# Patient Record
Sex: MALE | Race: WHITE | NOT HISPANIC OR LATINO | Employment: OTHER | URBAN - METROPOLITAN AREA
[De-identification: names, ages, dates, MRNs, and addresses within clinical notes are randomized per-mention and may not be internally consistent; named-entity substitution may affect disease eponyms.]

---

## 2017-01-05 DIAGNOSIS — K21.00 GASTROESOPHAGEAL REFLUX DISEASE WITH ESOPHAGITIS: Primary | ICD-10-CM

## 2017-01-05 RX ORDER — OMEPRAZOLE 40 MG/1
40 CAPSULE, DELAYED RELEASE ORAL DAILY
Qty: 90 CAPSULE | Refills: 0 | Status: SHIPPED | OUTPATIENT
Start: 2017-01-05 | End: 2017-03-23

## 2017-01-05 NOTE — TELEPHONE ENCOUNTER
omeprazole (PRILOSEC) 40 MG capsule      Last Written Prescription Date: 4/13/2016  Last Fill Quantity: 90 capsule,  # refills: 1   Last Office Visit with [facility], [facility] or [facility] prescribing provider: 4/13/2016

## 2017-01-05 NOTE — TELEPHONE ENCOUNTER
Prescription approved per [facility] Refill Protocol.  Dolores Kim, RN, BSN  [facility]

## 2017-01-09 ENCOUNTER — TELEPHONE (OUTPATIENT)
Dept: FAMILY MEDICINE | Facility: CLINIC | Age: 52
End: 2017-01-09

## 2017-01-09 DIAGNOSIS — Z53.9 DIAGNOSIS NOT YET DEFINED: Primary | ICD-10-CM

## 2017-01-09 PROCEDURE — G0179 MD RECERTIFICATION HHA PT: HCPCS | Performed by: FAMILY MEDICINE

## 2017-01-09 NOTE — TELEPHONE ENCOUNTER
Forms received by: Fax    Purpose of Form:  Home Health    How the form needs to be returned for patient:  Fax    Form currently placed:  inbox    Steph Tolbert CMA

## 2017-01-23 ENCOUNTER — TELEPHONE (OUTPATIENT)
Dept: FAMILY MEDICINE | Facility: CLINIC | Age: 52
End: 2017-01-23

## 2017-01-23 NOTE — TELEPHONE ENCOUNTER
Forms received by: Fax    Purpose of Form:  Accurate Home Care    How the form needs to be returned for patient:  Fax    Form currently placed:  inbox    Steph Tolbert CMA

## 2017-03-06 ENCOUNTER — TELEPHONE (OUTPATIENT)
Dept: FAMILY MEDICINE | Facility: CLINIC | Age: 52
End: 2017-03-06

## 2017-03-06 NOTE — TELEPHONE ENCOUNTER
Forms received by: Fax    Purpose of Form:  Orders    How the form needs to be returned for patient:  Fax    Form currently placed:  inbox    Steph Tolbert CMA

## 2017-03-13 ENCOUNTER — TELEPHONE (OUTPATIENT)
Dept: FAMILY MEDICINE | Facility: CLINIC | Age: 52
End: 2017-03-13

## 2017-03-13 DIAGNOSIS — Z53.9 DIAGNOSIS NOT YET DEFINED: Primary | ICD-10-CM

## 2017-03-13 PROCEDURE — 99207 C MD CERTIFICATION HHA PATIENT: CPT | Performed by: FAMILY MEDICINE

## 2017-03-13 NOTE — TELEPHONE ENCOUNTER
Date Forms was received: March 13, 2017    Forms received by: Fax    Last office visit: 04/20/2016    Purpose of Form:  Accurate Home Care    When the form is due:  ASAP    How the form needs to be returned for patient:  Fax to [phone]    Form currently placed  Dr Terrance Lovett LPN

## 2017-03-13 NOTE — TELEPHONE ENCOUNTER
Date Forms was received: March 13, 2017    Forms received by: Fax    Last office visit: 04/2016    Purpose of Form:  Home Health Cert,Plan of Care    When the form is due:  ASAP    How the form needs to be returned for patient:  Fax    Form currently placed  To Dr Terrance Lovett LPN

## 2017-03-23 DIAGNOSIS — K21.00 GASTROESOPHAGEAL REFLUX DISEASE WITH ESOPHAGITIS: ICD-10-CM

## 2017-03-23 NOTE — TELEPHONE ENCOUNTER
Omeprazole DR 40MG Capsule      Last Written Prescription Date: 01/05/2017  Last Fill Quantity: 90,  # refills: 0   Last Office Visit with G, P or [facility] prescribing provider: navneet

## 2017-03-24 RX ORDER — OMEPRAZOLE 40 MG/1
40 CAPSULE, DELAYED RELEASE ORAL DAILY
Qty: 90 CAPSULE | Refills: 0 | Status: SHIPPED | OUTPATIENT
Start: 2017-03-24 | End: 2017-07-25

## 2017-03-24 NOTE — TELEPHONE ENCOUNTER
Prescription approved per [facility] Refill Protocol.  LOV 4/13/16  Madelyn Aranda R.N.

## 2017-03-29 ENCOUNTER — OFFICE VISIT (OUTPATIENT)
Dept: FAMILY MEDICINE | Facility: CLINIC | Age: 52
End: 2017-03-29
Payer: MEDICARE

## 2017-03-29 VITALS
DIASTOLIC BLOOD PRESSURE: 64 MMHG | HEART RATE: 69 BPM | TEMPERATURE: 98 F | SYSTOLIC BLOOD PRESSURE: 118 MMHG | BODY MASS INDEX: 27.92 KG/M2 | OXYGEN SATURATION: 98 % | WEIGHT: 195 LBS | HEIGHT: 70 IN

## 2017-03-29 DIAGNOSIS — M21.371 RIGHT FOOT DROP: ICD-10-CM

## 2017-03-29 DIAGNOSIS — M25.561 ACUTE PAIN OF RIGHT KNEE: Primary | ICD-10-CM

## 2017-03-29 DIAGNOSIS — S06.9XAS NEUROBEHAVIORAL SEQUELAE OF TRAUMATIC BRAIN INJURY (H): ICD-10-CM

## 2017-03-29 DIAGNOSIS — G83.10 MONOPLEGIA OF LOWER LIMB AFFECTING DOMINANT SIDE (H): ICD-10-CM

## 2017-03-29 DIAGNOSIS — R26.9 ABNORMAL GAIT: ICD-10-CM

## 2017-03-29 DIAGNOSIS — R46.89 NEUROBEHAVIORAL SEQUELAE OF TRAUMATIC BRAIN INJURY (H): ICD-10-CM

## 2017-03-29 PROCEDURE — 99214 OFFICE O/P EST MOD 30 MIN: CPT | Performed by: PHYSICIAN ASSISTANT

## 2017-03-29 NOTE — NURSING NOTE
"Chief Complaint   Patient presents with     Musculoskeletal Problem       Initial /64  Pulse 69  Temp 98  F (36.7  C) (Oral)  Ht 5' 10\" (1.778 m)  Wt 195 lb (88.5 kg)  SpO2 98%  BMI 27.98 kg/m2 Estimated body mass index is 27.98 kg/(m^2) as calculated from the following:    Height as of this encounter: 5' 10\" (1.778 m).    Weight as of this encounter: 195 lb (88.5 kg)..  BP completed using cuff size: keaton Goss MA  "

## 2017-03-29 NOTE — MR AVS SNAPSHOT
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: Neurology Clinic - [place] [phone]   [email or web address]  HX of TBI  [facility]: [facility] of [facility] - [place] [phone]   [email or web address]  [place] [phone]   [email or web address]  [facility]: [facility] - [place] [phone]   [email or web address]    Reason for Referral: Consult    Please be aware that coverage of these services is subject to the terms and limitations of your health insurance plan.  Call member services at your health plan with any benefit or coverage questions.      Please bring the following with you to your appointment:    (1) Any X-Rays, CTs or MRIs which have been performed.  Contact the facility where they were done to arrange for  prior to your scheduled appointment.    (2) List of current medications  (3) This referral request   (4) Any documents/labs given to you for this referral            PHYSICAL THERAPY REFERRAL       *This therapy referral will be filtered to a centralized scheduling office at [facility] and the patient will receive a call to schedule an appointment at a [place] location most convenient for them. *     [facility] provides Physical Therapy  evaluation and treatment and many specialty services across the [place] system.  If requesting a specialty program, please choose from the list below.    If you have not heard from the scheduling office within 2 business days, please call [phone] for all locations, with the exception of [place], please call [phone].  Treatment: Evaluation & Treatment  Special Instructions/Modalities: Would like PT and OT evaluation for abnormal gait  Special Programs: Can we help fit him with a new brace for his gait instability as well?  If needs new orders or referral let me know.  Also completed a neurology consult and if feels he should complete this first let me know.    Electronically Signed By: Katia Huntley PA-C  [phone]    Please be aware that coverage of these services is subject to the terms and limitations of your health insurance plan.  Call member services at your health plan with any benefit or coverage questions.      **Note to Provider:  If you are referring outside of [facility] for the therapy appointment, please list the name of the location in the  special instructions  above, print the referral and give to the patient to schedule the appointment.                  Your next 10 appointments already scheduled     May 03, 2017  3:15 PM CDT   Evaluation with Lori Mcelroy, PT   [facility] ([facility])    [address] [place] [id number] [phone]              Who to contact     If you have questions or need follow up information about today's clinic visit or your schedule please contact [facility] [place] directly at [phone].  Normal or non-critical lab and imaging results will be communicated to you by MyChart, letter or phone within 4 business days after the clinic has received the results. If you do not hear from us within 7 days, please contact the clinic through MyChart or phone. If you have a critical or abnormal lab result,  "we will notify you by phone as soon as possible.  Submit refill requests through [employer] or call your pharmacy and they will forward the refill request to us. Please allow 3 business days for your refill to be completed.          Additional Information About Your Visit        [employer] Information     [employer] lets you send messages to your doctor, view your test results, renew your prescriptions, schedule appointments and more. To sign up, go to [email or web address] . Click on \"Log in\" on the left side of the screen, which will take you to the Welcome page. Then click on \"Sign up Now\" on the right side of the page.     You will be asked to enter the access code listed below, as well as some personal information. Please follow the directions to create your username and password.     Your access code is: [email or web address]  Expires: 2017  8:15 AM     Your access code will  in 90 days. If you need help or a new code, please call your [place] clinic or [phone].        Care EveryWhere ID     This is your Care EveryWhere ID. This could be used by other organizations to access your [place] medical records  [id number]        Your Vitals Were     Pulse Temperature Height Pulse Oximetry BMI (Body Mass Index)       69 98  F (36.7  C) (Oral) 5' 10\" (1.778 m) 98% 27.98 kg/m2        Blood Pressure from Last 3 Encounters:   17 118/64   16 141/69   16 112/62    Weight from Last 3 Encounters:   17 195 lb (88.5 kg)   16 190 lb (86.2 kg)   16 192 lb (87.1 kg)              We Performed the Following     NEUROLOGY ADULT REFERRAL     PHYSICAL THERAPY REFERRAL          Today's Medication Changes          These changes are accurate as of: 3/29/17 11:59 PM.  If you have any questions, ask your nurse or doctor.               Stop taking these medicines if you haven't already. Please contact your care team if you have questions.     order for DME   Stopped by:  Katia Huntley, " [place]                    Primary Care Provider Office Phone # Fax #    Alberto Carlos Jr., [phone]       [facility] [address] [facility] [address]        Thank you!     Thank you for choosing [facility]  for your care. Our goal is always to provide you with excellent care. Hearing back from our patients is one way we can continue to improve our services. Please take a few minutes to complete the written survey that you may receive in the mail after your visit with us. Thank you!             Your Updated Medication List - Protect others around you: Learn how to safely use, store and throw away your medicines at www.disposemymeds.org.          This list is accurate as of: 3/29/17 11:59 PM.  Always use your most recent med list.                   Brand Name Dispense Instructions for use    aspirin 325 MG EC tablet     90 tablet    Take 1 tablet (325 mg) by mouth every morning       dicyclomine 10 MG capsule    BENTYL    240 capsule    Take 1 capsule (10 mg) by mouth 4 times daily (before meals and nightly)       divalproex 500 MG 24 hr tablet    DEPAKOTE ER    180 tablet    Take 2 tablets (1,000 mg) by mouth daily       indomethacin 50 MG capsule    INDOCIN    30 capsule    Take 1 capsule (50 mg) by mouth 3 times daily (with meals)       Multi-vitamin Tabs tablet   Generic drug:  multivitamin, therapeutic with minerals      1 tab q d       omeprazole 40 MG capsule    priLOSEC    90 capsule    Take 1 capsule (40 mg) by mouth daily Take 30-60 minutes before a meal.       oxyCODONE-acetaminophen 5-325 MG per tablet    PERCOCET    20 tablet    Take 1-2 tablets by mouth every 6 hours as needed for moderate to severe pain       PARoxetine 40 MG tablet    PAXIL    135 tablet    Take 1.5 tablets (60 mg) by mouth every morning       polyethylene glycol powder    MIRALAX    510 g    Take 17 g (1 capful) by mouth daily       PROBIOTIC DAILY PO          senna-docusate 8.6-50 MG  per tablet    SENOKOT S    60 tablet    Take 1 tablet by mouth 2 times daily as needed for constipation       simvastatin 80 MG tablet    ZOCOR    90 tablet    Take 1 tablet (80 mg) by mouth At Bedtime       VITAMIN D (CHOLECALCIFEROL) PO      Take by mouth daily

## 2017-03-29 NOTE — PROGRESS NOTES
SUBJECTIVE:                                                    Yunior Angel is a 51 year old male who presents to clinic today for the following health issues:    Concern - right leg - knee area     Onset: 2 weeks - 5 days ago was the worst    Description:   When walking there is pain - rated 7/10 while walking and 0/10 at rest    Intensity: mild    Progression of Symptoms:  same    Accompanying Signs & Symptoms:  Limited activity at gym - can no longer walk on treadmill  0/10 pain at rest.  Only has pain with bearing weight rates as 6-7.  Stairs make worse.   Never has swelling or bruising.   No popping, catching or locking.   No remembered injury.       Previous history of similar problem:   none    Precipitating factors:   Worsened by: walking    Alleviating factors:  Improved by: laying down and sitting       Therapies Tried and outcome: knee brace - has worn this intermittently for past 3 years as has abnormal gait from his TBI in the 1990's. Has not seen neurology for years. No pain medication.    Pt accompanied by his mother who does provide some of the history today, but pt otherwise appears to be a pretty good historian.    Problem list and histories reviewed & adjusted, as indicated.  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  Avulsion of the right great toenail.  2.  Excision of bone cyst distal phalanx, right great toe.        Social History   Substance Use Topics     Smoking status: Never Smoker     Smokeless tobacco: Never Used     Alcohol use No     Family History   Problem Relation Age of Onset     Neurologic Disorder Father      migraines     Family History Negative Mother      Cancer - colorectal No family hx of      Colon Cancer No family hx of          Current Outpatient Prescriptions   Medication Sig Dispense Refill     omeprazole (PRILOSEC) 40 MG capsule Take 1 capsule (40 mg) by mouth daily Take 30-60 minutes before a meal. 90 capsule 0     divalproex (DEPAKOTE ER) 500 MG 24 hr tablet Take 2 tablets (1,000 mg) by mouth daily 180 tablet 1     PARoxetine (PAXIL) 40 MG tablet Take 1.5 tablets (60 mg) by mouth every morning 135 tablet 1     aspirin 325 MG EC tablet Take 1 tablet (325 mg) by mouth every morning 90 tablet 2     indomethacin (INDOCIN) 50 MG capsule Take 1 capsule (50 mg) by mouth 3 times daily (with meals) 30 capsule 0     oxyCODONE-acetaminophen (PERCOCET) 5-325 MG per tablet Take 1-2 tablets by mouth every 6 hours as needed for moderate to severe pain 20 tablet 0     polyethylene glycol (MIRALAX) powder Take 17 g (1 capful) by mouth daily 510 g 1     simvastatin (ZOCOR) 80 MG tablet Take 1 tablet (80 mg) by mouth At Bedtime 90 tablet 3     dicyclomine (BENTYL) 10 MG capsule Take 1 capsule (10 mg) by mouth 4 times daily (before meals and nightly) 240 capsule 3     senna-docusate (SENOKOT S) 8.6-50 MG per tablet Take 1 tablet by mouth 2  "times daily as needed for constipation 60 tablet 5     VITAMIN D, CHOLECALCIFEROL, PO Take by mouth daily       Probiotic Product (PROBIOTIC DAILY PO)        MULTI-VITAMIN OR TABS 1 tab q d        Allergies   Allergen Reactions     Insect Extract      red ants       Reviewed and updated as needed this visit by clinical staff  Tobacco  Allergies  Meds  Med Hx  Surg Hx  Fam Hx  Soc Hx      Reviewed and updated as needed this visit by Provider  Tobacco  Allergies  Meds  Med Hx  Surg Hx  Fam Hx  Soc Hx        ROS:  CONSTITUTIONAL:NEGATIVE for fever, chills, change in weight  INTEGUMENTARY/SKIN: NEGATIVE for worrisome rashes, moles or lesions  MUSCULOSKELETAL: POSITIVE  for R knee pain.    OBJECTIVE:                                                    /64  Pulse 69  Temp 98  F (36.7  C) (Oral)  Ht 5' 10\" (1.778 m)  Wt 195 lb (88.5 kg)  SpO2 98%  BMI 27.98 kg/m2  Body mass index is 27.98 kg/(m^2).  GENERAL: healthy, alert and no distress  MS: ambulates with antalgic gait picking-up R hip in order to place foot on ground as appears to have R foot drop. Utilizes custom knee orthosis, but knee appears to still hyperextend a little bit.   Neuro: Normal 2+ LE DTRs, but has decreased strength to dorsiflexion against resistance of R foot in comparison to L.   Knee: Wearing a R knee brace. Has no pain with active non-weight bearing ROM. No TTP of patella, medial/lateral joint lines or bony landmarks. Negative Rafi testing, medial/lateral stress testing and patellar grind.  SKIN: no rash or areas of abrasion, injury from brace.    Diagnostic Test Results:  X-ray considered today, but machine is broken.     ASSESSMENT/PLAN:                                                          ICD-10-CM    1. Acute pain of right knee M25.561 PHYSICAL THERAPY REFERRAL   2. Abnormal gait R26.9 PHYSICAL THERAPY REFERRAL     NEUROLOGY ADULT REFERRAL   3. Monoparesis, Right Lower Extremity, due to TBI G83.10 PHYSICAL " THERAPY REFERRAL     NEUROLOGY ADULT REFERRAL   4. Right foot drop M21.371 PHYSICAL THERAPY REFERRAL     NEUROLOGY ADULT REFERRAL   5. 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 Exam suggests that his R knee orthosis may now be contributing to peroneal injury resulting in R foot drop.   Would likely benefit from re-consultation with neurology to confirm diagnosis and ensure no other follow-up required.  Additionally feel that further evaluation with PT and OT would be of benefit to see if we can help strengthen his  R side and help fit him for a different orthosis.  Considered completing x-ray today, but this was unavailable.  Could consider if starting to have pain at rest or new concerns.  Had wanted to verify referral info for mom/pt so did call them after our visit with this and asked for return call with any questions.  Did call back again and they have appt already scheduled with PT and will call to schedule neurology consult at \[facility]\"" [facility] in [place].    A total of 30 minutes was spent with the patient today, with greater than 50% of the visit involving counseling and coordination of care regarding possible origins of his knee pain and next best treatment plan as noted above.  Katia Huntley PA-C  [facility]

## 2017-04-10 ENCOUNTER — TELEPHONE (OUTPATIENT)
Dept: FAMILY MEDICINE | Facility: CLINIC | Age: 52
End: 2017-04-10

## 2017-04-10 NOTE — TELEPHONE ENCOUNTER
Forms received by: Fax    Purpose of Form:  Accommodations to home fixtures    How the form needs to be returned for patient:  Fax    Form currently placed:  inbox    Steph Tolbert CMA

## 2017-05-03 ENCOUNTER — HOSPITAL ENCOUNTER (OUTPATIENT)
Dept: PHYSICAL THERAPY | Facility: CLINIC | Age: 52
Setting detail: THERAPIES SERIES
End: 2017-05-03
Attending: PHYSICIAN ASSISTANT
Payer: MEDICARE

## 2017-05-03 PROCEDURE — 97110 THERAPEUTIC EXERCISES: CPT | Mod: GP | Performed by: PHYSICAL THERAPIST

## 2017-05-03 PROCEDURE — G8979 MOBILITY GOAL STATUS: HCPCS | Mod: GP,CJ | Performed by: PHYSICAL THERAPIST

## 2017-05-03 PROCEDURE — 40000185 ZZHC STATISTIC PT OUTPT VISIT: Performed by: PHYSICAL THERAPIST

## 2017-05-03 PROCEDURE — G8978 MOBILITY CURRENT STATUS: HCPCS | Mod: GP,CK | Performed by: PHYSICAL THERAPIST

## 2017-05-03 PROCEDURE — 97162 PT EVAL MOD COMPLEX 30 MIN: CPT | Mod: GP | Performed by: PHYSICAL THERAPIST

## 2017-05-03 NOTE — PROGRESS NOTES
[facility]        OUTPATIENT PHYSICAL THERAPY FUNCTIONAL EVALUATION  PLAN OF TREATMENT FOR OUTPATIENT REHABILITATION  (COMPLETE FOR INITIAL CLAIMS ONLY)  Patient's Last Name, First Name, M.I.  YOB: 1965  JeanneYunior  LINDSAY     Provider's Name   [facility]   Medical Record No.  [id number]     Start of Care Date:  05/03/17   Onset Date:   (knee pain about 6-8 weeks ago.  TBI 1991)   Type:     _X__PT   ____OT  ____SLP Medical Diagnosis:  right knee pain; h/o TBI and sequelae     PT Diagnosis:  impaired postural alignment and gait mechanics resulting in right knee pain further limiting mobility.  Visits from SOC:  1                              __________________________________________________________________________________  Plan of Treatment/Functional Goals:  gait training, joint mobilization, neuromuscular re-education, orthotic fitting/training, strengthening, transfer training, stretching, manual therapy           GOALS  1  Yunior will be able to negotiate stairs in a reciprocal pattern and one rail with no c/o pain or instability for greater safety at home and in community.   07/02/17    2  Yunior will rise to stand from an 18 inch surface with feet no more than 10 inches apart with minimal use of hands and minimal c/o pain for greater ease and safety with transitions in home and community areas.   07/02/17    3  Yunior will ambulate 25 feet in 7.5 sec or less to indicate improved gait efficiency and reduced level of impairment.  07/02/17    4  Yunior will be independent in a home ex program to address his system and functional impairments to self management of his condition.   07/02/17                                                Therapy Frequency:  1 time/week   Predicted Duration of Therapy Intervention:  60 days    Lori Mcelroy, PT                                     I CERTIFY THE NEED FOR THESE SERVICES FURNISHED UNDER        THIS PLAN OF TREATMENT AND WHILE UNDER MY CARE     (Physician co-signature of this document indicates review and certification of the therapy plan).                Certification Date From:  05/03/17   Certification Date To:  07/02/17    Referring Provider:  Katia Kearns PA-C    Initial Assessment  See Epic Evaluation- Start of Care Date: 05/03/17

## 2017-05-03 NOTE — PROGRESS NOTES
05/03/17 1500   Quick Adds   Type of Visit Initial OP PT Evaluation   General Information   Start of Care Date 05/03/17   Referring Physician Katia Kearns PA-C   Orders Evaluate and Treat as Indicated   Order Date 03/29/17   Medical Diagnosis Right knee pain; h/o closed head injury, sequelae of TBI   Onset of illness/injury or Date of Surgery (knee pain about 6-8 weeks ago.  TBI 1991)   Precautions/Limitations fall precautions   Surgical/Medical history reviewed Yes   Pertinent history of current problem (include personal factors and/or comorbidities that impact the POC) Pt presents to PT with c/o increased rigth knee pain over the past 6-8 weeks.  Pt is known to me from previous episode of care.  He has h/o TBI in 1991 resulting in cognitive, memory impairmetns and right sided weakness as well as secondary impairments of tissue shortening and disuse atrophy of his right LE musculature.  Pt's mother Patsy is present to help with history of present condition.  She reports that about 6-8 weeks ago, pt started having c/o right knee pain with rising from sitting position , particularly with getting out of a car, as well as going up stairs.  She feels he started having greater difficulty on the stairs and then became more tentative and cautious on the stairs, mostly going up but also with descending at times.  Both Yunior and Patsy feel that his knee has improved over the past 4 weeks since he saw his PA but it is still bothersome.  Pt has a swedish knee cage brace that he was trained with in PT in his last episode of care 2 and half years ago and he still wears it daily to help limit knee hyperextension and malalignment that persists due to his brain injury.      Pertinent Visual History  wear glasses   Prior level of function comment independent, receives some help with housework. Walks up the stairs, but takes elevator down as he has a h/o fear of going down stairs as he sustained his TBI on the stairs  years ago.  Goes to [employer] 2 x per week. doing tmill less and bike more now. Starts golfing next week 1 x per week.    Diagnostic Tests (no imaging.  Xray machine was not working at time of MD appt)   Current Community Support Family/friend caregiver;Transportation service;Other/Comments  (mom Patsy helps as needed. Gets meals on wheels)   Patient role/Employment history Employed  (part time, mostly sitting)   Living environment Apartment/condo   Home/Community Accessibility Comments Has elevator.  sometimes uses stairs up.     Patient/Family Goals Statement Reduce knee pain   Fall Risk Screen   Fall screen completed by PT   Per patient - Fall 2 or more times in past year? No   Per patient - Fall with injury in past year? No   Timed Up and Go score (seconds) deferred.     Is patient a fall risk? Yes   Fall screen comments due to cognitive and physical impairments    System Outcome Measures   -PAC  Basic Mobility Score Level  (Lower scores equate to lower levels of function) 51.93   AM-PAC  Daily Activity Score Level  (Lower scores equate to lower levels of function) 91.66   AM-PAC  Applied Cognitive Score Level  (Lower scores equate to lower levels of function) 42.56   Pain   Patient currently in pain Yes   Pain location right distal thigh, proximal to patella   Pain rating unable to rate    Pain description (unable to describe)   Pain description comment grimaces when rises to stand.  May hurt more with rising after he has been sitting for awhile.  Feels it loosens up and gets better after first few steps.    Cognitive Status Examination   Orientation orientation to person, place and time   Level of Consciousness alert   Follows Commands and Answers Questions able to follow multistep instructions  (easily distracted, doesn't always answer questions directly)   Personal Safety and Judgment intact   Memory impaired   Posture   Posture Forward head position;Protracted shoulders   Posture Comments right knee  hyperextension with genu valgus of right LE, relative ankle PF and inversion in standing, wt shift to left,  asymmetry of pelvis and spine.ant pelvic tilt and increased lumbar lordosis .    Palpation   Palpation non tender to palpation at quadricep mm group, quad tendon, patellar tendon and MCL/LCL.  some mild tenderness to palpation at medial and lateral joint line.    Range of Motion (ROM)   ROM Comment hyperextension right knee, significant tissue shortening of right plantarflexors and ankle inverters.     Strength   Strength Comments weakness right hip extensors, right hip felxion 3+/5.  right knee ext 4+/5,  right knee flex 4-/5 with slight discomfort in distal thigh - all around front and back. right ankle DF 1+/5 with inversion.  0/5 everters.  hip abd/add in sitting appears similar to left.    Transfer Skills   Transfer Comments able to rise to stand from 21 inch surface with no UE support with effort.  decreased loading right LE - relies on left side.  altered movement pattern with decreased anter wt shift.     Gait   Gait Comments wearing swedish knee cage right LE - still demonstrates decreased hip ext in midstance to term stance with significant knee hyperextension from initial contact through stance phase.  right hip hike with circumduction in swing phase.  Initial contact on right with foot nearly flat - lackes heel contact, lacks ankle DF and ecc control into loading response on right.  compensations of trunk and pelvis due to limb impairments.     Gait Special Tests 25 Foot Timed Walk   Seconds 9.5   Steps 14 Steps   Comments no AD   Balance   Balance Comments able to stand fee together x 30 sec, feet together with EC 30 sec with adquate control.    Balance Special Tests Sit to Stand Reps in 30 Seconds   Reps in 30 seconds 11   Height 20   Comments decreased wt bearing throught right LE.  uses hands.     Sensory Examination   Sensory Perception Comments denies n/t   Coordination   Coordination  Comments impaired   Modality Interventions   Planned Modality Interventions Comments per PT   Planned Therapy Interventions   Planned Therapy Interventions gait training;joint mobilization;neuromuscular re-education;orthotic fitting/training;strengthening;transfer training;stretching;manual therapy   Clinical Impression   Criteria for Skilled Therapeutic Interventions Met yes, treatment indicated   PT Diagnosis impaired postural alignment and gait mechanics resulting in right knee pain further limiting mobility.    Influenced by the following impairments right LE mm imbalances, tissue shortening of right LE/foot and trunk, postural/alignment deviations, strongly engrained compensatory patterns, impaired memory and cognition, distractibility,   Functional limitations due to impairments decreased tolerance and movement quality of transfers, gait , risk for falls.    Clinical Presentation Evolving/Changing  (right knee pain is changing)   Clinical Presentation Rationale significant memory, cognition impairments, significant alignment/movement impairments, lives alone although mother supportive.  many impairments of chronic nature but with new acute pain.    Clinical Decision Making (Complexity) Moderate complexity   Therapy Frequency 1 time/week   Predicted Duration of Therapy Intervention (days/wks) 60 days   Risk & Benefits of therapy have been explained Yes   Patient, Family & other staff in agreement with plan of care Yes   Clinical Impression Comments Recommend follow up with orthotist to assess for any possible adjustments of right swedish knee cage.    Education Assessment   Preferred Learning Style Demonstration;Pictures/video   Barriers to Learning Cognitive   Goal 1   Goal Identifier 1   Goal Description Yunior will be able to negotiate stairs in a reciprocal pattern and one rail with no c/o pain or instability for greater safety at home and in community.    Target Date 07/02/17   Goal 2   Goal Identifier 2    Goal Description Yunior will rise to stand from an 18 inch surface with feet no more than 10 inches apart with minimal use of hands and minimal c/o pain for greater ease and safety with transitions in home and community areas.    Target Date 07/02/17   Goal 3   Goal Identifier 3   Goal Description Yunior will ambulate 25 feet in 7.5 sec or less to indicate improved gait efficiency and reduced level of impairment.   Target Date 07/02/17   Goal 4   Goal Identifier 4   Goal Description Yunior will be independent in a home ex program to address his system and functional impairments to self management of his condition.    Target Date 07/02/17   Total Evaluation Time   Total Evaluation Time (Minutes) 35   Therapy Certification   Certification date from 05/03/17   Certification date to 07/02/17   Medical Diagnosis right knee pain; h/o TBI and sequelae

## 2017-05-09 DIAGNOSIS — K59.09 CHRONIC CONSTIPATION: ICD-10-CM

## 2017-05-09 NOTE — TELEPHONE ENCOUNTER
senna-docusate (SENOKOT S) 8.6-50 MG per tablet      Last Written Prescription Date: 4/13/2016  Last Fill Quantity: 60 tablet,  # refills: 5   Last Office Visit with [facility], [facility] or [facility] prescribing provider: 3/29/2017

## 2017-05-10 ENCOUNTER — TELEPHONE (OUTPATIENT)
Dept: FAMILY MEDICINE | Facility: CLINIC | Age: 52
End: 2017-05-10

## 2017-05-10 ENCOUNTER — HOSPITAL ENCOUNTER (OUTPATIENT)
Dept: PHYSICAL THERAPY | Facility: CLINIC | Age: 52
Setting detail: THERAPIES SERIES
End: 2017-05-10
Attending: PHYSICIAN ASSISTANT
Payer: MEDICARE

## 2017-05-10 PROCEDURE — 40000719 ZZHC STATISTIC PT DEPARTMENT NEURO VISIT: Performed by: PHYSICAL THERAPIST

## 2017-05-10 PROCEDURE — 97112 NEUROMUSCULAR REEDUCATION: CPT | Mod: GP | Performed by: PHYSICAL THERAPIST

## 2017-05-10 NOTE — TELEPHONE ENCOUNTER
Skilled nurse requesting orders every other week for med management    Approved orders.    Brittany Mcintyre RN    [facility]

## 2017-05-11 RX ORDER — AMOXICILLIN 250 MG
1 CAPSULE ORAL 2 TIMES DAILY PRN
Qty: 180 TABLET | Refills: 1 | Status: SHIPPED | OUTPATIENT
Start: 2017-05-11 | End: 2018-01-04

## 2017-05-11 NOTE — TELEPHONE ENCOUNTER
Prescription approved per [facility] Refill Protocol.  Dolores Kim, RN, BSN  [facility]

## 2017-05-12 ENCOUNTER — HOSPITAL ENCOUNTER (OUTPATIENT)
Dept: LAB | Facility: CLINIC | Age: 52
Discharge: HOME | End: 2017-05-12
Attending: PHYSICIAN ASSISTANT | Admitting: PHYSICIAN ASSISTANT
Payer: MEDICARE

## 2017-05-12 DIAGNOSIS — G24.8 DYSTONIA OF EXTREMITY: ICD-10-CM

## 2017-05-12 DIAGNOSIS — Z87.820 HISTORY OF TRAUMATIC BRAIN INJURY: Primary | ICD-10-CM

## 2017-05-12 DIAGNOSIS — M79.18 MYOFASCIAL MUSCLE PAIN: ICD-10-CM

## 2017-05-12 LAB
CK SERPL-CCNC: 116 U/L (ref 30–300)
GGT SERPL-CCNC: 20 U/L (ref 0–75)

## 2017-05-12 PROCEDURE — 36415 COLL VENOUS BLD VENIPUNCTURE: CPT | Performed by: PHYSICIAN ASSISTANT

## 2017-05-12 PROCEDURE — 82550 ASSAY OF CK (CPK): CPT | Performed by: PHYSICIAN ASSISTANT

## 2017-05-12 PROCEDURE — 82977 ASSAY OF GGT: CPT | Mod: GA | Performed by: PHYSICIAN ASSISTANT

## 2017-05-15 ENCOUNTER — TELEPHONE (OUTPATIENT)
Dept: FAMILY MEDICINE | Facility: CLINIC | Age: 52
End: 2017-05-15

## 2017-05-15 DIAGNOSIS — Z53.9 DIAGNOSIS NOT YET DEFINED: Primary | ICD-10-CM

## 2017-05-15 PROCEDURE — G0179 MD RECERTIFICATION HHA PT: HCPCS | Performed by: FAMILY MEDICINE

## 2017-05-17 ENCOUNTER — HOSPITAL ENCOUNTER (OUTPATIENT)
Dept: PHYSICAL THERAPY | Facility: CLINIC | Age: 52
Setting detail: THERAPIES SERIES
End: 2017-05-17
Attending: PHYSICIAN ASSISTANT
Payer: MEDICARE

## 2017-05-17 PROCEDURE — 97112 NEUROMUSCULAR REEDUCATION: CPT | Mod: GP | Performed by: PHYSICAL THERAPIST

## 2017-05-17 PROCEDURE — 40000719 ZZHC STATISTIC PT DEPARTMENT NEURO VISIT: Performed by: PHYSICAL THERAPIST

## 2017-05-17 PROCEDURE — 97110 THERAPEUTIC EXERCISES: CPT | Mod: GP | Performed by: PHYSICAL THERAPIST

## 2017-05-18 ENCOUNTER — MEDICAL CORRESPONDENCE (OUTPATIENT)
Dept: HEALTH INFORMATION MANAGEMENT | Facility: CLINIC | Age: 52
End: 2017-05-18

## 2017-05-24 ENCOUNTER — HOSPITAL ENCOUNTER (OUTPATIENT)
Dept: PHYSICAL THERAPY | Facility: CLINIC | Age: 52
Setting detail: THERAPIES SERIES
End: 2017-05-24
Attending: PHYSICIAN ASSISTANT
Payer: MEDICARE

## 2017-05-24 PROCEDURE — 97110 THERAPEUTIC EXERCISES: CPT | Mod: GP | Performed by: PHYSICAL THERAPIST

## 2017-05-24 PROCEDURE — 40000719 ZZHC STATISTIC PT DEPARTMENT NEURO VISIT: Performed by: PHYSICAL THERAPIST

## 2017-05-24 PROCEDURE — 97112 NEUROMUSCULAR REEDUCATION: CPT | Mod: GP | Performed by: PHYSICAL THERAPIST

## 2017-06-07 ENCOUNTER — HOSPITAL ENCOUNTER (OUTPATIENT)
Dept: PHYSICAL THERAPY | Facility: CLINIC | Age: 52
Setting detail: THERAPIES SERIES
End: 2017-06-07
Attending: PHYSICIAN ASSISTANT
Payer: MEDICARE

## 2017-06-07 PROCEDURE — 40000719 ZZHC STATISTIC PT DEPARTMENT NEURO VISIT: Performed by: PHYSICAL THERAPIST

## 2017-06-07 PROCEDURE — 97112 NEUROMUSCULAR REEDUCATION: CPT | Mod: GP | Performed by: PHYSICAL THERAPIST

## 2017-06-07 PROCEDURE — 97110 THERAPEUTIC EXERCISES: CPT | Mod: GP | Performed by: PHYSICAL THERAPIST

## 2017-06-13 ENCOUNTER — HOSPITAL ENCOUNTER (OUTPATIENT)
Dept: PHYSICAL THERAPY | Facility: CLINIC | Age: 52
Setting detail: THERAPIES SERIES
End: 2017-06-13
Attending: PHYSICIAN ASSISTANT
Payer: MEDICARE

## 2017-06-13 PROCEDURE — 97110 THERAPEUTIC EXERCISES: CPT | Mod: GP | Performed by: PHYSICAL THERAPIST

## 2017-06-13 PROCEDURE — 40000719 ZZHC STATISTIC PT DEPARTMENT NEURO VISIT: Performed by: PHYSICAL THERAPIST

## 2017-06-13 PROCEDURE — 97116 GAIT TRAINING THERAPY: CPT | Mod: GP | Performed by: PHYSICAL THERAPIST

## 2017-06-15 DIAGNOSIS — R46.89 NEUROBEHAVIORAL SEQUELAE OF TRAUMATIC BRAIN INJURY (H): Primary | ICD-10-CM

## 2017-06-15 DIAGNOSIS — S06.9XAS NEUROBEHAVIORAL SEQUELAE OF TRAUMATIC BRAIN INJURY (H): Primary | ICD-10-CM

## 2017-06-15 NOTE — TELEPHONE ENCOUNTER
Paroxetine HCL 40MG     Last Written Prescription Date: 10/27/16  Last Fill Quantity: 135, # refills: 1  Last Office Visit with [facility] primary care provider:  3/29/17        Last PHQ-9 score on record=   PHQ-9 SCORE 12/10/2014   Total Score 3

## 2017-06-16 RX ORDER — PAROXETINE 40 MG/1
60 TABLET, FILM COATED ORAL EVERY MORNING
Qty: 30 TABLET | Refills: 0 | Status: SHIPPED | OUTPATIENT
Start: 2017-06-16 | End: 2017-06-26

## 2017-06-16 NOTE — TELEPHONE ENCOUNTER
Chart reviewed.  Rx sent to pt's preferred pharmacy.  Needs to be seen.  Please have patient make appointment.     [id number] - [place], [place] - [address] [place] [address] [address]    Alberto Carlos MD

## 2017-06-16 NOTE — TELEPHONE ENCOUNTER
Dx for medication is migraines. No depression or anxiety listed on patient's problem list.   Routing refill request to provider for review/approval because:  Patient needs to be seen because it has been more than 1 year since last office visit for this issue.       Madelyn Aranda R.N.

## 2017-06-16 NOTE — TELEPHONE ENCOUNTER
I spoke with pharmacist he is questioning why only a 20 day supply. I informed him patient is overdue for office visit and this is a courtesy refill. Patient will need to be seen by provider for further refills. I asked if they could tell patient this and they said they would. Madelyn Aranda R.N.      Will route to  to call patient for appointment. Madelyn Aranda R.N.

## 2017-06-16 NOTE — TELEPHONE ENCOUNTER
Pharmacy wants to verify quantity .  Please call them back @ [phone]  Татьяна Carpenter, Clinic Receptionist

## 2017-06-19 ENCOUNTER — TELEPHONE (OUTPATIENT)
Dept: FAMILY MEDICINE | Facility: CLINIC | Age: 52
End: 2017-06-19

## 2017-06-19 NOTE — TELEPHONE ENCOUNTER
Forms were dropped off with an incorrect middle and last name so patient could not initially be found.  Called [phone] and spoke with sister who gave me correct patient info.  Pt needs to be seen for this and does have an appt scheduled on 6/26/2017 with GISSELLE.  Form given to GISSELLE for completion at upcoming appt.  Ping Garay

## 2017-06-21 ENCOUNTER — HOSPITAL ENCOUNTER (OUTPATIENT)
Dept: PHYSICAL THERAPY | Facility: CLINIC | Age: 52
Setting detail: THERAPIES SERIES
End: 2017-06-21
Attending: PHYSICIAN ASSISTANT
Payer: MEDICARE

## 2017-06-21 PROCEDURE — 97110 THERAPEUTIC EXERCISES: CPT | Mod: GP | Performed by: PHYSICAL THERAPIST

## 2017-06-21 PROCEDURE — 40000719 ZZHC STATISTIC PT DEPARTMENT NEURO VISIT: Performed by: PHYSICAL THERAPIST

## 2017-06-21 PROCEDURE — 97112 NEUROMUSCULAR REEDUCATION: CPT | Mod: GP | Performed by: PHYSICAL THERAPIST

## 2017-06-26 ENCOUNTER — RADIANT APPOINTMENT (OUTPATIENT)
Dept: GENERAL RADIOLOGY | Facility: CLINIC | Age: 52
End: 2017-06-26
Attending: PHYSICIAN ASSISTANT
Payer: MEDICARE

## 2017-06-26 ENCOUNTER — OFFICE VISIT (OUTPATIENT)
Dept: FAMILY MEDICINE | Facility: CLINIC | Age: 52
End: 2017-06-26
Payer: MEDICARE

## 2017-06-26 VITALS
DIASTOLIC BLOOD PRESSURE: 74 MMHG | OXYGEN SATURATION: 98 % | BODY MASS INDEX: 27.66 KG/M2 | HEIGHT: 70 IN | HEART RATE: 83 BPM | TEMPERATURE: 97.4 F | SYSTOLIC BLOOD PRESSURE: 126 MMHG | WEIGHT: 193.2 LBS

## 2017-06-26 DIAGNOSIS — R46.89 NEUROBEHAVIORAL SEQUELAE OF TRAUMATIC BRAIN INJURY (H): ICD-10-CM

## 2017-06-26 DIAGNOSIS — S06.9XAS NEUROBEHAVIORAL SEQUELAE OF TRAUMATIC BRAIN INJURY (H): ICD-10-CM

## 2017-06-26 DIAGNOSIS — E78.5 HYPERLIPIDEMIA LDL GOAL <160: ICD-10-CM

## 2017-06-26 DIAGNOSIS — K21.00 GASTROESOPHAGEAL REFLUX DISEASE WITH ESOPHAGITIS: ICD-10-CM

## 2017-06-26 DIAGNOSIS — K58.0 IRRITABLE BOWEL SYNDROME WITH DIARRHEA: ICD-10-CM

## 2017-06-26 DIAGNOSIS — M25.561 RIGHT KNEE PAIN, UNSPECIFIED CHRONICITY: Primary | ICD-10-CM

## 2017-06-26 DIAGNOSIS — M25.561 RIGHT KNEE PAIN, UNSPECIFIED CHRONICITY: ICD-10-CM

## 2017-06-26 PROCEDURE — 99215 OFFICE O/P EST HI 40 MIN: CPT | Performed by: PHYSICIAN ASSISTANT

## 2017-06-26 PROCEDURE — 73562 X-RAY EXAM OF KNEE 3: CPT | Mod: RT

## 2017-06-26 RX ORDER — SIMVASTATIN 80 MG
80 TABLET ORAL AT BEDTIME
Qty: 90 TABLET | Refills: 0 | Status: SHIPPED | OUTPATIENT
Start: 2017-06-26 | End: 2018-02-08

## 2017-06-26 RX ORDER — PAROXETINE 40 MG/1
60 TABLET, FILM COATED ORAL EVERY MORNING
Qty: 90 TABLET | Refills: 1 | Status: SHIPPED | OUTPATIENT
Start: 2017-06-26 | End: 2017-11-10

## 2017-06-26 RX ORDER — BACLOFEN 10 MG/1
TABLET ORAL
Refills: 3 | COMMUNITY
Start: 2017-05-31 | End: 2018-02-21

## 2017-06-26 RX ORDER — DIVALPROEX SODIUM 250 MG/1
TABLET, EXTENDED RELEASE ORAL
Refills: 0 | COMMUNITY
Start: 2017-05-04 | End: 2018-02-21

## 2017-06-26 NOTE — PROGRESS NOTES
SUBJECTIVE:                                                    Yunior Angel is a 51 year old male who presents to clinic today for the following health issues:    Joint Pain    Onset: x 3 months     Description:   Location: Right knee   Character: Throbbing     Intensity: 10/10 (varies ) sitting and moving cause discomfort     Progression of Symptoms: worse    Accompanying Signs & Symptoms:  Other symptoms: numbness, tingling and swelling    History:   Previous similar pain: YES      Precipitating factors:   Trauma or overuse: YES    Alleviating factors:  Improved by: ice - mild improvement     Therapies Tried and outcome: Nothing     Difficulty obtaining history due to patient's TBI. Mother attempts to provide some history details.    Reports R anterolateral knee pain  States golf has been difficult for him due to the pain, so he recently stopped golfing. Initially states that it was the prolonged walking during golf that caused pain, but later states it was the twisting motion of his swing.   Gets stiffness when he sits for longer periods of time. Does think his knee loosens and becomes less painful once he has been walking for a bit.  Denies popping/clicking, but does state that the knee feels unstable.  Wears a knee orthosis. States they had it checked recently and were told that it fits him well and is appropriate to keep using.  Mother reports recent falls. Does also state that he has been having balance issues in general, for which he is attending physical therapy. Not doing any knee-specific physical therapy.  Physical therapist noticed the knee swelling and recommended a visit    Has history of gout in foot    Also needs form completed for [employer]. Has been using them for years to get to work.  Due to his traumatic brain injury, would be unable to navigate a bus schedule  He is unable to drive due to his TBI  Mother also notes some judgment problems. Has been known to wander off and think he can  walk home from the [facility], which is actually 4 miles. Cannot properly  the distance. Also got into a car with his mom's friends (whom he did not know) when they saw him walking.     Reports that he is doing well on Paxil. Denies depression or anxiety symptoms.  Mother is unsure what other medications he is taking. Someone comes to his home once per week to set up his medications for him. No known medication errors.  Mother believes that neurologist recently discontinued Depakote.  She believes he is still taking omeprazole.  Thinks he may have run out of his simvastatin.  Has indomethacin on medication list, but suspect this is from gout flare and that he is no longer on this  Mother has questions about what all of his medications are for.    Problem list and histories reviewed & adjusted, as indicated.  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  Avulsion of the right  great toenail.  2.  Excision of bone cyst distal phalanx, right great toe.        Social History   Substance Use Topics     Smoking status: Never Smoker     Smokeless tobacco: Never Used     Alcohol use No     Family History   Problem Relation Age of Onset     Neurologic Disorder Father      migraines     Family History Negative Mother      Cancer - colorectal No family hx of      Colon Cancer No family hx of          Current Outpatient Prescriptions   Medication Sig Dispense Refill     PARoxetine (PAXIL) 40 MG tablet Take 1.5 tablets (60 mg) by mouth every morning 90 tablet 1     simvastatin (ZOCOR) 80 MG tablet Take 1 tablet (80 mg) by mouth At Bedtime 90 tablet 0     aspirin 325 MG EC tablet Take 1 tablet (325 mg) by mouth every morning 90 tablet 2     baclofen (LIORESAL) 10 MG tablet   3     divalproex (DEPAKOTE ER) 250 MG 24 hr tablet   0     [DISCONTINUED] PARoxetine (PAXIL) 40 MG tablet Take 1.5 tablets (60 mg) by mouth every morning 30 tablet 0     senna-docusate (SENOKOT S) 8.6-50 MG per tablet Take 1 tablet by mouth 2 times daily as needed for constipation 180 tablet 1     omeprazole (PRILOSEC) 40 MG capsule Take 1 capsule (40 mg) by mouth daily Take 30-60 minutes before a meal. 90 capsule 0     divalproex (DEPAKOTE ER) 500 MG 24 hr tablet Take 2 tablets (1,000 mg) by mouth daily 180 tablet 1     indomethacin (INDOCIN) 50 MG capsule Take 1 capsule (50 mg) by mouth 3 times daily (with meals) 30 capsule 0     oxyCODONE-acetaminophen (PERCOCET) 5-325 MG per tablet Take 1-2 tablets by mouth every 6 hours as needed for moderate to severe pain 20 tablet 0     polyethylene glycol (MIRALAX) powder Take 17 g (1 capful) by mouth daily 510 g 1     dicyclomine (BENTYL) 10 MG capsule Take 1 capsule (10 mg) by mouth 4 times daily (before meals and nightly) 240 capsule 3     [DISCONTINUED] simvastatin (ZOCOR) 80 MG tablet Take 1 tablet (80 mg) by mouth At Bedtime 90 tablet 3     VITAMIN D, CHOLECALCIFEROL, PO Take by  "mouth daily       Probiotic Product (PROBIOTIC DAILY PO)        MULTI-VITAMIN OR TABS 1 tab q d        Allergies   Allergen Reactions     Insect Extract      red ants       Reviewed and updated as needed this visit by clinical staff       Reviewed and updated as needed this visit by Provider         ROS:  C: NEGATIVE for fever, chills, change in weight  INTEGUMENTARY/SKIN: NEGATIVE for worrisome rashes, moles or lesions  MUSCULOSKELETAL: POSITIVE  for knee pain  PSYCHIATRIC: NEGATIVE for changes in mood or affect    OBJECTIVE:     /74 (BP Location: Right arm, Patient Position: Chair, Cuff Size: Adult Regular)  Pulse 83  Temp 97.4  F (36.3  C) (Oral)  Ht 5' 10\" (1.778 m)  Wt 193 lb 3.2 oz (87.6 kg)  SpO2 98%  BMI 27.72 kg/m2  Body mass index is 27.72 kg/(m^2).  GENERAL: healthy, alert and no distress  MS: Mild anterior knee effusion. No pain with varus/valgus stress. No pain with knee extension. Antalgic gait. Knee appears stiff when he stands to ambulate.   SKIN: no suspicious lesions or rashes  PSYCH: tangential, affect normal/bright and appearance well groomed    Diagnostic Test Results:  Xray - Negative per my interpretation    ASSESSMENT/PLAN:     1. Right knee pain, unspecified chronicity  Discussed potential etiologies, including gout. I am concerned that his orthotic may not be fitting him properly, but they assure me that they recently had it checked. Right leg does have evidence of muscle atrophy; has monoparesis in R lower extremity secondary to TBI. Discussed with patient and mother that this is why his R knee looks different compared to his left. No palpable lumps. May benefit from joint aspiration to test fluid, as he has a history of gout in his foot. Recommend ortho consult for further evaluation. Also discussed benefits of physical therapy for his knee, as he has only been doing therapy for balance up to this point.  - XR Knee Right 3 Views; Future  - ORTHOPEDICS ADULT REFERRAL    2. 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 Medication refilled.  [employer] form completed and sent with patient today  - PARoxetine (PAXIL) 40 MG tablet; Take 1.5 tablets (60 mg) by mouth every morning  Dispense: 90 tablet; Refill: 1    3. Hyperlipidemia LDL goal <160  Likely ran out of cholesterol medication based on last refill date. Restart simvastatin and take consistently for next 6-8 weeks. Recommend fasting physical with PCP at that time to have labs checked.  - simvastatin (ZOCOR) 80 MG tablet; Take 1 tablet (80 mg) by mouth At Bedtime  Dispense: 90 tablet; Refill: 0    4. Gastroesophageal reflux disease with esophagitis  Discussed that omeprazole is for GERD. Experiences nausea in the morning when he wakes up. Continue on Prilosec.    5. Irritable bowel syndrome with diarrhea  Discussed that Bentyl is prescribed for IBS. Mother isn't sure if he is still on this medication. She will check at home.    Reviewed additional medications and their purposes with patient and mother. Mother plans to confirm which medications he is still taking.    See Patient Instructions    Over 45 minutes spent with patient, with greater than 50% of time spent on counseling and coordination of care.    Vanesa Oden PA-C  [facility]

## 2017-06-26 NOTE — NURSING NOTE
"Chief Complaint   Patient presents with     Musculoskeletal Problem     Recheck Medication     Refill Request       Initial /74 (BP Location: Right arm, Patient Position: Chair, Cuff Size: Adult Regular)  Pulse 83  Temp 97.4  F (36.3  C) (Oral)  Ht 5' 10\" (1.778 m)  Wt 193 lb 3.2 oz (87.6 kg)  SpO2 98%  BMI 27.72 kg/m2 Estimated body mass index is 27.72 kg/(m^2) as calculated from the following:    Height as of this encounter: 5' 10\" (1.778 m).    Weight as of this encounter: 193 lb 3.2 oz (87.6 kg).  Medication Reconciliation: complete     Jo-Ann Gallego MA     "

## 2017-06-26 NOTE — MR AVS SNAPSHOT
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 This medication is for depression/anxiety  -Simvastatin (cholesterol medication) also sent to pharmacy. Should take this daily, and come in for a fasting physical in 6-8 weeks.    -Check on other medications so that we can update his medication list          Follow-ups after your visit        Additional Services     ORTHOPEDICS ADULT REFERRAL       Your provider has referred you to: [facility]: [facility] [place] -  [facility]  [phone]   [email or web address]  [facility] [place] [phone]   [email or web address]    Please be aware that coverage of these services is subject to the terms and limitations of your health insurance plan.  Call member services at your health plan with any benefit or coverage questions.      Please bring the following to your appointment:    >>   Any x-rays, CTs or MRIs which have been performed.  Contact the facility where they were done to arrange for  prior to your scheduled appointment.    >>   List of current medications   >>   This referral request   >>   Any documents/labs given to you for this referral                  Your next 10 appointments already scheduled     Jun 28, 2017  2:45 PM CDT   Neuro Treatment with Lori Mcelroy, PT   [facility] ([facility])    [address]  [address]  [facility] [address]   [phone]              Who to contact     If you have questions or need follow up information about today's clinic visit or your schedule please contact [facility] directly at [phone].  Normal or non-critical lab and imaging results will be communicated to you by MyChart, letter or phone within 4 business days after the clinic has received the results. If you do not hear from us within 7 days, please contact the clinic through MyChart or phone. If you have a critical or abnormal lab result, we will notify you by phone as soon as possible.  Submit refill requests through [employer] or call your pharmacy and they will forward the refill request to us. Please allow 3 business days for your refill to be completed.          Additional Information About Your Visit        MyCharePrep Information     [facility] lets you send messages to your doctor, view your test results, renew your prescriptions, schedule appointments and more. To sign up, go to [email or web address] . Click on \"Log in\" on the left side of the screen, which will take you to the Welcome page. Then click on \"Sign up Now\" on the right side of the page.     You will be asked to enter the access code listed below, as well as some personal information. Please follow the directions to create your username and password.     Your access code is: [email or web address]  Expires: 2017  8:15 AM     Your access code will  in 90 days. If you need help or a new code, please call your [place] clinic or [phone].        Care EveryWhere ID     This is your Care EveryWhere ID. This could be used by other organizations to access your [place] medical records  [id number]        Your Vitals Were     Pulse Temperature Height Pulse Oximetry BMI (Body Mass Index)       83 97.4  F (36.3  C) (Oral) 5' 10\" (1.778 m) 98% 27.72 kg/m2        Blood Pressure from Last 3 Encounters:   17 126/74   17 118/64   16 141/69 "    Weight from Last 3 Encounters:   06/26/17 193 lb 3.2 oz (87.6 kg)   03/29/17 195 lb (88.5 kg)   06/02/16 190 lb (86.2 kg)              We Performed the Following     ORTHOPEDICS ADULT REFERRAL          Where to get your medicines      These medications were sent to [id number] - [facility], [place] - [address]  [address] [facility] [address], [facility] [address]     Phone:  [phone]     PARoxetine 40 MG tablet    simvastatin 80 MG tablet          Primary Care Provider Office Phone # Fax #    Alberto Carlos Jr., [phone]       [facility] [address] [facility] [address]        Equal Access to Services     RAMYA PORTILLO : Hadii jules bolanos hadasho Sotanvi, waaxda luqadaha, qaybta kaalmada adeegyada, sylvie wen . So [facility] [phone].    ATENCIÓN: Si habla español, tiene a gamble disposición servicios gratuitos de asistencia lingüística. [facility] [phone].    We comply with applicable federal civil rights laws and [place] laws. We do not discriminate on the basis of race, color, national origin, age, disability sex, sexual orientation or gender identity.            Thank you!     Thank you for choosing [facility]  for your care. Our goal is always to provide you with excellent care. Hearing back from our patients is one way we can continue to improve our services. Please take a few minutes to complete the written survey that you may receive in the mail after your visit with us. Thank you!             Your Updated Medication List - Protect others around you: Learn how to safely use, store and throw away your medicines at www.disposemymeds.org.          This list is accurate as of: 6/26/17  5:35 PM.  Always use your most recent med list.                   Brand Name Dispense Instructions for use Diagnosis    aspirin 325 MG EC tablet     90 tablet    Take 1 tablet (325 mg) by mouth every morning    Mixed  hyperlipidemia       baclofen 10 MG tablet    LIORESAL          dicyclomine 10 MG capsule    BENTYL    240 capsule    Take 1 capsule (10 mg) by mouth 4 times daily (before meals and nightly)    Irritable bowel syndrome with diarrhea       * divalproex 500 MG 24 hr tablet    DEPAKOTE ER    180 tablet    Take 2 tablets (1,000 mg) by mouth daily    Migraine without aura and without status migrainosus, not intractable       * divalproex 250 MG 24 hr tablet    DEPAKOTE ER          indomethacin 50 MG capsule    INDOCIN    30 capsule    Take 1 capsule (50 mg) by mouth 3 times daily (with meals)        Multi-vitamin Tabs tablet   Generic drug:  multivitamin, therapeutic with minerals      1 tab q d        omeprazole 40 MG capsule    priLOSEC    90 capsule    Take 1 capsule (40 mg) by mouth daily Take 30-60 minutes before a meal.    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)       polyethylene glycol powder    MIRALAX    510 g    Take 17 g (1 capful) by mouth daily    Irritable bowel syndrome with diarrhea       PROBIOTIC DAILY PO           senna-docusate 8.6-50 MG per tablet    SENOKOT S    180 tablet    Take 1 tablet by mouth 2 times daily as needed for constipation    Chronic constipation       simvastatin 80 MG tablet    ZOCOR    90 tablet    Take 1 tablet (80 mg) by mouth At Bedtime    Hyperlipidemia LDL goal <160       VITAMIN D (CHOLECALCIFEROL) PO      Take by mouth daily        * Notice:  This list has 2 medication(s) that are the same as other medications prescribed for you. Read the directions carefully, and ask your doctor or other care provider to review them with you.

## 2017-06-26 NOTE — PATIENT INSTRUCTIONS
-Paxil refills sent to pharmacy. This medication is for depression/anxiety  -Simvastatin (cholesterol medication) also sent to pharmacy. Should take this daily, and come in for a fasting physical in 6-8 weeks.    -Check on other medications so that we can update his medication list

## 2017-06-27 ENCOUNTER — TELEPHONE (OUTPATIENT)
Dept: FAMILY MEDICINE | Facility: CLINIC | Age: 52
End: 2017-06-27

## 2017-06-27 NOTE — TELEPHONE ENCOUNTER
Name of caller: ghassan  Relationship to Patient: mother    Reason for Call:  Patient seen yesterday for knee and advised to see ortho.  Wondering if he needs to continue PT as well.  Please advise.    Best phone number to reach pt at is: [phone]  Ok to leave a message with medical info? yes    Alexia Maloney

## 2017-06-28 ENCOUNTER — HOSPITAL ENCOUNTER (OUTPATIENT)
Dept: PHYSICAL THERAPY | Facility: CLINIC | Age: 52
Setting detail: THERAPIES SERIES
End: 2017-06-28
Attending: PHYSICIAN ASSISTANT
Payer: MEDICARE

## 2017-06-28 PROCEDURE — 97110 THERAPEUTIC EXERCISES: CPT | Mod: GP | Performed by: PHYSICAL THERAPIST

## 2017-06-28 PROCEDURE — 97112 NEUROMUSCULAR REEDUCATION: CPT | Mod: GP | Performed by: PHYSICAL THERAPIST

## 2017-06-28 PROCEDURE — 40000719 ZZHC STATISTIC PT DEPARTMENT NEURO VISIT: Performed by: PHYSICAL THERAPIST

## 2017-06-30 NOTE — TELEPHONE ENCOUNTER
Per Vanesa, yes continue PT for balance.  Ortho may Rx more PT also specific for knee      I left a detailed message for patient to continue PT  Mindy Lyles RN- Triage [employer]

## 2017-07-11 ENCOUNTER — TELEPHONE (OUTPATIENT)
Dept: FAMILY MEDICINE | Facility: CLINIC | Age: 52
End: 2017-07-11

## 2017-07-11 NOTE — TELEPHONE ENCOUNTER
Brenna Catalan--[phone]--[facility]    Skilled nurse visits every other week for next 60 days, patient needs medication set up and education. Verbal order given order approved per RN protocol. Madelyn Aranda R.N.

## 2017-07-19 ENCOUNTER — TELEPHONE (OUTPATIENT)
Dept: FAMILY MEDICINE | Facility: CLINIC | Age: 52
End: 2017-07-19

## 2017-07-21 ENCOUNTER — HOSPITAL ENCOUNTER (OUTPATIENT)
Dept: PHYSICAL THERAPY | Facility: CLINIC | Age: 52
Setting detail: THERAPIES SERIES
End: 2017-07-21
Attending: PHYSICIAN ASSISTANT
Payer: MEDICARE

## 2017-07-21 PROCEDURE — G8979 MOBILITY GOAL STATUS: HCPCS | Mod: GP,CJ | Performed by: PHYSICAL THERAPIST

## 2017-07-21 PROCEDURE — 40000719 ZZHC STATISTIC PT DEPARTMENT NEURO VISIT: Performed by: PHYSICAL THERAPIST

## 2017-07-21 PROCEDURE — 97110 THERAPEUTIC EXERCISES: CPT | Mod: GP | Performed by: PHYSICAL THERAPIST

## 2017-07-21 PROCEDURE — G8978 MOBILITY CURRENT STATUS: HCPCS | Mod: GP,CK | Performed by: PHYSICAL THERAPIST

## 2017-07-21 PROCEDURE — 97112 NEUROMUSCULAR REEDUCATION: CPT | Mod: GP | Performed by: PHYSICAL THERAPIST

## 2017-07-25 DIAGNOSIS — K21.00 GASTROESOPHAGEAL REFLUX DISEASE WITH ESOPHAGITIS: ICD-10-CM

## 2017-07-25 RX ORDER — OMEPRAZOLE 40 MG/1
40 CAPSULE, DELAYED RELEASE ORAL DAILY
Qty: 90 CAPSULE | Refills: 0 | Status: SHIPPED | OUTPATIENT
Start: 2017-07-25 | End: 2017-09-28

## 2017-07-25 NOTE — TELEPHONE ENCOUNTER
omeprazole (PRILOSEC) 40 MG capsule      Last Written Prescription Date: 3/24/2017  Last Fill Quantity: 90 capsule,  # refills: 0   Last Office Visit with [facility], [facility] or [facility] prescribing provider: 6/26/2017

## 2017-07-25 NOTE — TELEPHONE ENCOUNTER
Prescription approved per [facility], [facility] or [facility] refill protocol.  Ana Mahan RN - Triage  [facility]

## 2017-07-28 ENCOUNTER — HOSPITAL ENCOUNTER (OUTPATIENT)
Dept: PHYSICAL THERAPY | Facility: CLINIC | Age: 52
Setting detail: THERAPIES SERIES
End: 2017-07-28
Attending: PHYSICIAN ASSISTANT
Payer: MEDICARE

## 2017-07-28 PROCEDURE — 97110 THERAPEUTIC EXERCISES: CPT | Mod: GP | Performed by: PHYSICAL THERAPIST

## 2017-07-28 PROCEDURE — 40000719 ZZHC STATISTIC PT DEPARTMENT NEURO VISIT: Performed by: PHYSICAL THERAPIST

## 2017-07-28 PROCEDURE — 97112 NEUROMUSCULAR REEDUCATION: CPT | Mod: GP | Performed by: PHYSICAL THERAPIST

## 2017-08-04 ENCOUNTER — HOSPITAL ENCOUNTER (OUTPATIENT)
Dept: PHYSICAL THERAPY | Facility: CLINIC | Age: 52
Setting detail: THERAPIES SERIES
End: 2017-08-04
Attending: PHYSICIAN ASSISTANT
Payer: MEDICARE

## 2017-08-04 PROCEDURE — 40000719 ZZHC STATISTIC PT DEPARTMENT NEURO VISIT: Performed by: PHYSICAL THERAPIST

## 2017-08-04 PROCEDURE — 97112 NEUROMUSCULAR REEDUCATION: CPT | Mod: GP | Performed by: PHYSICAL THERAPIST

## 2017-08-04 PROCEDURE — 97110 THERAPEUTIC EXERCISES: CPT | Mod: GP | Performed by: PHYSICAL THERAPIST

## 2017-08-07 ENCOUNTER — TELEPHONE (OUTPATIENT)
Dept: FAMILY MEDICINE | Facility: CLINIC | Age: 52
End: 2017-08-07

## 2017-08-07 ENCOUNTER — MEDICAL CORRESPONDENCE (OUTPATIENT)
Dept: HEALTH INFORMATION MANAGEMENT | Facility: CLINIC | Age: 52
End: 2017-08-07

## 2017-08-07 NOTE — TELEPHONE ENCOUNTER
Forms received by: Fax    Purpose of Form:  Care Plan    How the form needs to be returned for patient:  Fax    Form currently placed:  inbox    Steph Tolbert CMA

## 2017-08-11 ENCOUNTER — HOSPITAL ENCOUNTER (OUTPATIENT)
Dept: PHYSICAL THERAPY | Facility: CLINIC | Age: 52
Setting detail: THERAPIES SERIES
End: 2017-08-11
Attending: PHYSICIAN ASSISTANT
Payer: MEDICARE

## 2017-08-11 PROCEDURE — 40000719 ZZHC STATISTIC PT DEPARTMENT NEURO VISIT: Performed by: PHYSICAL THERAPIST

## 2017-08-11 PROCEDURE — 97110 THERAPEUTIC EXERCISES: CPT | Mod: GP | Performed by: PHYSICAL THERAPIST

## 2017-08-17 DIAGNOSIS — K58.0 IRRITABLE BOWEL SYNDROME WITH DIARRHEA: ICD-10-CM

## 2017-08-17 NOTE — TELEPHONE ENCOUNTER
Dicyclomine 10MG cap Deb      Last Written Prescription Date: 4/13/16  Last Fill Quantity: 240,  # refills: 3   Last Office Visit with G, [facility] or [facility] prescribing provider: 6/26/17

## 2017-08-23 NOTE — ADDENDUM NOTE
Encounter addended by: Lori Mcelroy, PT on: 8/23/2017 11:25 AM<BR>     Actions taken: Flowsheet data copied forward, Flowsheet accepted

## 2017-08-23 NOTE — ADDENDUM NOTE
Encounter addended by: Lori Mcelroy, PT on: 8/23/2017 11:28 AM<BR>     Actions taken: Flowsheet accepted

## 2017-08-23 NOTE — ADDENDUM NOTE
Encounter addended by: Lori Mcelroy, PT on: 8/23/2017 11:24 AM<BR>     Actions taken: Sign clinical note, Flowsheet accepted

## 2017-08-23 NOTE — ADDENDUM NOTE
Encounter addended by: Lori Mcelroy, PT on: 8/23/2017 11:27 AM<BR>     Actions taken: Flowsheet data copied forward, Flowsheet accepted, Charge Capture section accepted

## 2017-08-23 NOTE — ADDENDUM NOTE
Encounter addended by: Lori Mcelroy, PT on: 8/23/2017 11:26 AM<BR>     Actions taken: Flowsheet accepted

## 2017-08-23 NOTE — PROGRESS NOTES
[facility]      OUTPATIENT PHYSICAL THERAPY  PLAN OF TREATMENT FOR OUTPATIENT REHABILITATION    Patient's Last Name, First Name, M.I.                YOB: 1965  Yunior Angel                        Provider's Name  [facility] Medical Record No.  [id number]                               Onset Date: knee pain about 6-8 weeks ago.  TBI 1991)   Start of Care Date: 5/3/2017   Type:     _X_PT   ___OT   ___SLP Medical Diagnosis: right knee pain; h/o TBI and sequelae                       PT Diagnosis: impaired postural alignment and gait mechanics resulting in right knee pain further limiting mobility.       _________________________________________________________________________________  Plan of Treatment:    Frequency/Duration: 0 x per week x 3 weeks, then 1 x per week x 67 days.       Goals:  Goal Identifier 1   Goal Description Yunior will be able to negotiate stairs in a reciprocal pattern and one rail with no c/o pain or instability for greater safety at home and in community.    Target Date 07/02/17   Date Met      Progress:  Still having effusion and pain in right knee.  In therapy sessions does not c/o knee pain on steps but has much fewer than he has elsewhere in community and parents home. Also uses strong compensatory strategies of circumduction and knee hyperextension.      Goal Identifier 2   Goal Description Yunior will rise to stand from an 18 inch surface with feet no more than 10 inches apart with minimal use of hands and minimal c/o pain for greater ease and safety with transitions in home and community areas.    Target Date 07/02/17   Date Met      Progress:  In prgoress.  Yunior is getting better with this and does not c/o pain during therapy sessions with this activity but does need cues and demo for proper set up, alignment and technique.       Goal Identifier 3    Goal Description Yunior will ambulate 25 feet in 7.5 sec or less to indicate improved gait efficiency and reduced level of impairment.   Target Date 07/02/17   Date Met      Progress:  Not recently tested.  Emphasis has been on quality of his gait pattern and achieving proper gait alignments.       Goal Identifier 4   Goal Description Yunior will be independent in a home ex program to address his system and functional impairments to self management of his condition.    Target Date 07/02/17   Date Met      Progress:  In progress.  Needs a lot of repetition and written instructions to follow  Through at home due to significant memory impairments. Due to this it does take him longer to be able to achieve independence in home program activities.      Progress Toward Goals:   Progress this reporting period: Yunior has been tolerating therapy sessions well.  He is able to demonstrate improvements in his alignment and gait after therapy interventions that target his right hip strength, right ankle alignment , pelvice alignment and motor sequencing.  Progress limited due to ongoing effusion right knee.  Still in progress of getting appropriate consultations / assessments of knee condition.  Also limited by cognition, memory and attention deficits from his TBI.      Continue with goals above and extend date to 9/25/17.  Due to cognitive, memory and attention impairments from his TBI, progress will be slow and gradual.       Certification date from 6/29/2017 to 9/25/2017.    Lori Mcelroy, PT          I CERTIFY THE NEED FOR THESE SERVICES FURNISHED UNDER        THIS PLAN OF TREATMENT AND WHILE UNDER MY CARE     (Physician co-signature of this document indicates review and certification of the therapy plan).                  Referring Provider: Katia Kearns PA-C

## 2017-08-29 RX ORDER — DICYCLOMINE HYDROCHLORIDE 10 MG/1
10 CAPSULE ORAL
Qty: 240 CAPSULE | Refills: 3 | Status: SHIPPED | OUTPATIENT
Start: 2017-08-29 | End: 2018-06-07

## 2017-08-29 NOTE — TELEPHONE ENCOUNTER
Chart reviewed.  Rx sent to pt's preferred pharmacy.    [id number] - [place], [place] - [address] [place] [address] [address]    Alberto Carlos MD

## 2017-08-29 NOTE — TELEPHONE ENCOUNTER
Routing refill request to provider for review/approval because:  Possible break in medication  Dolores Kim RN, BSN  [facility]

## 2017-08-31 ENCOUNTER — TELEPHONE (OUTPATIENT)
Dept: FAMILY MEDICINE | Facility: CLINIC | Age: 52
End: 2017-08-31

## 2017-08-31 NOTE — TELEPHONE ENCOUNTER
Spoke with Tiffany and gave verbal OK for continuation of skilled nurse visits for next 60 days.    Tiffany verbalized understanding and agrees with plan.    Will call back if further questions or concerns.    Dolores Kim, RN, BSN

## 2017-08-31 NOTE — TELEPHONE ENCOUNTER
Reason for Call: Request for an order or referral:    Order or referral being requested: Tiffany is calling requesting orders for continuing with nursing visits for the next 60 days.    Date needed: as soon as possible    Has the patient been seen by the PCP for this problem? YES    Phone number Patient can be reached at:  Other phone number: [phone]    Best Time:  Anytime    Can we leave a detailed message on this number?  YES    Call taken on 8/31/2017 at 2:30 PM by Dianna Greer

## 2017-09-06 ENCOUNTER — HOSPITAL ENCOUNTER (OUTPATIENT)
Dept: PHYSICAL THERAPY | Facility: CLINIC | Age: 52
Setting detail: THERAPIES SERIES
End: 2017-09-06
Attending: PHYSICIAN ASSISTANT
Payer: MEDICARE

## 2017-09-06 PROCEDURE — 97112 NEUROMUSCULAR REEDUCATION: CPT | Mod: GP | Performed by: PHYSICAL THERAPIST

## 2017-09-06 PROCEDURE — 97110 THERAPEUTIC EXERCISES: CPT | Mod: GP | Performed by: PHYSICAL THERAPIST

## 2017-09-06 PROCEDURE — 40000719 ZZHC STATISTIC PT DEPARTMENT NEURO VISIT: Performed by: PHYSICAL THERAPIST

## 2017-09-11 ENCOUNTER — TELEPHONE (OUTPATIENT)
Dept: FAMILY MEDICINE | Facility: CLINIC | Age: 52
End: 2017-09-11

## 2017-09-11 DIAGNOSIS — Z53.9 DIAGNOSIS NOT YET DEFINED: Primary | ICD-10-CM

## 2017-09-11 PROCEDURE — G0179 MD RECERTIFICATION HHA PT: HCPCS | Performed by: FAMILY MEDICINE

## 2017-09-22 ENCOUNTER — HOSPITAL ENCOUNTER (OUTPATIENT)
Dept: PHYSICAL THERAPY | Facility: CLINIC | Age: 52
Setting detail: THERAPIES SERIES
End: 2017-09-22
Attending: PHYSICIAN ASSISTANT
Payer: MEDICARE

## 2017-09-22 PROCEDURE — 40000719 ZZHC STATISTIC PT DEPARTMENT NEURO VISIT: Performed by: PHYSICAL THERAPIST

## 2017-09-22 PROCEDURE — 97116 GAIT TRAINING THERAPY: CPT | Mod: GP | Performed by: PHYSICAL THERAPIST

## 2017-09-22 PROCEDURE — 97112 NEUROMUSCULAR REEDUCATION: CPT | Mod: GP | Performed by: PHYSICAL THERAPIST

## 2017-09-28 DIAGNOSIS — K21.00 GASTROESOPHAGEAL REFLUX DISEASE WITH ESOPHAGITIS: ICD-10-CM

## 2017-09-28 RX ORDER — OMEPRAZOLE 40 MG/1
40 CAPSULE, DELAYED RELEASE ORAL DAILY
Qty: 90 CAPSULE | Refills: 1 | Status: SHIPPED | OUTPATIENT
Start: 2017-09-28 | End: 2018-06-13

## 2017-09-28 NOTE — TELEPHONE ENCOUNTER
omeprazole      Last Written Prescription Date: 7-25-17  Last Fill Quantity: 90,  # refills: 0   Last Office Visit with G, P or [facility] prescribing provider: 6-26-17

## 2017-09-28 NOTE — TELEPHONE ENCOUNTER
Prescription approved per [facility] Refill Protocol.  Dolores Kim, RN, BSN  [facility]

## 2017-10-11 ENCOUNTER — MEDICAL CORRESPONDENCE (OUTPATIENT)
Dept: HEALTH INFORMATION MANAGEMENT | Facility: CLINIC | Age: 52
End: 2017-10-11

## 2017-11-01 ENCOUNTER — TELEPHONE (OUTPATIENT)
Dept: FAMILY MEDICINE | Facility: CLINIC | Age: 52
End: 2017-11-01

## 2017-11-01 NOTE — TELEPHONE ENCOUNTER
Reason for Call: Request for an order or referral: Home Care    Order or referral being requested: Verbal orders for 60 days of home care. Also pt mother would like to know if he should take fish oil    Date needed: as soon as possible    Has the patient been seen by the PCP for this problem? YES    Additional comments: Tiffany vela [facility] [phone]    Phone number Patient can be reached at:  Other phone number:  [phone]    Best Time:      Can we leave a detailed message on this number?  Not Applicable    Call taken on 11/1/2017 at 10:37 AM by Nikia Ervin

## 2017-11-01 NOTE — TELEPHONE ENCOUNTER
I spoke with Tiffany and gave her info below. I advised about the fish oil and what Dr. Carlos advised as well as orders approved for home care. Tiffany will give information about fish oil to patient mom who asked the question. Madelyn Aranda R.N.

## 2017-11-01 NOTE — TELEPHONE ENCOUNTER
Dr. Carlos please see below regarding fish oil and advise. Then RN can place call to Home Care. Thank you, Madelyn Aranda R.N.

## 2017-11-01 NOTE — TELEPHONE ENCOUNTER
OK for 60 days of home care.  I do not see a pressing medical reason for which to take fish oil.  I don't think he needs to take this.    Alberto Carlos MD

## 2017-11-10 DIAGNOSIS — S06.9XAS NEUROBEHAVIORAL SEQUELAE OF TRAUMATIC BRAIN INJURY (H): ICD-10-CM

## 2017-11-10 DIAGNOSIS — R46.89 NEUROBEHAVIORAL SEQUELAE OF TRAUMATIC BRAIN INJURY (H): ICD-10-CM

## 2017-11-10 NOTE — TELEPHONE ENCOUNTER
Routing refill request to provider for review/approval because:  Dx does not fit RN protocol for refill.     Madelyn Aranda R.N.

## 2017-11-10 NOTE — TELEPHONE ENCOUNTER
Requested Prescriptions   Pending Prescriptions Disp Refills     PARoxetine (PAXIL) 40 MG tablet  Last Written Prescription Date:  6/26/2017  Last Fill Quantity: 90 tablet,  # refills: 1   Last Office Visit with [facility], P or [facility] prescribing provider:  6/26/2017   Future Office Visit:      90 tablet 1     Sig: Take 1.5 tablets (60 mg) by mouth every morning    SSRIs Protocol Passed    11/10/2017 11:52 AM  PHQ-9 SCORE 11/15/2013 12/10/2014   Total Score 3 3          Passed - Medication is NOT Bupropion    If the medication is Bupropion (Wellbutrin), and the patient is taking for smoking cessation; OK to refill.         Passed - Patient is age 18 or older

## 2017-11-13 ENCOUNTER — TELEPHONE (OUTPATIENT)
Dept: FAMILY MEDICINE | Facility: CLINIC | Age: 52
End: 2017-11-13

## 2017-11-13 DIAGNOSIS — Z53.9 DIAGNOSIS NOT YET DEFINED: Primary | ICD-10-CM

## 2017-11-13 PROCEDURE — G0179 MD RECERTIFICATION HHA PT: HCPCS | Performed by: FAMILY MEDICINE

## 2017-11-13 RX ORDER — PAROXETINE 40 MG/1
60 TABLET, FILM COATED ORAL EVERY MORNING
Qty: 90 TABLET | Refills: 1 | Status: SHIPPED | OUTPATIENT
Start: 2017-11-13 | End: 2018-01-12

## 2017-11-17 ENCOUNTER — HOSPITAL ENCOUNTER (OUTPATIENT)
Dept: PHYSICAL THERAPY | Facility: CLINIC | Age: 52
Setting detail: THERAPIES SERIES
End: 2017-11-17
Attending: PHYSICIAN ASSISTANT
Payer: MEDICARE

## 2017-11-17 PROCEDURE — 97110 THERAPEUTIC EXERCISES: CPT | Mod: GP | Performed by: PHYSICAL THERAPIST

## 2017-11-17 PROCEDURE — G8980 MOBILITY D/C STATUS: HCPCS | Mod: GP,CJ | Performed by: PHYSICAL THERAPIST

## 2017-11-17 PROCEDURE — 97116 GAIT TRAINING THERAPY: CPT | Mod: GP | Performed by: PHYSICAL THERAPIST

## 2017-11-17 PROCEDURE — 40000719 ZZHC STATISTIC PT DEPARTMENT NEURO VISIT: Performed by: PHYSICAL THERAPIST

## 2017-11-17 PROCEDURE — G8979 MOBILITY GOAL STATUS: HCPCS | Mod: GP,CJ | Performed by: PHYSICAL THERAPIST

## 2017-11-17 NOTE — PROGRESS NOTES
[facility]      OUTPATIENT PHYSICAL THERAPY  PLAN OF TREATMENT FOR OUTPATIENT REHABILITATION    Patient's Last Name, First Name, M.I.                YOB: 1965  Yunior Angel                        Provider's Name  [facility] Medical Record No.  [id number]                               Onset Date: 3/1/2017. ( TBI 1991)   Start of Care Date: 5/3/2017   Type:     _X_PT   ___OT   ___SLP Medical Diagnosis: right knee pain; h/o TBI and sequelae                       PT Diagnosis: impaired postural alignment and gait mechanics resulting in right knee pain further limiting mobility.       _________________________________________________________________________________  Plan of Treatment:    Frequency/Duration: 1 additional visit.  (Pt had significant delay in scheduling for final visit due to scheduling conflicts and transportation issues).     Goals:  Goal Identifier 1   Goal Description Yunior will be able to negotiate stairs in a reciprocal pattern and one rail with no c/o pain or instability for greater safety at home and in community.    Target Date 09/25/17   Date Met  11/17/17 (amb reciprocally on stairs with railing, no pain. )   Progress:     Goal Identifier 2   Goal Description Yunior will rise to stand from an 18 inch surface with feet no more than 10 inches apart with minimal use of hands and minimal c/o pain for greater ease and safety with transitions in home and community areas.    Target Date 09/25/17   Date Met  11/17/17 (able without UE support with cues for set up position)   Progress:     Goal Identifier 3   Goal Description Yunior will ambulate 25 feet in 7.5 sec or less to indicate improved gait efficiency and reduced level of impairment.   Target Date 09/25/17   Date Met   (not met)   Progress:  Ambulated 25 feet in 7.6 sec today when cued to walk fast.  At  faster pace, gait impairments are accentuated.  At comfortable pace today took 11.5 sec.       Goal Identifier 4   Goal Description Yunior will be independent in a home ex program to address his system and functional impairments to self management of his condition.    Target Date 09/25/17   Date Met   (needs guidance of written instructions)   Progress:  Yunior's mom also reinforces education with him.       Progress Toward Goals:   Progress this reporting period: Yunior has met 2 of 4 goals above.  He also is having no further pain with daily activity.  He still continues to have slight joint line effusion of the right knee but it has stayed stable since last visit on 9/22/17.    Progress limited due to significant cognitive/memory impairment, impairment in attention/distractibility related to his TBI.     Plan:  One additional visit 11/17/2017, then d/c from PT.      Certification date from 9/25/2017 to 11/17/2017    Lori Mcelroy, PT          I CERTIFY THE NEED FOR THESE SERVICES FURNISHED UNDER        THIS PLAN OF TREATMENT AND WHILE UNDER MY CARE     (Physician co-signature of this document indicates review and certification of the therapy plan).                  Referring Provider: Katia Kearns PA-C

## 2017-12-12 ENCOUNTER — TELEPHONE (OUTPATIENT)
Dept: FAMILY MEDICINE | Facility: CLINIC | Age: 52
End: 2017-12-12

## 2017-12-12 NOTE — TELEPHONE ENCOUNTER
Forms received by: Fax    Purpose of Form:  [place]     How the form needs to be returned for patient:  Fax    Form currently placed: SR inbox    Steph Tolbert CMA

## 2018-01-03 ENCOUNTER — TELEPHONE (OUTPATIENT)
Dept: FAMILY MEDICINE | Facility: CLINIC | Age: 53
End: 2018-01-03

## 2018-01-03 NOTE — TELEPHONE ENCOUNTER
Tiffany nurse [facility] calling for orders [phone]. Overall assessment and medication set up once per week, continue for next 60 days.     Orders approved per RN protocol. Madelyn Aranda R.N.

## 2018-01-04 DIAGNOSIS — K59.09 CHRONIC CONSTIPATION: ICD-10-CM

## 2018-01-04 NOTE — TELEPHONE ENCOUNTER
senna-docusate (SENOKOT S) 8.6-50 MG per tablet      Last Written Prescription Date:  5/11/2017  Last Fill Quantity: 180 tablet,   # refills: 1  Last Office Visit: 6/26/2017  Future Office visit:       Routing refill request to provider for review/approval because:  Drug not on the [facility], [facility] or [facility] refill protocol or controlled substance

## 2018-01-08 RX ORDER — AMOXICILLIN 250 MG
1 CAPSULE ORAL 2 TIMES DAILY PRN
Qty: 180 TABLET | Refills: 1 | Status: SHIPPED | OUTPATIENT
Start: 2018-01-08 | End: 2018-02-14

## 2018-01-09 ENCOUNTER — TELEPHONE (OUTPATIENT)
Dept: FAMILY MEDICINE | Facility: CLINIC | Age: 53
End: 2018-01-09

## 2018-01-09 PROCEDURE — G0179 MD RECERTIFICATION HHA PT: HCPCS | Performed by: FAMILY MEDICINE

## 2018-01-09 NOTE — TELEPHONE ENCOUNTER
Date Forms was received: January 9, 2018    Forms received by: Fax    Last office visit: 6/26/2017    Purpose of Form:  Nursing Home Orders POC    When the form is due:  ASAP    How the form needs to be returned for patient:  Fax    Form currently placed  SR inbox

## 2018-01-12 DIAGNOSIS — R46.89 NEUROBEHAVIORAL SEQUELAE OF TRAUMATIC BRAIN INJURY (H): ICD-10-CM

## 2018-01-12 DIAGNOSIS — S06.9XAS NEUROBEHAVIORAL SEQUELAE OF TRAUMATIC BRAIN INJURY (H): ICD-10-CM

## 2018-01-12 NOTE — TELEPHONE ENCOUNTER
"Requested Prescriptions   Pending Prescriptions Disp Refills     PARoxetine (PAXIL) 40 MG tablet  New Pharmacy  Last Written Prescription Date:  11/13/2017  Last Fill Quantity: 90 tablet,  # refills: 1   Last Office Visit with [facility], [facility] or [facility] prescribing provider:  6/26/2017   Future Office Visit:      90 tablet 1     Sig: Take 1.5 tablets (60 mg) by mouth every morning    SSRIs Protocol Passed    1/12/2018 11:30 AM    PHQ-9 SCORE 11/15/2013 12/10/2014   Total Score 3 3     OLIVER-7 SCORE 11/15/2013 12/10/2014   Total Score 0 3          Passed - Recent or future visit with authorizing provider    Patient had office visit in the last year or has a visit in the next 30 days with authorizing provider.  See \"Patient Info\" tab in inbasket, or \"Choose Columns\" in Meds & Orders section of the refill encounter.          Passed - Patient is age 18 or older          "

## 2018-01-15 RX ORDER — PAROXETINE 40 MG/1
60 TABLET, FILM COATED ORAL EVERY MORNING
Qty: 90 TABLET | Refills: 0 | Status: SHIPPED | OUTPATIENT
Start: 2018-01-15 | End: 2018-02-21

## 2018-01-15 NOTE — TELEPHONE ENCOUNTER
Prescription approved per [facility] Refill Protocol.  Dolores Kim, RN, BSN  [facility]

## 2018-02-02 DIAGNOSIS — K59.09 CHRONIC CONSTIPATION: ICD-10-CM

## 2018-02-02 RX ORDER — AMOXICILLIN 250 MG
1 CAPSULE ORAL 2 TIMES DAILY PRN
Qty: 180 TABLET | Refills: 1
Start: 2018-02-02

## 2018-02-02 NOTE — TELEPHONE ENCOUNTER
Filled on 1/8/2018 with 180 tablets and one refill, Refill denied, will route pharmacy a note to dispense from recent RX. Madelyn Aranda R.N.

## 2018-02-02 NOTE — TELEPHONE ENCOUNTER
senna-docusate (SENOKOT S) 8.6-50 MG per tablet      Last Written Prescription Date:  1/8/2018  Last Fill Quantity: 180 tablet,   # refills: 1  Last Office Visit: 6/26/2017  Future Office visit:       Routing refill request to provider for review/approval because:  Drug not on the [facility], [facility] or [facility] refill protocol or controlled substance

## 2018-02-08 DIAGNOSIS — E78.5 HYPERLIPIDEMIA LDL GOAL <160: ICD-10-CM

## 2018-02-08 RX ORDER — SIMVASTATIN 80 MG
80 TABLET ORAL AT BEDTIME
Qty: 90 TABLET | Refills: 0 | Status: SHIPPED | OUTPATIENT
Start: 2018-02-08 | End: 2018-02-21

## 2018-02-08 RX ORDER — SIMVASTATIN 80 MG
80 TABLET ORAL AT BEDTIME
Qty: 90 TABLET | Refills: 0 | OUTPATIENT
Start: 2018-02-08

## 2018-02-08 NOTE — TELEPHONE ENCOUNTER
Denied. Needs to be seen.  Please have patient make appointment for preventative/medicare annual wellness exam  visit.  Due for lab work and refills.  Please call patient.     Alberto Carlos MD

## 2018-02-08 NOTE — TELEPHONE ENCOUNTER
Called [phone] and spoke with mother.  Appt made for 2/21/2018 as MD is out next week.  Can we refill this knowing he will be seen when MD is back in office?  Ping Garay

## 2018-02-08 NOTE — TELEPHONE ENCOUNTER
Chart reviewed.  Rx sent to pt's preferred pharmacy.       [id number] - [place], [place] - [address]  [facility] - [place], [place] - [address] [place] [address]    Alberto Carlos MD

## 2018-02-08 NOTE — TELEPHONE ENCOUNTER
"Last Written Prescription Date:  6/26/17  Last Fill Quantity: 90,  # refills: 0   Last Office Visit with [facility] provider:  6/26/17   Future Office Visit:       Requested Prescriptions   Pending Prescriptions Disp Refills     simvastatin (ZOCOR) 80 MG tablet 90 tablet 0     Sig: Take 1 tablet (80 mg) by mouth At Bedtime    Statins Protocol Failed    2/8/2018  1:45 PM       Failed - LDL on file in past 12 months    Recent Labs   Lab Test  04/13/16   1402   LDL  144*            Passed - No abnormal creatine kinase in past 12 months    No lab results found.         Passed - Recent or future visit with authorizing provider    Patient had office visit in the last year or has a visit in the next 30 days with authorizing provider.  See \"Patient Info\" tab in inbasket, or \"Choose Columns\" in Meds & Orders section of the refill encounter.            Passed - Patient is age 18 or older        Routing refill request to provider for review/approval because:  Labs out of range:  LDL  A break in medication    Jaci De La Garza RN  [employer]        "

## 2018-02-14 DIAGNOSIS — K59.09 CHRONIC CONSTIPATION: ICD-10-CM

## 2018-02-14 NOTE — TELEPHONE ENCOUNTER
senna-docusate (SENOKOT S) 8.6-50 MG per tablet      Last Written Prescription Date:  1/8/2018  Last Fill Quantity: 180 tablet,   # refills: 1  Last Office Visit: 6/26/2017 Akshat  Future Office visit:    Next 5 appointments (look out 90 days)     Feb 21, 2018  2:40 PM CST   [facility] Alber[facility] Jr., [facility]    [address] [facility] [id number] [phone]                   Routing refill request to provider for review/approval because:  Drug not on the [facility], [facility] or  Health refill protocol or controlled substance

## 2018-02-14 NOTE — TELEPHONE ENCOUNTER
Reason for Call:  Medication or medication refill:    Do you use a [facility]?  Name of the pharmacy and phone number for the current request:      [facility] - [place], [place] - [address]      Name of the medication requested: senna-docusate (SENOKOT S) 8.6-50 MG per tablet    Other request: Tiffany ,Home Care Nurse stated that there is still some confusion as the patient still cannot get his RX.      Can we leave a detailed message on this number? YES    Phone number patient can be reached at:[phone]    Best Time: Any: this is Tiffany's Cell Phone    Call taken on 2/14/2018 at 10:26 AM by Arabella Naranjo

## 2018-02-15 RX ORDER — AMOXICILLIN 250 MG
1 CAPSULE ORAL 2 TIMES DAILY PRN
Qty: 180 TABLET | Refills: 1 | Status: SHIPPED | OUTPATIENT
Start: 2018-02-15 | End: 2018-03-29 | Stop reason: DRUGHIGH

## 2018-02-15 NOTE — TELEPHONE ENCOUNTER
REviewed rx. Original Rx was sent to [facility]. Will reorder and send to [facility] pharmacy.  Dolores Kim RN, BSN  [facility]

## 2018-02-20 ENCOUNTER — TELEPHONE (OUTPATIENT)
Dept: FAMILY MEDICINE | Facility: CLINIC | Age: 53
End: 2018-02-20

## 2018-02-21 ENCOUNTER — TELEPHONE (OUTPATIENT)
Dept: PHARMACY | Facility: OTHER | Age: 53
End: 2018-02-21

## 2018-02-21 ENCOUNTER — OFFICE VISIT (OUTPATIENT)
Dept: FAMILY MEDICINE | Facility: CLINIC | Age: 53
End: 2018-02-21
Payer: MEDICARE

## 2018-02-21 VITALS
BODY MASS INDEX: 27.77 KG/M2 | DIASTOLIC BLOOD PRESSURE: 62 MMHG | HEIGHT: 70 IN | HEART RATE: 74 BPM | WEIGHT: 194 LBS | OXYGEN SATURATION: 97 % | TEMPERATURE: 97.7 F | SYSTOLIC BLOOD PRESSURE: 118 MMHG

## 2018-02-21 DIAGNOSIS — G43.009 MIGRAINE WITHOUT AURA AND WITHOUT STATUS MIGRAINOSUS, NOT INTRACTABLE: ICD-10-CM

## 2018-02-21 DIAGNOSIS — Z11.59 NEED FOR HEPATITIS C SCREENING TEST: ICD-10-CM

## 2018-02-21 DIAGNOSIS — R46.89 NEUROBEHAVIORAL SEQUELAE OF TRAUMATIC BRAIN INJURY (H): ICD-10-CM

## 2018-02-21 DIAGNOSIS — M62.838 MUSCLE SPASTICITY: ICD-10-CM

## 2018-02-21 DIAGNOSIS — E78.5 HYPERLIPIDEMIA LDL GOAL <160: Primary | ICD-10-CM

## 2018-02-21 DIAGNOSIS — S06.9XAS NEUROBEHAVIORAL SEQUELAE OF TRAUMATIC BRAIN INJURY (H): ICD-10-CM

## 2018-02-21 LAB
BASOPHILS # BLD AUTO: 0.1 10E9/L (ref 0–0.2)
BASOPHILS NFR BLD AUTO: 2 %
DIFFERENTIAL METHOD BLD: ABNORMAL
EOSINOPHIL # BLD AUTO: 0.6 10E9/L (ref 0–0.7)
EOSINOPHIL NFR BLD AUTO: 8.5 %
ERYTHROCYTE [DISTWIDTH] IN BLOOD BY AUTOMATED COUNT: 16.7 % (ref 10–15)
HCT VFR BLD AUTO: 45.2 % (ref 40–53)
HGB BLD-MCNC: 14.8 G/DL (ref 13.3–17.7)
LYMPHOCYTES # BLD AUTO: 2.8 10E9/L (ref 0.8–5.3)
LYMPHOCYTES NFR BLD AUTO: 42.5 %
MCH RBC QN AUTO: 23.1 PG (ref 26.5–33)
MCHC RBC AUTO-ENTMCNC: 32.7 G/DL (ref 31.5–36.5)
MCV RBC AUTO: 71 FL (ref 78–100)
MONOCYTES # BLD AUTO: 0.4 10E9/L (ref 0–1.3)
MONOCYTES NFR BLD AUTO: 6.3 %
NEUTROPHILS # BLD AUTO: 2.7 10E9/L (ref 1.6–8.3)
NEUTROPHILS NFR BLD AUTO: 40.7 %
PLATELET # BLD AUTO: 352 10E9/L (ref 150–450)
RBC # BLD AUTO: 6.41 10E12/L (ref 4.4–5.9)
WBC # BLD AUTO: 6.6 10E9/L (ref 4–11)

## 2018-02-21 PROCEDURE — 36415 COLL VENOUS BLD VENIPUNCTURE: CPT | Performed by: FAMILY MEDICINE

## 2018-02-21 PROCEDURE — 85025 COMPLETE CBC W/AUTO DIFF WBC: CPT | Performed by: FAMILY MEDICINE

## 2018-02-21 PROCEDURE — 80053 COMPREHEN METABOLIC PANEL: CPT | Performed by: FAMILY MEDICINE

## 2018-02-21 PROCEDURE — 80061 LIPID PANEL: CPT | Performed by: FAMILY MEDICINE

## 2018-02-21 PROCEDURE — G0472 HEP C SCREEN HIGH RISK/OTHER: HCPCS | Performed by: FAMILY MEDICINE

## 2018-02-21 PROCEDURE — 80164 ASSAY DIPROPYLACETIC ACD TOT: CPT | Performed by: FAMILY MEDICINE

## 2018-02-21 PROCEDURE — 99215 OFFICE O/P EST HI 40 MIN: CPT | Performed by: FAMILY MEDICINE

## 2018-02-21 RX ORDER — BACLOFEN 10 MG/1
10 TABLET ORAL 2 TIMES DAILY
Qty: 90 TABLET | Refills: 3 | COMMUNITY
Start: 2018-02-21 | End: 2018-03-13

## 2018-02-21 RX ORDER — PAROXETINE 40 MG/1
60 TABLET, FILM COATED ORAL EVERY MORNING
Qty: 90 TABLET | Refills: 1 | Status: SHIPPED | OUTPATIENT
Start: 2018-02-21 | End: 2018-06-07

## 2018-02-21 RX ORDER — SIMVASTATIN 80 MG
80 TABLET ORAL AT BEDTIME
Qty: 90 TABLET | Refills: 3 | Status: SHIPPED | OUTPATIENT
Start: 2018-02-21 | End: 2019-07-31

## 2018-02-21 NOTE — TELEPHONE ENCOUNTER
MTM referral from: [facility] clinic visit (referral by provider)    MTM referral outreach attempt #1 on February 21, 2018 at 3:59 PM      Outcome: Spoke with patients mother, still at clinic requested we call back tomorrow.    Shalonda Page, MTM Coordinator

## 2018-02-21 NOTE — LETTER
February 26, 2018      Yunior Angel  [address]    [facility] [address]        Dear ,    We are writing to inform you of your test results.    -Liver and gallbladder tests are normal. (ALT,AST, Alk phos, bilirubin), kidney function is normal (Cr, GFR), Sodium is normal, Potassium is normal, Calcium is normal, Glucose is normal (diabetes screening test).   -Cholesterol levels are at your goal levels.  ADVISE: Continuing your medication, a regular exercise program with at least 30 minutes of aerobic exercise 3-4 days/week ( 45 minutes 4-6 days/week if weight loss needed), and a low saturated fat,/low carbohydrate diet are helpful to maintain this.  Rechecking your fasting cholesterol panel in 12 months is recommended (Lipid w/ LDL reflex).   -Normal red blood cell (hgb) levels, normal white blood cell count and normal platelet levels.   -Hepatitis C antibody screen test shows no signs of a previous hepatitis C infection.   -Depakote level is below three as we would expect since he's no longer taking this.     If you have further questions about the interpretation of your labs, labtestsonline.org is a good website to check out for further information.     Resulted Orders   CBC with platelets differential   Result Value Ref Range    WBC 6.6 4.0 - 11.0 10e9/L    RBC Count 6.41 (H) 4.4 - 5.9 10e12/L    Hemoglobin 14.8 13.3 - 17.7 g/dL    Hematocrit 45.2 40.0 - 53.0 %    MCV 71 (L) 78 - 100 fl    MCH 23.1 (L) 26.5 - 33.0 pg    MCHC 32.7 31.5 - 36.5 g/dL    RDW 16.7 (H) 10.0 - 15.0 %    Platelet Count 352 150 - 450 10e9/L    Diff Method Automated Method     % Neutrophils 40.7 %    % Lymphocytes 42.5 %    % Monocytes 6.3 %    % Eosinophils 8.5 %    % Basophils 2.0 %    Absolute Neutrophil 2.7 1.6 - 8.3 10e9/L    Absolute Lymphocytes 2.8 0.8 - 5.3 10e9/L    Absolute Monocytes 0.4 0.0 - 1.3 10e9/L    Absolute Eosinophils 0.6 0.0 - 0.7 10e9/L    Absolute Basophils 0.1 0.0 - 0.2 10e9/L    Hepatitis C Screen Reflex to HCV RNA Quant and Genotype   Result Value Ref Range    Hepatitis C Antibody Nonreactive NR^Nonreactive      Comment:      Assay performance characteristics have not been established for newborns,   infants, and children     Lipid panel reflex to direct LDL Non-fasting   Result Value Ref Range    Cholesterol 204 (H) <200 mg/dL      Comment:      Desirable:       <200 mg/dl    Triglycerides 113 <150 mg/dL    HDL Cholesterol 47 >39 mg/dL    LDL Cholesterol Calculated 134 (H) <100 mg/dL      Comment:      Above desirable:  100-129 mg/dl  Borderline High:  130-159 mg/dL  High:             160-189 mg/dL  Very high:       >189 mg/dl      Non HDL Cholesterol 157 (H) <130 mg/dL      Comment:      Above Desirable:  130-159 mg/dl  Borderline high:  160-189 mg/dl  High:             190-219 mg/dl  Very high:       >219 mg/dl     Valproic acid   Result Value Ref Range    Valproic Acid Level <3 (L) 50 - 100 mg/L   Comprehensive metabolic panel (BMP + Alb, Alk Phos, ALT, AST, Total. Bili, TP)   Result Value Ref Range    Sodium 137 133 - 144 mmol/L    Potassium 4.1 3.4 - 5.3 mmol/L    Chloride 103 94 - 109 mmol/L    Carbon Dioxide 31 20 - 32 mmol/L    Anion Gap 3 3 - 14 mmol/L    Glucose 83 70 - 99 mg/dL    Urea Nitrogen 14 7 - 30 mg/dL    Creatinine 0.81 0.66 - 1.25 mg/dL    GFR Estimate >90 >60 mL/min/1.7m2      Comment:      Non  GFR Calc    GFR Estimate If Black >90 >60 mL/min/1.7m2      Comment:       GFR Calc    Calcium 9.3 8.5 - 10.1 mg/dL    Bilirubin Total 0.7 0.2 - 1.3 mg/dL    Albumin 4.0 3.4 - 5.0 g/dL    Protein Total 7.4 6.8 - 8.8 g/dL    Alkaline Phosphatase 65 40 - 150 U/L    ALT 31 0 - 70 U/L    AST 20 0 - 45 U/L       If you have any questions or concerns, please call the clinic at the number listed above.       Sincerely,        Alberto Carlos Jr, MD

## 2018-02-21 NOTE — MR AVS SNAPSHOT
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)        Migraine without aura and without status migrainosus, not intractable        Muscle spasticity        Need for hepatitis C screening test           Follow-ups after your visit        Additional Services     MED THERAPY MANAGE REFERRAL       Your provider has referred you to: **[facility] Scheduling (numerous locations) [phone]   [email or web address]  [facility]: [facility] [phone]   [email or web address]    Reason for Referral: review all meds, indications, costs, etc.  Patient with brain injury and mother's memory is lapsing as well.  She is Yunior's primary caregiver.    The [facility] department will contact you to schedule an appointment.  You may also schedule the appointment by calling [phone].  For [facility] [place] patients, please call [phone] to confirm/schedule your appointment on the next business day.    This service is designed to help you get the most from your medications.  A specially trained Pharmacist will work closely with you and your providers to solve any questions, concerns, issues or problems related to your medications.    Please bring all of your prescription and non-prescription medications (such as vitamins, over-the-counter medications, and herbals) or a detailed medication list to your appointment.    If you have a glucose meter or other home monitoring information, please also bring this to your  "appointment (i.e. blood glucose log, blood pressure log, pain log, etc.).                  Follow-up notes from your care team     Return in about 6 months (around 2018) for Recheck mental disorder, migraine.      Who to contact     If you have questions or need follow up information about today's clinic visit or your schedule please contact [facility] [place] directly at [phone].  Normal or non-critical lab and imaging results will be communicated to you by Histogenhart, letter or phone within 4 business days after the clinic has received the results. If you do not hear from us within 7 days, please contact the clinic through Histogenhart or phone. If you have a critical or abnormal lab result, we will notify you by phone as soon as possible.  Submit refill requests through [employer] or call your pharmacy and they will forward the refill request to us. Please allow 3 business days for your refill to be completed.          Additional Information About Your Visit        HistogenharInMobi Information     [employer] lets you send messages to your doctor, view your test results, renew your prescriptions, schedule appointments and more. To sign up, go to [email or web address] . Click on \"Log in\" on the left side of the screen, which will take you to the Welcome page. Then click on \"Sign up Now\" on the right side of the page.     You will be asked to enter the access code listed below, as well as some personal information. Please follow the directions to create your username and password.     Your access code is: [email or web address]  Expires: 2018  3:44 PM     Your access code will  in 90 days. If you need help or a new code, please call your [facility] clinic or [phone].        Care EveryWhere ID     This is your Care EveryWhere ID. This could be used by other organizations to access your [facility] medical records  [id number]        Your Vitals Were     Pulse Temperature Height Pulse Oximetry BMI (Body Mass Index)       " "74 97.7  F (36.5  C) (Oral) 5' 10\" (1.778 m) 97% 27.84 kg/m2        Blood Pressure from Last 3 Encounters:   02/21/18 118/62   06/26/17 126/74   03/29/17 118/64    Weight from Last 3 Encounters:   02/21/18 194 lb (88 kg)   06/26/17 193 lb 3.2 oz (87.6 kg)   03/29/17 195 lb (88.5 kg)              We Performed the Following     CBC with platelets differential     Comprehensive metabolic panel (BMP + Alb, Alk Phos, ALT, AST, Total. Bili, TP)     Hepatitis C Screen Reflex to HCV RNA Quant and Genotype     Lipid panel reflex to direct LDL Non-fasting     MED THERAPY MANAGE REFERRAL     Valproic acid          Today's Medication Changes          These changes are accurate as of 2/21/18  3:44 PM.  If you have any questions, ask your nurse or doctor.               These medicines have changed or have updated prescriptions.        Dose/Directions    baclofen 10 MG tablet   Commonly known as:  LIORESAL   This may have changed:    - how much to take  - how to take this  - when to take this   Used for:  Muscle spasticity   Changed by:  Alberto Carlos Jr., MD        Dose:  10 mg   Take 1 tablet (10 mg) by mouth 2 times daily   Quantity:  90 tablet   Refills:  3            Where to get your medicines      These medications were sent to [facility] - [place], [place] - [address] [place] [address]  [address] [facility] [facility] [address]    Hours:  Mon-Fri 8:30am-5:00pm Toll Free [phone] Phone:  [phone]     PARoxetine 40 MG tablet    simvastatin 80 MG tablet                Primary Care Provider Office Phone # Fax #    Alberto Carlos Jr., [phone] [address]  [place] [address]        Equal Access to Services     RAMYA PORTILLO AH: Ruthie Gordon, wamirian tan, qaybta kaalmada jaquelin, sylvie ch. So [facility] [phone].    ATENCIÓN: Si habla español, tiene a gamble disposición servicios gratuitos de asistencia lingüística. " [place] diallo [phone].    We comply with applicable federal civil rights laws and [place] laws. We do not discriminate on the basis of race, color, national origin, age, disability, sex, sexual orientation, or gender identity.            Thank you!     Thank you for choosing [facility]  for your care. Our goal is always to provide you with excellent care. Hearing back from our patients is one way we can continue to improve our services. Please take a few minutes to complete the written survey that you may receive in the mail after your visit with us. Thank you!             Your Updated Medication List - Protect others around you: Learn how to safely use, store and throw away your medicines at www.disposemymeds.org.          This list is accurate as of 2/21/18  3:44 PM.  Always use your most recent med list.                   Brand Name Dispense Instructions for use Diagnosis    aspirin 325 MG EC tablet     90 tablet    Take 1 tablet (325 mg) by mouth every morning    Mixed hyperlipidemia       baclofen 10 MG tablet    LIORESAL    90 tablet    Take 1 tablet (10 mg) by mouth 2 times daily    Muscle spasticity       dicyclomine 10 MG capsule    BENTYL    240 capsule    Take 1 capsule (10 mg) by mouth 4 times daily (before meals and nightly)    Irritable bowel syndrome with diarrhea       indomethacin 50 MG capsule    INDOCIN    30 capsule    Take 1 capsule (50 mg) by mouth 3 times daily (with meals)        Multi-vitamin Tabs tablet   Generic drug:  multivitamin, therapeutic with minerals      1 tab q d        omeprazole 40 MG capsule    priLOSEC    90 capsule    Take 1 capsule (40 mg) by mouth daily Take 30-60 minutes before a meal.    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

## 2018-02-21 NOTE — NURSING NOTE
"Chief Complaint   Patient presents with     Recheck Medication     Lipids       Initial /62  Pulse 74  Temp 97.7  F (36.5  C) (Oral)  Ht 5' 10\" (1.778 m)  Wt 194 lb (88 kg)  SpO2 97%  BMI 27.84 kg/m2 Estimated body mass index is 27.84 kg/(m^2) as calculated from the following:    Height as of this encounter: 5' 10\" (1.778 m).    Weight as of this encounter: 194 lb (88 kg).  Medication Reconciliation: complete  "

## 2018-02-21 NOTE — PROGRESS NOTES
SUBJECTIVE:   Yunior Angel is a 52 year old male, accompanied by his mother, who presents to clinic today for the following health issues:    Hyperlipidemia Follow-Up      Rate your low fat/cholesterol diet?: good    Taking statin?  Yes, no muscle aches from statin    Other lipid medications/supplements?:  none    Amount of exercise or physical activity: 2-3 days/week     Problems taking medications regularly: No    Medication side effects: none    Diet: low fat/cholesterol      Lipids- Pt is not fasting today, but agreed to recheck his lipids today. Pt is currently on simvastatin. He takes daily baby aspirin as well.    Anxiety- Pt is currently on Paxil for his anxiety, is working well.    Migraines- Pt is on Depakote for his migraines. Pt has never had a seizure. His mother thinks neurologist took him off Depakote but is not exactly sure. Mother notes pt rarely gets tingling in his hands, but when this happens, it is a sign that migraine is going to develop shortly after. Pt last saw neurologist Dr. Appiah in 2005, gave him trial of Depakote 250 mg TID. Dr. Appiah put him on Depakote for migraines and not for seizures. Dr. Appiah noted that pt wanted to taper off Depakote. Mother notes pt does not need Depakote since he rarely has migraines. Pt has home health nurse that comes every Wednesday; mother used to help pt with his medications but since she is not doing this anymore, mother is unsure of what medications he is currently on.    Knee pain- Mother notes pt had terrible knee pain and edema which prevented him from doing daily activities. Pt also had lump in his knee, was told it might be cancerous. Mother notes pt had MRI for his knee that did not show lump was cancerous. Lump eventually resolved. Pt did PT, and his pain also resolved. Mother thinks [facility] prescribed baclofen for his knee pain. However, pt does not have any current knee pain.    Nausea- Pt has episodes of  "nausea in the mornings, has not had it in awhile. His employer is getting agitated with this. Mother has tried many things to try to help with this but nothing has helped. His nausea prevents him from being able to even talk on the phone in the mornings.    Gait and balance concerns- His balance issues have have worsened. His mother notes pt is real unsteady and has to grab his mother's arm frequently due to feeling unstable when he is walking.    His mother notes pt does not need Percocet; she notes pt does not have any pain.        Problem list and histories reviewed & adjusted, as indicated.  Additional history: as documented    Reviewed and updated as needed this visit by clinical staff  Tobacco  Allergies  Meds  Problems       Reviewed and updated as needed this visit by Provider  Allergies  Meds  Problems       ROS:  Constitutional, HEENT, cardiovascular, pulmonary, gi and gu systems are negative, except as otherwise noted.    This document serves as a record of the services and decisions personally performed and made by Alberto Carlos MD. It was created on his behalf by Ankur Oden, a trained medical scribe. The creation of this document is based on the provider's statements to the medical scribe.  Ankur Oden 3:09 PM February 21, 2018    OBJECTIVE:     /62  Pulse 74  Temp 97.7  F (36.5  C) (Oral)  Ht 1.778 m (5' 10\")  Wt 88 kg (194 lb)  SpO2 97%  BMI 27.84 kg/m2  Body mass index is 27.84 kg/(m^2).  GENERAL: healthy, alert and no distress  PSYCH: mentation appears normal, affect normal/bright    Diagnostic Test Results:  Results for orders placed or performed in visit on 02/21/18 (from the past 24 hour(s))   CBC with platelets differential   Result Value Ref Range    WBC 6.6 4.0 - 11.0 10e9/L    RBC Count 6.41 (H) 4.4 - 5.9 10e12/L    Hemoglobin 14.8 13.3 - 17.7 g/dL    Hematocrit 45.2 40.0 - 53.0 %    MCV 71 (L) 78 - 100 fl    MCH 23.1 (L) 26.5 - 33.0 pg    MCHC 32.7 31.5 - 36.5 " g/dL    RDW 16.7 (H) 10.0 - 15.0 %    Platelet Count 352 150 - 450 10e9/L    Diff Method Automated Method     % Neutrophils 40.7 %    % Lymphocytes 42.5 %    % Monocytes 6.3 %    % Eosinophils 8.5 %    % Basophils 2.0 %    Absolute Neutrophil 2.7 1.6 - 8.3 10e9/L    Absolute Lymphocytes 2.8 0.8 - 5.3 10e9/L    Absolute Monocytes 0.4 0.0 - 1.3 10e9/L    Absolute Eosinophils 0.6 0.0 - 0.7 10e9/L    Absolute Basophils 0.1 0.0 - 0.2 10e9/L     ASSESSMENT/PLAN:     (E78.5) Hyperlipidemia LDL goal <160  (primary encounter diagnosis)  Comment: Pt was not fasting today, but agreed to recheck his lipids. In the interim, will refill his simvastatin and have him continue to take this along with his daily 325 mg aspirin tablet. His mother agreed to switch to [facility] mail order for his simvastatin to better coordinate his care & medications since patient has a TBI and mother is uncertain of his medications.  Plan: CBC with platelets differential, Lipid panel         reflex to direct LDL Non-fasting, simvastatin         (ZOCOR) 80 MG tablet        Follow up based on labs.    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 However, I advised pt that given the amount of time we spent discussing other concerns, will have pt follow up at another appointment to discuss his gait and balance issues. Furthermore, given patient's complex care, advised with his mother and with pt that if we refer him to a specialist such as a neurologist, I recommend we stay within the [facility] so we can have coordinated care. Mother was in agreement with this. She was also in agreement to switching to [facility] mail order for his Paroxetine. Will have mother follows up with [facility] to send us patient's records from his visits with them.  Plan: CBC with platelets differential, PARoxetine         (PAXIL) 40 MG tablet,  Valproic acid,         Comprehensive metabolic panel (BMP + Alb, Alk         Phos, ALT, AST, Total. Bili, TP)        Follow up based on labs. Will have pt follow up at another appointment to discuss his gait and balance problems.    (G43.009) Migraine without aura and without status migrainosus, not intractable  Comment: Discussed with pt and his mother that daily Depakote use reduces severity and frequency of migraines. Neurologist Dr. Appiah initially put pt on trial of Depakote in 2005 for migraines, but shortly after pt wanted to taper off his Depakote. Mother noted that pt rarely has migraines and feels pt does not need Depakote. Therefore, I was in agreement with discontinuing his Depakote. In the interim, will check CBC for further evaluation.  Plan: CBC with platelets differential        Follow up based on labs.    (M62.838) Muscle spasticity  Comment: His mother initially thought [facility] prescribed baclofen for patient's knee pain. However, I think [facility] may have prescribed this given pt had a visit with them for his knee pain. Furthermore, there is no evidence in our system of pt being put on baclofen by neurologist or for what reason he was put on this. However, I discussed with mother and pt that we usually keep patients on baclofen to prevent muscle spasticity. Furthermore, given I did not put pt on baclofen, I advised that we refill it and keep him on baclofen; mother and pt were in agreement with this. In the interim, since I do not have any current records from [facility] or from [facility], mother is going to call them to release their current records to us here at [facility] so we can figure out why pt is on baclofen. Will recheck blood work for further evaluation.  Plan: CBC with platelets differential, Comprehensive         metabolic panel (BMP + Alb, Alk Phos, ALT, AST,        Total. Bili, TP), baclofen (LIORESAL) 10 MG          tablet        Follow up based on labs.    (Z11.59) Need for hepatitis C screening test  Comment: Pt due for Hep C screening, discussed with pt that people born between 2980-1082 are at higher risk of Hep C, pt agreed to screen for Hep C today.  Plan: Hepatitis C Screen Reflex to HCV RNA Quant and         Genotype        Follow up based on labs.    MTM referral- Given his mother was clearly confused today on patient's medications, I recommended pt see MTM to go over his medications. This will enable his mother to have an accurate picture of what medications pt is taking, how he is supposed to be taking, and what he is taking them for.    The information in this document, created by the medical scribe for me, accurately reflects the services I personally performed and the decisions made by me. I have reviewed and approved this document for accuracy prior to leaving the patient care area.  February 21, 2018 3:09 PM    Over 40 minutes spent with patient, greater than 50% in face to face counseling and coordination of care.     Alberto Carlos Jr, MD  [facility]

## 2018-02-22 LAB
ALBUMIN SERPL-MCNC: 4 G/DL (ref 3.4–5)
ALP SERPL-CCNC: 65 U/L (ref 40–150)
ALT SERPL W P-5'-P-CCNC: 31 U/L (ref 0–70)
ANION GAP SERPL CALCULATED.3IONS-SCNC: 3 MMOL/L (ref 3–14)
AST SERPL W P-5'-P-CCNC: 20 U/L (ref 0–45)
BILIRUB SERPL-MCNC: 0.7 MG/DL (ref 0.2–1.3)
BUN SERPL-MCNC: 14 MG/DL (ref 7–30)
CALCIUM SERPL-MCNC: 9.3 MG/DL (ref 8.5–10.1)
CHLORIDE SERPL-SCNC: 103 MMOL/L (ref 94–109)
CHOLEST SERPL-MCNC: 204 MG/DL
CO2 SERPL-SCNC: 31 MMOL/L (ref 20–32)
CREAT SERPL-MCNC: 0.81 MG/DL (ref 0.66–1.25)
GFR SERPL CREATININE-BSD FRML MDRD: >90 ML/MIN/1.7M2
GLUCOSE SERPL-MCNC: 83 MG/DL (ref 70–99)
HCV AB SERPL QL IA: NONREACTIVE
HDLC SERPL-MCNC: 47 MG/DL
LDLC SERPL CALC-MCNC: 134 MG/DL
NONHDLC SERPL-MCNC: 157 MG/DL
POTASSIUM SERPL-SCNC: 4.1 MMOL/L (ref 3.4–5.3)
PROT SERPL-MCNC: 7.4 G/DL (ref 6.8–8.8)
SODIUM SERPL-SCNC: 137 MMOL/L (ref 133–144)
TRIGL SERPL-MCNC: 113 MG/DL
VALPROATE SERPL-MCNC: <3 MG/L (ref 50–100)

## 2018-02-23 NOTE — PROGRESS NOTES
Please send the following letter:    Mr. Angel,    -Liver and gallbladder tests are normal. (ALT,AST, Alk phos, bilirubin), kidney function is normal (Cr, GFR), Sodium is normal, Potassium is normal, Calcium is normal, Glucose is normal (diabetes screening test).   -Cholesterol levels are at your goal levels.  ADVISE: Continuing your medication, a regular exercise program with at least 30 minutes of aerobic exercise 3-4 days/week ( 45 minutes 4-6 days/week if weight loss needed), and a low saturated fat,/low carbohydrate diet are helpful to maintain this.  Rechecking your fasting cholesterol panel in 12 months is recommended (Lipid w/ LDL reflex).  -Normal red blood cell (hgb) levels, normal white blood cell count and normal platelet levels.  -Hepatitis C antibody screen test shows no signs of a previous hepatitis C infection.  -Depakote level is below three as we would expect since he's no longer taking this.    If you have further questions about the interpretation of your labs, labtestsonline.org is a good website to check out for further information.      Please contact the clinic if you have additional questions.  Thank you.    Sincerely,    Alberto Carlos MD

## 2018-02-23 NOTE — TELEPHONE ENCOUNTER
MTM referral from: [facility] visit (referral by provider)    MTM referral outreach attempt #2 on February 23, 2018 at 1:43 PM      Outcome: Patient not reachable after several attempts, will route to MTM Pharmacist/Provider as an FYI. Thank you for the referral.    Shalnoda Page, MTM Coordinator

## 2018-02-26 ENCOUNTER — MEDICAL CORRESPONDENCE (OUTPATIENT)
Dept: HEALTH INFORMATION MANAGEMENT | Facility: CLINIC | Age: 53
End: 2018-02-26

## 2018-02-27 ENCOUNTER — MEDICAL CORRESPONDENCE (OUTPATIENT)
Dept: HEALTH INFORMATION MANAGEMENT | Facility: CLINIC | Age: 53
End: 2018-02-27

## 2018-02-28 ENCOUNTER — TELEPHONE (OUTPATIENT)
Dept: FAMILY MEDICINE | Facility: CLINIC | Age: 53
End: 2018-02-28

## 2018-02-28 NOTE — TELEPHONE ENCOUNTER
Verbal order given for continuation of home care for 60 days.    Tiffany DOUGLAS asking for order for fiber powder for constipation, 1 TBLSP in water as needed.    Triage to call back if OK to add this to the patient's medication list.  Leona Marmolejo RN

## 2018-03-01 NOTE — TELEPHONE ENCOUNTER
Detailed message left for patient with information noted from provider below. Left call back number for any additional questions.  Jaci De La Garza RN  [employer]

## 2018-03-01 NOTE — TELEPHONE ENCOUNTER
Okay to add fiber supplement to patient's medication regimen as requested below.    Alberto Carlos MD

## 2018-03-05 ENCOUNTER — MEDICAL CORRESPONDENCE (OUTPATIENT)
Dept: HEALTH INFORMATION MANAGEMENT | Facility: CLINIC | Age: 53
End: 2018-03-05

## 2018-03-05 ENCOUNTER — TELEPHONE (OUTPATIENT)
Dept: FAMILY MEDICINE | Facility: CLINIC | Age: 53
End: 2018-03-05

## 2018-03-05 NOTE — TELEPHONE ENCOUNTER
Forms received by: Fax    Purpose of Form:  FACE TO FACE ENCOUNTER     How the form needs to be returned for patient:  Fax    Form currently placed: SR inbox    Steph Tolbert CMA

## 2018-03-07 ENCOUNTER — MEDICAL CORRESPONDENCE (OUTPATIENT)
Dept: HEALTH INFORMATION MANAGEMENT | Facility: CLINIC | Age: 53
End: 2018-03-07

## 2018-03-07 ENCOUNTER — TELEPHONE (OUTPATIENT)
Dept: FAMILY MEDICINE | Facility: CLINIC | Age: 53
End: 2018-03-07

## 2018-03-08 ENCOUNTER — TELEPHONE (OUTPATIENT)
Dept: FAMILY MEDICINE | Facility: CLINIC | Age: 53
End: 2018-03-08

## 2018-03-08 NOTE — TELEPHONE ENCOUNTER
Date Forms was received: March 8, 2018    Forms received by: Fax    Purpose of Form:  Nursing Home Orders Interim    When the form is due:  ASAP    How the form needs to be returned for patient:  Fax    Form currently placed  SR inbox

## 2018-03-12 ENCOUNTER — TELEPHONE (OUTPATIENT)
Dept: FAMILY MEDICINE | Facility: CLINIC | Age: 53
End: 2018-03-12

## 2018-03-12 ENCOUNTER — TELEPHONE (OUTPATIENT)
Dept: PHARMACY | Facility: CLINIC | Age: 53
End: 2018-03-12

## 2018-03-12 NOTE — TELEPHONE ENCOUNTER
9:05 am: Has appt with MTM today, 3/12@9:00 am. Patient/mother did not answer. Left Vm and CB#s to return call. 11:30 am: Patient called, tried calling back. No answer. Left VM and CB#s to return call again.     Katia Sow, PharmD  Pharmaceutical Care Resident   Pager: [phone]    Update 3/15:Called patient to try and reschedule, patient did not answer. Left VM and CB#s.

## 2018-03-13 ENCOUNTER — APPOINTMENT (OUTPATIENT)
Dept: CT IMAGING | Facility: CLINIC | Age: 53
DRG: 470 | End: 2018-03-13
Attending: EMERGENCY MEDICINE
Payer: MEDICARE

## 2018-03-13 ENCOUNTER — HOSPITAL ENCOUNTER (INPATIENT)
Facility: CLINIC | Age: 53
LOS: 4 days | Discharge: NURSING, REHAB OR LONG-TERM CARE | DRG: 470 | End: 2018-03-17
Attending: EMERGENCY MEDICINE | Admitting: INTERNAL MEDICINE
Payer: MEDICARE

## 2018-03-13 ENCOUNTER — APPOINTMENT (OUTPATIENT)
Dept: GENERAL RADIOLOGY | Facility: CLINIC | Age: 53
DRG: 470 | End: 2018-03-13
Attending: EMERGENCY MEDICINE
Payer: MEDICARE

## 2018-03-13 DIAGNOSIS — S72.001A CLOSED FRACTURE OF NECK OF RIGHT FEMUR, INITIAL ENCOUNTER (H): ICD-10-CM

## 2018-03-13 DIAGNOSIS — R20.0 RIGHT LEG NUMBNESS: ICD-10-CM

## 2018-03-13 LAB
ANION GAP SERPL CALCULATED.3IONS-SCNC: 7 MMOL/L (ref 3–14)
APTT PPP: 31 SEC (ref 22–37)
BASOPHILS # BLD AUTO: 0.2 10E9/L (ref 0–0.2)
BASOPHILS NFR BLD AUTO: 2.3 %
BUN SERPL-MCNC: 23 MG/DL (ref 7–30)
CALCIUM SERPL-MCNC: 9.3 MG/DL (ref 8.5–10.1)
CHLORIDE SERPL-SCNC: 105 MMOL/L (ref 94–109)
CO2 SERPL-SCNC: 29 MMOL/L (ref 20–32)
CREAT SERPL-MCNC: 0.76 MG/DL (ref 0.66–1.25)
DIFFERENTIAL METHOD BLD: ABNORMAL
EOSINOPHIL # BLD AUTO: 0.6 10E9/L (ref 0–0.7)
EOSINOPHIL NFR BLD AUTO: 9 %
ERYTHROCYTE [DISTWIDTH] IN BLOOD BY AUTOMATED COUNT: 15.2 % (ref 10–15)
GFR SERPL CREATININE-BSD FRML MDRD: >90 ML/MIN/1.7M2
GLUCOSE SERPL-MCNC: 104 MG/DL (ref 70–99)
HCT VFR BLD AUTO: 42.5 % (ref 40–53)
HGB BLD-MCNC: 13.1 G/DL (ref 13.3–17.7)
IMM GRANULOCYTES # BLD: 0.1 10E9/L (ref 0–0.4)
IMM GRANULOCYTES NFR BLD: 1.1 %
INR PPP: 0.98 (ref 0.86–1.14)
LYMPHOCYTES # BLD AUTO: 1.7 10E9/L (ref 0.8–5.3)
LYMPHOCYTES NFR BLD AUTO: 25.7 %
MCH RBC QN AUTO: 22.6 PG (ref 26.5–33)
MCHC RBC AUTO-ENTMCNC: 30.8 G/DL (ref 31.5–36.5)
MCV RBC AUTO: 73 FL (ref 78–100)
MONOCYTES # BLD AUTO: 0.4 10E9/L (ref 0–1.3)
MONOCYTES NFR BLD AUTO: 6.3 %
NEUTROPHILS # BLD AUTO: 3.6 10E9/L (ref 1.6–8.3)
NEUTROPHILS NFR BLD AUTO: 55.6 %
NRBC # BLD AUTO: 0 10*3/UL
NRBC BLD AUTO-RTO: 0 /100
PLATELET # BLD AUTO: 376 10E9/L (ref 150–450)
POTASSIUM SERPL-SCNC: 4.6 MMOL/L (ref 3.4–5.3)
RBC # BLD AUTO: 5.79 10E12/L (ref 4.4–5.9)
SODIUM SERPL-SCNC: 141 MMOL/L (ref 133–144)
WBC # BLD AUTO: 6.5 10E9/L (ref 4–11)

## 2018-03-13 PROCEDURE — 96374 THER/PROPH/DIAG INJ IV PUSH: CPT

## 2018-03-13 PROCEDURE — 85730 THROMBOPLASTIN TIME PARTIAL: CPT | Performed by: EMERGENCY MEDICINE

## 2018-03-13 PROCEDURE — 73502 X-RAY EXAM HIP UNI 2-3 VIEWS: CPT

## 2018-03-13 PROCEDURE — 99223 1ST HOSP IP/OBS HIGH 75: CPT | Mod: AI | Performed by: INTERNAL MEDICINE

## 2018-03-13 PROCEDURE — 96376 TX/PRO/DX INJ SAME DRUG ADON: CPT

## 2018-03-13 PROCEDURE — 85025 COMPLETE CBC W/AUTO DIFF WBC: CPT | Performed by: EMERGENCY MEDICINE

## 2018-03-13 PROCEDURE — 93005 ELECTROCARDIOGRAM TRACING: CPT

## 2018-03-13 PROCEDURE — 25000132 ZZH RX MED GY IP 250 OP 250 PS 637: Mod: GY | Performed by: PHYSICIAN ASSISTANT

## 2018-03-13 PROCEDURE — 72192 CT PELVIS W/O DYE: CPT

## 2018-03-13 PROCEDURE — 80048 BASIC METABOLIC PNL TOTAL CA: CPT | Performed by: EMERGENCY MEDICINE

## 2018-03-13 PROCEDURE — 99285 EMERGENCY DEPT VISIT HI MDM: CPT | Mod: 25

## 2018-03-13 PROCEDURE — 25000128 H RX IP 250 OP 636: Performed by: PHYSICIAN ASSISTANT

## 2018-03-13 PROCEDURE — A9270 NON-COVERED ITEM OR SERVICE: HCPCS | Mod: GY | Performed by: PHYSICIAN ASSISTANT

## 2018-03-13 PROCEDURE — 12000000 ZZH R&B MED SURG/OB

## 2018-03-13 PROCEDURE — 25000128 H RX IP 250 OP 636: Performed by: EMERGENCY MEDICINE

## 2018-03-13 PROCEDURE — 85610 PROTHROMBIN TIME: CPT | Performed by: EMERGENCY MEDICINE

## 2018-03-13 RX ORDER — PROCHLORPERAZINE 25 MG
25 SUPPOSITORY, RECTAL RECTAL EVERY 12 HOURS PRN
Status: DISCONTINUED | OUTPATIENT
Start: 2018-03-13 | End: 2018-03-14

## 2018-03-13 RX ORDER — MORPHINE SULFATE 4 MG/ML
8 INJECTION, SOLUTION INTRAMUSCULAR; INTRAVENOUS ONCE
Status: COMPLETED | OUTPATIENT
Start: 2018-03-13 | End: 2018-03-13

## 2018-03-13 RX ORDER — SODIUM CHLORIDE 9 MG/ML
INJECTION, SOLUTION INTRAVENOUS CONTINUOUS
Status: DISCONTINUED | OUTPATIENT
Start: 2018-03-13 | End: 2018-03-14

## 2018-03-13 RX ORDER — HYDROMORPHONE HYDROCHLORIDE 1 MG/ML
0.2 INJECTION, SOLUTION INTRAMUSCULAR; INTRAVENOUS; SUBCUTANEOUS
Status: DISCONTINUED | OUTPATIENT
Start: 2018-03-13 | End: 2018-03-14

## 2018-03-13 RX ORDER — DICYCLOMINE HYDROCHLORIDE 10 MG/1
10 CAPSULE ORAL
Status: DISCONTINUED | OUTPATIENT
Start: 2018-03-13 | End: 2018-03-14

## 2018-03-13 RX ORDER — MULTIVITAMIN,THERAPEUTIC
1 TABLET ORAL DAILY
COMMUNITY

## 2018-03-13 RX ORDER — ACETAMINOPHEN 325 MG/1
975 TABLET ORAL 3 TIMES DAILY
Status: DISCONTINUED | OUTPATIENT
Start: 2018-03-13 | End: 2018-03-14

## 2018-03-13 RX ORDER — PAROXETINE 20 MG/1
60 TABLET, FILM COATED ORAL EVERY MORNING
Status: DISCONTINUED | OUTPATIENT
Start: 2018-03-14 | End: 2018-03-14

## 2018-03-13 RX ORDER — ONDANSETRON 2 MG/ML
4 INJECTION INTRAMUSCULAR; INTRAVENOUS EVERY 6 HOURS PRN
Status: DISCONTINUED | OUTPATIENT
Start: 2018-03-13 | End: 2018-03-14

## 2018-03-13 RX ORDER — AMOXICILLIN 250 MG
1 CAPSULE ORAL 2 TIMES DAILY PRN
Status: DISCONTINUED | OUTPATIENT
Start: 2018-03-13 | End: 2018-03-14

## 2018-03-13 RX ORDER — MORPHINE SULFATE 4 MG/ML
4 INJECTION, SOLUTION INTRAMUSCULAR; INTRAVENOUS ONCE
Status: COMPLETED | OUTPATIENT
Start: 2018-03-13 | End: 2018-03-13

## 2018-03-13 RX ORDER — PROCHLORPERAZINE MALEATE 10 MG
10 TABLET ORAL EVERY 6 HOURS PRN
Status: DISCONTINUED | OUTPATIENT
Start: 2018-03-13 | End: 2018-03-14

## 2018-03-13 RX ORDER — NALOXONE HYDROCHLORIDE 0.4 MG/ML
.1-.4 INJECTION, SOLUTION INTRAMUSCULAR; INTRAVENOUS; SUBCUTANEOUS
Status: DISCONTINUED | OUTPATIENT
Start: 2018-03-13 | End: 2018-03-14

## 2018-03-13 RX ORDER — CEFAZOLIN SODIUM 2 G/100ML
2 INJECTION, SOLUTION INTRAVENOUS
Status: CANCELLED | OUTPATIENT
Start: 2018-03-13

## 2018-03-13 RX ORDER — OXYCODONE HYDROCHLORIDE 5 MG/1
5-10 TABLET ORAL
Status: DISCONTINUED | OUTPATIENT
Start: 2018-03-13 | End: 2018-03-14

## 2018-03-13 RX ORDER — ONDANSETRON 4 MG/1
4 TABLET, ORALLY DISINTEGRATING ORAL EVERY 6 HOURS PRN
Status: DISCONTINUED | OUTPATIENT
Start: 2018-03-13 | End: 2018-03-14

## 2018-03-13 RX ORDER — LIDOCAINE 40 MG/G
CREAM TOPICAL
Status: DISCONTINUED | OUTPATIENT
Start: 2018-03-13 | End: 2018-03-13

## 2018-03-13 RX ORDER — SIMVASTATIN 40 MG
80 TABLET ORAL AT BEDTIME
Status: DISCONTINUED | OUTPATIENT
Start: 2018-03-13 | End: 2018-03-14

## 2018-03-13 RX ADMIN — DICYCLOMINE HYDROCHLORIDE 10 MG: 10 CAPSULE ORAL at 21:24

## 2018-03-13 RX ADMIN — OXYCODONE HYDROCHLORIDE 5 MG: 5 TABLET ORAL at 21:24

## 2018-03-13 RX ADMIN — MORPHINE SULFATE 8 MG: 4 INJECTION INTRAVENOUS at 11:43

## 2018-03-13 RX ADMIN — DICYCLOMINE HYDROCHLORIDE 10 MG: 10 CAPSULE ORAL at 17:13

## 2018-03-13 RX ADMIN — ACETAMINOPHEN 975 MG: 325 TABLET, FILM COATED ORAL at 15:12

## 2018-03-13 RX ADMIN — MORPHINE SULFATE 4 MG: 4 INJECTION INTRAVENOUS at 09:40

## 2018-03-13 RX ADMIN — SIMVASTATIN 80 MG: 40 TABLET, FILM COATED ORAL at 21:24

## 2018-03-13 RX ADMIN — ACETAMINOPHEN 975 MG: 325 TABLET, FILM COATED ORAL at 21:24

## 2018-03-13 RX ADMIN — OXYCODONE HYDROCHLORIDE 5 MG: 5 TABLET ORAL at 15:12

## 2018-03-13 RX ADMIN — SODIUM CHLORIDE: 9 INJECTION, SOLUTION INTRAVENOUS at 16:44

## 2018-03-13 ASSESSMENT — ENCOUNTER SYMPTOMS
COLOR CHANGE: 0
HEADACHES: 0
ABDOMINAL PAIN: 0
NECK PAIN: 0
NUMBNESS: 1
BACK PAIN: 0

## 2018-03-13 ASSESSMENT — ACTIVITIES OF DAILY LIVING (ADL)
DRESS: 0-->INDEPENDENT
SWALLOWING: 0-->SWALLOWS FOODS/LIQUIDS WITHOUT DIFFICULTY
TOILETING: 0-->INDEPENDENT
FALL_HISTORY_WITHIN_LAST_SIX_MONTHS: YES
RETIRED_COMMUNICATION: 0-->UNDERSTANDS/COMMUNICATES WITHOUT DIFFICULTY
COGNITION: 1 - ATTENTION OR MEMORY DEFICITS
TRANSFERRING: 0-->INDEPENDENT
AMBULATION: 0-->INDEPENDENT
NUMBER_OF_TIMES_PATIENT_HAS_FALLEN_WITHIN_LAST_SIX_MONTHS: 1
BATHING: 0-->INDEPENDENT
RETIRED_EATING: 0-->INDEPENDENT

## 2018-03-13 NOTE — PROGRESS NOTES
- Pt seen and examined in ED. I discussed case with Ortho Trauma Surgeon, Dr. Parmjit Zabala- patient has right femoral neck fracture that will require surgery. Admit to Hospitalist Service. Plan for surgery tomorrow morning. Ok for patient to eat today. Bedrest and NWB on RLE until after surgery. NPO after midnight. Hold Aspirin and anticoagulant medications prior to surgery. We will also obtain CT of pelvis today. Patient has limited ability to wiggle toes and plantar/dorsiflex toes in right foot- states this is due to a previous brain injury in 1991. Is able to feel light touch to RLE, 1+ DP pulses, cap refill < 3 sec. Full Ortho consult to completed by Dr. Zabala.

## 2018-03-13 NOTE — IP AVS SNAPSHOT
` ` Patient Information     Patient Name Sex     Jeanne Yunior LINDSAY ([id number]) Male 1965       Room Bed    [address] [place] [address]      Patient Demographics     Address Phone E-mail Address    [address]   [facility] [address] [phone] (Home) *Preferred*  NONE (Work)  [phone] (Mobile) [email or web address]      Patient Ethnicity & Race     Ethnic Group Patient Race    American White      Emergency Contact(s)     Name Relation Home Work Mobile    Patsy Machado Mother [phone] [employer] [phone]    Jhony Angel Father [phone] NONE [phone]    ZINA ANGEL Brother [phone] NONE [phone]      Documents on File        Status Date Received Description       Documents for the Patient    Insurance Card  () 06     Face Sheet  () 10/03/08     Privacy Notice - [place] Received () 03     Waiver - Payment  () 04     Insurance Card  () 08     Face Sheet Received () 09     External Medication Information Consent Accepted () 09     Waiver - Payment  () 09     Patient ID       Consent for Services - Hospital/Clinic Received () 11     Insurance Card  () 11     External Medication Information Consent Accepted () 11     HIM ZACK Authorization - File Only   11 [facility]    Insurance Card Received () 10/30/11     Patient ID Received () 10/30/11     Privacy Notice - [place] Received 10/30/11     Consent for Services - Hospital/Clinic Received () 10/30/11     HIM ZACK Authorization - File Only   12 Z[facility]    External Medication Information Consent  ()      HIM ZACK Authorization   Yunior Gillesler    Insurance Card Received () 12     External Medication Information Consent Accepted 12     Consent for EHR Access  13 Copied from existing Consent for  services - C/HOD collected on 10/30/2011    [facility] Specified Other       Consent for Services - Hospital/Clinic Received () 13     Consent to Communicate Received () 10/16/13 authorization to discuss health information    HIM ZACK Authorization - File Only   [place]  [facility]   14    Consent for Services - Hospital/Clinic Received () 14     Consent to Communicate Received () 14     Physical Therapy Certification Received 14 balance/gait 14 PT    Insurance Card Received () 14 MEDICA    Patient ID Received () 14     Insurance Card Received () 14 MEDICARE    Business/Insurance/Care Coordination/Health Form - Patient   [facility] [place] Letter 01/29/15    HIM ZACK Authorization - File Only   [place] [facility] [place] 1/23/15    Consent for Services - Hospital/Clinic Received () 08/03/15     HIM ZACK Authorization - File Only Received 11/30/15 Proxy Access    HIM ZACK Authorization  16 [place] [facility] [place]/[facility] [place]-invalid    Consent for Services/Privacy Notice - Hospital/Clinic Received 17     Consent for Services/Privacy Notice - Hospital/Clinic Received () 16     Insurance Card Received () 16 [employer]    Insurance Card Received () 17 MEDICARE    Patient ID Received 17 exp     Business/Insurance/Care Coordination/Health Form - Patient  17 [employer] [facility] REASONABLE ACCOMMODATION FORMS 2017    HIM ZACK Authorization - File Only  17 AUTHORIZATION FOR ELECTRONIC COMMUNICATION    Consent to Communicate  17 AUTHORIZATION TO DISCUSS PROTECTED HEALTH INFORMATION    Care Everywhere Prospective Auth Received 17     Insurance Card Received 18 [employer]    Insurance Card  (Deleted) 09        Documents for the Encounter    CMS IM for Patient Signature Received 03/15/18     Discharge Attachment    INDOMETHACIN ORAL CAPSULE (ENGLISH)    Discharge Attachment  (Deleted)  INDOMETHACIN ORAL CAPSULE (ENGLISH)    Discharge Attachment  (Deleted)  INDOMETHACIN CAPSULES (ENGLISH)      Admission Information     Attending Provider Admitting Provider Admission Type Admission Date/Time    Parmjit Zabala MD Nemanich, Joseph Patrick, MD Emergency 03/13/18  0914    Discharge Date Hospital Service Auth/Cert Status Service Area     Orthoped[facility]    Unit Room/Bed Admission Status        ORTHO SPINE [id number]/[id number] Admission (Confirmed)       Admission     Complaint    Fracture of femoral neck, right (H), Femoral neck fracture, S/P total hip arthroplasty      Hospital Account     Name Acct ID Class Status Primary Coverage    Yunior Angel [id number] Inpatient Open MEDICARE - MEDICARE            Guarantor Account (for Hospital Account #[id number])     Name Relation to Pt Service Area Active? Acct Type    Yunior Angel  FCS Yes Personal/Family    Address Phone          [address] [place] [address] [phone](H)              Coverage Information (for Hospital Account #[id number])     1. MEDICARE/MEDICARE     F/O Payor/Plan Precert #    MEDICARE/MEDICARE     Subscriber Subscriber #    Yunior Angel [id number]    Address Phone    ATTN CLAIMS  [address]  [facility] [place] [address] [phone]          2. MEDICA/MEDICA ACCESS ABILITY [place]     F/O Payor/Plan Precert #    MEDICA/MEDICA ACCESS ABILITY [place]     Subscriber Subscriber #    Yunior Angel [id number]    Address Phone    [address] [address]  [place] [address] [phone]

## 2018-03-13 NOTE — IP AVS SNAPSHOT
` `     [facility] ORTHO SPINE: [phone]            Medication Administration Report for Yunior Angel as of 03/17/18 1311   Legend:    Given Hold Not Given Due Canceled Entry Other Actions    Time Time (Time) Time  Time-Action       Inactive    Active    Linked        Medications 03/11/18 03/12/18 03/13/18 03/14/18 03/15/18 03/16/18 03/17/18    acetaminophen (TYLENOL) tablet 650 mg  Dose: 650 mg  Freq: EVERY 4 HOURS PRN Route: PO  PRN Reason: other  PRN Comment: multimodal surgical pain management along with NSAIDS and opioid medication as indicated based on pain control and physical function.  Start: 03/17/18 1100   Admin Instructions: May give first dose 4 hours after last scheduled dose of acetaminophen  Maximum acetaminophen dose from all sources = 75 mg/kg/day not to exceed 4 grams/day.               benzocaine-menthol (CHLORASEPTIC) 6-10 MG lozenge 1-2 lozenge  Dose: 1-2 lozenge  Freq: EVERY 1 HOUR PRN Route: BU  PRN Reason: sore throat  PRN Comment: sore throat without fever  Start: 03/14/18 1203              dicyclomine (BENTYL) capsule 10 mg  Dose: 10 mg  Freq: 4 TIMES DAILY BEFORE MEALS & NIGHTLY Route: PO  Start: 03/15/18 1630   Admin Instructions: Recommended to take before meals.         1654 (10 mg)-Given       2230 (10 mg)-Given        0646 (10 mg)-Given       1207 (10 mg)-Given       1653 (10 mg)-Given       2126 (10 mg)-Given        0650 (10 mg)-Given       1303 (10 mg)-Given       [ ] 1630       [ ] 2200           enoxaparin (LOVENOX) injection 40 mg  Dose: 40 mg  Freq: EVERY 24 HOURS Route: SC  Start: 03/15/18 1000   Admin Instructions: Check to make sure start date/time is 12-24 hours post op unless documented complication, AND no sooner than 22 hours post op if spinal anesthesia used.   Continue until discharge to home. HOLD if platelet count falls below 50% of baseline or less than 100,000/ L and notify provider.         1043 (40 mg)-Given        1206 (40 mg)-Given        0939 (40  "mg)-Given           HYDROmorphone (PF) (DILAUDID) injection 0.3-0.5 mg  Dose: 0.3-0.5 mg  Freq: EVERY 2 HOURS PRN Route: IV  PRN Reasons: severe pain,other  PRN Comment: pain control or improvement in physical function. Hold dose for analgesic side effects.  Start: 03/14/18 1203   Admin Instructions: Start at the lowest dose.  May adjust dose by 0.1 mg every 2 hours as needed.  Hold dose for analgesic side effects.  Notify provider to assess for uncontrolled pain or analgesic side effects.   Hold while on IV PCA or with regular IV opioid dosing.  For ordered doses up to 4 mg give IV Push undiluted. Administer each 2mg over 2-5 minutes.               hydrOXYzine (ATARAX) tablet 25 mg  Dose: 25 mg  Freq: EVERY 6 HOURS PRN Route: PO  PRN Reason: itching  PRN Comment: adjuvant pain  Start: 03/14/18 1203   Admin Instructions: Caution to be used when administering multiple Central Nervous System (CNS) depressing meds within a short time frame.               indomethacin (INDOCIN) capsule 50 mg  Dose: 50 mg  Freq: 3 TIMES DAILY WITH MEALS Route: PO  Start: 03/14/18 1300   Admin Instructions: DO NOT CRUSH.        (1309)-Not Given       1821 (50 mg)-Given        (0915)-Not Given [C]       (1300)-Not Given [C]       1330 (50 mg)-Given       1742 (50 mg)-Given        0836 (50 mg)-Given       1207 (50 mg)-Given       1730 (50 mg)-Given        0938 (50 mg)-Given       1303 (50 mg)-Given       [ ] 1800           lidocaine (LMX4) kit  Freq: EVERY 1 HOUR PRN Route: Top  PRN Reason: pain  PRN Comment: with VAD insertion or accessing implanted port.  Start: 03/14/18 1203   Admin Instructions: Do NOT give if patient has a history of allergy to any local anesthetic or any \"americo\" product.   Apply 30 minutes prior to VAD insertion or port access.  MAX Dose:  2.5 g (  of 5 g tube)               lidocaine 1 % 1 mL  Dose: 1 mL  Freq: EVERY 1 HOUR PRN Route: OTHER  PRN Comment: mild pain with VAD insertion or accessing implanted " "port  Start: 03/14/18 1203   Admin Instructions: Do NOT give if patient has a history of allergy to any local anesthetic or any \"americo\" product. MAX dose 1 mL subcutaneous OR intradermal in divided doses.               naloxone (NARCAN) injection 0.1-0.4 mg  Dose: 0.1-0.4 mg  Freq: EVERY 2 MIN PRN Route: IV  PRN Reason: opioid reversal  Start: 03/14/18 1203   Admin Instructions: For respiratory rate LESS than or EQUAL to 8.  Partial reversal dose:  0.1 mg titrated q 2 minutes for Analgesia Side Effects Monitoring Sedation Level of 3 (frequently drowsy, arousable, drifts to sleep during conversation).Full reversal dose:  0.4 mg bolus for Analgesia Side Effects Monitoring Sedation Level of 4 (somnolent, minimal or no response to stimulation).  For ordered doses up to 2mg give IVP. Give each 0.4mg over 15 seconds in emergency situations. For non-emergent situations further dilute in 9mL of NS to facilitate titration of response.               omeprazole (priLOSEC) CR capsule 40 mg  Dose: 40 mg  Freq: EVERY MORNING Route: PO  Start: 03/14/18 1300       1653 (40 mg)-Given        0915 (40 mg)-Given        0835 (40 mg)-Given        0938 (40 mg)-Given           ondansetron (ZOFRAN-ODT) ODT tab 4 mg  Dose: 4 mg  Freq: EVERY 6 HOURS PRN Route: PO  PRN Reasons: nausea,vomiting  Start: 03/14/18 1203   Admin Instructions: This is Step 1 of nausea and vomiting management.  If nausea not resolved in 15 minutes, go to Step 2 prochlorperazine (COMPAZINE). Do not push through foil backing. Peel back foil and gently remove. Place on tongue immediately. Administration with liquid unnecessary  With dry hands, peel back foil backing and gently remove tablet; do not push oral disintegrating tablet through foil backing; administer immediately on tongue and oral disintegrating tablet dissolves in seconds; then swallow with saliva; liquid not required.              Or  ondansetron (ZOFRAN) injection 4 mg  Dose: 4 mg  Freq: EVERY 6 HOURS PRN " Route: IV  PRN Reasons: nausea,vomiting  Start: 03/14/18 1203   Admin Instructions: This is Step 1 of nausea and vomiting management.  If nausea not resolved in 15 minutes, go to Step 2 prochlorperazine (COMPAZINE).  Irritant. For ordered doses up to 4 mg, give IV Push undiluted over 2-5 minutes.               oxyCODONE IR (ROXICODONE) tablet 5-10 mg  Dose: 5-10 mg  Freq: EVERY 3 HOURS PRN Route: PO  PRN Reason: other  PRN Comment: pain control or improvement in physical function. Hold dose for analgesic side effects.  Start: 03/14/18 1203   Admin Instructions: Start with the lowest dose.    May adjust dose by 5 mg every 3 hours as needed.  Notify provider to assess for uncontrolled pain or analgesic side effects. Hold while on PCA or with regular IV opioid dosing. Maximum total is 80 mg in 24 hours.        1653 (5 mg)-Given       2029 (5 mg)-Given        0514 (5 mg)-Given       0915 (5 mg)-Given       1442 (5 mg)-Given       1742 (5 mg)-Given       2230 (5 mg)-Given        0123 (5 mg)-Given       0448 (5 mg)-Given       1301 (5 mg)-Given       1732 (5 mg)-Given        0650 (5 mg)-Given       0938 (10 mg)-Given           PARoxetine (PAXIL) tablet 60 mg  Dose: 60 mg  Freq: EVERY MORNING Route: PO  Start: 03/16/18 0800         0835 (60 mg)-Given        0937 (60 mg)-Given           prochlorperazine (COMPAZINE) injection 10 mg  Dose: 10 mg  Freq: EVERY 6 HOURS PRN Route: IV  PRN Reasons: nausea,vomiting  Start: 03/14/18 1203   Admin Instructions: This is Step 2 of nausea and vomiting management.   If nausea not resolved in 15 minutes, give metoclopramide (REGLAN), if ordered (step 3 of nausea and vomiting management)  For ordered doses up to 10 mg, give IV Push undiluted. Each 5mg over 1 minute.              Or  prochlorperazine (COMPAZINE) tablet 10 mg  Dose: 10 mg  Freq: EVERY 6 HOURS PRN Route: PO  PRN Reasons: nausea,vomiting  Start: 03/14/18 1203   Admin Instructions: This is Step 2 of nausea and vomiting  management.   If nausea not resolved in 15 minutes, give metoclopramide (REGLAN), if ordered (step 3 of nausea and vomiting management)               senna-docusate (SENOKOT-S;PERICOLACE) 8.6-50 MG per tablet 1 tablet  Dose: 1 tablet  Freq: 2 TIMES DAILY Route: PO  Start: 03/14/18 2000   Admin Instructions: If no bowel movement in 24 hours, increase to 2 tablets PO.  Hold for loose stools.        1944 (1 tablet)-Given        0915 (1 tablet)-Given                      2126 (1 tablet)-Given        0938 (1 tablet)-Given       [ ] 2000          Or  senna-docusate (SENOKOT-S;PERICOLACE) 8.6-50 MG per tablet 2 tablet  Dose: 2 tablet  Freq: 2 TIMES DAILY Route: PO  Start: 03/14/18 2000   Admin Instructions: Hold for loose stools.                       1952 (2 tablet)-Given        0836 (2 tablet)-Given                      [ ] 2000           simvastatin (ZOCOR) tablet 80 mg  Dose: 80 mg  Freq: AT BEDTIME Route: PO  Start: 03/15/18 2200        2230 (80 mg)-Given        2127 (80 mg)-Given        [ ] 2200           sodium chloride (PF) 0.9% PF flush 3 mL  Dose: 3 mL  Freq: EVERY 8 HOURS Route: IK  Start: 03/14/18 1215   Admin Instructions: And Q1H PRN, to lock peripheral IV dormant line.        (1234)-Not Given       (1945)-Not Given        0608 (3 mL)-Given       1543 (3 mL)-Given        0123 (3 mL)-Given       0837 (3 mL)-Given       0838 (3 mL)-Given       1731 (3 mL)-Given        0021 (3 mL)-Given       0943 (3 mL)-Given       [ ] 1600           sodium chloride (PF) 0.9% PF flush 3 mL  Dose: 3 mL  Freq: EVERY 1 HOUR PRN Route: IK  PRN Reason: line flush  PRN Comment: for peripheral IV flush post IV meds  Start: 03/14/18 1203              sodium chloride 0.9% infusion  Rate: 50 mL/hr   Freq: CONTINUOUS Route: IV  Last Dose: Stopped (03/15/18 1818)  Start: 03/14/18 1215   Admin Instructions: Change to saline lock when PO well tolerated.        1204 ( )-New Bag       1945 ( )-New Bag        1818-Stopped             zolpidem  (AMBIEN) tablet 5 mg  Dose: 5 mg  Freq: AT BEDTIME PRN Route: PO  PRN Reason: sleep  Start: 03/15/18 2000   Admin Instructions: POD 1.  Do not give unless at least 6 hours of uninterrupted sleep is expected.              Completed Medications  Medications 03/11/18 03/12/18 03/13/18 03/14/18 03/15/18 03/16/18 03/17/18         Dose: 1,000 mL  Freq: ONCE Route: IV  Last Dose: 1,000 mL (03/14/18 1308)  Start: 03/14/18 1215   End: 03/14/18 1508   Admin Instructions: Give over 2 hours as soon as pt arrives on floor        1308 (1,000 mL)-New Bag                Dose: 975 mg  Freq: EVERY 8 HOURS Route: PO  Start: 03/14/18 1500   End: 03/17/18 0938   Admin Instructions: Do not use if patient has an active opioid/acetaminophen combined analgesic product ordered for pain.  Maximum acetaminophen dose from all sources = 75 mg/kg/day not to exceed 4 grams/day.        1652 (975 mg)-Given        0111 (975 mg)-Given       0915 (975 mg)-Given       1654 (975 mg)-Given        0123 (975 mg)-Given       0835 (975 mg)-Given       1653 (975 mg)-Given        0020 (975 mg)-Given       0938 (975 mg)-Given             Dose: 2 g  Freq: EVERY 8 HOURS Route: IV  Indications of Use: PERIOPERATIVE PHARMACOPROPHYLAXIS  Start: 03/14/18 1600   End: 03/15/18 0132   Admin Instructions: First post-op dose due 8 hours after intra-op dose, see eMAR.          1609 (2 g)-Given        0102 (2 g)-Given            Discontinued Medications  Medications 03/11/18 03/12/18 03/13/18 03/14/18 03/15/18 03/16/18 03/17/18         Dose: 4 mg  Freq: EVERY 6 HOURS Route: IV  Start: 03/14/18 1215   End: 03/15/18 1214   Admin Instructions: Irritant. For ordered doses up to 4 mg, give IV Push undiluted over 2-5 minutes.        (1235)-Not Given       (1825)-Not Given [C]        0102 (4 mg)-Given       0608 (4 mg)-Given       1214-Med Discontinued

## 2018-03-13 NOTE — IP AVS SNAPSHOT
"    [facility] ORTHO SPINE: [phone]                                              INTERAGENCY TRANSFER FORM - PHYSICIAN ORDERS   3/13/2018                    Hospital Admission Date: 3/13/2018  CASIE CORLEY   : 1965  Sex: Male        Attending Provider: Parmjit Zabala MD     Allergies:  Insect Extract    Infection:  None   Service:  ORTHOPEDICS    Ht:  1.803 m (5' 11\")   Wt:  90.7 kg (200 lb)   Admission Wt:  90.7 kg (200 lb)    BMI:  27.89 kg/m 2   BSA:  2.13 m 2            Patient PCP Information     Provider PCP Type    Alberto Carlos Jr, MD General      ED Clinical Impression     Diagnosis Description Comment Added By Time Added    Closed fracture of neck of right femur, initial encounter (H) [S72.001A] Closed fracture of neck of right femur, initial encounter (H) [S72.001A]  Viktoria Solares MD 3/13/2018 11:19 AM    Right leg numbness [R20.0] Right leg numbness [R20.0]  Viktoria Solares MD 3/13/2018 11:19 AM      Hospital Problems as of 3/17/2018              Priority Class Noted POA    Fracture of femoral neck, right (H) Medium  3/13/2018 Yes    S/P total hip arthroplasty Medium  3/14/2018 Yes      Non-Hospital Problems as of 3/17/2018              Priority Class Noted    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 Medium  2005    Beta Thalassemia Minor Medium  2007    Monoparesis, Right Lower Extremity, due to TBI Medium  12/3/2009    Erythromelalgia (H) Medium  2009    Hyperlipidemia LDL goal <160 Medium  2/10/2010    Overweight (BMI 25.0-29.9) Medium  2011    Gout Medium  2014    Migraine without aura and without status migrainosus, not intractable Medium  10/28/2015    Benign neoplasm of sigmoid colon Medium  2016    Gastroesophageal reflux disease with esophagitis Medium  2016    Closed head injury, sequela Medium  2016    Irritable bowel syndrome with diarrhea Medium  2016    " 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) Medium  4/13/2016    Muscle spasticity Medium  2/21/2018      Code Status History     Date Active Date Inactive Code Status Order ID Comments User Context    3/13/2018  1:52 PM 3/14/2018 12:02 PM Full Code [id number]  Angie Martinez PA-C Inpatient         Medication Review      START taking        Dose / Directions Comments    indomethacin 25 MG capsule   Commonly known as:  INDOCIN        Dose:  25 mg   Take 1 capsule (25 mg) by mouth 3 times daily (with meals)   Quantity:  120 capsule   Refills:  0        oxyCODONE-acetaminophen 5-325 MG per tablet   Commonly known as:  PERCOCET        Dose:  1-2 tablet   Take 1-2 tablets by mouth every 4 hours as needed for moderate to severe pain   Quantity:  75 tablet   Refills:  0          CONTINUE these medications which may have CHANGED, or have new prescriptions. If we are uncertain of the size of tablets/capsules you have at home, strength may be listed as something that might have changed.        Dose / Directions Comments    aspirin  MG EC tablet   This may have changed:    - how much to take  - how to take this  - when to take this  - additional instructions        Take one Aspirin tab twice daily for 5 weeks.   Quantity:  70 tablet   Refills:  0          CONTINUE these medications which have NOT CHANGED        Dose / Directions Comments    dicyclomine 10 MG capsule   Commonly known as:  BENTYL   Used for:  Irritable bowel syndrome with diarrhea        Dose:  10 mg   Take 1 capsule (10 mg) by mouth 4 times daily (before meals and nightly)   Quantity:  240 capsule   Refills:  3        multivitamin, therapeutic Tabs tablet        Dose:  1 tablet   Take 1 tablet by mouth daily   Refills:  0        omeprazole 40 MG capsule   Commonly known as:  priLOSEC   Used for:  Gastroesophageal reflux disease with esophagitis        Dose:  40 mg   Take 1 capsule (40 mg) by mouth daily Take 30-60 minutes before a meal.    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)        Dose:  60 mg   Take 1.5 tablets (60 mg) by mouth every morning   Quantity:  90 tablet   Refills:  1        PROBIOTIC DAILY PO        Refills:  0        senna-docusate 8.6-50 MG per tablet   Commonly known as:  SENOKOT S   Used for:  Chronic constipation        Dose:  1 tablet   Take 1 tablet by mouth 2 times daily as needed for constipation   Quantity:  180 tablet   Refills:  1        simvastatin 80 MG tablet   Commonly known as:  ZOCOR   Used for:  Hyperlipidemia LDL goal <160        Dose:  80 mg   Take 1 tablet (80 mg) by mouth At Bedtime   Quantity:  90 tablet   Refills:  3        VITAMIN D (CHOLECALCIFEROL) PO        Take by mouth daily   Refills:  0                Summary of Visit     Reason for your hospital stay       Right femoral neck fracture, with subsequent total hip arthroplasty.             After Care     Activity - Up ad gavi           Advance Diet as Tolerated       Follow this diet upon discharge: regular       General info for SNF       Length of Stay Estimate: Short Term Care: Estimated # of Days <30  Condition at Discharge: Improving  Level of care:skilled   Rehabilitation Potential: Excellent  Admission H&P remains valid and up-to-date: Yes  Recent Chemotherapy: N/A  Use Nursing Home Standing Orders: Yes       Mantoux instructions       Give two-step Mantoux (PPD) Per Facility Policy Yes       Weight bearing status       WBAT       Wound care       Site:   R hip  Instructions:  Leave aquacel dressing in place until post-op follow-up.  If it becomes loose or soiled then remove and replace with daily dry dressings.             Referrals     Occupational Therapy Adult Consult       Evaluate and treat as clinically indicated.    Reason:  S/p R DANIELLE. No hip precautions.       Physical Therapy Adult Consult       Evaluate and treat as  clinically indicated.    Reason:  S/p R DANIELLE. No hip precautions.             Follow-Up Appointment Instructions     Future Labs/Procedures    Follow Up and recommended labs and tests     Comments:    Follow up with Dolores Gimenez PA-C within 12-16 days from surgery.  If you do not already have a follow up appointment scheduled, please call our [place] office: [phone].  No follow up labs or test are needed.      Follow-Up Appointment Instructions     Follow Up and recommended labs and tests       Follow up with Dolores Gimenez PA-C within 12-16 days from surgery.  If you do not already have a follow up appointment scheduled, please call our [place] office: [phone].  No follow up labs or test are needed.             Statement of Approval     Ordered          03/17/18 1111  I have reviewed and agree with all the recommendations and orders detailed in this document.  EFFECTIVE NOW     Approved and electronically signed by:  Betty Lopez MD

## 2018-03-13 NOTE — ED NOTES
States was preparing for day and was suddenly unable to ambulate due to severe pain in r hip.  States pain free un til movement of hip.  Position of comfort with hip and knee flexed.

## 2018-03-13 NOTE — CONSULTS
Formal consult to follow. I have reviewed the xrays and history, and discussed the case with Misael Quintana NP.  In Brief 52 year old male who suffered a right displaced femoral neck fracture after a mechanical fall. Patient would benefit from total hip arthroplasty.  Will plan for surgery in the morning.

## 2018-03-13 NOTE — ED NOTES
[facility]  ED Nurse Handoff Report    Yunior Angel is a 52 year old male   ED Chief complaint: Hip Pain (sudden onset this am with no wt bearing)  . ED Diagnosis:   Final diagnoses:   Closed fracture of neck of right femur, initial encounter (H)   Right leg numbness     Allergies:   Allergies   Allergen Reactions     Insect Extract      red ants       Code Status: Full Code  Activity level - Baseline/Home:  Independent. Activity Level - Current:   Total Care. Lift room needed: Yes. Bariatric: No   Needed: No   Isolation: No. Infection: Not Applicable.     Vital Signs:   Vitals:    03/13/18 0945 03/13/18 1015 03/13/18 1030 03/13/18 1045   BP: 116/58 110/62 125/73    Pulse: 58 64 64    Resp:       Temp:       TempSrc:       SpO2: 98% 98% 95% 92%   Weight:           Cardiac Rhythm:  ,      Pain level: 0-10 Pain Scale: 8  Patient confused: Oriented x4 but short term memory loss 2 to TBI. Patient Falls Risk: Yes.   Elimination Status: Has voided   Patient Report - Initial Complaint: Hip pain. Focused Assessment: unable to bear weight to R hip. Sudden onset when trying to stand today.  Not witnessed but pt is unaware of any fall.  Mom believes pt would recall a fall   Tests Performed:  . Abnormal Results:   Labs Ordered and Resulted from Time of ED Arrival Up to the Time of Departure from the ED   CBC WITH PLATELETS DIFFERENTIAL - Abnormal; Notable for the following:        Result Value    Hemoglobin 13.1 (*)     MCV 73 (*)     MCH 22.6 (*)     MCHC 30.8 (*)     RDW 15.2 (*)     All other components within normal limits   BASIC METABOLIC PANEL - Abnormal; Notable for the following:     Glucose 104 (*)     All other components within normal limits   INR   PARTIAL THROMBOPLASTIN TIME   PULSE OXIMETRY NURSING   PERIPHERAL IV CATHETER     XR Pelvis and Hip Right 2 Views   Final Result   IMPRESSION: Frontal view is consistent with a femoral neck fracture.   Extensive heterotopic bone formation  overlying the right inferior   pubic ramus, unusual appearing and likely related to old trauma. It   measures approximately 9 cm in length.       CHINO DUDLEY MD      CT Pelvis w/o Contrast    (Results Pending)    .   Treatments provided: pain meds   Family Comments: Mom in room   OBS brochure/video discussed/provided to patient:  No  ED Medications:   Medications   lidocaine 1 % 1 mL (not administered)   lidocaine (LMX4) kit (not administered)   sodium chloride (PF) 0.9% PF flush 3 mL (not administered)   sodium chloride (PF) 0.9% PF flush 3 mL (not administered)   morphine (PF) injection 4 mg (4 mg Intravenous Given 3/13/18 0940)   morphine (PF) injection 8 mg (8 mg Intravenous Given 3/13/18 0173)     Drips infusing:  No  For the majority of the shift, the patient's behavior Green. Interventions performed were  .     Severe Sepsis OR Septic Shock Diagnosis Present: No      ED Nurse Name/Phone Number: Tanya Barragan,   12:49 PM  RECEIVING UNIT ED HANDOFF REVIEW    Above ED Nurse Handoff Report was reviewed: Yes  Reviewed by: Zeina Oconnor on March 13, 2018 at 1:15 PM
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
## 2018-03-13 NOTE — PHARMACY-ADMISSION MEDICATION HISTORY
Admission medication history interview status for this patient is complete. See [facility] admission navigator for allergy information, prior to admission medications and immunization status.     Medication history interview source(s):Patient's mother  Medication history resources (including written lists, pill bottles, clinic record): med list    Changes made to PTA medication list:  Added: none  Deleted: Miralax  Changed: none  For patients on insulin therapy: n/a     Actions taken by pharmacist (provider contacted, etc):None     Additional medication history information:None    Medication reconciliation/reorder completed by provider prior to medication history? No    Prior to Admission medications    Medication Sig Last Dose Taking? Auth Provider   multivitamin, therapeutic (THERA-VIT) TABS tablet Take 1 tablet by mouth daily  Yes Unknown, Entered By History   simvastatin (ZOCOR) 80 MG tablet Take 1 tablet (80 mg) by mouth At Bedtime 3/12/2018 Yes Alberto Carlos Jr., MD   PARoxetine (PAXIL) 40 MG tablet Take 1.5 tablets (60 mg) by mouth every morning 3/13/2018 Yes Alberto Carlos Jr., MD   omeprazole (PRILOSEC) 40 MG capsule Take 1 capsule (40 mg) by mouth daily Take 30-60 minutes before a meal. 3/13/2018 Yes Alberto Carlos Jr., MD   dicyclomine (BENTYL) 10 MG capsule Take 1 capsule (10 mg) by mouth 4 times daily (before meals and nightly) 3/13/2018 at am Yes Alberto Carlos Jr., MD   aspirin 325 MG EC tablet Take 1 tablet (325 mg) by mouth every morning 3/13/2018 Yes Alberto Carlos Jr., MD   VITAMIN D, CHOLECALCIFEROL, PO Take by mouth daily 3/13/2018 Yes Reported, Patient   senna-docusate (SENOKOT S) 8.6-50 MG per tablet Take 1 tablet by mouth 2 times daily as needed for constipation More than a month at Unknown time  Alberto Carlos Jr., MD   Probiotic Product (PROBIOTIC DAILY PO)    Reported, Patient

## 2018-03-13 NOTE — ED PROVIDER NOTES
History     Chief Complaint:  Hip Pain (sudden onset this am with no wt bearing)      HPI   Yunior Angel is a 52 year old male with a history of a closed head injury with long term cognitive deficit who presents via EMS with hip pain. The patient states that he was going about his regular morning routine when he had sudden onset of right hip with ambulation. He cannot recall a fall or abnormal movement that triggered this. He did not hit his head or lose consciousness (he does not recall falling). Of note, the patient wears a right leg brace for a chronic deficit since his TBI, which he was wearing at time of onset. Patient notes chronic numbness in his right foot since his TBI.  Patient denies redness or swelling in his right leg. Patient denies headache, neck pain, back pain, and abdominal pain. Mother denies any change in his behavior. Patient denies any other complaints. He is a poor historian with regards to details.     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    Tobacco use: Never  Alcohol use: No  PCP: Alberto Carlos Jr    Marital Status:  Single     Review of Systems   Cardiovascular: Negative for leg swelling.   Gastrointestinal: Negative for abdominal pain.   Musculoskeletal: Negative for  "back pain and neck pain.        Right hip pain   Skin: Negative for color change.   Neurological: Positive for numbness. Negative for headaches.   All other systems reviewed and are negative.    Physical Exam     Patient Vitals for the past 24 hrs:   BP Temp Temp src Pulse Resp SpO2 Height Weight   03/13/18 1345 134/67 97.6  F (36.4  C) - 69 16 97 % 1.803 m (5' 11\") -   03/13/18 1130 - - - - - 94 % - -   03/13/18 1045 - - - - - 92 % - -   03/13/18 1030 125/73 - - 64 - 95 % - -   03/13/18 1015 110/62 - - 64 - 98 % - -   03/13/18 0945 116/58 - - 58 - 98 % - -   03/13/18 0930 118/57 - - - - 99 % - -   03/13/18 0923 - 98.4  F (36.9  C) Oral 73 16 99 % - 90.7 kg (200 lb)      Physical Exam  General: Adult male, sitting upright.   Eyes: PERRL, Conjunctive within normal limits  ENT: Moist mucous membranes, oropharynx clear.   CV: Normal S1S2, no murmur, rub or gallop. Regular rate and rhythm. Distal pulses intact in right lower extremity.   Resp: Clear to auscultation bilaterally, no wheezes, rales or rhonchi. Normal respiratory effort.  GI: Abdomen is soft, nontender and nondistended. No palpable masses. No rebound or guarding.  MSK: No edema. Tenderness over right lateral hip with slight prominence of the great trochanter region. No palpable crepitance. Unable to range due to pain. Nontender over the rest of right lower extremity.   Skin: Warm and dry. No rashes or lesions or ecchymoses on visible skin. Capillary refill of toes in right lower extremity is <2s.   Neuro: Alert and oriented. Responds appropriately to all questions and commands. No focal findings appreciated. Normal muscle tone. Sensation to light touch diminished but intact to sharp sensation.  Psych: Normal mood and affect. Pleasant.    Emergency Department Course   ECG:   ECG (13:27:51):  Rate 68 bpm. OH interval 140. QRS duration 102. QT/QTc 426/452. P-R-T axes 43 28 25. Normal sinus rhythm with sinus arrhythmia. Normal ECG. Interpreted at 1351 by " Viktoria Solares MD.     Imaging:  Radiographic findings were communicated with the patient and family who voiced understanding of the findings.    XR Pelvis and Right Hip:  IMPRESSION: Frontal view is consistent with a femoral neck fracture. Extensive heterotopic bone formation overlying the right inferior pubic ramus, unusual appearing and likely related to old trauma. It  measures approximately 9 cm in length.      Results per radiology.     Laboratory:  CBC: HGB 13.1 (L) o/w WNL (WBC 6.5, )    BMP: Glucose 104 (H) o/w WNL (Creatinine 0.76)   INR: 0.98  PTT: 31    Interventions:  0940: Morphine 4 mg IV  1143: Morphine 8 mg IV     The patient's symptoms were partially improved with parenteral narcotics.    Emergency Department Course:  Past medical records, nursing notes, and vitals reviewed.  0916: I performed an exam of the patient and obtained history, as documented above.  IV inserted and blood drawn.   Above interventions provided.   The patient was sent for a pelvis and right hip xray while in the emergency department, findings above.   I personally reviewed the laboratory results with the Patient and mother and answered all related questions prior to admission.  Findings and plan explained to the Patient and mother who consents to admission.   1130: I reassessed the patient and on repeat back exam he still has no tenderness. On reassessment of his right leg he now has sensation to light touch, though this is blunted per his report.   1122: I spoke to Misael Quintana NP, from orthopedics, regarding this patient.   1137: Discussed the patient with DAVION Page, who will admit the patient to an inpatient bed for further monitoring, evaluation, and treatment.        Misael Quintana in the ED to see the patient. Reports surgery likely for tomorrow. Requests that I order a CT scan of the pelvis for follow-up by Dr. Zabala.    Impression & Plan    Medical Decision Making:  Yunior Angel is a  52 year old male with a history of TBI and resultant memory issues who presents to the ED for concern of right leg pain and difficulty moving and standing on it. He had palpable pulses in the right lower extremity on arrival but decreased sensation to light touch with normal sensation pin prick. This was diffuse in nature and seemed to improve over time in the ED. The patient had reported some numbness in the past prior to injury, although he is a poor historian and his mother reports no reports of numbness in the past. The clinical significance of this is not known at this time. He has evidence of femoral neck fracture on xray. He can not recall any trauma and there are no other sign of trauma on examination.  I consulted orthopedics and spoke to Misael the APC of Dr. Zabala. He offered to see the patient in the ED prior to admission. Patient will be admitted for further surgical care.    Disposition: Patient admitted is admitted to a med/surg bed to the hospitalist service and was accepted by DAVION Page, in stable condition with orthopedics consulting.      Diagnosis:    ICD-10-CM   1. Closed fracture of neck of right femur, initial encounter (H) S72.001A   2. Right leg numbness R20.0         3/13/2018   [facility] EMERGENCY DEPARTMENT  IMargarita am serving as a scribe at 9:16 AM on 3/13/2018 to document services personally performed by Viktoria Solares MD based on my observations and the provider's statements to me.       Viktoria Solares MD  03/13/18 [id number]

## 2018-03-14 ENCOUNTER — APPOINTMENT (OUTPATIENT)
Dept: GENERAL RADIOLOGY | Facility: CLINIC | Age: 53
DRG: 470 | End: 2018-03-14
Attending: ORTHOPAEDIC SURGERY
Payer: MEDICARE

## 2018-03-14 ENCOUNTER — ANESTHESIA EVENT (OUTPATIENT)
Dept: SURGERY | Facility: CLINIC | Age: 53
DRG: 470 | End: 2018-03-14
Payer: MEDICARE

## 2018-03-14 ENCOUNTER — ANESTHESIA (OUTPATIENT)
Dept: SURGERY | Facility: CLINIC | Age: 53
DRG: 470 | End: 2018-03-14
Payer: MEDICARE

## 2018-03-14 PROBLEM — Z96.649 S/P TOTAL HIP ARTHROPLASTY: Status: ACTIVE | Noted: 2018-03-14

## 2018-03-14 LAB
ANION GAP SERPL CALCULATED.3IONS-SCNC: 4 MMOL/L (ref 3–14)
BUN SERPL-MCNC: 16 MG/DL (ref 7–30)
CALCIUM SERPL-MCNC: 8.2 MG/DL (ref 8.5–10.1)
CHLORIDE SERPL-SCNC: 108 MMOL/L (ref 94–109)
CO2 SERPL-SCNC: 31 MMOL/L (ref 20–32)
CREAT SERPL-MCNC: 0.84 MG/DL (ref 0.66–1.25)
ERYTHROCYTE [DISTWIDTH] IN BLOOD BY AUTOMATED COUNT: 15.3 % (ref 10–15)
GFR SERPL CREATININE-BSD FRML MDRD: >90 ML/MIN/1.7M2
GLUCOSE SERPL-MCNC: 106 MG/DL (ref 70–99)
HCT VFR BLD AUTO: 39.5 % (ref 40–53)
HGB BLD-MCNC: 12.2 G/DL (ref 13.3–17.7)
INTERPRETATION ECG - MUSE: NORMAL
MCH RBC QN AUTO: 22.8 PG (ref 26.5–33)
MCHC RBC AUTO-ENTMCNC: 30.9 G/DL (ref 31.5–36.5)
MCV RBC AUTO: 74 FL (ref 78–100)
PLATELET # BLD AUTO: 294 10E9/L (ref 150–450)
POTASSIUM SERPL-SCNC: 4.2 MMOL/L (ref 3.4–5.3)
RBC # BLD AUTO: 5.34 10E12/L (ref 4.4–5.9)
SODIUM SERPL-SCNC: 143 MMOL/L (ref 133–144)
WBC # BLD AUTO: 9.4 10E9/L (ref 4–11)

## 2018-03-14 PROCEDURE — A9270 NON-COVERED ITEM OR SERVICE: HCPCS | Mod: GY | Performed by: PHYSICIAN ASSISTANT

## 2018-03-14 PROCEDURE — 71000012 ZZH RECOVERY PHASE 1 LEVEL 1 FIRST HR: Performed by: ORTHOPAEDIC SURGERY

## 2018-03-14 PROCEDURE — 80048 BASIC METABOLIC PNL TOTAL CA: CPT | Performed by: PHYSICIAN ASSISTANT

## 2018-03-14 PROCEDURE — 25000128 H RX IP 250 OP 636: Performed by: ANESTHESIOLOGY

## 2018-03-14 PROCEDURE — 37000008 ZZH ANESTHESIA TECHNICAL FEE, 1ST 30 MIN: Performed by: ORTHOPAEDIC SURGERY

## 2018-03-14 PROCEDURE — 37000009 ZZH ANESTHESIA TECHNICAL FEE, EACH ADDTL 15 MIN: Performed by: ORTHOPAEDIC SURGERY

## 2018-03-14 PROCEDURE — A9270 NON-COVERED ITEM OR SERVICE: HCPCS | Mod: GY | Performed by: ORTHOPAEDIC SURGERY

## 2018-03-14 PROCEDURE — 36000093 ZZH SURGERY LEVEL 4 1ST 30 MIN: Performed by: ORTHOPAEDIC SURGERY

## 2018-03-14 PROCEDURE — 25000128 H RX IP 250 OP 636: Performed by: PHYSICIAN ASSISTANT

## 2018-03-14 PROCEDURE — 27810169 ZZH OR IMPLANT GENERAL: Performed by: ORTHOPAEDIC SURGERY

## 2018-03-14 PROCEDURE — 27210794 ZZH OR GENERAL SUPPLY STERILE: Performed by: ORTHOPAEDIC SURGERY

## 2018-03-14 PROCEDURE — 25000132 ZZH RX MED GY IP 250 OP 250 PS 637: Mod: GY | Performed by: ORTHOPAEDIC SURGERY

## 2018-03-14 PROCEDURE — 36000063 ZZH SURGERY LEVEL 4 EA 15 ADDTL MIN: Performed by: ORTHOPAEDIC SURGERY

## 2018-03-14 PROCEDURE — 99232 SBSQ HOSP IP/OBS MODERATE 35: CPT | Performed by: INTERNAL MEDICINE

## 2018-03-14 PROCEDURE — 85027 COMPLETE CBC AUTOMATED: CPT | Performed by: PHYSICIAN ASSISTANT

## 2018-03-14 PROCEDURE — 40000985 XR PELVIS AD HIP PORTABLE RIGHT 1 VIEW

## 2018-03-14 PROCEDURE — 27211024 ZZHC OR SUPPLY OTHER OPNP: Performed by: ORTHOPAEDIC SURGERY

## 2018-03-14 PROCEDURE — 25000566 ZZH SEVOFLURANE, EA 15 MIN: Performed by: ORTHOPAEDIC SURGERY

## 2018-03-14 PROCEDURE — 40000306 ZZH STATISTIC PRE PROC ASSESS II: Performed by: ORTHOPAEDIC SURGERY

## 2018-03-14 PROCEDURE — 25000128 H RX IP 250 OP 636: Performed by: NURSE ANESTHETIST, CERTIFIED REGISTERED

## 2018-03-14 PROCEDURE — C1776 JOINT DEVICE (IMPLANTABLE): HCPCS | Performed by: ORTHOPAEDIC SURGERY

## 2018-03-14 PROCEDURE — 12000007 ZZH R&B INTERMEDIATE

## 2018-03-14 PROCEDURE — 0SR9029 REPLACEMENT OF RIGHT HIP JOINT WITH METAL ON POLYETHYLENE SYNTHETIC SUBSTITUTE, CEMENTED, OPEN APPROACH: ICD-10-PCS | Performed by: ORTHOPAEDIC SURGERY

## 2018-03-14 PROCEDURE — 25000128 H RX IP 250 OP 636: Performed by: ORTHOPAEDIC SURGERY

## 2018-03-14 PROCEDURE — 25000132 ZZH RX MED GY IP 250 OP 250 PS 637: Mod: GY | Performed by: PHYSICIAN ASSISTANT

## 2018-03-14 PROCEDURE — 25000125 ZZHC RX 250: Performed by: PHYSICIAN ASSISTANT

## 2018-03-14 PROCEDURE — 25000125 ZZHC RX 250: Performed by: NURSE ANESTHETIST, CERTIFIED REGISTERED

## 2018-03-14 PROCEDURE — 36415 COLL VENOUS BLD VENIPUNCTURE: CPT | Performed by: PHYSICIAN ASSISTANT

## 2018-03-14 DEVICE — BONE CEMENT SIMPLEX FULL DOSE 6191-1-001: Type: IMPLANTABLE DEVICE | Site: HIP | Status: FUNCTIONAL

## 2018-03-14 DEVICE — BONE CEMENT SIMPLEX W/TOBRAMYCIN 6197-9-001: Type: IMPLANTABLE DEVICE | Site: HIP | Status: FUNCTIONAL

## 2018-03-14 DEVICE — IMPLANTABLE DEVICE: Type: IMPLANTABLE DEVICE | Site: HIP | Status: FUNCTIONAL

## 2018-03-14 DEVICE — BONE CEMENT RESTRICTOR BUCK FEMORAL 18.5MM 129418: Type: IMPLANTABLE DEVICE | Site: HIP | Status: FUNCTIONAL

## 2018-03-14 DEVICE — IMP CENTRALIZER DISTAL BI POLAR SNN INVIS 12MM 71313212: Type: IMPLANTABLE DEVICE | Site: HIP | Status: FUNCTIONAL

## 2018-03-14 RX ORDER — HYDROXYZINE HYDROCHLORIDE 25 MG/1
25 TABLET, FILM COATED ORAL 3 TIMES DAILY PRN
Status: DISCONTINUED | OUTPATIENT
Start: 2018-03-14 | End: 2018-03-14

## 2018-03-14 RX ORDER — CEFAZOLIN SODIUM 2 G/100ML
2 INJECTION, SOLUTION INTRAVENOUS
Status: COMPLETED | OUTPATIENT
Start: 2018-03-14 | End: 2018-03-14

## 2018-03-14 RX ORDER — HYDROMORPHONE HYDROCHLORIDE 1 MG/ML
.3-.5 INJECTION, SOLUTION INTRAMUSCULAR; INTRAVENOUS; SUBCUTANEOUS EVERY 5 MIN PRN
Status: DISCONTINUED | OUTPATIENT
Start: 2018-03-14 | End: 2018-03-14 | Stop reason: HOSPADM

## 2018-03-14 RX ORDER — ONDANSETRON 2 MG/ML
4 INJECTION INTRAMUSCULAR; INTRAVENOUS EVERY 30 MIN PRN
Status: DISCONTINUED | OUTPATIENT
Start: 2018-03-14 | End: 2018-03-14 | Stop reason: HOSPADM

## 2018-03-14 RX ORDER — LABETALOL HYDROCHLORIDE 5 MG/ML
10 INJECTION, SOLUTION INTRAVENOUS
Status: DISCONTINUED | OUTPATIENT
Start: 2018-03-14 | End: 2018-03-14 | Stop reason: HOSPADM

## 2018-03-14 RX ORDER — SODIUM CHLORIDE, SODIUM LACTATE, POTASSIUM CHLORIDE, CALCIUM CHLORIDE 600; 310; 30; 20 MG/100ML; MG/100ML; MG/100ML; MG/100ML
INJECTION, SOLUTION INTRAVENOUS CONTINUOUS
Status: DISCONTINUED | OUTPATIENT
Start: 2018-03-14 | End: 2018-03-14 | Stop reason: HOSPADM

## 2018-03-14 RX ORDER — NALOXONE HYDROCHLORIDE 0.4 MG/ML
.1-.4 INJECTION, SOLUTION INTRAMUSCULAR; INTRAVENOUS; SUBCUTANEOUS
Status: DISCONTINUED | OUTPATIENT
Start: 2018-03-14 | End: 2018-03-17 | Stop reason: HOSPADM

## 2018-03-14 RX ORDER — NALOXONE HYDROCHLORIDE 0.4 MG/ML
.1-.4 INJECTION, SOLUTION INTRAMUSCULAR; INTRAVENOUS; SUBCUTANEOUS
Status: DISCONTINUED | OUTPATIENT
Start: 2018-03-14 | End: 2018-03-14

## 2018-03-14 RX ORDER — ONDANSETRON 2 MG/ML
4 INJECTION INTRAMUSCULAR; INTRAVENOUS EVERY 6 HOURS PRN
Status: DISCONTINUED | OUTPATIENT
Start: 2018-03-14 | End: 2018-03-17 | Stop reason: HOSPADM

## 2018-03-14 RX ORDER — LIDOCAINE HYDROCHLORIDE 10 MG/ML
INJECTION, SOLUTION INFILTRATION; PERINEURAL PRN
Status: DISCONTINUED | OUTPATIENT
Start: 2018-03-14 | End: 2018-03-14

## 2018-03-14 RX ORDER — GLYCOPYRROLATE 0.2 MG/ML
INJECTION, SOLUTION INTRAMUSCULAR; INTRAVENOUS PRN
Status: DISCONTINUED | OUTPATIENT
Start: 2018-03-14 | End: 2018-03-14

## 2018-03-14 RX ORDER — PROPOFOL 10 MG/ML
INJECTION, EMULSION INTRAVENOUS PRN
Status: DISCONTINUED | OUTPATIENT
Start: 2018-03-14 | End: 2018-03-14

## 2018-03-14 RX ORDER — OXYCODONE HYDROCHLORIDE 5 MG/1
5-10 TABLET ORAL
Status: DISCONTINUED | OUTPATIENT
Start: 2018-03-14 | End: 2018-03-17 | Stop reason: HOSPADM

## 2018-03-14 RX ORDER — AMOXICILLIN 250 MG
2 CAPSULE ORAL 2 TIMES DAILY
Status: DISCONTINUED | OUTPATIENT
Start: 2018-03-14 | End: 2018-03-17 | Stop reason: HOSPADM

## 2018-03-14 RX ORDER — CEFAZOLIN SODIUM 2 G/100ML
2 INJECTION, SOLUTION INTRAVENOUS EVERY 8 HOURS
Status: COMPLETED | OUTPATIENT
Start: 2018-03-14 | End: 2018-03-15

## 2018-03-14 RX ORDER — ONDANSETRON 2 MG/ML
4 INJECTION INTRAMUSCULAR; INTRAVENOUS EVERY 6 HOURS
Status: DISPENSED | OUTPATIENT
Start: 2018-03-14 | End: 2018-03-15

## 2018-03-14 RX ORDER — CEFAZOLIN SODIUM 1 G/3ML
1 INJECTION, POWDER, FOR SOLUTION INTRAMUSCULAR; INTRAVENOUS SEE ADMIN INSTRUCTIONS
Status: DISCONTINUED | OUTPATIENT
Start: 2018-03-14 | End: 2018-03-14 | Stop reason: HOSPADM

## 2018-03-14 RX ORDER — ACETAMINOPHEN 500 MG
1000 TABLET ORAL ONCE
Status: COMPLETED | OUTPATIENT
Start: 2018-03-14 | End: 2018-03-14

## 2018-03-14 RX ORDER — DEXAMETHASONE SODIUM PHOSPHATE 4 MG/ML
INJECTION, SOLUTION INTRA-ARTICULAR; INTRALESIONAL; INTRAMUSCULAR; INTRAVENOUS; SOFT TISSUE PRN
Status: DISCONTINUED | OUTPATIENT
Start: 2018-03-14 | End: 2018-03-14

## 2018-03-14 RX ORDER — ACETAMINOPHEN 325 MG/1
975 TABLET ORAL EVERY 8 HOURS
Status: COMPLETED | OUTPATIENT
Start: 2018-03-14 | End: 2018-03-17

## 2018-03-14 RX ORDER — FENTANYL CITRATE 50 UG/ML
25-50 INJECTION, SOLUTION INTRAMUSCULAR; INTRAVENOUS
Status: DISCONTINUED | OUTPATIENT
Start: 2018-03-14 | End: 2018-03-14 | Stop reason: HOSPADM

## 2018-03-14 RX ORDER — NEOSTIGMINE METHYLSULFATE 1 MG/ML
VIAL (ML) INJECTION PRN
Status: DISCONTINUED | OUTPATIENT
Start: 2018-03-14 | End: 2018-03-14

## 2018-03-14 RX ORDER — LIDOCAINE 40 MG/G
CREAM TOPICAL
Status: DISCONTINUED | OUTPATIENT
Start: 2018-03-14 | End: 2018-03-17 | Stop reason: HOSPADM

## 2018-03-14 RX ORDER — AMOXICILLIN 250 MG
1 CAPSULE ORAL 2 TIMES DAILY
Status: DISCONTINUED | OUTPATIENT
Start: 2018-03-14 | End: 2018-03-17 | Stop reason: HOSPADM

## 2018-03-14 RX ORDER — SODIUM CHLORIDE 9 MG/ML
INJECTION, SOLUTION INTRAVENOUS CONTINUOUS
Status: DISCONTINUED | OUTPATIENT
Start: 2018-03-14 | End: 2018-03-17 | Stop reason: HOSPADM

## 2018-03-14 RX ORDER — ACETAMINOPHEN 325 MG/1
650 TABLET ORAL EVERY 4 HOURS PRN
Status: DISCONTINUED | OUTPATIENT
Start: 2018-03-17 | End: 2018-03-17 | Stop reason: HOSPADM

## 2018-03-14 RX ORDER — HYDRALAZINE HYDROCHLORIDE 20 MG/ML
2.5-5 INJECTION INTRAMUSCULAR; INTRAVENOUS EVERY 10 MIN PRN
Status: DISCONTINUED | OUTPATIENT
Start: 2018-03-14 | End: 2018-03-14 | Stop reason: HOSPADM

## 2018-03-14 RX ORDER — PANTOPRAZOLE SODIUM 40 MG/1
40 TABLET, DELAYED RELEASE ORAL ONCE
Status: COMPLETED | OUTPATIENT
Start: 2018-03-14 | End: 2018-03-14

## 2018-03-14 RX ORDER — ONDANSETRON 4 MG/1
4 TABLET, ORALLY DISINTEGRATING ORAL EVERY 30 MIN PRN
Status: DISCONTINUED | OUTPATIENT
Start: 2018-03-14 | End: 2018-03-14 | Stop reason: HOSPADM

## 2018-03-14 RX ORDER — PROCHLORPERAZINE MALEATE 10 MG
10 TABLET ORAL EVERY 6 HOURS PRN
Status: DISCONTINUED | OUTPATIENT
Start: 2018-03-14 | End: 2018-03-17 | Stop reason: HOSPADM

## 2018-03-14 RX ORDER — ZOLPIDEM TARTRATE 5 MG/1
5 TABLET ORAL
Status: DISCONTINUED | OUTPATIENT
Start: 2018-03-15 | End: 2018-03-17 | Stop reason: HOSPADM

## 2018-03-14 RX ORDER — ONDANSETRON 4 MG/1
4 TABLET, ORALLY DISINTEGRATING ORAL EVERY 6 HOURS PRN
Status: DISCONTINUED | OUTPATIENT
Start: 2018-03-14 | End: 2018-03-17 | Stop reason: HOSPADM

## 2018-03-14 RX ORDER — INDOMETHACIN 50 MG/1
50 CAPSULE ORAL
Status: DISCONTINUED | OUTPATIENT
Start: 2018-03-14 | End: 2018-03-17 | Stop reason: HOSPADM

## 2018-03-14 RX ORDER — HYDROXYZINE HYDROCHLORIDE 25 MG/1
25 TABLET, FILM COATED ORAL EVERY 6 HOURS PRN
Status: DISCONTINUED | OUTPATIENT
Start: 2018-03-14 | End: 2018-03-17 | Stop reason: HOSPADM

## 2018-03-14 RX ORDER — FENTANYL CITRATE 50 UG/ML
INJECTION, SOLUTION INTRAMUSCULAR; INTRAVENOUS PRN
Status: DISCONTINUED | OUTPATIENT
Start: 2018-03-14 | End: 2018-03-14

## 2018-03-14 RX ORDER — HYDROMORPHONE HYDROCHLORIDE 1 MG/ML
.3-.5 INJECTION, SOLUTION INTRAMUSCULAR; INTRAVENOUS; SUBCUTANEOUS
Status: DISCONTINUED | OUTPATIENT
Start: 2018-03-14 | End: 2018-03-17 | Stop reason: HOSPADM

## 2018-03-14 RX ADMIN — OXYCODONE HYDROCHLORIDE 5 MG: 5 TABLET ORAL at 16:53

## 2018-03-14 RX ADMIN — Medication 3 MG: at 09:45

## 2018-03-14 RX ADMIN — Medication 1 G: at 07:45

## 2018-03-14 RX ADMIN — ACETAMINOPHEN 975 MG: 325 TABLET, FILM COATED ORAL at 16:52

## 2018-03-14 RX ADMIN — INDOMETHACIN 50 MG: 50 CAPSULE ORAL at 18:21

## 2018-03-14 RX ADMIN — SODIUM CHLORIDE, POTASSIUM CHLORIDE, SODIUM LACTATE AND CALCIUM CHLORIDE: 600; 310; 30; 20 INJECTION, SOLUTION INTRAVENOUS at 09:00

## 2018-03-14 RX ADMIN — OMEPRAZOLE 40 MG: 20 CAPSULE, DELAYED RELEASE ORAL at 16:53

## 2018-03-14 RX ADMIN — FENTANYL CITRATE 100 MCG: 50 INJECTION, SOLUTION INTRAMUSCULAR; INTRAVENOUS at 08:00

## 2018-03-14 RX ADMIN — SODIUM CHLORIDE: 9 INJECTION, SOLUTION INTRAVENOUS at 19:45

## 2018-03-14 RX ADMIN — OXYCODONE HYDROCHLORIDE 5 MG: 5 TABLET ORAL at 20:29

## 2018-03-14 RX ADMIN — ONDANSETRON 4 MG: 2 INJECTION INTRAMUSCULAR; INTRAVENOUS at 09:45

## 2018-03-14 RX ADMIN — FENTANYL CITRATE 150 MCG: 50 INJECTION, SOLUTION INTRAMUSCULAR; INTRAVENOUS at 07:24

## 2018-03-14 RX ADMIN — PROPOFOL 180 MG: 10 INJECTION, EMULSION INTRAVENOUS at 07:24

## 2018-03-14 RX ADMIN — SODIUM CHLORIDE, POTASSIUM CHLORIDE, SODIUM LACTATE AND CALCIUM CHLORIDE: 600; 310; 30; 20 INJECTION, SOLUTION INTRAVENOUS at 08:20

## 2018-03-14 RX ADMIN — SODIUM CHLORIDE, POTASSIUM CHLORIDE, SODIUM LACTATE AND CALCIUM CHLORIDE: 600; 310; 30; 20 INJECTION, SOLUTION INTRAVENOUS at 06:45

## 2018-03-14 RX ADMIN — DEXAMETHASONE SODIUM PHOSPHATE 4 MG: 4 INJECTION, SOLUTION INTRA-ARTICULAR; INTRALESIONAL; INTRAMUSCULAR; INTRAVENOUS; SOFT TISSUE at 07:24

## 2018-03-14 RX ADMIN — Medication 1 MG: at 09:00

## 2018-03-14 RX ADMIN — PANTOPRAZOLE SODIUM 40 MG: 40 TABLET, DELAYED RELEASE ORAL at 06:39

## 2018-03-14 RX ADMIN — SODIUM CHLORIDE: 9 INJECTION, SOLUTION INTRAVENOUS at 12:04

## 2018-03-14 RX ADMIN — CEFAZOLIN SODIUM 2 G: 2 INJECTION, SOLUTION INTRAVENOUS at 16:09

## 2018-03-14 RX ADMIN — GLYCOPYRROLATE 0.4 MG: 0.2 INJECTION, SOLUTION INTRAMUSCULAR; INTRAVENOUS at 09:45

## 2018-03-14 RX ADMIN — Medication 0.2 MG: at 05:04

## 2018-03-14 RX ADMIN — ROCURONIUM BROMIDE 20 MG: 10 INJECTION INTRAVENOUS at 09:00

## 2018-03-14 RX ADMIN — ROCURONIUM BROMIDE 50 MG: 10 INJECTION INTRAVENOUS at 07:24

## 2018-03-14 RX ADMIN — SODIUM CHLORIDE: 9 INJECTION, SOLUTION INTRAVENOUS at 01:05

## 2018-03-14 RX ADMIN — GLYCOPYRROLATE 0.2 MG: 0.2 INJECTION, SOLUTION INTRAMUSCULAR; INTRAVENOUS at 07:24

## 2018-03-14 RX ADMIN — SENNOSIDES AND DOCUSATE SODIUM 1 TABLET: 8.6; 5 TABLET ORAL at 19:44

## 2018-03-14 RX ADMIN — SODIUM CHLORIDE 1000 ML: 9 INJECTION, SOLUTION INTRAVENOUS at 13:08

## 2018-03-14 RX ADMIN — CEFAZOLIN SODIUM 2 G: 2 INJECTION, SOLUTION INTRAVENOUS at 07:40

## 2018-03-14 RX ADMIN — LIDOCAINE HYDROCHLORIDE 40 MG: 10 INJECTION, SOLUTION INFILTRATION; PERINEURAL at 07:24

## 2018-03-14 RX ADMIN — SODIUM CHLORIDE, POTASSIUM CHLORIDE, SODIUM LACTATE AND CALCIUM CHLORIDE: 600; 310; 30; 20 INJECTION, SOLUTION INTRAVENOUS at 10:15

## 2018-03-14 RX ADMIN — ACETAMINOPHEN 1000 MG: 500 TABLET, FILM COATED ORAL at 06:39

## 2018-03-14 RX ADMIN — Medication 0.5 MG: at 11:25

## 2018-03-14 NOTE — BRIEF OP NOTE
[facility] Brief Operative Note    Pre-operative diagnosis: Femoral neck fracture   Post-operative diagnosis same   Procedure: Procedure(s):  Right total hip arthroplasty - Wound Class: I-Clean   Surgeon(s): Surgeon(s) and Role:     * Parmjit Zabala MD - Primary     * Dolores Gimenez PA-C   Estimated blood loss: 600 mL    Specimens: * No specimens in log *   Findings: See dictation

## 2018-03-14 NOTE — ANESTHESIA PREPROCEDURE EVALUATION
Anesthesia Evaluation     . Pt has had prior anesthetic.     No history of anesthetic complications          ROS/MED HX    ENT/Pulmonary:       Neurologic:     (+)other neuro TBI-deficits short term memory    Cardiovascular:     (+) Dyslipidemia, ----. : . . . :. .       METS/Exercise Tolerance:     Hematologic:         Musculoskeletal:   (+) , fracture lower extremity, -       GI/Hepatic:     (+) GERD       Renal/Genitourinary:         Endo:         Psychiatric:     (+) psychiatric history anxiety      Infectious Disease:         Malignancy:         Other:                     Physical Exam  Normal systems: cardiovascular and pulmonary    Airway   Mallampati: II  TM distance: >3 FB  Neck ROM: full    Dental     Cardiovascular       Pulmonary                     Anesthesia Plan      History & Physical Review      ASA Status:  2 .    NPO Status:  > 8 hours    Plan for General and ETT with Intravenous and Propofol induction. Maintenance will be Balanced.    PONV prophylaxis:  Ondansetron (or other 5HT-3) and Dexamethasone or Solumedrol       Postoperative Care  Postoperative pain management:  IV analgesics.      Consents  Anesthetic plan, risks, benefits and alternatives discussed with:  Patient and Parent (Mother and/or Father).  Use of blood products discussed: Yes.   Use of blood products discussed with Patient and Parent (Mother and/or Father).  Consented to blood products.  .                          .

## 2018-03-14 NOTE — PLAN OF CARE
Problem: Patient Care Overview  Goal: Plan of Care/Patient Progress Review  Outcome: No Change  VSS, Afebrile. Lung sounds clear.  +BS +flatus.  Tolerating diet, will be NPO at midnight. Voiding in urinal at bedside. Pain controlled with oral oxycodone 5mg. Bedrest. Pt hx of TBI, short term memory problems, Repeat question. Pt has no history of falls, pt was getting his day going and sudden onset of sharp pain in his hip, Surgery tomorrow at 730am with Vj- which will meet patient and family in Pre Op. CMS+, foot cool to touch, wiggles toes.  No significant issues this shift.

## 2018-03-14 NOTE — ANESTHESIA CARE TRANSFER NOTE
Patient: Yunior Angel    Procedure(s):  Right total hip arthroplasty - Wound Class: I-Clean    Diagnosis: Femoral neck fracture  Diagnosis Additional Information: No value filed.    Anesthesia Type:   General, ETT     Note:  Airway :Face Mask  Patient transferred to:PACU  Comments: Pt SV good tidal volume, op sxn prepare to transfer to pacu with ETT.  Report to PACU RN transfer care. Handoff Report: Identifed the Patient, Identified the Reponsible Provider, Reviewed the pertinent medical history, Discussed the surgical course, Reviewed Intra-OP anesthesia mangement and issues during anesthesia, Set expectations for post-procedure period and Allowed opportunity for questions and acknowledgement of understanding      Vitals: (Last set prior to Anesthesia Care Transfer)    CRNA VITALS  3/14/2018 0951 - 3/14/2018 1027      3/14/2018             Pulse: 68    SpO2: 100 %    Resp Rate (observed): 11                Electronically Signed By: JOSE A Rasmussen CRNA  March 14, 2018  10:27 AM

## 2018-03-14 NOTE — PLAN OF CARE
"Problem: Patient Care Overview  Goal: Plan of Care/Patient Progress Review  VS /59 (BP Location: Left arm)  Pulse 70  Temp 97.5  F (36.4  C) (Oral)  Resp 16  Ht 1.803 m (5' 11\")  Wt 90.7 kg (200 lb)  SpO2 93%  BMI 27.89 kg/m2  Lung sounds Clear  O2 Room air  NPO overnight  IVF NS at 100ml/hr  Activity Bedrest  Pain PRN oxycodone x1 and dilaudid x1 for pain.  Patient/family centered care Cares explained to patient.  Plan Surgery 0730 for R ORIF.    Pt off floor at 0600 - down to surgery.      "

## 2018-03-14 NOTE — OR NURSING
Unable to complete xray due to pt's legs shaking.  Xray tech called when the shaking had stopped.

## 2018-03-14 NOTE — PLAN OF CARE
Problem: Patient Care Overview  Goal: Plan of Care/Patient Progress Review  Outcome: Improving  VSS, Afebrile. Arrived to the floor shortly after 1200 from surgery. Pt sleepy, Getting bolus per orders. Pt will need to have radiation to hip per order from MD. Pt will go this afternoon. Lung sounds clear. Clear Liquids, Garcia in place. Pain controlled with IV Dilaudid if needed. Pt hx of TBI, short term memory problems, Repeat question.  CMS+, foot cool to touch, wiggles toes. +1 pulse. Abd pillow in place. EBL was 750cc. Will cont to monitor.

## 2018-03-14 NOTE — PROGRESS NOTES
"               [facility]  Hospitalist Progress Note  Name: Yunior Angel    MRN: [id number]  YOB: 1965    Age: 52 year old  Date of admission: 3/13/2018  Primary care provider: Alberto Carlos Jr.      Reason for Stay (Diagnosis): Presumed fall with right femoral neck fracture.         Assessment and Plan:      Summary of Stay:  Yunior Angel is a 52 year old male with a PMH significant for TBI with resultant cognitive deficit (limited short term memory), hx of migraines, HLP and anxiety and otherwise fairly healthy 53 yo  who presents with acute right hip pain. Xrays reveal right femoral neck fracture. Pt himself does not recall falling but his mother suspects he must have fallen. No hx of pathologic fractures in the past.     Problem List  1. Right femoral neck fracture: Suspect secondary to fall event although unwitnessed and limited historian secondary to history of traumatic brain injury.  Status post right total hip arthroplasty  2. History of traumatic brain injury with notable cognitive deficit: Currently pleasant and appreciative care with no evidence of belligerence.  3. Anxiety  4. Hyperlipidemia  5. History of GERD: Chronically on a PPI    Plan:    Routine postoperative PT/OT evaluation and treatment    Resume patient's chronic medication    Reorient as needed    DVT Prophylaxis: Per orthopedics  Code Status: Full Code  Discharge Dispo: To be determined based on progress with PT/OT  Estimated Disch Date / # of Days until Disch: Possibly tomorrow if cleared by orthopedics        Interval History (Subjective):      Pain reasonably controlled.   when patient is up with therapy.         Physical Exam:      Vital signs:  Temp: 97.2  F (36.2  C) Temp src: Oral BP: 125/68 Pulse: 70 Heart Rate: 80 Resp: 12 SpO2: 93 % O2 Device: Nasal cannula Oxygen Delivery: 3 LPM Height: 180.3 cm (5' 11\") Weight: 90.7 kg (200 lb)  Estimated body mass index is 27.89 kg/(m^2) as " "calculated from the following:    Height as of this encounter: 1.803 m (5' 11\").    Weight as of this encounter: 90.7 kg (200 lb).    # Pain Assessment:   Current Pain Score 3/14/2018 3/14/2018 3/14/2018   Patient currently in pain? yes sleeping: patient not able to self report sleeping: patient not able to self report   Pain score (0-10) 6 - -   Pain location Hip - -   Pain descriptors Aching - -   CPOT pain score - - -   - Yunior is experiencing pain due to right hip fracture status post right total hip arthroplasty. Pain management was discussed and the plan was created in a collaborative fashion.  Yunior's response to the current recommendations: compliant  - Opioid regimen: Oxycodone  - Response to opioid medications: Reduction of symptoms   - Bowel regimen: senna          I/O last 3 completed shifts:  In: 3758 [I.V.:3758]  Out: 2725 [Urine:1975; Blood:750]  Vitals:    03/13/18 0923   Weight: 90.7 kg (200 lb)       Constitutional: Awake, alert, cooperative, no apparent distress   Respiratory: Nl work of breathing. Clear to auscultation bilaterally, no crackles or wheezing   Cardiovascular: Regular rate and rhythm, normal S1 and S2, and no murmur noted       Skin: No rashes, no cyanosis, dry to touch   Neuro: CN 2-12 intact, no localizing weakness   Extremities: No edema, normal range of motion   HEENT Normocephalic, atraumatic, normal nasal turbinates; oropharynx clear   Neck Supple; nl inspection; trachea midline; no thryomegaly   Psychiatric: A+O x3. Normal affect          Medications:      All current medications were reviewed with changes reflected in problem list.         Data:      All new lab and imaging data was reviewed.   Labs:    Recent Labs  Lab 03/14/18  0504 03/13/18  0942   WBC 9.4 6.5   HGB 12.2* 13.1*   HCT 39.5* 42.5   MCV 74* 73*    376       Recent Labs  Lab 03/14/18  0504 03/13/18  0942    141   POTASSIUM 4.2 4.6   CHLORIDE 108 105   CO2 31 29   ANIONGAP 4 7   * 104*   BUN " 16 23   CR 0.84 0.76   GFRESTIMATED >90 >90   GFRESTBLACK >90 >90   JUSTA 8.2* 9.3      Imaging:   Recent Results (from the past 24 hour(s))   XR Pelvis w Hip Port Right 1 View    Narrative    PORTABLE ONE VIEW PELVIS AND ONE VIEW RIGHT HIP 3/14/2018 11:49 AM    HISTORY: Postoperative evaluation of the right hip.    COMPARISON: 3/13/2018    FINDINGS: There are interval surgical changes of a right total hip  arthroplasty. The hardware is intact with no fracture or other  complication seen. Lateral skin staples are seen.  No other  abnormality is demonstrated.      Impression    IMPRESSION: Unremarkable postoperative appearance of the right hip.    MD Betty DEL RIO [phone]

## 2018-03-14 NOTE — OP NOTE
Procedure Date: 03/14/2018      DATE OF PROCEDURE:   03/14/2018      PREOPERATIVE DIAGNOSIS:  Right displaced femoral neck fracture.      POSTOPERATIVE DIAGNOSIS:  Right displaced femoral neck fracture.      PROCEDURES:     1.  Right total hip arthroplasty using Smith and Nephew Synergy femoral stem and Reflection acetabulum.   2.  Excision of heterotopic ossification.      SURGEON:  Parmjit Zabala MD      FIRST ASSISTANT:  Dolores Gimenez PA-C      HISTORY OF PRESENT ILLNESS:  Mr. Angel is a very pleasant 52-year-old gentleman who has history of closed head injury and had an injury yesterday where he began developing right groin pain.  He was taken to [facility] where he was found to have a right displaced femoral neck fracture.  He also had a large area of heterotopic ossification extending off his right pubic ramus toward his medial femur at the level of the subtrochanteric region.  Given his age, activity level, and nature of this fracture I think he would benefit from total hip arthroplasty.  I am concerned that the amount of HO may be interfering with his mobility or causing impingement and decided that when we were operating we would excise some of that to increase range of motion of his hip.  He understands and is happy with this plan of care, as does his family.  He does have issues with gait, ambulation and spasticity on the right side secondary to his closed head injury.  We discussed he would be at increased risk for heterotopic ossification secondary to his closed head injury.  He is happy with this.  He understands all this and with his plan of care.      NARRATIVE EVENTS:  After thorough evaluation and proper identification of patient and patient's extremity to be operated on, Mr. Angel was taken to the Operating Room where he underwent a general anesthetic.  He was placed in the right lateral decubitus position on the operating table.  His right hip was prepped and draped in the  usual sterile manner.  After appropriate surgical pause to confirm the patient's extremity to be operated on and 30 minutes after the patient received 2 grams of Ancef, his right hip was approached through a lateral incision centered over the greater trochanter.  Skin and soft tissue were sharply dissected down to the tensor fascia.  The fascia was split in line with the fibers in line with the femur, taken down to the trochanteric bursa.  Bursal tissue was removed.  The anterior one-third of the gluteus medius was removed off the greater trochanter in a modified Hardinge fashion.  This took us down onto the joint capsule.  Joint capsule was T'd and the femoral neck and fracture was easily identified.  We made our femoral neck osteotomy 12 mm proximal to the lesser trochanter in accordance with our preoperative templating.  We then removed the femoral head from the acetabulum and exposed the acetabulum.  At this point, decision was made to proceed to try to excise some of the heterotopic ossification in his right pelvis to groin.  We were able to identify this.  Along its most distal end we were able to place some retractors around this and using an osteotome were able to excise a large amount of this and increase the range of motion.  We then thoroughly irrigated this wound bed and then proceeded back to the acetabulum.  When we knew we had left some of the heterotopic ossification in place and felt that given we had started to develop a little bit of bleeding near the area around the musculature it was best likely to leave the remainder of the HO before getting into further issues with bleeding or near any other neurovascular structures.  At this point, we then placed retractors around the acetabulum to expose and removed the ligamentum teres, transverse acetabular ligament and labrum.  We sequentially reamed the acetabulum up to fit a size 56  Smith and Nephew Reflection acetabular component.  We impacted this  into position approximately 20 degrees of anteversion and 40 degrees of abduction.  Once we had good primary stability of the cup we augmented this with 2 screws; 1 in the inner table of the pelvis, 1 the posterior column.  We then placed a polyethylene liner to fit a size 36 mm femoral head.  This was a flat polyethylene liner.  At this point we then thoroughly irrigated the hip and placed our attention to the femur.  Here we removed bone from the piriformis fossa using the entry reamer, lateralized and sequentially broached up to fit a size 16 Smith and Nephew Synergy cemented femoral stem with an extended neck offset and +0 femoral head.  We had good stability and full range of motion.  Decision was made at this point in time to place our final implants into position using 2 batches of Simplex cement: 1 with antibiotics and 1 without.  Using third generation cement techniques we cemented in a size 16 Smith and Nephew Synergy femoral stem with an 11 distal centralizer.  Once the cement had cured we removed excess cement from the hip.  This was an extended neck offset stem.  At this point we placed our trial femoral heads onto the trunnion and found a +0, 36 mm femoral head gave us appropriate leg length, stability and soft tissue tension.  This was an Oxinium femoral head with +0 neck length, 36 mm diameter.  It was impacted onto the trunnion.  We elected to use Oxinium secondary to the patient's age and expected level of activity.  At this point we then thoroughly irrigated the hip with both saline and IrriSept solution, closed the joint capsule with interrupted 0 Vicryl sutures, closed the abductors with #5 Ethibond sutures through bone tunnels.  We closed the tensor fascia with interrupted and running 0 Vicryl sutures and closed the skin and soft tissue with absorbable sutures and staples in the skin.  The patient was placed in a well-padded postoperative dressing and abduction pillow, taken to Recovery Room in  stable condition.  He tolerated the procedure without difficulty.         REI ETIENNE MD             D: 2018   T: 2018   MT: SESAR      Name:     CASIE CORLEY   MRN:      [id number]        Account:        [id number]   :      1965           Procedure Date: 2018      Document: [id number]

## 2018-03-14 NOTE — CONSULTS
Consult Date:  03/14/2018      DATE OF SERVICE:  03/14/2018.      DIAGNOSIS:  Right displaced femoral neck fracture.      REQUESTING PHYSICIAN:  Angie Martinez PA-C.      HISTORY OF PRESENT ILLNESS:  Mr. Angel is a very pleasant 52-year-old gentleman who has a history of closed head injury with long-term cognitive defect and spasticity in the right side.  He presented to the EMS with right hip pain.  Yesterday he developed a sudden onset of right hip pain with ambulation.  He cannot recall fall or abnormal movement, but he has memory issues and may have fallen and does not know if he could have possibly fallen.  Of note, the patient does wear a leg brace for chronic deficits with his TBI which he was wearing at the time of the pain onset.  He has some chronic sensory limitations in his right foot since his brain injury as well.  He has significant right groin pain.  He was found to have a displaced right femoral neck fracture and was admitted for definitive care.      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  He has had a previous colonoscopy, EGD, foot surgery and avulsion of the right great toenail.      FAMILY HISTORY:  Noncontributory.      SOCIAL HISTORY:  He is single.  His mother is his caretaker and his power of .  Ambulates with gait aids and a brace.      REVIEW OF SYSTEMS:  A 10-point review of systems is positive only for right hip pain and then the above-mentioned cognitive issues since his TBI.      PHYSICAL EXAMINATION:   GENERAL:  Lying in his hospital bed.  He is alert, oriented, conversive.  He is in mild distress anytime his hip is flexed or  moved.   VITAL SIGNS:  He is afebrile.  Vital signs are stable.  No obvious head trauma.   EXTREMITIES:  Right and left upper extremities are within normal limits.  Skin is clean, dry and intact.  Palpable radial pulses.  Radial, ulnar and median nerve sensation and function is intact bilaterally.  Right and left lower extremities:  Skin is clean, dry and intact.  Palpable dorsalis pedis pulses.  EHL, FHL, tibialis anterior and gastroc soleus fire bilaterally.  He has spasticity to his right foot, and this is accentuated by significant pain.  He has pain with range of motion of his right hip.      DIAGNOSTIC DATA:  X-rays show a right femoral neck fracture which is displaced.  He does have a large area of heterotopic ossification extending from his right pelvis to his right mid subtrochanteric region.  This was evaluated on CT.  In May actually impinged to some degree on his medial femur.      LABORATORY DATA:  Sodium is 143, potassium 4.2, hemoglobin is 12.2, white blood cell count 9.4.  Creatinine 0.84.  INR is 0.98.      IMPRESSION, REPORT AND PLAN:  I had a long discussion with Mr. Angel and his family regarding his right hip.  With a displaced femoral neck fracture at his age, I think the best treatment option would be a total hip arthroplasty.  We did discuss the risks, benefits and alternatives, particularly the risk of instability, DVT, PE, also the potential for recurrence of heterotopic ossification, as well as potential anemia, blood loss and possible need for transfusion.  We did discuss potentially removing some of, if not all of, his heterotopic ossification.  He and his family are happy with this plan of care.  We will make arrangements for surgery later today.         REI ETIENNE MD             D: 2018   T: 2018   MT: STACI      Name:     CASIE ANGEL   MRN:      [id number]        Account:       [id number]   :      1965           Consult Date:  2018       Document: [id number]

## 2018-03-14 NOTE — ANESTHESIA POSTPROCEDURE EVALUATION
Patient: Yunior Angel    Procedure(s):  Right total hip arthroplasty - Wound Class: I-Clean    Diagnosis:Femoral neck fracture  Diagnosis Additional Information: Pre-operative diagnosis: Femoral neck fracture  Post-operative diagnosis same  Procedure: Procedure(s):  Right total hip arthroplasty - Wound Class: I-Clean  Surgeon(s): Surgeon(s) and Role:     * Parmjit Zabala MD - Primary     * Gimenez Ashley, PA-C  Estimated blood loss: 600 mL                  Specimens: * No specimens in log *  Findings: See dictation                   Anesthesia Type:  General, ETT    Note:  Anesthesia Post Evaluation    Patient location during evaluation: PACU  Patient participation: Able to fully participate in evaluation  Level of consciousness: awake  Pain management: adequate  Airway patency: patent  Cardiovascular status: acceptable  Respiratory status: acceptable  Hydration status: acceptable  PONV: none     Anesthetic complications: None          Last vitals:  Vitals:    03/13/18 2130 03/14/18 0048 03/14/18 0626   BP: 121/62 121/59 134/73   Pulse: 71 70    Resp: 18 16 12   Temp: 96.5  F (35.8  C) 97.5  F (36.4  C) 99.2  F (37.3  C)   SpO2: 92% 93%          Electronically Signed By: Rafal Pearson MD  March 14, 2018  10:33 AM

## 2018-03-15 ENCOUNTER — APPOINTMENT (OUTPATIENT)
Dept: PHYSICAL THERAPY | Facility: CLINIC | Age: 53
DRG: 470 | End: 2018-03-15
Attending: ORTHOPAEDIC SURGERY
Payer: MEDICARE

## 2018-03-15 LAB
GLUCOSE SERPL-MCNC: 129 MG/DL (ref 70–99)
HGB BLD-MCNC: 9.8 G/DL (ref 13.3–17.7)

## 2018-03-15 PROCEDURE — 12000000 ZZH R&B MED SURG/OB

## 2018-03-15 PROCEDURE — 25000128 H RX IP 250 OP 636: Performed by: ORTHOPAEDIC SURGERY

## 2018-03-15 PROCEDURE — 25000132 ZZH RX MED GY IP 250 OP 250 PS 637: Mod: GY | Performed by: ORTHOPAEDIC SURGERY

## 2018-03-15 PROCEDURE — 97162 PT EVAL MOD COMPLEX 30 MIN: CPT | Mod: GP | Performed by: PHYSICAL THERAPIST

## 2018-03-15 PROCEDURE — A9270 NON-COVERED ITEM OR SERVICE: HCPCS | Mod: GY | Performed by: INTERNAL MEDICINE

## 2018-03-15 PROCEDURE — 97530 THERAPEUTIC ACTIVITIES: CPT | Mod: GP | Performed by: PHYSICAL THERAPIST

## 2018-03-15 PROCEDURE — 25000132 ZZH RX MED GY IP 250 OP 250 PS 637: Mod: GY | Performed by: INTERNAL MEDICINE

## 2018-03-15 PROCEDURE — 97116 GAIT TRAINING THERAPY: CPT | Mod: GP | Performed by: PHYSICAL THERAPIST

## 2018-03-15 PROCEDURE — 36415 COLL VENOUS BLD VENIPUNCTURE: CPT | Performed by: ORTHOPAEDIC SURGERY

## 2018-03-15 PROCEDURE — A9270 NON-COVERED ITEM OR SERVICE: HCPCS | Mod: GY | Performed by: ORTHOPAEDIC SURGERY

## 2018-03-15 PROCEDURE — 99207 ZZC CDG-MDM COMPONENT: MEETS LOW - DOWN CODED: CPT | Performed by: INTERNAL MEDICINE

## 2018-03-15 PROCEDURE — 82947 ASSAY GLUCOSE BLOOD QUANT: CPT | Performed by: ORTHOPAEDIC SURGERY

## 2018-03-15 PROCEDURE — 97110 THERAPEUTIC EXERCISES: CPT | Mod: GP | Performed by: PHYSICAL THERAPIST

## 2018-03-15 PROCEDURE — 99232 SBSQ HOSP IP/OBS MODERATE 35: CPT | Performed by: INTERNAL MEDICINE

## 2018-03-15 PROCEDURE — 40000193 ZZH STATISTIC PT WARD VISIT: Performed by: PHYSICAL THERAPIST

## 2018-03-15 PROCEDURE — 85018 HEMOGLOBIN: CPT | Performed by: ORTHOPAEDIC SURGERY

## 2018-03-15 RX ORDER — INDOMETHACIN 25 MG/1
25 CAPSULE ORAL
Qty: 120 CAPSULE | Refills: 0 | DISCHARGE
Start: 2018-03-15 | End: 2018-04-06

## 2018-03-15 RX ORDER — PAROXETINE 20 MG/1
60 TABLET, FILM COATED ORAL EVERY MORNING
Status: DISCONTINUED | OUTPATIENT
Start: 2018-03-16 | End: 2018-03-17 | Stop reason: HOSPADM

## 2018-03-15 RX ORDER — DICYCLOMINE HYDROCHLORIDE 10 MG/1
10 CAPSULE ORAL
Status: DISCONTINUED | OUTPATIENT
Start: 2018-03-15 | End: 2018-03-17 | Stop reason: HOSPADM

## 2018-03-15 RX ORDER — OXYCODONE AND ACETAMINOPHEN 5; 325 MG/1; MG/1
1-2 TABLET ORAL EVERY 4 HOURS PRN
Qty: 75 TABLET | Refills: 0 | Status: SHIPPED | DISCHARGE
Start: 2018-03-15 | End: 2018-03-20 | Stop reason: DRUGHIGH

## 2018-03-15 RX ORDER — SIMVASTATIN 40 MG
80 TABLET ORAL AT BEDTIME
Status: DISCONTINUED | OUTPATIENT
Start: 2018-03-15 | End: 2018-03-17 | Stop reason: HOSPADM

## 2018-03-15 RX ADMIN — SENNOSIDES AND DOCUSATE SODIUM 2 TABLET: 8.6; 5 TABLET ORAL at 19:52

## 2018-03-15 RX ADMIN — OXYCODONE HYDROCHLORIDE 5 MG: 5 TABLET ORAL at 05:14

## 2018-03-15 RX ADMIN — OXYCODONE HYDROCHLORIDE 5 MG: 5 TABLET ORAL at 14:42

## 2018-03-15 RX ADMIN — SENNOSIDES AND DOCUSATE SODIUM 1 TABLET: 8.6; 5 TABLET ORAL at 09:15

## 2018-03-15 RX ADMIN — INDOMETHACIN 50 MG: 50 CAPSULE ORAL at 17:42

## 2018-03-15 RX ADMIN — ACETAMINOPHEN 975 MG: 325 TABLET, FILM COATED ORAL at 09:15

## 2018-03-15 RX ADMIN — OXYCODONE HYDROCHLORIDE 5 MG: 5 TABLET ORAL at 22:30

## 2018-03-15 RX ADMIN — OMEPRAZOLE 40 MG: 20 CAPSULE, DELAYED RELEASE ORAL at 09:15

## 2018-03-15 RX ADMIN — CEFAZOLIN SODIUM 2 G: 2 INJECTION, SOLUTION INTRAVENOUS at 01:02

## 2018-03-15 RX ADMIN — ONDANSETRON 4 MG: 2 INJECTION INTRAMUSCULAR; INTRAVENOUS at 06:08

## 2018-03-15 RX ADMIN — INDOMETHACIN 50 MG: 50 CAPSULE ORAL at 13:30

## 2018-03-15 RX ADMIN — OXYCODONE HYDROCHLORIDE 5 MG: 5 TABLET ORAL at 17:42

## 2018-03-15 RX ADMIN — ACETAMINOPHEN 975 MG: 325 TABLET, FILM COATED ORAL at 01:11

## 2018-03-15 RX ADMIN — SIMVASTATIN 80 MG: 40 TABLET, FILM COATED ORAL at 22:30

## 2018-03-15 RX ADMIN — ONDANSETRON 4 MG: 2 INJECTION INTRAMUSCULAR; INTRAVENOUS at 01:02

## 2018-03-15 RX ADMIN — ACETAMINOPHEN 975 MG: 325 TABLET, FILM COATED ORAL at 16:54

## 2018-03-15 RX ADMIN — OXYCODONE HYDROCHLORIDE 5 MG: 5 TABLET ORAL at 09:15

## 2018-03-15 RX ADMIN — DICYCLOMINE HYDROCHLORIDE 10 MG: 10 CAPSULE ORAL at 22:30

## 2018-03-15 RX ADMIN — ENOXAPARIN SODIUM 40 MG: 40 INJECTION SUBCUTANEOUS at 10:43

## 2018-03-15 RX ADMIN — DICYCLOMINE HYDROCHLORIDE 10 MG: 10 CAPSULE ORAL at 16:54

## 2018-03-15 NOTE — PROGRESS NOTES
03/15/18 0800   Quick Adds   Type of Visit Initial PT Evaluation   Living Environment   Lives With alone   Living Arrangements apartment  (elevator, may dc to mother's home, unsure if she has stairs.)   Home Accessibility no concerns  (at his home)   Transportation Available family or friend will provide   Living Environment Comment pt lives in an apartment with elevator access, he may dc to mothers place upon discharge. Pt has a TBI, and will need assit if he doesn't go to TCU setting.    Self-Care   Usual Activity Tolerance good   Current Activity Tolerance poor   Equipment Currently Used at Home (Kneee brace, does not recall using any other AD)   Activity/Exercise/Self-Care Comment pt reports indep ADL's meds set up and he takes them. Someone takes him shopping. MOW's, he does light cooking himself   Functional Level Prior   Ambulation 0-->independent   Transferring 0-->independent   Toileting 0-->independent   Bathing 0-->independent   Dressing 0-->independent   Eating 0-->independent   Communication 0-->understands/communicates without difficulty  (STM loss)   Cognition 1 - attention or memory deficits   Fall history within last six months yes   Number of times patient has fallen within last six months 1   Prior Functional Level Comment wears a knee stabilizing brace R knee, has spasticity. needs his beter shoes here, mother to bring them in today.    General Information   Onset of Illness/Injury or Date of Surgery - Date 03/13/18  (surgery 3/14)   Referring Physician [facility]   Patient/Family Goals Statement decrease pain   Pertinent History of Current Problem (include personal factors and/or comorbidities that impact the POC) pt had unwitnessed fall, which he does does not recall, but reported R LRE pain ( pt has a TBI with STM loss), to LILIAN pena R femoral neck fx. POD#1 s/p R DANIELLE. Palliative radiation to begin today RLE. HX; Fell down steps many years ago, suffering a TBI, STM loss, pleasant.     Precautions/Limitations oxygen therapy device and L/min  (2 L, no hip precautions)   Weight-Bearing Status - RLE weight-bearing as tolerated   General Observations on capno, looks pale hgb 9.8   Cognitive Status Examination   Level of Consciousness alert   Follows Commands and Answers Questions 75% of the time;able to follow single-step instructions   Personal Safety and Judgment impaired  (STM loss, questions if he can get OOB alone on a few occasio)   Memory impaired   Cognitive Comment TBI with STM loss, cog deficits   Pain Assessment   Patient Currently in Pain Yes, see Vital Sign flowsheet   Range of Motion (ROM)   ROM Comment limited R HF to 80 2/2 pain. R LE spasticity with decreased DF   Strength   Strength Comments pt can ankle pump, good quad/ham set on R, did not assess otherwise on R due to pain and spasticity   Bed Mobility   Bed Mobility Comments Min>mod Ax1   Transfer Skills   Transfer Comments min A x2   Gait   Gait Comments unable at eval, does not have proper shoes here, R foot spastic, pt not bearing wt on RLE due to pain   Balance   Balance Comments needs UE support of fww in standing with CGA for safety   Sensory Examination   Sensory Perception Comments reports baseline R foot numbness   Muscle Tone   Muscle Tone other (describe)   Muscle Tone Comments R LE spasticity   General Therapy Interventions   Planned Therapy Interventions bed mobility training;gait training;strengthening;ROM;transfer training;progressive activity/exercise   Intervention Comments needs to have R knee brace on, and shoes   Clinical Impression   Criteria for Skilled Therapeutic Intervention yes, treatment indicated   PT Diagnosis significant decrease in functional mobility due to fall with fracture RLE   Influenced by the following impairments pain, weakness, RLE spasticiity, STM loss due to TBI hx   Functional limitations due to impairments Transefrs, gait limitations, increased fall risk, need for A with all mobility  "  Clinical Presentation Evolving/Changing   Clinical Presentation Rationale per clinical observation, spasticity may be heightened due to pain and trauma, pain fluctuating   Clinical Decision Making (Complexity) Moderate complexity   Therapy Frequency` 2 times/day   Predicted Duration of Therapy Intervention (days/wks) 3 days   Anticipated Equipment Needs at Discharge front wheeled walker  (if doesn't have)   Anticipated Discharge Disposition Transitional Care Facility;Home with Assist  (pt states home to mothers place)   Risk & Benefits of therapy have been explained Yes   Patient, Family & other staff in agreement with plan of care Yes   Clinical Impression Comments Mother not present to verify dc plan options   [facility] TM \"6 Clicks\"   2016, Trustees of [facility], under license to [employer].  All rights reserved.   6 Clicks Short Forms Basic Mobility Inpatient Short Form   [facility]  \"6 Clicks\" V.2 Basic Mobility Inpatient Short Form   1. Turning from your back to your side while in a flat bed without using bedrails? 3 - A Little   2. Moving from lying on your back to sitting on the side of a flat bed without using bedrails? 2 - A Lot   3. Moving to and from a bed to a chair (including a wheelchair)? 3 - A Little   4. Standing up from a chair using your arms (e.g., wheelchair, or bedside chair)? 1 - Total   5. To walk in hospital room? 1 - Total   6. Climbing 3-5 steps with a railing? 1 - Total   Basic Mobility Raw Score (Score out of 24.Lower scores equate to lower levels of function) 11   Total Evaluation Time   Total Evaluation Time (Minutes) 30     "

## 2018-03-15 NOTE — CONSULTS
.Care Transition Initial Assessment - SW  Reason For Consult: discharge planning. Noted PT assessment and progress with recommendation for pt's transfer to rehab facility pending his ability to stay with his mother. Per SW discussion with RN, pt's mother has been discussing need for pt's transfer to rehab facility. Noted consideration of radiation therapy, depending on length of therapy this could delay pt's transfer to rehab facility.   Met with: Patient    Active Problems:    Fracture of femoral neck, right (H)    S/P total hip arthroplasty       DATA  Lives With: alone  Living Arrangements: apartment  Description of Support System: Supportive, Involved  Who is your support system?: Parent(s)  Support Assessment: Adequate family and caregiver support.   Identified issues/concerns regarding health management: Pt lives alone, has hx of TBI with short term memory loss. Noted per therapy documentation that pt's mother provides  transportation and shopping assistance, pt receives MOW, meds set up otherwise independent.            Quality Of Family Relationships: supportive, involved  Transportation Available:  (to be determined)    ASSESSMENT  Cognitive Status:  awake and alert    INTERVENTION    Call placed today to pt's mother to discuss discharge planning and rehab facility considerations based on information received today from RN, message left. Met with pt who notes awareness of the consideration of his transfer to rehab facility, he defers to his mother to help with this determination. Pt was conversant with SW, discussed the injury he sustained which led to the TBI noting that this occurred after a fall down the stairs after which he was not found down until the day later. He notes that he had gone [place] [facility] in [place] and then went to [facility] but has no recall of his being at [facility] due to the injury. Pt was able to maintain conversation with SW, he acknowledged  often the short term memory issues that he has.. It was found by SW that clear, conscise information is the best way to communicate with pt with opportunity for him to ask clarifying questions.     No referral were made at this time pending discussion with mother, will also verify services and supports that pt has at home.      PLAN    To be determined, anticipate tranfer to rehab facility, will initiate process after discussion with pt's mother.

## 2018-03-15 NOTE — PLAN OF CARE
"Problem: Patient Care Overview  Goal: Plan of Care/Patient Progress Review  Outcome: No Change  [facility] Orthopedic Nursing Progress Note       Assessment   Assessment:  Post-operative day #1  Total hip arthoplasty (Right)     /59 (BP Location: Left arm)  Pulse 89  Temp 97.9  F (36.6  C) (Oral)  Resp 18  Ht 1.803 m (5' 11\")  Wt 90.7 kg (200 lb)  SpO2 97%  BMI 27.89 kg/m2    CMS: is intact except for baseline numbness in right foot. Calves non-tender bilaterally.  Lungs: are clear, encouraged to use incentive spirometer w/a.  BS: hypoactive, no flatus yet, roebrto sips of clear liquids.   Urine: adequate per parra  Surgical Site: Dressing is clean dry intact.   Pain: denied pain at rest. Premedicated with po pain meds prior to  repositioning. Ice to incisional area.   Activity: bedrest  Slept well.      Nishant Caro RN      "

## 2018-03-15 NOTE — PLAN OF CARE
Problem: Patient Care Overview  Goal: Individualization & Mutuality  Outcome: Improving  Pt A&O; hx of TBI, short term memory loss, forgetful. PO pain meds managing pain. CMS: numbness to R foot, states this is baseline. Dressing CDI. Dangled and stood at bedside w/ A2, gait belt, and walker. Garcia patent and draining. Tolerating liquids; hasn't had much of an appetite this evening, pretty sleepy. Pt to see radiation therapy in the morning. Will continue to monitor.

## 2018-03-15 NOTE — PLAN OF CARE
Problem: Patient Care Overview  Goal: Plan of Care/Patient Progress Review  Discharge Planner PT   Patient plan for discharge: TCU per mother  Current status: Min A bed mobility, and transfers. Shoes and brace on for gait with fww 12' in room, pt bearing some wt on RLE this pm, pain level high per pt report. Tolerates ex. Declined to sit up in chair, looks pale.  Barriers to return to prior living situation: Alone, fall risk, needs A, cannot care for himself. Mother reports she is moving in a week and cannot care for him at this time.  Recommendations for discharge: TCU  Rationale for recommendations: pt will benefit from skilled PT as IP and at TCU setting for progression of functional mobility, strength and gait until Mod I with ADL, and functional mobility.        Entered by: Jolene Graves 03/15/2018 3:59 PM

## 2018-03-15 NOTE — PLAN OF CARE
"Problem: Patient Care Overview  Goal: Plan of Care/Patient Progress Review  PT: PT evaluation completed, tx initiated. Pt admitted 3/13 after an apparent unwitnessed fall which pt does not recall. He had R hip pain, to ED with R femoral neck fx and R DANIELLE done 3/14/15. Pt has hx of TBI with STM loss with RLE spasticity, and R foot numbness at baseline. Pt reports living alone in apartment, wears a R knee brace, but does not use any AD. His mother helps as needed with driving, and shopping. He reports indep ADL's, meds are set up for him, but he takes them, has MOW, and does light meal prep with microwave and stove top. Pt will have radiation for RLE tomorrow at 9 am per nurse.   Discharge Planner PT   Patient plan for discharge: Pt reports he will go his mothers home, but does not recall if she has stairs  Current status: pain \" high\" R hip. Tolerates bedside LE ex. Min>mod A x1 supine to sit EOB. Min A x2 to stand, pt not bearing any wt on RLE at this time due to pain. R knee brace is on, spasticity increases with activity, R foot in inversion, plantarflexion. He does not have proper shoes here, his mother will go to his apartment and get them today. Stood approx 1 min with fww. Transferred to bedside commode per pt request with min A x2. Pt able \" shimmy \" with LLE to move approx 2 feet. Mod A x2 back to bed. WBAT. No hip precautions per chart. Pt on capno with 02 at 2 L.   Barriers to return to prior living situation: Lives alone, high fall risk, STM loss, wears a brace.  Recommendations for discharge: Pt reports dc to mothers, will need to verify this, and pt will need to be functionally mobile and min A x1 at minimum. Difficult at eval as shoes not present, and pt not bearing weight. TCU needed if pt not min A x1, or mother cannot have pt stay with her.   Rationale for recommendations: Pt will need some A at dc due to STM loss, brace, and mobility with pain pill management and supervision. Skilled PT here " continued BID to progress strength, gait and stairs if needed.        Entered by: Jolene Graves 03/15/2018 9:36 AM

## 2018-03-15 NOTE — PLAN OF CARE
Problem: Patient Care Overview  Goal: Plan of Care/Patient Progress Review  Outcome: Improving  Pt is voiding and  tolerating diet. pts pain is controlled with po oxycodone.  Pt is up with assist, brace, gait belt and walker to the commode.  Pt is going to have some radiation tomorrow at 9:00.

## 2018-03-15 NOTE — PLAN OF CARE
Problem: Patient Care Overview  Goal: Plan of Care/Patient Progress Review  OT:Orders received and chart reviewed. Pt. admitted following a fall w/ R hip fracture, s/p R DANIELLE 3/14. Pt. Lives alone in an apartment, h/o TBI w/ STM loss, RLE spasticity, R foot numbness at baseline. Pt. wears a brace,special shoes. Per PT, pt. Not appropriate for OT this date. Pt. does not have brace, special shoes at this time, min. A X 2 for sit-stand w/PT. Will reschedule. If pt., discharges to TCU , will defer OT to best utilize services. Will check-back 3/16.

## 2018-03-15 NOTE — PROGRESS NOTES
"Orthopedic Surgery  3/15/2018    S: Patient voices no complaints today.     O: Blood pressure 113/57, pulse 89, temperature 96.8  F (36  C), temperature source Oral, resp. rate 18, height 1.803 m (5' 11\"), weight 90.7 kg (200 lb), SpO2 96 %.  Lab Results   Component Value Date    HGB 9.8 03/15/2018     Lab Results   Component Value Date    INR 0.98 03/13/2018        Neurovascularly intact.  Calves are negative bilaterally, both soft and nontender.  The wound is C/D/I.  The wound looks good with minimal erythema of the surrounding skin.    A: Mr. Angel is doing well status post Procedure(s):  ARTHROPLASTY HIP.    P: Continue physical therapy.   Anticoagulation with lovenox  Pain management  HO prophylaxis:  Considering radiation, consult placed to Rad Onc.  Plan for indomethacin for 6 weeks  Discharge planning    Parmjit Zabala  [phone]    "

## 2018-03-15 NOTE — PROGRESS NOTES
[facility]  Hospitalist Progress Note  Name: Yunior Angel    MRN: [id number]  YOB: 1965    Age: 52 year old  Date of admission: 3/13/2018  Primary care provider: Alberto Carlos Jr.      Reason for Stay (Diagnosis): Presumed fall with right femoral neck fracture.         Assessment and Plan:      Summary of Stay:  Yunior Angel is a 52 year old male with a PMH significant for TBI with resultant cognitive deficit (limited short term memory), hx of migraines, HLP and anxiety and otherwise fairly healthy 51 yo  who presents with acute right hip pain. Xrays reveal right femoral neck fracture. Pt himself does not recall falling but his mother suspects he must have fallen. No hx of pathologic fractures in the past.     Problem List  1. Right femoral neck fracture: Suspect secondary to fall event although unwitnessed and limited historian secondary to history of traumatic brain injury.  Status post right total hip arthroplasty.  Postop day #1.  Pain controlled.  2. Notable heterotropic ossification: Orthopedic input appreciated.  Discussed with the orthopedic team.  Plan is for 1 dose of radiation treatment tomorrow and then 3 weeks of indomethacin to prevent recurrence.  3. History of traumatic brain injury with notable cognitive deficit: Currently pleasant and appreciative care with no evidence of belligerence.  4. Anxiety  5. Hyperlipidemia  6. History of GERD: Chronically on a PPI    Plan:    Routine postoperative PT/OT evaluation and treatment    Resume patient's chronic medication    One session of radiation treatment tomorrow to the heterotropic ossification anterior to the right pubic with plan on 3 weeks of indomethacin.    Reorient as needed    DVT Prophylaxis: Per orthopedics  Code Status: Full Code  Discharge Dispo: To be determined based on progress with PT/OT  Estimated Disch Date / # of Days until Disch: Possibly tomorrow if cleared by orthopedics         "Interval History (Subjective):      Pain reasonably controlled at rest.   when patient is up with therapy.         Physical Exam:      Vital signs:  Temp: 97.4  F (36.3  C) Temp src: Oral BP: 117/58 Pulse: 67 Heart Rate: 69 Resp: 16 SpO2: 95 % O2 Device: None (Room air) Oxygen Delivery: 3 LPM Height: 180.3 cm (5' 11\") Weight: 90.7 kg (200 lb)  Estimated body mass index is 27.89 kg/(m^2) as calculated from the following:    Height as of this encounter: 1.803 m (5' 11\").    Weight as of this encounter: 90.7 kg (200 lb).    # Pain Assessment:   Current Pain Score 3/15/2018 3/15/2018 3/15/2018   Patient currently in pain? denies - -   Pain score (0-10) - 6 3   Pain location - - Hip   Pain descriptors - - -   CPOT pain score - - -   - Yunior is experiencing pain due to right hip fracture status post right total hip arthroplasty. Pain management was discussed and the plan was created in a collaborative fashion.  Yunior's response to the current recommendations: compliant  - Opioid regimen: Oxycodone  - Response to opioid medications: Reduction of symptoms   - Bowel regimen: senna          I/O last 3 completed shifts:  In: 380 [P.O.:240; I.V.:140]  Out: 2325 [Urine:2325]  Vitals:    03/13/18 0923   Weight: 90.7 kg (200 lb)       Constitutional: Awake, alert, cooperative, no apparent distress   Respiratory: Nl work of breathing. Clear to auscultation bilaterally, no crackles or wheezing   Cardiovascular: Regular rate and rhythm, normal S1 and S2, and no murmur noted       Skin: No rashes, no cyanosis, dry to touch   Neuro: CN 2-12 intact, no localizing weakness   Extremities: No edema, normal range of motion   HEENT Normocephalic, atraumatic, normal nasal turbinates; oropharynx clear   Neck Supple; nl inspection; trachea midline; no thryomegaly   Psychiatric: A+O x3. Normal affect          Medications:      All current medications were reviewed with changes reflected in problem list.         Data:      All new lab " and imaging data was reviewed.   Labs:    Recent Labs  Lab 03/15/18  0631 03/14/18  0504 03/13/18  0942   WBC  --  9.4 6.5   HGB 9.8* 12.2* 13.1*   HCT  --  39.5* 42.5   MCV  --  74* 73*   PLT  --  294 376       Recent Labs  Lab 03/15/18  0631 03/14/18  0504 03/13/18  0942   NA  --  143 141   POTASSIUM  --  4.2 4.6   CHLORIDE  --  108 105   CO2  --  31 29   ANIONGAP  --  4 7   * 106* 104*   BUN  --  16 23   CR  --  0.84 0.76   GFRESTIMATED  --  >90 >90   GFRESTBLACK  --  >90 >90   JUSTA  --  8.2* 9.3      Imaging:   Recent Results (from the past 24 hour(s))   XR Pelvis w Hip Port Right 1 View    Narrative    PORTABLE ONE VIEW PELVIS AND ONE VIEW RIGHT HIP 3/14/2018 11:49 AM    HISTORY: Postoperative evaluation of the right hip.    COMPARISON: 3/13/2018    FINDINGS: There are interval surgical changes of a right total hip  arthroplasty. The hardware is intact with no fracture or other  complication seen. Lateral skin staples are seen.  No other  abnormality is demonstrated.      Impression    IMPRESSION: Unremarkable postoperative appearance of the right hip.    MD Betty DEL RIO [phone]

## 2018-03-16 ENCOUNTER — APPOINTMENT (OUTPATIENT)
Dept: PHYSICAL THERAPY | Facility: CLINIC | Age: 53
DRG: 470 | End: 2018-03-16
Payer: MEDICARE

## 2018-03-16 ENCOUNTER — TRANSFERRED RECORDS (OUTPATIENT)
Dept: HEALTH INFORMATION MANAGEMENT | Facility: CLINIC | Age: 53
End: 2018-03-16

## 2018-03-16 LAB
ERYTHROCYTE [DISTWIDTH] IN BLOOD BY AUTOMATED COUNT: 15.1 % (ref 10–15)
GLUCOSE BLDC GLUCOMTR-MCNC: 110 MG/DL (ref 70–99)
GLUCOSE SERPL-MCNC: 111 MG/DL (ref 70–99)
HCT VFR BLD AUTO: 31.6 % (ref 40–53)
HGB BLD-MCNC: 9.7 G/DL (ref 13.3–17.7)
MCH RBC QN AUTO: 22.7 PG (ref 26.5–33)
MCHC RBC AUTO-ENTMCNC: 30.7 G/DL (ref 31.5–36.5)
MCV RBC AUTO: 74 FL (ref 78–100)
PLATELET # BLD AUTO: 254 10E9/L (ref 150–450)
RBC # BLD AUTO: 4.28 10E12/L (ref 4.4–5.9)
WBC # BLD AUTO: 10 10E9/L (ref 4–11)

## 2018-03-16 PROCEDURE — 00000146 ZZHCL STATISTIC GLUCOSE BY METER IP

## 2018-03-16 PROCEDURE — 40000193 ZZH STATISTIC PT WARD VISIT: Performed by: PHYSICAL THERAPIST

## 2018-03-16 PROCEDURE — 77307 TELETHX ISODOSE PLAN CPLX: CPT

## 2018-03-16 PROCEDURE — 97110 THERAPEUTIC EXERCISES: CPT | Mod: GP | Performed by: PHYSICAL THERAPIST

## 2018-03-16 PROCEDURE — 25000132 ZZH RX MED GY IP 250 OP 250 PS 637: Mod: GY | Performed by: INTERNAL MEDICINE

## 2018-03-16 PROCEDURE — 85027 COMPLETE CBC AUTOMATED: CPT | Performed by: INTERNAL MEDICINE

## 2018-03-16 PROCEDURE — 99232 SBSQ HOSP IP/OBS MODERATE 35: CPT | Performed by: INTERNAL MEDICINE

## 2018-03-16 PROCEDURE — 25000128 H RX IP 250 OP 636: Performed by: ORTHOPAEDIC SURGERY

## 2018-03-16 PROCEDURE — 25000132 ZZH RX MED GY IP 250 OP 250 PS 637: Mod: GY | Performed by: ORTHOPAEDIC SURGERY

## 2018-03-16 PROCEDURE — 77334 RADIATION TREATMENT AID(S): CPT

## 2018-03-16 PROCEDURE — 82947 ASSAY GLUCOSE BLOOD QUANT: CPT | Performed by: INTERNAL MEDICINE

## 2018-03-16 PROCEDURE — 36415 COLL VENOUS BLD VENIPUNCTURE: CPT | Performed by: INTERNAL MEDICINE

## 2018-03-16 PROCEDURE — A9270 NON-COVERED ITEM OR SERVICE: HCPCS | Mod: GY | Performed by: INTERNAL MEDICINE

## 2018-03-16 PROCEDURE — A9270 NON-COVERED ITEM OR SERVICE: HCPCS | Mod: GY | Performed by: ORTHOPAEDIC SURGERY

## 2018-03-16 PROCEDURE — 77290 THER RAD SIMULAJ FIELD CPLX: CPT

## 2018-03-16 PROCEDURE — 12000000 ZZH R&B MED SURG/OB

## 2018-03-16 PROCEDURE — 97116 GAIT TRAINING THERAPY: CPT | Mod: GP | Performed by: PHYSICAL THERAPIST

## 2018-03-16 PROCEDURE — 77412 RADIATION TX DELIVERY LVL 3: CPT

## 2018-03-16 PROCEDURE — 77417 THER RADIOLOGY PORT IMAGE(S): CPT

## 2018-03-16 PROCEDURE — 97530 THERAPEUTIC ACTIVITIES: CPT | Mod: GP | Performed by: PHYSICAL THERAPIST

## 2018-03-16 RX ADMIN — OMEPRAZOLE 40 MG: 20 CAPSULE, DELAYED RELEASE ORAL at 08:35

## 2018-03-16 RX ADMIN — INDOMETHACIN 50 MG: 50 CAPSULE ORAL at 08:36

## 2018-03-16 RX ADMIN — SENNOSIDES AND DOCUSATE SODIUM 1 TABLET: 8.6; 5 TABLET ORAL at 21:26

## 2018-03-16 RX ADMIN — INDOMETHACIN 50 MG: 50 CAPSULE ORAL at 17:30

## 2018-03-16 RX ADMIN — OXYCODONE HYDROCHLORIDE 5 MG: 5 TABLET ORAL at 04:48

## 2018-03-16 RX ADMIN — DICYCLOMINE HYDROCHLORIDE 10 MG: 10 CAPSULE ORAL at 12:07

## 2018-03-16 RX ADMIN — OXYCODONE HYDROCHLORIDE 5 MG: 5 TABLET ORAL at 01:23

## 2018-03-16 RX ADMIN — DICYCLOMINE HYDROCHLORIDE 10 MG: 10 CAPSULE ORAL at 06:46

## 2018-03-16 RX ADMIN — ACETAMINOPHEN 975 MG: 325 TABLET, FILM COATED ORAL at 08:35

## 2018-03-16 RX ADMIN — ACETAMINOPHEN 975 MG: 325 TABLET, FILM COATED ORAL at 16:53

## 2018-03-16 RX ADMIN — OXYCODONE HYDROCHLORIDE 5 MG: 5 TABLET ORAL at 17:32

## 2018-03-16 RX ADMIN — PAROXETINE HYDROCHLORIDE 60 MG: 20 TABLET, FILM COATED ORAL at 08:35

## 2018-03-16 RX ADMIN — DICYCLOMINE HYDROCHLORIDE 10 MG: 10 CAPSULE ORAL at 21:26

## 2018-03-16 RX ADMIN — DICYCLOMINE HYDROCHLORIDE 10 MG: 10 CAPSULE ORAL at 16:53

## 2018-03-16 RX ADMIN — SIMVASTATIN 80 MG: 40 TABLET, FILM COATED ORAL at 21:27

## 2018-03-16 RX ADMIN — SENNOSIDES AND DOCUSATE SODIUM 2 TABLET: 8.6; 5 TABLET ORAL at 08:36

## 2018-03-16 RX ADMIN — ENOXAPARIN SODIUM 40 MG: 40 INJECTION SUBCUTANEOUS at 12:06

## 2018-03-16 RX ADMIN — ACETAMINOPHEN 975 MG: 325 TABLET, FILM COATED ORAL at 01:23

## 2018-03-16 RX ADMIN — INDOMETHACIN 50 MG: 50 CAPSULE ORAL at 12:07

## 2018-03-16 RX ADMIN — OXYCODONE HYDROCHLORIDE 5 MG: 5 TABLET ORAL at 13:01

## 2018-03-16 NOTE — CONSULTS
RADIATION THERAPY CONSULTATION  DATE OF CONSULTATION: 3/16/2018  PATIENT NAME: Yunior Angel  : 1965  HEALTHCARE PROVIDERS: Parmjit Zabala MD, Alberto Carlos Jr, M.D.  CHIEF COMPLAINT: Heterotopic ossification of the right hip, status post right total hip arthroplasty and excision of heterotopic ossification  HISTORY OF PRESENT ILLNESS: Yunior Angel was brought today to our department; he is a 52-year-old gentleman with history of traumatic brain injury in  with cognitive deficit including deficits in short-term memory and chronic sensory limitations in the right foot and wears a right leg brace. He was brought to the emergency room on 2018 because of sudden onset of right hip pain. The patient stated that he woke up normally in the morning and experienced sudden right hip pain that worsened with the ambulation. He did not recall a fall, but he has short-term memory deficit. While in the ER the pelvis and right hip x-ray showed right femoral neck fracture with extensive heterotopic bone formation overlying the right inferior pubic ramus, likely related to old trauma measuring 9 cm in length. The CT scan of the pelvis confirmed the presence of a transverse fracture through the right femoral neck with mild anterior displacement of the femoral shaft relative to the head. There is mild impaction of the posterior fracture line. Again, a large area of heterotopic ossification medial to the proximal right femur was seen, measuring up to 8 cm in length. No other adjacent soft tissue abnormality was seen. The patient was seen in orthopedic surgery consultation by Dr. Parmjit Zabala who recommended a right hip arthroplasty and diminishment of heterotopic ossification. On 2018 the patient underwent a right total hip arthroplasty using [employer] Synergy femoral stem and Reflection acetabulum Dr. Parmjit Zabala was able to exercise a large amount of the heterotopic ossification with  increase in the range of motion, however, during the process of removing the ossification, the patient had a little bit of bleeding and because of concerns that the bleeding could worsened, some ossification was left in place. The patient had an uneventful postoperative recovery, and we were asked to see the patient for postoperative, prophylactic irradiation.  ECOG PERFORMANCE: 3 - Capable of only limited self-care, confined to bed or chair more than 50% of waking hours. (ECOG)  PAST MEDICAL HISTORY: Benign neoplasm of sigmoid colon, Beta thalassemia minor, Closed head injury, Erythromelalgia, Flu Shot (uncertain if he had the flu shot this season, 3199-1670) , Fracture of femoral neck (right) , Gastroesophageal reflux (with esophagitis) , Gout, Hyperlipidemia, IBS, Migraine (without aura) , Monoparesis (right lower extremity due to TBI) , Muscle spasticity, Neurobehavior sequelae of TBI, Organic brain disorder and Overweight.  PAST SURGICAL HISTORY: Colonoscopy on 12/31/2014, EGD on 12/31/2014, Excision of bone cyst distal phalanx in 2006 (right great toe, avulsion of the right great toenail), Foot surgery and Hip arthroplasty on 3/14/2018 (right).   FAMILY HISTORY:Father is alive. Mother is alive.  MEDICATIONS: Dicyclomine HCl 1 Capsule (of 10 mg) Capsule Oral four times a day   Indomethacin ER 1 Capsule (of 75 mg) Capsule, controlled release Oral b.i.d.   Multivitamins 1 Tablet Tablet Oral q.d.   PARoxetine HCl 1.5 Tablet (of 40 mg) Tablet Oral q.d.   Probiotic Product 1 Tablet Tablet Oral q.d.   RaNITidine HCl 1 Tablet (of 150 mg) Tablet Oral b.i.d.   Senna-Docusate Sodium 1 Tablet (of 8.6-50 mg) Tablet Oral b.i.d. PRN   Simvastatin 1 Tablet (of 80 mg) Tablet Oral hs   Vitamin D Tablet Oral q.d.  ALLERGIES: Insect extract  SOCIAL HISTORY: Last screened on 3/16/2018 - Never smoked. Past drinker. Patient indicated access to the following support systems: Single and lives in [place].  REVIEW OF SYSTEMS:    Constitutional Denies lack of appetite, fatigue, fever, lethargy, malaise, night sweats, rigors / chills and change in weight.   Allergic/Immunologic Insect extract   Head Denies alopecia.   Eyes Denies blurred vision, double vision, lacrimation, night blindness, visual difficulties and photophobia. occasional double vision   ENMT Denies dysphagia, ear pain, epistaxis, esophagitis, problems with hearing, mouth dryness, oral bleeding, otitis, sinusitis, sputum production, stomatitis, altered taste and tinnitus.   Neck Denies neck masses, muscle weakness, neck pain, decreased range of motion and swelling of the neck.   Integumentary Right hip dressing C/D/I   Breasts Denies breast masses, nipple discharge, nipple inversion and pain.   Cardiovascular Denies arrhythmias, chest pain, dyspnea, edema, implantable cardiac device and palpitations.   Respiratory Denies cough, dyspnea, hemoptysis, hiccoughs, pleuritic chest pain and wheezing. mild SOB   Gastrointestinal constipation   Genitourinary (M) intermittent difficulty initiating urination   Musculoskeletal right hip pain, rating 5/10   Neurologic intermittent bilateral hand numbness >right, unsteady gait, memory issues   Psychiatric Denies delusions, hallucinations, mood swings, depression and euphoria.   Endocrine Denies diabetes, hot flashes, menstrual irregularities and thyroid disease.   Hematologic/Lymphatic Denies anemia and tender or enlarged lymph nodes.   PHYSICAL EXAMINATION: The patient is alert, oriented, he is in a hospital bed. His vitals are blood pressure 135/74, pulse 64/m, respiratory rate 16/m. HEENT: Pupils symmetrical, no scleral icterus. Neck, supraclavicular regions and axillary: Without palpable lymph node enlargement or masses. Cardiovascular: Heart regular rate and rhythm. Lungs: Clear at auscultation bilaterally. Back: No point of percussion tenderness. Abdomen: Soft, nontender. No palpable liver enlargement. Extremities: Right hip status  post right total hip arthroplasty and heterotopic ossification removal. The surgical wound with dressing. No evidence of bleeding or infection.  IMPRESSION AND PLAN: Heterotopic ossification of the right hip, status post right total hip arthroplasty and excision of heterotopic ossification performed on 3/14/2018.  I had a long discussion with the patient and his family regarding the nature of his disease and his treatment options. I explained to them that the goal of postoperative irradiation is prevention of heterotopic bone formation and this approach is a very reasonable option in this situation. I reviewed the logistics, acute, and potential long-term side effects of the single fraction of radiation therapy and explained to them that the field would include the area of orthotopic bone formation. At the end of our discussion today Yunior and his parents stated having a good understanding of all the issues involved and expressed their wishes to proceed with the treatment. The patient was brought to the simulator and subsequently to the treatment machine.   800 cGy in 1 fraction were delivered today to the right hip and the area of orthotopic bone formation. The patient tolerated the procedure well.   Thank you very much for asking me to see Yunior Angel in radiation oncology consultation. Please do not hesitate to call if you have any questions or concerns regarding this patient.  (Portions of this document may have been recorded using electronic voice recognition technology.)  Sincerely,    Kamran Hackett MD, Radiation Oncologist 3/16/2018 12:19:05 PM   Electronically Signed and Approved  Signature Derived from Controlled Access Password

## 2018-03-16 NOTE — PLAN OF CARE
Problem: Hip Arthroplasty (Total, Partial) (Adult)  Goal: Signs and Symptoms of Listed Potential Problems Will be Absent, Minimized or Managed (Hip Arthroplasty)  Signs and symptoms of listed potential problems will be absent, minimized or managed by discharge/transition of care (reference Hip Arthroplasty (Total, Partial) (Adult) CPG).   Outcome: Improving  A&Ox3, unsure of where he was, directable, hx TBI  Up Ax2 w/GB & walker  LDA: PIV SL  Vitals: stable, RA  Pain: R hip, sched tylenol & oxy q3h, ice  R hip dressing w/scant drainage   Trouble voiding this shift, bladder scan 242mL  Diet: regular, tolerating  Followed by Ortho & Rad Onc  Plan: Monitor bladder function, control pain, Rad treatment @ 0900

## 2018-03-16 NOTE — PROGRESS NOTES
[facility]  Hospitalist Progress Note  Name: Yunior Angel    MRN: [id number]  YOB: 1965    Age: 52 year old  Date of admission: 3/13/2018  Primary care provider: Alberto Carlos Jr.      Reason for Stay (Diagnosis): Presumed fall with right femoral neck fracture.         Assessment and Plan:      Summary of Stay:  Yunior Angel is a 52 year old male with a PMH significant for TBI with resultant cognitive deficit (limited short term memory), hx of migraines, HLP and anxiety and otherwise fairly healthy 53 yo  who presents with acute right hip pain. Xrays reveal right femoral neck fracture. Pt himself does not recall falling but his mother suspects he must have fallen. No hx of pathologic fractures in the past.     Problem List  1. Right femoral neck fracture: Suspect secondary to fall event although unwitnessed and limited historian secondary to history of traumatic brain injury.  Status post right total hip arthroplasty.  Postop day #1.  Pain controlled.  2. Notable heterotropic ossification:   3. History of traumatic brain injury with notable cognitive deficit: Currently pleasant and appreciative care with no evidence of belligerence.  4. Anxiety  5. Hyperlipidemia  6. History of GERD: Chronically on a PPI    Plan:    Routine postoperative PT/OT evaluation and treatment    Resume patient's chronic medication    Status post 1 plan session of radiation treatment on 3/16/18 to the heterotropic ossification anterior to the right inferior pubic rami with plan on 3 weeks of indomethacin.    Reorient as needed    DVT Prophylaxis: Per orthopedics  Code Status: Full Code  Discharge Dispo: [facility]  Estimated Disch Date / # of Days until Disch: Tomorrow        Interval History (Subjective):      Pain reasonably controlled at rest.  Only reports being fatigued from lack of sleep.         Physical Exam:      Vital signs:  Temp: 96.2  F (35.7  C) Temp src:  "Oral BP: 104/42 Pulse: 64 Heart Rate: 69 Resp: 16 SpO2: 96 % O2 Device: None (Room air) Oxygen Delivery: 3 LPM Height: 180.3 cm (5' 11\") Weight: 90.7 kg (200 lb)  Estimated body mass index is 27.89 kg/(m^2) as calculated from the following:    Height as of this encounter: 1.803 m (5' 11\").    Weight as of this encounter: 90.7 kg (200 lb).    # Pain Assessment:   Current Pain Score 3/16/2018 3/16/2018 3/16/2018   Patient currently in pain? - yes denies   Pain score (0-10) 0 5 -   Pain location - Hip -   Pain descriptors - - -   CPOT pain score - - -   - Yunior is experiencing pain due to right hip fracture status post right total hip arthroplasty. Pain management was discussed and the plan was created in a collaborative fashion.  Yunior's response to the current recommendations: compliant  - Opioid regimen: Oxycodone  - Response to opioid medications: Reduction of symptoms   - Bowel regimen: senna          I/O last 3 completed shifts:  In: 493 [P.O.:490; I.V.:3]  Out: 250 [Urine:250]  Vitals:    03/13/18 0923   Weight: 90.7 kg (200 lb)       Constitutional: Awake, alert, cooperative, no apparent distress   Respiratory: Nl work of breathing. Clear to auscultation bilaterally, no crackles or wheezing   Cardiovascular: Regular rate and rhythm, normal S1 and S2, and no murmur noted       Skin: No rashes, no cyanosis, dry to touch   Neuro: CN 2-12 intact, no localizing weakness   Extremities: No edema, normal range of motion   HEENT Normocephalic, atraumatic, normal nasal turbinates; oropharynx clear   Neck Supple; nl inspection; trachea midline; no thryomegaly   Psychiatric: A+O x3. Normal affect          Medications:      All current medications were reviewed with changes reflected in problem list.         Data:      All new lab and imaging data was reviewed.   Labs:    Recent Labs  Lab 03/16/18  0717 03/15/18  0631 03/14/18  0504 03/13/18  0942   WBC 10.0  --  9.4 6.5   HGB 9.7* 9.8* 12.2* 13.1*   HCT 31.6*  --  39.5* " 42.5   MCV 74*  --  74* 73*     --  294 376       Recent Labs  Lab 03/16/18  0717 03/15/18  0631 03/14/18  0504 03/13/18  0942   NA  --   --  143 141   POTASSIUM  --   --  4.2 4.6   CHLORIDE  --   --  108 105   CO2  --   --  31 29   ANIONGAP  --   --  4 7   * 129* 106* 104*   BUN  --   --  16 23   CR  --   --  0.84 0.76   GFRESTIMATED  --   --  >90 >90   GFRESTBLACK  --   --  >90 >90   JUSTA  --   --  8.2* 9.3      Imaging:   Recent Results (from the past 24 hour(s))   XR Pelvis w Hip Port Right 1 View    Narrative    PORTABLE ONE VIEW PELVIS AND ONE VIEW RIGHT HIP 3/14/2018 11:49 AM    HISTORY: Postoperative evaluation of the right hip.    COMPARISON: 3/13/2018    FINDINGS: There are interval surgical changes of a right total hip  arthroplasty. The hardware is intact with no fracture or other  complication seen. Lateral skin staples are seen.  No other  abnormality is demonstrated.      Impression    IMPRESSION: Unremarkable postoperative appearance of the right hip.    MD Betty DEL RIO [phone]

## 2018-03-16 NOTE — PROGRESS NOTES
"Orthopedic Surgery Progress Note  3/16/2018  POD #2 S/P Right total hip arthroplasty and Excision of heterotopic ossification for Right displaced femoral neck fracture     S: Patient voices no unexpected ortho complaints today. History of TBI with memory loss. Denies chest pain, shortness of breath, fever/chills. Some soreness in right hip/thigh but manageable. Will have one time radiation treatment this morning for heterotropic ossification.     O: Blood pressure 104/55, pulse 64, temperature 97.6  F (36.4  C), resp. rate 16, height 1.803 m (5' 11\"), weight 90.7 kg (200 lb), SpO2 97 %.  Lab Results   Component Value Date    HGB 9.7 03/16/2018     Lab Results   Component Value Date    INR 0.98 03/13/2018     I/O last 3 completed shifts:  In: 493 [P.O.:490; I.V.:3]  Out: 1300 [Urine:1300]     Some baseline numbness and loss of motion in RLE from past TBI, but no change since surgery.   Calves are negative bilaterally, both soft and nontender.  2+ DP pulses  The dressing is C/D/I.   Minimal erythema of the surrounding skin  A: Mr. Angel is doing well status post Procedure(s):  S/P Right total hip arthroplasty and Excision of heterotopic ossification for Right displaced femoral neck fracture     P:   - One time radiation treatment for heterotropic ossification (HO) in right hip scheduled this morning. Also on Indomethacin TID to help prevent further development of HO, likely for next 6 weeks.  - Pain Control  - PT/OT  - Weight Bearing Status: WBAT on RLE   - Wound Care: Aquacel dressing, RN may change dressing on POD 3 prior to discharge, then should leave in place until follow up appointment unless becomes loose or saturated.   - DVT Prophylaxis: Lovenox in hospital, will switch to Aspirin 325mg BID on discharge.  - Disp: Anticipate will be ready for d/c to TCU tomorrow if medically stable per Hospitalist. Ortho related TCU orders and pain med scripts completed and signed.   - Follow Up: Follow up with Dolores" ALMA Gimenez for Dr. Zabala, within 12-16 days from surgery.  If you do not already have a follow up appointment scheduled, please call our [place] office: [phone].  No follow up labs or test are needed.      Please page me with any Ortho related questions/concerns on this patient between 7a-5pm today.     Misael Quintana, MSN, APRN, NP-C   Nurse Practitioner, [facility]  [facility]   Office Phone: [phone]  Pager: [phone]

## 2018-03-16 NOTE — PROGRESS NOTES
SWS     D: Discharge planning continuing.. noted per ortho PA the anticipation of pt's discharge tomorrow pending medical stability.      I: Ladi spoke today by phone to pt's mother who affirms planning for pt's transfer to rehab facility identifying [facility] and [facility] as facility considerations, as well as [facility] if needed, semi-private room. She lives in [place] so would like facility close to her. Referrals have been made to facilities noted. Mother inquired about pt's transport to rehab facility, SW noted that with pt's [employer] [facility] that the billing for w/c transport would go to the insurance however SW unable to guarantee coverage. Discussed with her that if insurance does not cover that the cost of w/c transport would be $70/base and $4.50 which she acknowledges and accepts.      A/P: Await facility assessments, MD determination of discharge date and continue planning accordingly.     Addendum:     D: Per discussion with hospitalist LADI is continuing planning for pt's transfer to rehab facility tomorrow. [facility] and [facility] have assessed and would be able to accept pt tomorrow, [facility] does not have openings at this time.     I: Spoke by phone with pt's mother and discussed above, she has asked that planning continue for pt's transfer to [facility], with the medical transport. [facility] w/c arranged for 3/17 @ 1300. Plan discussed with pt.    A/P: Anticipate no problem with arrangements made for transfer tomorrow, SW available until discharge as needed.        D: Per DHS regulation, LADI completed and submitted PAS-RR to [place] [employer] via the [employer].  PAS-RR confirmation # is : [id number].

## 2018-03-16 NOTE — PLAN OF CARE
Problem: Patient Care Overview  Goal: Plan of Care/Patient Progress Review  Discharge Planner OT   Patient plan for discharge: TCU  Current status: OT orders received and chart reviewed. Per PT and chart notes, patient has h/o TBI with short term memory deficits and is currently ambulating with fww 12' in room, pt bearing some wt on RLE this pm and report pain level high.  Barriers to return to prior living situation: defer to PT  Recommendations for discharge: defer to PT  Rationale for recommendations: it has been recommended that patient transfer to TCU d/t h/o TBI, short term memory deficits, slow progress with functional mobility and is below baseline for ADL's, will discharge from OT services and defer OT to next level of care.       Entered by: Sunita Lundy 03/16/2018 12:12 PM

## 2018-03-16 NOTE — PLAN OF CARE
Problem: Patient Care Overview  Goal: Individualization & Mutuality  Outcome: Improving  Pt A&O. Forgetful at times d/t TBI hx. VS stable; afebrile. PO pain meds managing pain. CMS intact: numbness to R foot at baseline. Dressing CDI. Up w/ A2, gait belt, walker, and knee brace. Voiding in good amts. Tolerating regular diet. Will go to radiation therapy tomorrow at 0900. Plan is to d/c to TCU within the next couple of days. Will continue to monitor.

## 2018-03-16 NOTE — PLAN OF CARE
Problem: Patient Care Overview  Goal: Plan of Care/Patient Progress Review  PT: PT attempted, pt still at radiology. Will try this pm.    2nd attempt: was able to have pt perform bedside ex only at 12:15. Will ambulate in pm.

## 2018-03-16 NOTE — PLAN OF CARE
Problem: Patient Care Overview  Goal: Plan of Care/Patient Progress Review  Discharge Planner PT   Patient plan for discharge: TCU, likey tomorrow  Current status: Improving, better energy. Pain R hip 6/10 with WB'ing. SBA bed mobility, CGA transfers with cues, gets distracted. Walking with R knee hyperextension brace on, and tennis shoes x 80' with fww, gait deformities due to RLE spasticity with knee in extension, and ankle in PF. Tolerating ex well.   Barriers to return to prior living situation: Lives alone, high fall risk, cannot get shoes on for gait, unable to care for himself at this time.  Recommendations for discharge: TCU  Rationale for recommendations: Skilled PT IP and next level of care to maximize strength and functional mobility post op R DANIELLE w/ complicating chronic TBI and RLE spasticity so pt can be Mod I and safe in his apartment       Entered by: Jolene Graves 03/16/2018 3:15 PM

## 2018-03-16 NOTE — PLAN OF CARE
Problem: Hip Arthroplasty (Total, Partial) (Adult)  Goal: Signs and Symptoms of Listed Potential Problems Will be Absent, Minimized or Managed (Hip Arthroplasty)  Signs and symptoms of listed potential problems will be absent, minimized or managed by discharge/transition of care (reference Hip Arthroplasty (Total, Partial) (Adult) CPG).   Outcome: Improving  Afeb, vss, does have prior numbness and lack of mobility in his right foot from TBI. No pain unless he moves then moderate pain. Did go down to radiation therapy for a 1 time treatment. Dressing with scant dried drainage and only mild swelling in hip/thigh area. Did void after catheter removed a second time before heading to [facility] tx.  Working with PT on moving but missed am session. Will be transferred to TCU tomorrow for further therapy.

## 2018-03-17 ENCOUNTER — APPOINTMENT (OUTPATIENT)
Dept: PHYSICAL THERAPY | Facility: CLINIC | Age: 53
DRG: 470 | End: 2018-03-17
Payer: MEDICARE

## 2018-03-17 VITALS
RESPIRATION RATE: 16 BRPM | SYSTOLIC BLOOD PRESSURE: 105 MMHG | TEMPERATURE: 96.4 F | BODY MASS INDEX: 28 KG/M2 | HEART RATE: 64 BPM | DIASTOLIC BLOOD PRESSURE: 43 MMHG | HEIGHT: 71 IN | OXYGEN SATURATION: 93 % | WEIGHT: 200 LBS

## 2018-03-17 LAB — PLATELET # BLD AUTO: 267 10E9/L (ref 150–450)

## 2018-03-17 PROCEDURE — 25000128 H RX IP 250 OP 636: Performed by: ORTHOPAEDIC SURGERY

## 2018-03-17 PROCEDURE — 25000132 ZZH RX MED GY IP 250 OP 250 PS 637: Mod: GY | Performed by: ORTHOPAEDIC SURGERY

## 2018-03-17 PROCEDURE — 25000132 ZZH RX MED GY IP 250 OP 250 PS 637: Mod: GY | Performed by: INTERNAL MEDICINE

## 2018-03-17 PROCEDURE — 97110 THERAPEUTIC EXERCISES: CPT | Mod: GP | Performed by: PHYSICAL THERAPY ASSISTANT

## 2018-03-17 PROCEDURE — A9270 NON-COVERED ITEM OR SERVICE: HCPCS | Mod: GY | Performed by: ORTHOPAEDIC SURGERY

## 2018-03-17 PROCEDURE — 99238 HOSP IP/OBS DSCHRG MGMT 30/<: CPT | Performed by: INTERNAL MEDICINE

## 2018-03-17 PROCEDURE — 36415 COLL VENOUS BLD VENIPUNCTURE: CPT | Performed by: ORTHOPAEDIC SURGERY

## 2018-03-17 PROCEDURE — 40000193 ZZH STATISTIC PT WARD VISIT: Performed by: PHYSICAL THERAPY ASSISTANT

## 2018-03-17 PROCEDURE — A9270 NON-COVERED ITEM OR SERVICE: HCPCS | Mod: GY | Performed by: INTERNAL MEDICINE

## 2018-03-17 PROCEDURE — 97530 THERAPEUTIC ACTIVITIES: CPT | Mod: GP | Performed by: PHYSICAL THERAPY ASSISTANT

## 2018-03-17 PROCEDURE — 97116 GAIT TRAINING THERAPY: CPT | Mod: GP | Performed by: PHYSICAL THERAPY ASSISTANT

## 2018-03-17 PROCEDURE — 85049 AUTOMATED PLATELET COUNT: CPT | Performed by: ORTHOPAEDIC SURGERY

## 2018-03-17 RX ADMIN — ACETAMINOPHEN 975 MG: 325 TABLET, FILM COATED ORAL at 09:38

## 2018-03-17 RX ADMIN — INDOMETHACIN 50 MG: 50 CAPSULE ORAL at 13:03

## 2018-03-17 RX ADMIN — OXYCODONE HYDROCHLORIDE 5 MG: 5 TABLET ORAL at 06:50

## 2018-03-17 RX ADMIN — PAROXETINE HYDROCHLORIDE 60 MG: 20 TABLET, FILM COATED ORAL at 09:37

## 2018-03-17 RX ADMIN — OMEPRAZOLE 40 MG: 20 CAPSULE, DELAYED RELEASE ORAL at 09:38

## 2018-03-17 RX ADMIN — ACETAMINOPHEN 975 MG: 325 TABLET, FILM COATED ORAL at 00:20

## 2018-03-17 RX ADMIN — ENOXAPARIN SODIUM 40 MG: 40 INJECTION SUBCUTANEOUS at 09:39

## 2018-03-17 RX ADMIN — OXYCODONE HYDROCHLORIDE 10 MG: 5 TABLET ORAL at 09:38

## 2018-03-17 RX ADMIN — SENNOSIDES AND DOCUSATE SODIUM 1 TABLET: 8.6; 5 TABLET ORAL at 09:38

## 2018-03-17 RX ADMIN — INDOMETHACIN 50 MG: 50 CAPSULE ORAL at 09:38

## 2018-03-17 RX ADMIN — DICYCLOMINE HYDROCHLORIDE 10 MG: 10 CAPSULE ORAL at 13:03

## 2018-03-17 RX ADMIN — DICYCLOMINE HYDROCHLORIDE 10 MG: 10 CAPSULE ORAL at 06:50

## 2018-03-17 NOTE — PLAN OF CARE
Problem: Patient Care Overview  Goal: Plan of Care/Patient Progress Review  A/O. Confused and forgetful at times. CMS: numbness on R leg from TBI, drgs: cdi. VSS: stable, denies having pain the beginning of the  shift, PO medications is managing pain, using urinal, voiding small amounts frequently, voided 350 ml  this shift. Passing flatus. Plans to dc to TCU.

## 2018-03-17 NOTE — PLAN OF CARE
Problem: Patient Care Overview  Goal: Plan of Care/Patient Progress Review  Discharge Planner PT   Patient plan for discharge: TCU, likey tomorrow  Current status: Pt transfers supine to sit with min assist and min assist with sit to/from stand. Assisted pt with donning leg brace and shoes. Pt transfers bed to chair with ww with CGA.  All Goals not met Pt amb 20' with ww with leg brace on R LE and shoes with CGA. Note pt to amb with step to gait pattern. Not met  Barriers to return to prior living situation: Lives alone, high fall risk, cannot get shoes on for gait, unable to care for himself at this time.  Recommendations for discharge: TCU  Rationale for recommendations: Skilled PT IP and next level of care to maximize strength and functional mobility post op R DANIELLE w/ complicating chronic TBI and RLE spasticity so pt can be Mod I and safe in his apartment      PT - Pt discharging to TCU today with goals not met. Please refer discharge summary.        Entered by: Sandy Whitfield 03/17/2018 9:09 AM

## 2018-03-17 NOTE — PLAN OF CARE
Problem: Patient Care Overview  Goal: Plan of Care/Patient Progress Review  Physical Therapy Discharge Summary    Reason for therapy discharge:    Discharged to transitional care facility.    Progress towards therapy goal(s). See goals on Care Plan in [facility] electronic health record for goal details.  Goals not met.  Barriers to achieving goals:   limited tolerance for therapy.    Therapy recommendation(s):    Continued therapy is recommended.  Rationale/Recommendations:  .. Pt is below baseline for functional mobility and strength. Pt would benefit from continued skilled therapy to address these deficits.

## 2018-03-19 ENCOUNTER — TELEPHONE (OUTPATIENT)
Dept: FAMILY MEDICINE | Facility: CLINIC | Age: 53
End: 2018-03-19

## 2018-03-19 ENCOUNTER — NURSING HOME VISIT (OUTPATIENT)
Dept: GERIATRICS | Facility: CLINIC | Age: 53
End: 2018-03-19
Payer: MEDICARE

## 2018-03-19 VITALS
WEIGHT: 200 LBS | BODY MASS INDEX: 28.63 KG/M2 | DIASTOLIC BLOOD PRESSURE: 74 MMHG | HEIGHT: 70 IN | OXYGEN SATURATION: 94 % | SYSTOLIC BLOOD PRESSURE: 124 MMHG | RESPIRATION RATE: 16 BRPM | HEART RATE: 76 BPM | TEMPERATURE: 98.4 F

## 2018-03-19 DIAGNOSIS — D64.9 POSTOPERATIVE ANEMIA: ICD-10-CM

## 2018-03-19 DIAGNOSIS — Z87.820 H/O TRAUMATIC BRAIN INJURY: ICD-10-CM

## 2018-03-19 DIAGNOSIS — Z53.9 DIAGNOSIS NOT YET DEFINED: Primary | ICD-10-CM

## 2018-03-19 DIAGNOSIS — F41.9 ANXIETY: ICD-10-CM

## 2018-03-19 DIAGNOSIS — E78.5 HYPERLIPIDEMIA, UNSPECIFIED HYPERLIPIDEMIA TYPE: ICD-10-CM

## 2018-03-19 DIAGNOSIS — S72.001D CLOSED FRACTURE OF NECK OF RIGHT FEMUR WITH ROUTINE HEALING, SUBSEQUENT ENCOUNTER: Primary | ICD-10-CM

## 2018-03-19 DIAGNOSIS — S72.001D ENCOUNTER FOR AFTERCARE FOR HEALING CLOSED TRAUMATIC FRACTURE OF RIGHT HIP: ICD-10-CM

## 2018-03-19 DIAGNOSIS — R53.81 PHYSICAL DECONDITIONING: ICD-10-CM

## 2018-03-19 DIAGNOSIS — Z96.641 STATUS POST TOTAL REPLACEMENT OF RIGHT HIP: ICD-10-CM

## 2018-03-19 DIAGNOSIS — K21.9 GASTROESOPHAGEAL REFLUX DISEASE, ESOPHAGITIS PRESENCE NOT SPECIFIED: ICD-10-CM

## 2018-03-19 DIAGNOSIS — M89.8X9 HETEROTOPIC OSSIFICATION OF BONE: ICD-10-CM

## 2018-03-19 PROCEDURE — 99207 C MD CERTIFICATION HHA PATIENT: CPT | Performed by: FAMILY MEDICINE

## 2018-03-19 PROCEDURE — 99310 SBSQ NF CARE HIGH MDM 45: CPT | Performed by: NURSE PRACTITIONER

## 2018-03-19 RX ORDER — POLYETHYLENE GLYCOL 3350 17 G/17G
17 POWDER, FOR SOLUTION ORAL 2 TIMES DAILY PRN
COMMUNITY
End: 2019-12-23

## 2018-03-19 NOTE — PROGRESS NOTES
[facility]  PRIMARY CARE PROVIDER AND CLINIC:  Alberto Carlos Jr. [address] / [place] [address]  Chief Complaint   Patient presents with     Hospital F/U       HPI:    Yunior Angel is a 52 year old  (1965),admitted to the [facility] from [facility].  Hospital stay 3/13/18 through 3/17/18.  Admitted to this facility for  rehab, medical management and nursing care.  HPI information obtained from: facility chart records, facility staff, patient report and [facility] chart review.      Current issues are:      Closed fracture of neck of right femur with routine healing, subsequent encounter  Encounter for aftercare for healing closed traumatic fracture of right hip  Status post total replacement of right hip  Physical deconditioning  Patient was brought to ER after having hip pain and was unable to bear weight on right leg. His mother believes that he fell sometime at home. He does live independently in [place] and has several services that come out to his house, however he was not sure what they were. Patient without intact memory after accident in 1991. Had hip replacement on 3/14/18. Today on exam is unable to recall that he had surgery on his hip. Does not endorse any pain, however when in bed and moves he reports some aching of right hip that is worse when he moves. Is on PRN percocet. However nursing reports he does not ask for medication and it does not seem like he would be able to remember to ask for pain medications if needed. On ASA BID for DVT prophy. WBAT. Aquacel dressing to remain in place unless loose or saturated. F/u with ortho. Is working with physical therapy and occupational therapy during admission at TCU. No BM charted since admission- has Senna S available PRN.    Post op anemia  Hemoglobin 9.7 at discharge. Down from baseline of 14.    Heterotopic ossification of bone  Found incidentally on right  inferior pubic rami during recent admission. A large amoutn of hetertropic ossification was removed during surgery. He did receive one session of radiation on 3/16 and is to complete 3 weeks of indomethacin.     H/O traumatic brain injury  Patient without intact memory after accident in 1991. Is not able to answer many questions about how he feels during exam today. Does not recall having hip surgery. Does require frequent reorientation.    Anxiety  Reports that he is a little down and having some anxiety after his fracture. He believes it is because he is having to stay at TCU for rehab. He believes it will improve when he goes back home. On paxil.     Gastroesophageal reflux disease, esophagitis presence not specified  On omeprazole.     Hyperlipidemia, unspecified hyperlipidemia type  On simvastatin. Cholesterol recently checked 2/2018.      BP: 107-146/54-79 mmHg  P: 71-97 bpm    D/c summary in italics:         Assessment and Plan:      Summary of Stay:  Yunior Angel is a 52 year old male with a PMH significant for TBI with resultant cognitive deficit (limited short term memory), hx of migraines, HLP and anxiety and otherwise fairly healthy 53 yo  who presents with acute right hip pain. Xrays reveal right femoral neck fracture. Pt himself does not recall falling but his mother suspects he must have fallen. No hx of pathologic fractures in the past.      Problem List  1. Right femoral neck fracture: Suspect secondary to fall event although unwitnessed and limited historian secondary to history of traumatic brain injury.  Status post right total hip arthroplasty.  Postop day #1.  Pain controlled.  2. Notable heterotropic ossification:   3. History of traumatic brain injury with notable cognitive deficit: Currently pleasant and appreciative care with no evidence of belligerence.  4. Anxiety  5. Hyperlipidemia  6. History of GERD: Chronically on a PPI     Plan:    Routine postoperative PT/OT evaluation and  treatment    Resume patient's chronic medication    Status post 1 plan session of radiation treatment on 3/16/18 to the heterotropic ossification anterior to the right inferior pubic rami with plan on 3 weeks of indomethacin.    Reorient as needed      CODE STATUS/ADVANCE DIRECTIVES DISCUSSION:   CPR/Full code   Patient's living condition: lives alone but does receive services, however patient is not able to recall which services.    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 He also has no past medical history of COPD (chronic obstructive pulmonary disease) (H); Diabetes (H); Hypertension; or Uncomplicated asthma.  PAST SURGICAL HISTORY:  has a past surgical history that includes surgical history of - ; surgical history of -  (2006); Esophagoscopy, gastroscopy, duodenoscopy (EGD), combined (N/A, 12/31/2014); Colonoscopy (N/A, 12/31/2014); and Arthroplasty hip (Right, 3/14/2018).  FAMILY HISTORY: family history includes Family History Negative in his mother; Neurologic Disorder in his father. There is no history of Cancer - colorectal or Colon Cancer.  SOCIAL HISTORY:  reports that he has never smoked. He has never used smokeless tobacco. He reports that he does not drink alcohol or use illicit drugs.    Post Discharge Medication Reconciliation Status: discharge medications reconciled and changed, per note/orders (see AVS).  Current Outpatient Prescriptions   Medication Sig Dispense Refill     polyethylene glycol (MIRALAX/GLYCOLAX) powder Take 17 g by mouth 2 times daily       oxyCODONE-acetaminophen (PERCOCET) 5-325 MG per tablet Take 1-2 tablets by mouth every 4 hours as needed for moderate to severe pain (Patient taking differently: Take 1 tablet by mouth every 4 hours as needed for moderate to  "severe pain (at 8AM and 1PM) ) 75 tablet 0     aspirin  MG EC tablet Take one Aspirin tab twice daily for 5 weeks. 70 tablet 0     indomethacin (INDOCIN) 25 MG capsule Take 1 capsule (25 mg) by mouth 3 times daily (with meals) 120 capsule 0     multivitamin, therapeutic (THERA-VIT) TABS tablet Take 1 tablet by mouth daily       simvastatin (ZOCOR) 80 MG tablet Take 1 tablet (80 mg) by mouth At Bedtime 90 tablet 3     PARoxetine (PAXIL) 40 MG tablet Take 1.5 tablets (60 mg) by mouth every morning 90 tablet 1     senna-docusate (SENOKOT S) 8.6-50 MG per tablet Take 1 tablet by mouth 2 times daily as needed for constipation 180 tablet 1     omeprazole (PRILOSEC) 40 MG capsule Take 1 capsule (40 mg) by mouth daily Take 30-60 minutes before a meal. 90 capsule 1     dicyclomine (BENTYL) 10 MG capsule Take 1 capsule (10 mg) by mouth 4 times daily (before meals and nightly) 240 capsule 3     VITAMIN D, CHOLECALCIFEROL, PO Take by mouth daily         ROS:  Unobtainable secondary to cognitive impairment.     Exam:  /74  Pulse 76  Temp 98.4  F (36.9  C)  Resp 16  Ht 5' 10\" (1.778 m)  Wt 200 lb (90.7 kg)  SpO2 94%  BMI 28.7 kg/m2   GENERAL APPEARANCE:  Alert, oriented to self  EYES:  EOM, lids, pupils and irises normal, sclera clear and conjunctiva normal, no discharge or mattering on lids or lashes noted  ENT:  Mouth normal, moist mucous membranes, nose without drainage or crusting, external ears without lesions, hearing acuity intact  RESP:  respiratory effort and palpation of chest normal, no chest wall tenderness, no respiratory distress, Lung sounds clear  CV:  Palpation and auscultation of heart done, rate and rhythm regular. no murmur, no rub or gallop. No edema  ABDOMEN:  normal bowel sounds, soft, nontender.  M/S:   Gait and station abnormal: uses walker for ambulation  SKIN:  Inspection and palpation of skin and subcutaneous tissue: skin warm, dry without rashes, incision to right hip covered with " aquacel dressing with some drainage present  NEURO: cranial nerves 2-12 grossly intact, no facial asymmetry, no speech deficits and able to follow directions, moves all extremities symmetrically  PSYCH:  insight and judgement impaired, memory impaired, affect and mood normal    Lab/Diagnostic data:     CBC RESULTS:   Recent Labs   Lab Test  03/17/18   0640  03/16/18   0717  03/15/18   0631  03/14/18   0504   WBC   --   10.0   --   9.4   RBC   --   4.28*   --   5.34   HGB   --   9.7*  9.8*  12.2*   HCT   --   31.6*   --   39.5*   MCV   --   74*   --   74*   MCH   --   22.7*   --   22.8*   MCHC   --   30.7*   --   30.9*   RDW   --   15.1*   --   15.3*   PLT  267  254   --   294       Last Basic Metabolic Panel:  Recent Labs   Lab Test  03/16/18   0717  03/15/18   0631  03/14/18   0504  03/13/18   0942   NA   --    --   143  141   POTASSIUM   --    --   4.2  4.6   CHLORIDE   --    --   108  105   JUSTA   --    --   8.2*  9.3   CO2   --    --   31  29   BUN   --    --   16  23   CR   --    --   0.84  0.76   GLC  111*  129*  106*  104*       Liver Function Studies -   Recent Labs   Lab Test  02/21/18   1550  04/13/16   1402   PROTTOTAL  7.4  7.8   ALBUMIN  4.0  3.9   BILITOTAL  0.7  0.4   ALKPHOS  65  43   AST  20  12   ALT  31  29       ASSESSMENT/PLAN:  (S72.001D) Closed fracture of neck of right femur with routine healing, subsequent encounter  (primary encounter diagnosis)  (S72.001D) Encounter for aftercare for healing closed traumatic fracture of right hip  (Z96.641) Status post total replacement of right hip  (R53.81) Physical deconditioning  Comment: Acute, stable. No BM charted since admission. Patient reports some pain with movement of hip  Plan: On PRN percocet, but will schedule due to cognitive status so that he can participate in therapy. Percocet take 8AM and 1PM and q4h PRN. Schedule miralax BID and notify provider if no BM by 3/21. On ASA BID for DVT prophy. WBAT. Aquacel dressing to remain in place unless  loose or saturated. F/u with ortho. Encourage participation in physical therapy/occupational therapy for strengthening and deconditioning. Discharge planning per their recommendation. Social work to assist with d/c planning.    (D64.9) Postoperative anemia  Comment: Acute, anemia related to surgery  Plan: Hemoglobin 3/22. Monitor for bleeding.    (M89.8X9) Heterotopic ossification of bone  Comment: chronic, some removed during recent hip surgery. Did receive radiation as well.  Plan: Continue 3 week course of indomethacin. F/u with ortho    (Z87.820) H/O traumatic brain injury  Comment: chronic, does not have intact short term memory  Plan: Reorient regularly. Nursing plans to create written schedule for patient and will place in room to help with orientation.     (F41.9) Anxiety  Comment: chronic, worse due to recent admission to hospital and TCU  Plan: consider ACP referral. Continue paxil.     (K21.9) Gastroesophageal reflux disease, esophagitis presence not specified  Comment: chronic, stable  Plan: continue PPI    (E78.5) Hyperlipidemia, unspecified hyperlipidemia type  Comment: chronic, last checked in February this year  Plan: continue statin. F/u with PCP.       Total time spent with patient visit at the [facility] nursing [facility] was 46 minutes including patient visit and review of past records. Greater than 50% of total time spent with counseling and coordinating care due to hip surgery, hx of TBI, Heterotopic ossification    Electronically signed by:  JOSE A Davis CNP

## 2018-03-19 NOTE — TELEPHONE ENCOUNTER
Forms received by: Fax    Purpose of Form:  Home Health Plan of Care     How the form needs to be returned for patient:  Fax    Form currently placed:  inbox    Steph Tolbert CMA

## 2018-03-19 NOTE — DISCHARGE SUMMARY
[facility]  Discharge Summary        Yunior Angel MRN# [id number]   YOB: 1965 Age: 52 year old     Date of Admission:  3/13/2018  Date of Discharge:  3/17/2018  1:16 PM  Admitting Physician:  Parmjit Zabala MD  Discharge Physician: Betty Lopez MD  Discharging Service: Hospitalist     Primary Provider: Alberto Ye Jr.  Primary Care Physician Phone Number: [phone]         Discharge Diagnoses/Problem Oriented Hospital Course (Providers):    Yunior Angel was admitted on 3/13/2018 by Parmjit Zabala MD and I would refer you to their history and physical.      Patient was seen and examined on the day of discharge    Discharge diagnoses:  1. Right femoral neck fracture  2. Notable heterotropic ossification  3. History of traumatic brain injury with notable chronic cognitive deficits  4. Anxiety  5. Hyperlipidemia  6. History of GERD    Hospital course:  1. Patient is a 52-year-old gentleman with a history notable for a traumatic brain injury with resultant cognitive deficits, more so related to limited short-term memory deficits, hyperlipidemia, anxiety, and migraines who presented here with acute right hip pain.  Patient does not recall falling but mother suspect that he may have had a fall.  No history of pathological fractures in the past.  Workup did demonstrate a right femoral neck fracture.  Patient was admitted to the hospital and orthopedic was consulted.  Pain was otherwise controlled and required minimal narcotics.  Patient was brought to the OR and underwent an ORIF.  Postoperatively, pain was controlled and patient did not require much narcotics.  PT/OT was consulted per routine and recommended TCU.  2. History of heterotropic ossification: Incidental on x-ray findings.  This was anterior to the right pubic inferior rami.  There was some surgical intervention.  Please refer to the orthopedics operative note for details.  This seems to be not  uncommon in patients with previous neurologic injuries.  Radiation oncology was consulted and patient underwent radiation treatment ×1 per standard practice.  Additionally, patient will be placed on indomethacin for 3 weeks.    Disposition: Discharged to TCU in stable condition.             Pending Results:        Unresulted Labs Ordered in the Past 30 Days of this Admission     No orders found from 1/12/2018 to 3/14/2018.            Discharge Instructions and Follow-Up:      Follow-up Appointments     Follow Up and recommended labs and tests       Follow up with Dolores Gimenez PA-C within 12-16 days from surgery.  If   you do not already have a follow up appointment scheduled, please call our   [place] office: [phone].  No follow up labs or test are needed.                      Discharge Disposition:      Discharged to TCU         Discharge Medications:        Discharge Medication List as of 3/17/2018  1:11 PM      START taking these medications    Details   oxyCODONE-acetaminophen (PERCOCET) 5-325 MG per tablet Take 1-2 tablets by mouth every 4 hours as needed for moderate to severe pain, Disp-75 tablet, R-0, Transitional      indomethacin (INDOCIN) 25 MG capsule Take 1 capsule (25 mg) by mouth 3 times daily (with meals), Disp-120 capsule, R-0, Transitional         CONTINUE these medications which have CHANGED    Details   aspirin  MG EC tablet Take one Aspirin tab twice daily for 5 weeks., Disp-70 tablet, R-0, Transitional         CONTINUE these medications which have NOT CHANGED    Details   multivitamin, therapeutic (THERA-VIT) TABS tablet Take 1 tablet by mouth daily, Historical      simvastatin (ZOCOR) 80 MG tablet Take 1 tablet (80 mg) by mouth At Bedtime, Disp-90 tablet, R-3, E-Prescribe      PARoxetine (PAXIL) 40 MG tablet Take 1.5 tablets (60 mg) by mouth every morning, Disp-90 tablet, R-1, E-Prescribe      omeprazole (PRILOSEC) 40 MG capsule Take 1 capsule (40 mg) by mouth daily Take 30-60  minutes before a meal., Disp-90 capsule, R-1, E-Prescribe      dicyclomine (BENTYL) 10 MG capsule Take 1 capsule (10 mg) by mouth 4 times daily (before meals and nightly), Disp-240 capsule, R-3, E-Prescribe      VITAMIN D, CHOLECALCIFEROL, PO Take by mouth daily, Historical      senna-docusate (SENOKOT S) 8.6-50 MG per tablet Take 1 tablet by mouth 2 times daily as needed for constipation, Disp-180 tablet, R-1, E-Prescribe      Probiotic Product (PROBIOTIC DAILY PO) Historical               Allergies:         Allergies   Allergen Reactions     Insect Extract      red ants           Consultations This Hospital Stay:      Consultation during this admission received from orthopedics           Image Results From This Hospital Stay (For Non-EPIC Providers):        Results for orders placed or performed during the hospital encounter of 03/13/18   XR Pelvis and Hip Right 2 Views    Narrative    PELVIS AND HIP RIGHT TWO VIEWS March 13, 2018 10:17 AM    HISTORY: Right hip pain with movement, unable to ambulate.     COMPARISON: Abdomen and pelvis plain film from 8/7/2012.      Impression    IMPRESSION: Frontal view is consistent with a femoral neck fracture.  Extensive heterotopic bone formation overlying the right inferior  pubic ramus, unusual appearing and likely related to old trauma. It  measures approximately 9 cm in length.     CHINO DUDLEY MD   CT Pelvis w/o Contrast    Narrative    CT PELVIS WITHOUT CONTRAST   3/13/2018 1:15 PM    HISTORY: Right hip pain after trauma. Evaluate femoral neck fracture.    COMPARISON: Radiographs earlier today at 9:58 AM.    TECHNIQUE: Routine CT imaging is performed through the pelvis and both  hips without contrast. Radiation dose for this scan was reduced using  automated exposure control, adjustment of the mA and/or kV according  to patient size, or iterative reconstruction technique.     FINDINGS: Again seen is a transverse fracture through the right  femoral neck. There is mild  anterior displacement of the femoral shaft  relative to the head. There is mild impaction of the posterior  fracture line. No other fracture or osseous lesion is demonstrated.  There is a large area of heterotopic ossification medial to the  proximal right femur. This measures up to 8 cm in length. No other  adjacent soft tissue abnormality is seen. No significant joint space  pathology is noted.      Impression    IMPRESSION: Minimally displaced fracture of the right femoral neck.    DANIEL SHER MD   XR Pelvis w Hip Port Right 1 View    Narrative    PORTABLE ONE VIEW PELVIS AND ONE VIEW RIGHT HIP 3/14/2018 11:49 AM    HISTORY: Postoperative evaluation of the right hip.    COMPARISON: 3/13/2018    FINDINGS: There are interval surgical changes of a right total hip  arthroplasty. The hardware is intact with no fracture or other  complication seen. Lateral skin staples are seen.  No other  abnormality is demonstrated.      Impression    IMPRESSION: Unremarkable postoperative appearance of the right hip.    DANIEL SHER MD

## 2018-03-20 ENCOUNTER — NURSING HOME VISIT (OUTPATIENT)
Dept: GERIATRICS | Facility: CLINIC | Age: 53
End: 2018-03-20
Payer: MEDICARE

## 2018-03-20 VITALS
HEIGHT: 70 IN | SYSTOLIC BLOOD PRESSURE: 100 MMHG | DIASTOLIC BLOOD PRESSURE: 61 MMHG | BODY MASS INDEX: 28.63 KG/M2 | RESPIRATION RATE: 18 BRPM | HEART RATE: 74 BPM | TEMPERATURE: 98.4 F | OXYGEN SATURATION: 97 % | WEIGHT: 200 LBS

## 2018-03-20 DIAGNOSIS — E78.5 HYPERLIPIDEMIA, UNSPECIFIED HYPERLIPIDEMIA TYPE: ICD-10-CM

## 2018-03-20 DIAGNOSIS — F41.9 ANXIETY: ICD-10-CM

## 2018-03-20 DIAGNOSIS — R53.81 PHYSICAL DECONDITIONING: ICD-10-CM

## 2018-03-20 DIAGNOSIS — S72.001D CLOSED FRACTURE OF NECK OF RIGHT FEMUR WITH ROUTINE HEALING, SUBSEQUENT ENCOUNTER: Primary | ICD-10-CM

## 2018-03-20 PROCEDURE — 99305 1ST NF CARE MODERATE MDM 35: CPT | Performed by: INTERNAL MEDICINE

## 2018-03-20 RX ORDER — OXYCODONE AND ACETAMINOPHEN 5; 325 MG/1; MG/1
1 TABLET ORAL 3 TIMES DAILY
COMMUNITY
End: 2018-04-06

## 2018-03-20 NOTE — MR AVS SNAPSHOT
"              After Visit Summary   3/20/2018    Yunior Angel    MRN: [id number]           Patient Information     Date Of Birth          1965        Visit Information        Provider Department      3/20/2018 9:30 AM Arley Ruiz MD [facility]        Today's Diagnoses     Closed fracture of neck of right femur with routine healing, subsequent encounter    -  1    Anxiety        Hyperlipidemia, unspecified hyperlipidemia type        Physical deconditioning           Follow-ups after your visit        Who to contact     If you have questions or need follow up information about today's clinic visit or your schedule please contact [facility] directly at [phone].  Normal or non-critical lab and imaging results will be communicated to you by [employer], letter or phone within 4 business days after the clinic has received the results. If you do not hear from us within 7 days, please contact the clinic through Buyou[employer] or phone. If you have a critical or abnormal lab result, we will notify you by phone as soon as possible.  Submit refill requests through [employer] or call your pharmacy and they will forward the refill request to us. Please allow 3 business days for your refill to be completed.          Additional Information About Your Visit        MyChart Information     [employer] lets you send messages to your doctor, view your test results, renew your prescriptions, schedule appointments and more. To sign up, go to [email or web address] . Click on \"Log in\" on the left side of the screen, which will take you to the Welcome page. Then click on \"Sign up Now\" on the right side of the page.     You will be asked to enter the access code listed below, as well as some personal information. Please follow the directions to create your username and password.     Your access code is: [email or web address]  Expires: 2018  4:44 PM     Your access code will  in 90 days. If you need " "help or a new code, please call your [place] [facility] clinic or [phone].        Care EveryWhere ID     This is your Care EveryWhere ID. This could be used by other organizations to access your [facility] medical records  [id number]        Your Vitals Were     Pulse Temperature Respirations Height Pulse Oximetry BMI (Body Mass Index)    74 98.4  F (36.9  C) 18 5' 10\" (1.778 m) 97% 28.7 kg/m2       Blood Pressure from Last 3 Encounters:   03/20/18 100/61   03/19/18 124/74   03/17/18 105/43    Weight from Last 3 Encounters:   03/20/18 200 lb (90.7 kg)   03/19/18 200 lb (90.7 kg)   03/13/18 200 lb (90.7 kg)              Today, you had the following     No orders found for display         Today's Medication Changes          These changes are accurate as of 3/20/18 11:59 PM.  If you have any questions, ask your nurse or doctor.               These medicines have changed or have updated prescriptions.        Dose/Directions    oxyCODONE-acetaminophen 5-325 MG per tablet   Commonly known as:  PERCOCET   This may have changed:  Another medication with the same name was removed. Continue taking this medication, and follow the directions you see here.        Dose:  1 tablet   Take 1 tablet by mouth 2 times daily AND 1-2 tabs Q4H PRN   Refills:  0                Primary Care Provider Office Phone # Fax #    Alberto Carlos Jr., [phone] [address]  [place] [address]        Equal Access to Services     [facility]: Hadii aad ku hadasho Soomaali, waaxda luqadaha, qaybta kaalmada rolandoegyada, sylvie wen . So [facility] [phone].    ATENCIÓN: Si habla español, tiene a gamble disposición servicios gratuitos de asistencia lingüística. Llame al [phone].    We comply with applicable federal civil rights laws and [place] laws. We do not discriminate on the basis of race, color, national origin, age, disability, sex, sexual orientation, or gender identity.            Thank you! "     Thank you for choosing [facility]  for your care. Our goal is always to provide you with excellent care. Hearing back from our patients is one way we can continue to improve our services. Please take a few minutes to complete the written survey that you may receive in the mail after your visit with us. Thank you!             Your Updated Medication List - Protect others around you: Learn how to safely use, store and throw away your medicines at www.disposemymeds.org.          This list is accurate as of 3/20/18 11:59 PM.  Always use your most recent med list.                   Brand Name Dispense Instructions for use Diagnosis    aspirin  MG EC tablet     70 tablet    Take one Aspirin tab twice daily for 5 weeks.    Closed fracture of neck of right femur, initial encounter (H)       dicyclomine 10 MG capsule    BENTYL    240 capsule    Take 1 capsule (10 mg) by mouth 4 times daily (before meals and nightly)    Irritable bowel syndrome with diarrhea       indomethacin 25 MG capsule    INDOCIN    120 capsule    Take 1 capsule (25 mg) by mouth 3 times daily (with meals)    Closed fracture of neck of right femur, initial encounter (H)       multivitamin, therapeutic Tabs tablet      Take 1 tablet by mouth daily        omeprazole 40 MG capsule    priLOSEC    90 capsule    Take 1 capsule (40 mg) by mouth daily Take 30-60 minutes before a meal.    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

## 2018-03-20 NOTE — PROGRESS NOTES
PRIMARY CARE PROVIDER AND CLINIC RESPONSIBLE:  Alberto Carlos Jr., [address] / [place] [address]        ADMISSION HISTORY AND PHYSICAL EXAMINATION     Chief Complaint   Patient presents with     Hospital F/U         HISTORY OF PRESENT ILLNESS:  52 year old male, (1965), [facility] and [facility].    Pt admitted [facility] 3/13 to 3/17 for fall and R femur fx s/p R DANIELLE.    Pt states pain in R hip is tolerable. No constipation/N/V/fever/chills.     Please see Katt Gaines 's CNP admit noted dated 3/19 for details of admission, past medical history, family history, allergies, medication list, social history and other details pertinent with this admission. Hospital admission and dc summary reviewed.      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       Past Surgical History:   Procedure Laterality Date     ARTHROPLASTY HIP Right 3/14/2018    Procedure: ARTHROPLASTY HIP;  Right total hip arthroplasty;  Surgeon: Parmjit Zabala MD;  Location:  OR     COLONOSCOPY N/A 12/31/2014    Procedure: COLONOSCOPY;  Surgeon: Inna Gonzalez MD;  Location:  GI     ESOPHAGOSCOPY, GASTROSCOPY, DUODENOSCOPY (EGD), COMBINED N/A 12/31/2014    Procedure: COMBINED ESOPHAGOSCOPY, GASTROSCOPY, DUODENOSCOPY (EGD), BIOPSY SINGLE OR MULTIPLE;  Surgeon: Inna Gonzalez MD;  Location:  GI     SURGICAL HISTORY OF -       foot surgery     SURGICAL HISTORY OF -   2006    1.  Avulsion of the right great toenail.  2.  Excision of bone cyst distal phalanx, right great toe.        Current Outpatient Prescriptions   Medication Sig     oxyCODONE-acetaminophen (PERCOCET) 5-325 MG per tablet Take 1 tablet by mouth 2 times daily AND 1-2 tabs Q4H PRN  "    polyethylene glycol (MIRALAX/GLYCOLAX) powder Take 17 g by mouth 2 times daily     aspirin  MG EC tablet Take one Aspirin tab twice daily for 5 weeks.     indomethacin (INDOCIN) 25 MG capsule Take 1 capsule (25 mg) by mouth 3 times daily (with meals)     multivitamin, therapeutic (THERA-VIT) TABS tablet Take 1 tablet by mouth daily     simvastatin (ZOCOR) 80 MG tablet Take 1 tablet (80 mg) by mouth At Bedtime     PARoxetine (PAXIL) 40 MG tablet Take 1.5 tablets (60 mg) by mouth every morning     senna-docusate (SENOKOT S) 8.6-50 MG per tablet Take 1 tablet by mouth 2 times daily as needed for constipation     omeprazole (PRILOSEC) 40 MG capsule Take 1 capsule (40 mg) by mouth daily Take 30-60 minutes before a meal.     dicyclomine (BENTYL) 10 MG capsule Take 1 capsule (10 mg) by mouth 4 times daily (before meals and nightly)     VITAMIN D, CHOLECALCIFEROL, PO Take 1,000 Units by mouth daily      No current facility-administered medications for this visit.        Allergies   Allergen Reactions     Insect Extract      red ants       Social History     Social History     Marital status: Single     Spouse name: N/A     Number of children: N/A     Years of education: N/A     Occupational History     Not on file.     Social History Main Topics     Smoking status: Never Smoker     Smokeless tobacco: Never Used     Alcohol use No     Drug use: No     Sexual activity: No     Other Topics Concern     Not on file     Social History Narrative          Information reviewed:  Medications, vital signs, orders, nursing notes, problem list, hospital information.     ROS: All 10 point review of system completed, those pertinent positive, please see H&P, the remaining ROS is negative.    /61  Pulse 74  Temp 98.4  F (36.9  C)  Resp 18  Ht 5' 10\" (1.778 m)  Wt 200 lb (90.7 kg)  SpO2 97%  BMI 28.7 kg/m2    PHYSICAL EXAMINATION:   GENERAL:  No acute distress. Laying in bed.  SKIN:  Dry and warm.  There is no rash, " lesions, ulcers or juandice at area of skin examined.  HEENT:  Head without trauma.  Pupils round, reactive. Exam of conjunctiva and lids are normal. Sclera without icterus. There is no oral thrush.  NECK:  Supple.  There is no cervical adenopathy, no thyromegaly. No jugular venous distension.  CHEST: No reproducible chest tenderness.   LUNGS:  Normal respiratory effort. Lungs are Clear on ascultation.  HEART:  Regular rate and rhythm.  No murmur, gallops or rubs auscultated.  ABDOMEN:  Soft, bowel sounds positive.  There is no tenderness or guarding.   EXTREMITIES: No edema.   NEUROLOGIC:  Alert and oriented to self, situation and month.    Lab/Diagnostic data:  Reviewed    Lab Results   Component Value Date    WBC 10.0 03/16/2018     Lab Results   Component Value Date    RBC 4.28 03/16/2018     Lab Results   Component Value Date    HGB 9.7 03/16/2018     Lab Results   Component Value Date    HCT 31.6 03/16/2018     Lab Results   Component Value Date    MCV 74 03/16/2018     Lab Results   Component Value Date    MCH 22.7 03/16/2018     Lab Results   Component Value Date    MCHC 30.7 03/16/2018     Lab Results   Component Value Date    RDW 15.1 03/16/2018     Lab Results   Component Value Date     03/17/2018       Last Basic Metabolic Panel:  Lab Results   Component Value Date     03/14/2018      Lab Results   Component Value Date    POTASSIUM 4.2 03/14/2018     Lab Results   Component Value Date    CHLORIDE 108 03/14/2018     Lab Results   Component Value Date    JUSTA 8.2 03/14/2018     Lab Results   Component Value Date    CO2 31 03/14/2018     Lab Results   Component Value Date    BUN 16 03/14/2018     Lab Results   Component Value Date    CR 0.84 03/14/2018     Lab Results   Component Value Date     03/16/2018         ASSESSMENT / PLAN:     Closed fracture of neck of right femur with routine healing, subsequent encounter  - s/p R DANIELLE 3/14.  - WBAT.  - pain control.  - On ASA for DVT ppx.  -  f/u with ortho as scheduled.    Anxiety  - On paxil.  - Has underlying TBI with some cognitive impairment.    Hyperlipidemia, unspecified hyperlipidemia type  - On zocor.    Physical deconditioning  -Plan: PT/OT, fall precautions. Care conference with patient and family for the progress of rehab and disposition issues will be discussed as planned. Rehab evaluation and other evaluations including CPT are at rehab logs, to be reviewed separately.  Fall risk assessment as well as cognitive evaluation will be formed during rehab stay if indicated.      Other problems with same care. Primary care doctor and other specialists to address those chronic problems in next clinic appointment to be scheduled upon discharge from the TCU.    Total time spent with patient visit was 27 min including patient visit, review of past records, 1/2 time on patients counseling and coordinating care.

## 2018-03-21 ENCOUNTER — RECORDS - HEALTHEAST (OUTPATIENT)
Dept: LAB | Facility: CLINIC | Age: 53
End: 2018-03-21

## 2018-03-22 ENCOUNTER — TRANSFERRED RECORDS (OUTPATIENT)
Dept: HEALTH INFORMATION MANAGEMENT | Facility: CLINIC | Age: 53
End: 2018-03-22

## 2018-03-22 LAB
ALBUMIN SERPL-MCNC: 3.2 G/DL (ref 3.5–5)
ALP SERPL-CCNC: 144 U/L (ref 45–120)
ALT SERPL-CCNC: 47 U/L (ref 0–45)
ANION GAP SERPL CALCULATED.3IONS-SCNC: 10 MMOL/L (ref 5–18)
AST SERPL-CCNC: 29 U/L (ref 0–40)
BILIRUB SERPL-MCNC: 0.7 MG/DL (ref 0–1)
BUN SERPL-MCNC: 28 MG/DL (ref 8–22)
CALCIUM SERPL-MCNC: 9.3 MG/DL (ref 8.5–10.5)
CHLORIDE SERPLBLD-SCNC: 100 MMOL/L (ref 98–107)
CO2 SERPL-SCNC: 28 MMOL/L (ref 22–31)
CREAT SERPL-MCNC: 0.62 MG/DL (ref 0.6–1.1)
GFR SERPL CREATININE-BSD FRML MDRD: >60 ML/MIN/1.73M2
GLUCOSE SERPL-MCNC: 118 MG/DL (ref 70–125)
HEMOGLOBIN: 10 G/DL (ref 14–18)
HGB BLD-MCNC: 10 G/DL (ref 14–18)
POTASSIUM SERPL-SCNC: 3.9 MMOL/L (ref 3.5–5)
PROT SERPL-MCNC: 6.2 G/DL (ref 6–8)
SODIUM SERPL-SCNC: 138 MMOL/L (ref 136–145)

## 2018-03-26 ENCOUNTER — NURSING HOME VISIT (OUTPATIENT)
Dept: GERIATRICS | Facility: CLINIC | Age: 53
End: 2018-03-26
Payer: MEDICARE

## 2018-03-26 VITALS
OXYGEN SATURATION: 99 % | HEART RATE: 67 BPM | RESPIRATION RATE: 16 BRPM | TEMPERATURE: 97.8 F | DIASTOLIC BLOOD PRESSURE: 52 MMHG | HEIGHT: 70 IN | SYSTOLIC BLOOD PRESSURE: 94 MMHG

## 2018-03-26 DIAGNOSIS — S72.001D ENCOUNTER FOR AFTERCARE FOR HEALING CLOSED TRAUMATIC FRACTURE OF RIGHT HIP: ICD-10-CM

## 2018-03-26 DIAGNOSIS — M89.8X9 HETEROTOPIC OSSIFICATION OF BONE: ICD-10-CM

## 2018-03-26 DIAGNOSIS — D64.9 POSTOPERATIVE ANEMIA: ICD-10-CM

## 2018-03-26 DIAGNOSIS — S72.001D CLOSED FRACTURE OF NECK OF RIGHT FEMUR WITH ROUTINE HEALING, SUBSEQUENT ENCOUNTER: Primary | ICD-10-CM

## 2018-03-26 DIAGNOSIS — R53.81 PHYSICAL DECONDITIONING: ICD-10-CM

## 2018-03-26 DIAGNOSIS — Z96.641 STATUS POST TOTAL REPLACEMENT OF RIGHT HIP: ICD-10-CM

## 2018-03-26 DIAGNOSIS — L03.031 CELLULITIS OF TOE OF RIGHT FOOT: ICD-10-CM

## 2018-03-26 DIAGNOSIS — Z87.820 H/O TRAUMATIC BRAIN INJURY: ICD-10-CM

## 2018-03-26 DIAGNOSIS — F43.23 ADJUSTMENT DISORDER WITH MIXED ANXIETY AND DEPRESSED MOOD: ICD-10-CM

## 2018-03-26 DIAGNOSIS — K59.03 DRUG-INDUCED CONSTIPATION: ICD-10-CM

## 2018-03-26 PROCEDURE — 99310 SBSQ NF CARE HIGH MDM 45: CPT | Performed by: NURSE PRACTITIONER
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
## 2018-03-29 ENCOUNTER — NURSING HOME VISIT (OUTPATIENT)
Dept: GERIATRICS | Facility: CLINIC | Age: 53
End: 2018-03-29
Payer: MEDICARE

## 2018-03-29 VITALS
HEART RATE: 72 BPM | WEIGHT: 192.6 LBS | OXYGEN SATURATION: 99 % | TEMPERATURE: 98.6 F | DIASTOLIC BLOOD PRESSURE: 56 MMHG | HEIGHT: 70 IN | SYSTOLIC BLOOD PRESSURE: 98 MMHG | BODY MASS INDEX: 27.57 KG/M2 | RESPIRATION RATE: 16 BRPM

## 2018-03-29 DIAGNOSIS — L03.031 CELLULITIS OF TOE OF RIGHT FOOT: Primary | ICD-10-CM

## 2018-03-29 DIAGNOSIS — S72.001D ENCOUNTER FOR AFTERCARE FOR HEALING CLOSED TRAUMATIC FRACTURE OF RIGHT HIP: ICD-10-CM

## 2018-03-29 DIAGNOSIS — Z87.820 H/O TRAUMATIC BRAIN INJURY: ICD-10-CM

## 2018-03-29 DIAGNOSIS — M89.8X9 HETEROTOPIC OSSIFICATION OF BONE: ICD-10-CM

## 2018-03-29 DIAGNOSIS — R53.81 PHYSICAL DECONDITIONING: ICD-10-CM

## 2018-03-29 DIAGNOSIS — S72.001D CLOSED FRACTURE OF NECK OF RIGHT FEMUR WITH ROUTINE HEALING, SUBSEQUENT ENCOUNTER: ICD-10-CM

## 2018-03-29 DIAGNOSIS — K59.03 DRUG-INDUCED CONSTIPATION: ICD-10-CM

## 2018-03-29 DIAGNOSIS — Z96.641 STATUS POST TOTAL REPLACEMENT OF RIGHT HIP: ICD-10-CM

## 2018-03-29 PROCEDURE — 99309 SBSQ NF CARE MODERATE MDM 30: CPT | Performed by: NURSE PRACTITIONER

## 2018-03-29 RX ORDER — AMOXICILLIN 250 MG
1 CAPSULE ORAL DAILY PRN
COMMUNITY
End: 2020-09-29

## 2018-03-29 NOTE — PROGRESS NOTES
[facility]    Chief Complaint   Patient presents with     Nursing Home Acute       HPI:    Yunior Angel is a 52 year old  (1965), who is being seen today for an episodic care visit at [facility].  HPI information obtained from: facility chart records, facility staff, patient report and [facility] chart review.Today's concern is:     Cellulitis of toe of right foot  Yunior's mother reported new onset edema in his right foot earlier this week. On exam today it is somewhat improved, some improvement in edema. He is able to wear his shoes, whereas earlier this week he was unable to do so. Denies fever/chills. Denies any pain in calves. Does not recall bumping foot on anything. Was started on keflex, however initally the antibiotic was not given appropriately due to an error with transcribing the order into the computer system. The error was fixed and he will complete a full course with all doses of antibiotics as prescribed on 3/31.     Closed fracture of neck of right femur with routine healing, subsequent encounter  Encounter for aftercare for healing closed traumatic fracture of right hip  Status post total replacement of right hip  Physical deconditioning  Patient was brought to ER after having hip pain and was unable to bear weight on right leg. His mother believes that he fell sometime at home. He lives independently in [place] and has several services that come out to his house, however he was not sure what they were. Patient without intact memory after accident in 1991. Had hip replacement on 3/14/18. Reports pain is worse when moving and it is interfering with his sleep. Is currently on scheduled percocet three times a day. Is also on indomethacin for heterotropic ossification as below. On ASA BID for DVT prophy. WBAT. F/u with ortho. Is working with physical therapy and occupational therapy during admission at [facility].      Heterotopic ossification  "of bone  Found incidentally on right inferior pubic rami during recent admission. A large amount of hetertropic ossification was removed during surgery. He did receive one session of radiation on 3/16 and is to complete 3 weeks of indomethacin.       H/O traumatic brain injury  Patient without intact memory after accident in 1991. Is not able to answer many questions about how he feels during exam today. Requires frequent reorientation.     Drug induced constipation  Reports feeling constipated. Is having BMs however they are not well formed-per patient \"are like golf balls\"     BP: /52-86 mmHg  P: 67-77 bpm      ALLERGIES: Insect extract  Past Medical, Surgical, Family and Social History reviewed and updated in [facility].    Current Outpatient Prescriptions   Medication Sig Dispense Refill     senna-docusate (SENOKOT-S;PERICOLACE) 8.6-50 MG per tablet Take 1 tablet by mouth daily       Cephalexin (KEFLEX PO) Take 500 mg by mouth 4 times daily       oxyCODONE-acetaminophen (PERCOCET) 5-325 MG per tablet Take 1 tablet by mouth 3 times daily (at 8AM, noon, 8PM) AND 1 tabs Q4H PRN       polyethylene glycol (MIRALAX/GLYCOLAX) powder Take 17 g by mouth 2 times daily       aspirin  MG EC tablet Take one Aspirin tab twice daily for 5 weeks. 70 tablet 0     indomethacin (INDOCIN) 25 MG capsule Take 1 capsule (25 mg) by mouth 3 times daily (with meals) 120 capsule 0     multivitamin, therapeutic (THERA-VIT) TABS tablet Take 1 tablet by mouth daily       simvastatin (ZOCOR) 80 MG tablet Take 1 tablet (80 mg) by mouth At Bedtime 90 tablet 3     PARoxetine (PAXIL) 40 MG tablet Take 1.5 tablets (60 mg) by mouth every morning 90 tablet 1     omeprazole (PRILOSEC) 40 MG capsule Take 1 capsule (40 mg) by mouth daily Take 30-60 minutes before a meal. 90 capsule 1     dicyclomine (BENTYL) 10 MG capsule Take 1 capsule (10 mg) by mouth 4 times daily (before meals and nightly) 240 capsule 3     VITAMIN D, CHOLECALCIFEROL, PO Take " "1,000 Units by mouth daily        Medications reviewed:  Medications reconciled to facility chart and changes were made to reflect current medications as identified as above med list.     REVIEW OF SYSTEMS:  10 point ROS of systems including Constitutional, Neurological, Respiratory, Cardiovascular, Gastroenterology, Genitourinary, Integumentary, Muscularskeletal, Psychiatric were all negative except for pertinent positives noted in my HPI.    Physical Exam:   BP 98/56  Pulse 72  Temp 98.6  F (37  C)  Resp 16  Ht 5' 10\" (1.778 m)  Wt 192 lb 9.6 oz (87.4 kg)  SpO2 99%  BMI 27.64 kg/m2  GENERAL APPEARANCE:  Alert, oriented to self  EYES:  EOM, lids, pupils and irises normal, sclera clear and conjunctiva normal, no discharge or mattering on lids or lashes noted  ENT:  Mouth normal, moist mucous membranes, nose without drainage or crusting, external ears without lesions, hearing acuity intact  RESP:  respiratory effort and palpation of chest normal, no chest wall tenderness, no respiratory distress, Lung sounds clear  CV:  Palpation and auscultation of heart done, rate and rhythm regular. no murmur, no rub or gallop. No edema, pedal pulses +2  ABDOMEN:  normal bowel sounds, soft, nontender.  M/S:   Gait and station abnormal: uses walker for ambulation  SKIN:  Inspection and palpation of skin and subcutaneous tissue: skin warm, dry without rashes Right foot edematous- significantly more at great toe and across toes- site is erythematous and warm. No broken skin in area.   NEURO: cranial nerves 2-12 grossly intact, no facial asymmetry, no speech deficits and able to follow directions, moves all extremities symmetrically  PSYCH:  insight and judgement impaired, memory impaired, affect and mood normal    Recent Labs:     CBC RESULTS:   Recent Labs   Lab Test 03/22/18 03/17/18   0640  03/16/18   0717   03/14/18   0504   WBC   --    --   10.0   --   9.4   RBC   --    --   4.28*   --   5.34   HGB  10.0*   --   9.7*   < " >  12.2*   HCT   --    --   31.6*   --   39.5*   MCV   --    --   74*   --   74*   MCH   --    --   22.7*   --   22.8*   MCHC   --    --   30.7*   --   30.9*   RDW   --    --   15.1*   --   15.3*   PLT   --   267  254   --   294    < > = values in this interval not displayed.       Last Basic Metabolic Panel:  Recent Labs   Lab Test 03/22/18 03/16/18   0717   03/14/18   0504   NA  138   --    --   143   POTASSIUM  3.9   --    --   4.2   CHLORIDE  100   --    --   108   JUSTA  9.3   --    --   8.2*   CO2  28   --    --   31   BUN  28*   --    --   16   CR  0.62   --    --   0.84   GLC  118  111*   < >  106*    < > = values in this interval not displayed.       Liver Function Studies -   Recent Labs   Lab Test 03/22/18 02/21/18   1550   PROTTOTAL  6.2  7.4   ALBUMIN  3.2*  4.0   BILITOTAL  0.7  0.7   ALKPHOS  144*  65   AST  29  20   ALT  47*  31       Assessment/Plan:  (L03.031) Cellulitis of toe of right foot (primary encounter diagnosis)  Comment: Acute, seems somewhat improved on exam- no systemic signs of infection   Plan: Complete keflex course 3/31. Monitor closely for improvement. If no signs of improvement notify provider.     (S72.001D) Closed fracture of neck of right femur with routine healing, subsequent encounter    (S72.001D) Encounter for aftercare for healing closed traumatic fracture of right hip  (Z96.641) Status post total replacement of right hip  (R53.81) Physical deconditioning  Comment: Acute, continues to complain of some pain on exam, especially with any movement.  Plan: Continue scheduled percocet, indomethacin. On ASA BID for DVT prophy. WBAT. F/u with ortho. Encourage participation in physical therapy/occupational therapy for strengthening and deconditioning. Discharge planning per their recommendation. Social work to assist with d/c planning.     (M89.8X9) Heterotopic ossification of bone  Comment: chronic, stable- could be the cause of some of his pain  Plan: Continue 3 week course of  indomethacin. F/u with ortho      (Z87.820) H/O traumatic brain injury  Comment: chronic, does not have intact short term memory  Plan: Reorient regularly.      (K59.03) Drug-induced constipation  Comment: Acute, likely related to narcotics  Plan: On miralax, senna-s scheduled. Monitor stools.       Electronically signed by  JOSE A Davis CNP

## 2018-04-02 ENCOUNTER — NURSING HOME VISIT (OUTPATIENT)
Dept: GERIATRICS | Facility: CLINIC | Age: 53
End: 2018-04-02
Payer: MEDICARE

## 2018-04-02 VITALS
WEIGHT: 192.6 LBS | SYSTOLIC BLOOD PRESSURE: 102 MMHG | BODY MASS INDEX: 27.57 KG/M2 | DIASTOLIC BLOOD PRESSURE: 51 MMHG | RESPIRATION RATE: 12 BRPM | OXYGEN SATURATION: 99 % | HEART RATE: 66 BPM | TEMPERATURE: 98.2 F | HEIGHT: 70 IN

## 2018-04-02 DIAGNOSIS — L03.031 CELLULITIS OF TOE OF RIGHT FOOT: Primary | ICD-10-CM

## 2018-04-02 PROCEDURE — 99308 SBSQ NF CARE LOW MDM 20: CPT | Performed by: NURSE PRACTITIONER

## 2018-04-02 NOTE — PROGRESS NOTES
"[facility]    Chief Complaint   Patient presents with     Nursing Home Acute       HPI:    Yunior Angel is a 52 year old  (1965), who is being seen today for an episodic care visit at [facility].    HPI information obtained from: facility chart records, facility staff, patient report and [facility] chart review.     Today's concern is:  Cellulitis of toe of right foot  Patient denies any pain in right foot and is able to wear his shoes (last week he was unable to put them on due to pain and edema). He continues to deny fevers/ chills. He completed a course of keflex on 3/31.      REVIEW OF SYSTEMS:  4 point ROS including Consitutional, Respiratory, CV, and GI other than that noted in the HPI,  is negative    /51  Pulse 66  Temp 98.2  F (36.8  C)  Resp 12  Ht 5' 10\" (1.778 m)  Wt 192 lb 9.6 oz (87.4 kg)  SpO2 99%  BMI 27.64 kg/m2  GENERAL APPEARANCE:  Alert, in no distress  SKIN: Inspection and palpation of skin and subcutaneous tissue: skin warm, dry without rashes, Right foot without erythema, edema, or excess. No broken skin in area. Nails thick.    ASSESSMENT/PLAN:  (L03.031) Cellulitis of toe of right foot  (primary encounter diagnosis)  Comment: Acute, improved- completed course of antibiotics  Plan: Infection resolved. Monitor foot for pain, any worsening of symptoms      Electronically signed by:  JOSE A Davis CNP  "

## 2018-04-06 ENCOUNTER — NURSING HOME VISIT (OUTPATIENT)
Dept: GERIATRICS | Facility: CLINIC | Age: 53
End: 2018-04-06
Payer: MEDICARE

## 2018-04-06 VITALS
DIASTOLIC BLOOD PRESSURE: 68 MMHG | OXYGEN SATURATION: 98 % | RESPIRATION RATE: 16 BRPM | BODY MASS INDEX: 27.4 KG/M2 | TEMPERATURE: 97.7 F | WEIGHT: 191.4 LBS | HEIGHT: 70 IN | SYSTOLIC BLOOD PRESSURE: 125 MMHG | HEART RATE: 65 BPM

## 2018-04-06 DIAGNOSIS — K59.03 DRUG-INDUCED CONSTIPATION: ICD-10-CM

## 2018-04-06 DIAGNOSIS — Z87.820 H/O TRAUMATIC BRAIN INJURY: ICD-10-CM

## 2018-04-06 DIAGNOSIS — Z96.641 STATUS POST TOTAL REPLACEMENT OF RIGHT HIP: ICD-10-CM

## 2018-04-06 DIAGNOSIS — R53.81 PHYSICAL DECONDITIONING: ICD-10-CM

## 2018-04-06 DIAGNOSIS — L03.031 CELLULITIS OF TOE OF RIGHT FOOT: ICD-10-CM

## 2018-04-06 DIAGNOSIS — S72.001D CLOSED FRACTURE OF NECK OF RIGHT FEMUR WITH ROUTINE HEALING, SUBSEQUENT ENCOUNTER: Primary | ICD-10-CM

## 2018-04-06 DIAGNOSIS — S72.001D ENCOUNTER FOR AFTERCARE FOR HEALING CLOSED TRAUMATIC FRACTURE OF RIGHT HIP: ICD-10-CM

## 2018-04-06 DIAGNOSIS — M89.8X9 HETEROTOPIC OSSIFICATION OF BONE: ICD-10-CM

## 2018-04-06 PROCEDURE — 99316 NF DSCHRG MGMT 30 MIN+: CPT | Performed by: NURSE PRACTITIONER

## 2018-04-06 NOTE — LETTER
2018        RE: Yunior Angel  [address]   [facility] [address]          [facility] DISCHARGE SUMMARY    PATIENT'S NAME: Yunior Angel  YOB: 1965  MEDICAL RECORD NUMBER:  [id number]    PRIMARY CARE PROVIDER AND CLINIC RESPONSIBLE AFTER TRANSFER: Alberto Carlos Jr. [address] / [place] [address]     CODE STATUS/ADVANCE DIRECTIVES DISCUSSION:   CPR/Full code        Allergies   Allergen Reactions     Insect Extract      red ants       TRANSFERRING PROVIDERS: JOSE A Davis CNP, Dr. Sara MD  DATE OF SNF ADMISSION:    DATE OF SNF (anticipated) DISCHARGE:   DISCHARGE DISPOSITION: [facility] Provider   Nursing Facility: [facility] stay 3/13/18 to 3/17/18.     Condition on Discharge:  Improving.  Function:    Dressing: u/e set up l/e sba-cga , need help with shoes   Bed mobility: sba   Transfers: sba   Walkinft 2WW cga  Toileting: sba   Cognitive Scores: SLUMS 23/30    Equipment: walker    DISCHARGE DIAGNOSIS:   1. Closed fracture of neck of right femur with routine healing, subsequent encounter    2. Encounter for aftercare for healing closed traumatic fracture of right hip    3. Status post total replacement of right hip    4. Physical deconditioning    5. Cellulitis of toe of right foot    6. Heterotopic ossification of bone    7. H/O traumatic brain injury    8. Drug-induced constipation        HPI Nursing Facility Course:  HPI information obtained from: facility chart records, facility staff, patient report and [facility] chart review.     Closed fracture of neck of right femur with routine healing, subsequent encounter  Encounter for aftercare for healing closed traumatic fracture of right hip  Status post total replacement of right hip  Physical deconditioning  Patient was brought to ER after having hip pain and was unable  to bear weight on right leg. His mother believes that he fell sometime at home. He lives independently in [place] and has several services that come out to his house to assist him. Patient without intact memory after accident in 1991. Had hip replacement on 3/14/18. Reports pain is controlled. He was receiving scheduled percocet which was then changed to PRN prior to discharge. He will not be discharged with the percocet. He does have tylenol PRN. On ASA BID for DVT prophy. WBAT. F/u with ortho. Worked with physical therapy and occupational therapy during his stay and is much stronger. Will receive home care services at discharge.     Cellulitis of toe of right foot  Was treated with keflex for cellulitis of the right foot. This is improved.     Heterotopic ossification of bone  Found incidentally on right inferior pubic rami during recent admission. A large amount of hetertropic ossification was removed during surgery. He did receive one session of radiation on 3/16 and he completed 3 weeks of indomethacin.       H/O traumatic brain injury  Patient without intact memory after accident in 1991. Is not able to answer many questions about how he feels during exam today. Requires frequent reorientation.      Drug induced constipation  Has reported constipation during his stay, likely due to narcotics. Is on Senna-S daily. Narcotics will be stopped at discharge, so this will improve. Senna S should be discontinued after discharge.     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 He also has no past medical history of COPD (chronic obstructive pulmonary disease) (H); Diabetes (H); Hypertension; or Uncomplicated asthma.    DISCHARGE  "MEDICATIONS:  Current Outpatient Prescriptions   Medication Sig Dispense Refill     Oseltamivir Phosphate (TAMIFLU PO) Take 75 mg by mouth daily       senna-docusate (SENOKOT-S;PERICOLACE) 8.6-50 MG per tablet Take 1 tablet by mouth daily       polyethylene glycol (MIRALAX/GLYCOLAX) powder Take 17 g by mouth 2 times daily as needed        aspirin  MG EC tablet Take one Aspirin tab twice daily for 5 weeks. 70 tablet 0     multivitamin, therapeutic (THERA-VIT) TABS tablet Take 1 tablet by mouth daily       simvastatin (ZOCOR) 80 MG tablet Take 1 tablet (80 mg) by mouth At Bedtime 90 tablet 3     PARoxetine (PAXIL) 40 MG tablet Take 1.5 tablets (60 mg) by mouth every morning 90 tablet 1     omeprazole (PRILOSEC) 40 MG capsule Take 1 capsule (40 mg) by mouth daily Take 30-60 minutes before a meal. 90 capsule 1     dicyclomine (BENTYL) 10 MG capsule Take 1 capsule (10 mg) by mouth 4 times daily (before meals and nightly) 240 capsule 3     VITAMIN D, CHOLECALCIFEROL, PO Take 1,000 Units by mouth daily          MEDICATION CHANGES/RATIONALE:   Indomethacin course was completed  Percocet was discontinued at discharge. Tylenol was added PRN.  Completed a course of keflex for cellulitis    Controlled medications sent with patient:   not applicable/none     ROS:    10 point ROS of systems including Constitutional, Eyes, Respiratory, Cardiovascular, Gastroenterology, Genitourinary, Integumentary, Muscularskeletal, Psychiatric were all negative except for pertinent positives noted in my HPI.    Physical Exam:   Vitals: /68  Pulse 65  Temp 97.7  F (36.5  C)  Resp 16  Ht 5' 10\" (1.778 m)  Wt 191 lb 6.4 oz (86.8 kg)  SpO2 98%  BMI 27.46 kg/m2  BMI= Body mass index is 27.46 kg/(m^2).  GENERAL APPEARANCE:  Alert, oriented to self  EYES:  EOM, lids, pupils and irises normal, sclera clear and conjunctiva normal, no discharge or mattering on lids or lashes noted  ENT:  Mouth normal, moist mucous membranes, nose without " drainage or crusting, external ears without lesions, hearing acuity intact  RESP:  respiratory effort and palpation of chest normal, no chest wall tenderness, no respiratory distress, Lung sounds clear  CV:  Palpation and auscultation of heart done, rate and rhythm regular. no murmur, no rub or gallop. No edema, pedal pulses +2  ABDOMEN:  normal bowel sounds, soft, nontender.  M/S:   Gait and station abnormal: uses walker for ambulation  SKIN:  Inspection and palpation of skin and subcutaneous tissue: skin warm, dry without rashes Right foot without erythema, edema, or excess. No broken skin in area. Nails thick. Right hip incision well approximated- no drainage present  NEURO: cranial nerves 2-12 grossly intact, no facial asymmetry, no speech deficits and able to follow directions, moves all extremities symmetrically  PSYCH:  insight and judgement impaired, memory impaired, affect and mood normal    DISCHARGE PLAN:  Occupational Therapy, Physical Therapy, Registered Nurse and Home Health Aide  Patient instructed to follow-up with:  PCP in 7 days    MTM referral needed and placed by this provider: No    Pending labs: None  SNF labs     CBC RESULTS:   Recent Labs   Lab Test 03/22/18 03/17/18   0640  03/16/18 0717 03/14/18   0504   WBC   --    --   10.0   --   9.4   RBC   --    --   4.28*   --   5.34   HGB  10.0*   --   9.7*   < >  12.2*   HCT   --    --   31.6*   --   39.5*   MCV   --    --   74*   --   74*   MCH   --    --   22.7*   --   22.8*   MCHC   --    --   30.7*   --   30.9*   RDW   --    --   15.1*   --   15.3*   PLT   --   267  254   --   294    < > = values in this interval not displayed.       Last Basic Metabolic Panel:  Recent Labs   Lab Test 03/22/18 03/16/18   0717 03/14/18   0504   NA  138   --    --   143   POTASSIUM  3.9   --    --   4.2   CHLORIDE  100   --    --   108   JUSTA  9.3   --    --   8.2*   CO2  28   --    --   31   BUN  28*   --    --   16   CR  0.62   --    --   0.84   GLC  118   111*   < >  106*    < > = values in this interval not displayed.       Liver Function Studies -   Recent Labs   Lab Test 03/22/18 02/21/18   1550   PROTTOTAL  6.2  7.4   ALBUMIN  3.2*  4.0   BILITOTAL  0.7  0.7   ALKPHOS  144*  65   AST  29  20   ALT  47*  31       Discharge Treatments:  F/u with ortho and PCP    TOTAL DISCHARGE TIME:   Greater than 30 minutes  Electronically signed by:  JOSE A Davis CNP      Sincerely,        JOSE A Davis CNP

## 2018-04-06 NOTE — PROGRESS NOTES
[facility] DISCHARGE SUMMARY    PATIENT'S NAME: Yunior Angel  YOB: 1965  MEDICAL RECORD NUMBER:  [id number]    PRIMARY CARE PROVIDER AND CLINIC RESPONSIBLE AFTER TRANSFER: Alberto Carlos Jr. [address] / [place] [address]     CODE STATUS/ADVANCE DIRECTIVES DISCUSSION:   CPR/Full code        Allergies   Allergen Reactions     Insect Extract      red ants       TRANSFERRING PROVIDERS: JOSE A Davis CNP, Dr. Sara MD  DATE OF SNF ADMISSION:    DATE OF SNF (anticipated) DISCHARGE:   DISCHARGE DISPOSITION: FMG Provider   Nursing Facility: [facility] stay 3/13/18 to 3/17/18.     Condition on Discharge:  Improving.  Function:    Dressing: u/e set up l/e sba-cga , need help with shoes   Bed mobility: sba   Transfers: sba   Walkinft 2WW cga  Toileting: sba   Cognitive Scores: SLUMS 23/30    Equipment: walker    DISCHARGE DIAGNOSIS:   1. Closed fracture of neck of right femur with routine healing, subsequent encounter    2. Encounter for aftercare for healing closed traumatic fracture of right hip    3. Status post total replacement of right hip    4. Physical deconditioning    5. Cellulitis of toe of right foot    6. Heterotopic ossification of bone    7. H/O traumatic brain injury    8. Drug-induced constipation        HPI Nursing Facility Course:  HPI information obtained from: facility chart records, facility staff, patient report and [facility] chart review.     Closed fracture of neck of right femur with routine healing, subsequent encounter  Encounter for aftercare for healing closed traumatic fracture of right hip  Status post total replacement of right hip  Physical deconditioning  Patient was brought to ER after having hip pain and was unable to bear weight on right leg. His mother believes that he fell sometime at home. He lives independently in  [place] and has several services that come out to his house to assist him. Patient without intact memory after accident in 1991. Had hip replacement on 3/14/18. Reports pain is controlled. He was receiving scheduled percocet which was then changed to PRN prior to discharge. He will not be discharged with the percocet. He does have tylenol PRN. On ASA BID for DVT prophy. WBAT. F/u with ortho. Worked with physical therapy and occupational therapy during his stay and is much stronger. Will receive home care services at discharge.     Cellulitis of toe of right foot  Was treated with keflex for cellulitis of the right foot. This is improved.     Heterotopic ossification of bone  Found incidentally on right inferior pubic rami during recent admission. A large amount of hetertropic ossification was removed during surgery. He did receive one session of radiation on 3/16 and he completed 3 weeks of indomethacin.       H/O traumatic brain injury  Patient without intact memory after accident in 1991. Is not able to answer many questions about how he feels during exam today. Requires frequent reorientation.      Drug induced constipation  Has reported constipation during his stay, likely due to narcotics. Is on Senna-S daily. Narcotics will be stopped at discharge, so this will improve. Senna S should be discontinued after discharge.     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 He also has no past medical history of COPD (chronic obstructive pulmonary disease) (H); Diabetes (H); Hypertension; or Uncomplicated asthma.    DISCHARGE MEDICATIONS:  Current Outpatient Prescriptions   Medication Sig Dispense Refill     Oseltamivir Phosphate  "(TAMIFLU PO) Take 75 mg by mouth daily       senna-docusate (SENOKOT-S;PERICOLACE) 8.6-50 MG per tablet Take 1 tablet by mouth daily       polyethylene glycol (MIRALAX/GLYCOLAX) powder Take 17 g by mouth 2 times daily as needed        aspirin  MG EC tablet Take one Aspirin tab twice daily for 5 weeks. 70 tablet 0     multivitamin, therapeutic (THERA-VIT) TABS tablet Take 1 tablet by mouth daily       simvastatin (ZOCOR) 80 MG tablet Take 1 tablet (80 mg) by mouth At Bedtime 90 tablet 3     PARoxetine (PAXIL) 40 MG tablet Take 1.5 tablets (60 mg) by mouth every morning 90 tablet 1     omeprazole (PRILOSEC) 40 MG capsule Take 1 capsule (40 mg) by mouth daily Take 30-60 minutes before a meal. 90 capsule 1     dicyclomine (BENTYL) 10 MG capsule Take 1 capsule (10 mg) by mouth 4 times daily (before meals and nightly) 240 capsule 3     VITAMIN D, CHOLECALCIFEROL, PO Take 1,000 Units by mouth daily          MEDICATION CHANGES/RATIONALE:   Indomethacin course was completed  Percocet was discontinued at discharge. Tylenol was added PRN.  Completed a course of keflex for cellulitis    Controlled medications sent with patient:   not applicable/none     ROS:    10 point ROS of systems including Constitutional, Eyes, Respiratory, Cardiovascular, Gastroenterology, Genitourinary, Integumentary, Muscularskeletal, Psychiatric were all negative except for pertinent positives noted in my HPI.    Physical Exam:   Vitals: /68  Pulse 65  Temp 97.7  F (36.5  C)  Resp 16  Ht 5' 10\" (1.778 m)  Wt 191 lb 6.4 oz (86.8 kg)  SpO2 98%  BMI 27.46 kg/m2  BMI= Body mass index is 27.46 kg/(m^2).  GENERAL APPEARANCE:  Alert, oriented to self  EYES:  EOM, lids, pupils and irises normal, sclera clear and conjunctiva normal, no discharge or mattering on lids or lashes noted  ENT:  Mouth normal, moist mucous membranes, nose without drainage or crusting, external ears without lesions, hearing acuity intact  RESP:  respiratory effort and " palpation of chest normal, no chest wall tenderness, no respiratory distress, Lung sounds clear  CV:  Palpation and auscultation of heart done, rate and rhythm regular. no murmur, no rub or gallop. No edema, pedal pulses +2  ABDOMEN:  normal bowel sounds, soft, nontender.  M/S:   Gait and station abnormal: uses walker for ambulation  SKIN:  Inspection and palpation of skin and subcutaneous tissue: skin warm, dry without rashes Right foot without erythema, edema, or excess. No broken skin in area. Nails thick. Right hip incision well approximated- no drainage present  NEURO: cranial nerves 2-12 grossly intact, no facial asymmetry, no speech deficits and able to follow directions, moves all extremities symmetrically  PSYCH:  insight and judgement impaired, memory impaired, affect and mood normal    DISCHARGE PLAN:  Occupational Therapy, Physical Therapy, Registered Nurse and Home Health Aide  Patient instructed to follow-up with:  PCP in 7 days    MTM referral needed and placed by this provider: No    Pending labs: None  SNF labs     CBC RESULTS:   Recent Labs   Lab Test 03/22/18 03/17/18   0640  03/16/18 0717 03/14/18   0504   WBC   --    --   10.0   --   9.4   RBC   --    --   4.28*   --   5.34   HGB  10.0*   --   9.7*   < >  12.2*   HCT   --    --   31.6*   --   39.5*   MCV   --    --   74*   --   74*   MCH   --    --   22.7*   --   22.8*   MCHC   --    --   30.7*   --   30.9*   RDW   --    --   15.1*   --   15.3*   PLT   --   267  254   --   294    < > = values in this interval not displayed.       Last Basic Metabolic Panel:  Recent Labs   Lab Test 03/22/18 03/16/18 0717 03/14/18   0504   NA  138   --    --   143   POTASSIUM  3.9   --    --   4.2   CHLORIDE  100   --    --   108   JUSTA  9.3   --    --   8.2*   CO2  28   --    --   31   BUN  28*   --    --   16   CR  0.62   --    --   0.84   GLC  118  111*   < >  106*    < > = values in this interval not displayed.       Liver Function Studies -   Recent  Labs   Lab Test 03/22/18 02/21/18   1550   PROTTOTAL  6.2  7.4   ALBUMIN  3.2*  4.0   BILITOTAL  0.7  0.7   ALKPHOS  144*  65   AST  29  20   ALT  47*  31       Discharge Treatments:  F/u with ortho and PCP    TOTAL DISCHARGE TIME:   Greater than 30 minutes  Electronically signed by:  JOSE A Davis CNP

## 2018-04-09 ENCOUNTER — TELEPHONE (OUTPATIENT)
Dept: FAMILY MEDICINE | Facility: CLINIC | Age: 53
End: 2018-04-09

## 2018-04-09 NOTE — TELEPHONE ENCOUNTER
Pt was DC'd from geriatric services on 4/7/2018 after being treated for Closed Fracture Of Neck Of Right Femur With Routine Healing, Subsequent Encounter.  Next scheduled appt with PCP is not scheduled.  Please call patient.  Thank you!  Ping Garay

## 2018-04-10 ENCOUNTER — DOCUMENTATION ONLY (OUTPATIENT)
Dept: CARE COORDINATION | Facility: CLINIC | Age: 53
End: 2018-04-10

## 2018-04-11 ENCOUNTER — DOCUMENTATION ONLY (OUTPATIENT)
Dept: CARE COORDINATION | Facility: CLINIC | Age: 53
End: 2018-04-11

## 2018-04-11 ENCOUNTER — TELEPHONE (OUTPATIENT)
Dept: FAMILY MEDICINE | Facility: CLINIC | Age: 53
End: 2018-04-11

## 2018-04-11 NOTE — TELEPHONE ENCOUNTER
Home care nurse looking for Home care orders- OT, PT and  Opal. Nursing 2xwk- 2x wks, 1x wks- 2x, and follow up as needed.  Please contact Sharyn# [phone]  Thank you  Estee Johnson

## 2018-04-11 NOTE — TELEPHONE ENCOUNTER
Orders below approved per RN protocol. I spoke with Sharyn. Follow up as below is PRN. Madelyn Aranda R.N.

## 2018-04-11 NOTE — PROGRESS NOTES
[facility] now requests orders and shares plan of care/discharge summaries for some patients through [employer].  Please REPLY TO THIS MESSAGE in order to give authorization for orders when needed.  This is considered a verbal order, you will still receive a faxed copy of orders for signature.  Thank you for your assistance in improving collaboration for our patients.    ORDER PT eval and treat for gait training, strengthening, transfers, balance.  Plan for 2w3.        MD SUMMARY/PLAN OF CARE  Pt presents s/p R DANIELLE with pain, impaired strength and balance, functional weakness, impaired gait.  Pt will benefit from skilled PT to address limitations and work towards stated goals.      Goals to be met by 5/5/18  1.  Pt will be independent with HEP for strength to promote safe mobility in home.   2.  Pt will be able to walk safely in apartment building with least restrictive AD for ability to go and get mail.  3.  Pt will score less than 30 seconds on TUG to show decreased risk for falls.   4.  Pt will be able to navigate steps independently to enter/exit apartment building.       Ana Noguera, PT  [facility]  [phone]

## 2018-04-12 NOTE — TELEPHONE ENCOUNTER
ED / Discharge Outreach Protocol    Patient Contact    Attempt # 2    Was call answered?  No.  Left message on voicemail with information to call me back.  Dolores Kim RN, BSN  [facility]

## 2018-04-12 NOTE — PROGRESS NOTES
OK for physical therapy to evaluate and treat for gait training, strengthening, transfers, balance.  Plan for twice weekly for three weeks.    Alberto Carlos MD

## 2018-04-13 NOTE — TELEPHONE ENCOUNTER
ED / Discharge Outreach Protocol    Patient Contact    Attempt # 3    Was call answered?  No.  Left message on voicemail with information to call me back.  Final attempt. No future appointment noted at this time. Will close encounter.   Madelyn Aranda R.N.

## 2018-04-13 NOTE — TELEPHONE ENCOUNTER
"    Hospital/TCU/ED for chronic condition Discharge Protocol    \"Hi, my name is Madelyn Aranda, a registered nurse, and I am calling from [facility]  I am calling to follow up and see how things are going for you after your recent emergency visit/hospital/TCU stay.\"    Tell me how you are doing now that you are home? Per mother Patsy \"he is doing very well\"      Discharge Instructions    \"Let's review your discharge instructions.  What is/are the follow-up recommendations?  Pt. Response: Follow up with ortho    \"Has an appointment with your primary care provider been scheduled?\"   No--pt is scheduled for follow up with Ortho. Is going to do that follow up first and then will schedule at a later time with Dr. Carlos--primary care    \"When you see the provider, I would recommend that you bring your medications with you.\"    Medications    \"What questions do you have about your medications?\"    None     New diagnoses of heart failure, COPD, diabetes, or MI?   No           Was MTM referral placed (*Make sure to put transitions as reason for referral)?   No    Call Summary    \"What questions or concerns do you have about your recent visit and your follow-up care?\"     none Mother feels Yunior is doing well and has no questions. She will follow up with Ortho. Pt broke hip. She will call for appointment for Primary Care follow up after seeing ortho.     \"If you have questions or things don't continue to improve, we encourage you contact us through the main clinic number (give number).  Even if the clinic is not open, triage nurses are available 24/7 to help you.     We would like you to know that our clinic has extended hours (provide information).  We also have urgent care (provide details on closest location and hours/contact info)\"      \"Thank you for your time and take care!\"             "

## 2018-04-17 ENCOUNTER — TELEPHONE (OUTPATIENT)
Dept: FAMILY MEDICINE | Facility: CLINIC | Age: 53
End: 2018-04-17

## 2018-04-17 NOTE — TELEPHONE ENCOUNTER
Forms received by: Fax    Purpose of Form:  Home Care    How the form needs to be returned for patient:  Fax    Form currently placed:  inbox    Steph Tolbert CMA

## 2018-04-19 ENCOUNTER — TELEPHONE (OUTPATIENT)
Dept: FAMILY MEDICINE | Facility: CLINIC | Age: 53
End: 2018-04-19

## 2018-04-19 NOTE — TELEPHONE ENCOUNTER
Date Forms was received: April 19, 2018    Forms received by: Fax    Purpose of Form:  Nursing Home Orders     When the form is due:  AAP    How the form needs to be returned for patient:  Fax    Form currently placed  SR inbox

## 2018-04-24 ENCOUNTER — TELEPHONE (OUTPATIENT)
Dept: FAMILY MEDICINE | Facility: CLINIC | Age: 53
End: 2018-04-24

## 2018-04-24 NOTE — TELEPHONE ENCOUNTER
Forms received by: Fax    Purpose of Form:  Home Health    How the form needs to be returned for patient:  Fax    Form currently placed: SW inbox in absence of SR    Steph Tolbert CMA

## 2018-04-25 DIAGNOSIS — Z53.9 DIAGNOSIS NOT YET DEFINED: Primary | ICD-10-CM

## 2018-04-25 PROCEDURE — G0180 MD CERTIFICATION HHA PATIENT: HCPCS | Performed by: FAMILY MEDICINE

## 2018-04-27 ENCOUNTER — DOCUMENTATION ONLY (OUTPATIENT)
Dept: OTHER | Facility: CLINIC | Age: 53
End: 2018-04-27

## 2018-04-27 PROBLEM — Z71.89 ACP (ADVANCE CARE PLANNING): Chronic | Status: ACTIVE | Noted: 2018-04-27

## 2018-04-30 ENCOUNTER — TELEPHONE (OUTPATIENT)
Dept: FAMILY MEDICINE | Facility: CLINIC | Age: 53
End: 2018-04-30

## 2018-04-30 NOTE — TELEPHONE ENCOUNTER
Date Forms was received: April 30, 2018    Forms received by: Fax    Purpose of Form:  APA medical    When the form is due:  ASAP    How the form needs to be returned for patient:  Fax    Form currently placed  SR inbox

## 2018-05-02 ENCOUNTER — TELEPHONE (OUTPATIENT)
Dept: FAMILY MEDICINE | Facility: CLINIC | Age: 53
End: 2018-05-02

## 2018-05-02 ENCOUNTER — DOCUMENTATION ONLY (OUTPATIENT)
Dept: CARE COORDINATION | Facility: CLINIC | Age: 53
End: 2018-05-02

## 2018-05-02 DIAGNOSIS — S72.001D CLOSED FRACTURE OF NECK OF RIGHT FEMUR WITH ROUTINE HEALING, SUBSEQUENT ENCOUNTER: ICD-10-CM

## 2018-05-02 DIAGNOSIS — G83.10 MONOPLEGIA OF LOWER LIMB AFFECTING DOMINANT SIDE (H): ICD-10-CM

## 2018-05-02 DIAGNOSIS — S09.90XS CLOSED HEAD INJURY, SEQUELA: Primary | ICD-10-CM

## 2018-05-02 DIAGNOSIS — Z96.641 STATUS POST TOTAL REPLACEMENT OF RIGHT HIP: ICD-10-CM

## 2018-05-02 DIAGNOSIS — S06.9XAS NEUROBEHAVIORAL SEQUELAE OF TRAUMATIC BRAIN INJURY (H): ICD-10-CM

## 2018-05-02 DIAGNOSIS — R46.89 NEUROBEHAVIORAL SEQUELAE OF TRAUMATIC BRAIN INJURY (H): ICD-10-CM

## 2018-05-02 NOTE — PROGRESS NOTES
[facility] now requests orders and shares plan of care/discharge summaries for some patients through [facility].  Thank you for your assistance in improving collaboration for our patients.    OCCUPATIONAL THERAPY DISCHARGE SUMMARY    Patient is home bound secondary to  weakness, unsteady gait and requires assist/supervision with self and equipment to leave home.     SUBJECTIVE    OBJECTIVE Changes and progress since initial eval noted in following areas    BP           HR          O2 Sats  see PT note as pt is slightly aggitated/frustrated and tired and desiring very brief visit    Mobility Tool Score//  1    Functional mobility//  pt continues to need reminders to use walker for all mobility and avoid parking and walking and does not understand/remember that this is why his hip gets sore and he fatigues. He still shows need for SBA with all mobility for safe tub/shower transfers and use of walker, toilet mobility is safest given a grab bar that he uses. Pt will benefit from living in an environment of increased supervision for safety as he remains at high risk for falls d/t his instability and forgetfulness with safety/equip needs.     Skin Integrity/Edema//  no new issues    ADLs//  reports mod I with showering, toileting U/LB dressing, grooming, shows need for SBA for safety, ramon with tub/shower routine with no shower chair and lack of adequate safe set up.     IADLs//  per PT report today, pt changed his meds from Wed to Sunday as he was quite confused about the day of the week, he had not eaten breakfast and was hungry despite reporting being awake since 6am, but was in bed when OT arrives. Pt is showing decreasing safety with compliance with IADLs such as meals and meds.     DME// grab bars for tub/toilet, 2ww    Cognition//  pt with significant short term memory deficits since TBI in 1991. his deficits are limiting his safety with his new hip precautions and need to use walker.     A Plan of  Care//  na as pt declined this service    Skilled services provided today//  OT completes reassessment of use of walker, ADL, mobility safety and safety with managing his own laundry.     ASSESSMENT/SUMMARY/Justification of DC. Pt does not bring his walker into laundry room that has a very heavy fireproof door on it and then a long, narrow and turning hallway that he must navigate through storage lockers to get to wash/dryer, because of this he does not use his walker and places significant stress on hip to walk there and back while trying to carry heavy hamper and soap. OT recommends he continue with assist for laundry. OT had provided pt with notes to always use walker, one is on his walker the other on his door and with maximal PT/OT education and notes he continues to use walker only limited manner and does not tend to use it when engaged with ADL/IADLs which places him at higher risk for falls at these times, but he lacks insight/memory to be aware of this. OT recommends to his mother, ELIEZER CONTE, patient and care team that he needs a higher level of supervision for safety, also appears that he needs this for his meds and so OT recommends longterm or  placement for increased assist/supervision, meals, meds and socialization. Pt at times is agreeable and at other times is very upset with this idea. Pt has made limited progress d/t his brain injury and cognitive/memory deficits and has plateued in home care OT. He is discharged post 6/7 OT visits and pt is agreeable to discharge as he expresses that all the visits have been overwhelming, he is happy to go back to just having a nurse come to help with med set up as he has always had/but then he also forgot that he has been getting this help for a while, when OT reminded him he agreed and accepted the idea.     Reason patient was admitted/ s/p fall with injury to R hip    Summary of outcomes in meeting goals  1. Pt will demonstrate safety with tub/shower transfer using  appropriate equipment Independently, by discharge.   MOSTLY MET  2. Pt/cgs to be linked with necessary AE, such as additional grab bar, shower chair and toilet riser, for safety with showering and toileting Indep, by discharge.   MET, TBI CM is working to connect him with shower chair and grab bars  3. Pt will demonstrate safety with light/hot meal prep and clean up Independently, by discharge.   MET  4. Pt will demonstrate safe use of walker while performing ADLs/IADLs with min cues by discharge.   SOME PROGRESS, does need the min cues. MET  5. Pt will demonstrate safety with laundry tasks, including transporting items down hallway, Independently by discharge.   MET with recommendation to cont with assist per PCA/HHA   Discharge disposition/ shows need for increased supervision for safety    Final education provided/ recommending move into [facility] or  and increased supervision and managemnet of his meds.    ALEXANDER Duran, OTR/L

## 2018-05-02 NOTE — TELEPHONE ENCOUNTER
I returned call, message left to return call to clinic. I advised Shahana if I am not available to please leave specifics of what orders are needed so we can review. Madelyn Aranda R.N.

## 2018-05-02 NOTE — TELEPHONE ENCOUNTER
Call returned to Shahana. I was able to clarify, she needs MD to put order in.    [facility] is the preferred location because of his brain injury instead of [facility] because Shahana feels it will better serve him and this is also a [place] Location. Order is needed from Dr. Carlos. Shahana is closing out home Physical Therapy cares and patient needs to transfer to outpatient.     I have started a referral please review/revise as needed. Thank you, Madelyn Aranda R.N.

## 2018-05-02 NOTE — TELEPHONE ENCOUNTER
Name of caller: Shahana  Relationship of Patient: [facility]    Reason for Call: Home care needs outpatient Phys therapy orders at the [facility] location    Best phone number to reach pt at is: [phone]  Ok to leave a message with medical info? Yes    Pharmacy preferred (if calling for a refill): [facility]  Patient Representative - [facility]

## 2018-05-02 NOTE — TELEPHONE ENCOUNTER
Reason for Call:  Other returning call    Detailed comments: Ana trent/ [facility] is wanting outpatient orders put in epic.  [facility]    Phone Number Patient can be reached at: Other phone number:  [phone]     Best Time: anytime    Can we leave a detailed message on this number? YES    Call taken on 5/2/2018 at 11:29 AM by Sandy Rabago

## 2018-05-02 NOTE — PROGRESS NOTES
Below noted.  Agree with discharge from OT.  Consider care coordination referral to help with memory issues.    Alberto Carlos MD

## 2018-05-04 ENCOUNTER — TELEPHONE (OUTPATIENT)
Dept: FAMILY MEDICINE | Facility: CLINIC | Age: 53
End: 2018-05-04

## 2018-05-04 NOTE — TELEPHONE ENCOUNTER
[facility] calling [phone] for orders.  Nursing 1 x week for 5 weeks  2 PRN's     Physical Therapy is done with treatment at this time and recommending outpatient therapy.   OT recommends group home setting for continued therapy. Family is currently trying to get Yunior in group home setting soon.     Our call was disconnected and I called back and advised above nursing orders are approved and if there is anything else or any corrections to please return call to me (numbers given).    Orders approved per RN protocol.     Madelyn Aranda R.N.

## 2018-05-09 ENCOUNTER — TELEPHONE (OUTPATIENT)
Dept: FAMILY MEDICINE | Facility: CLINIC | Age: 53
End: 2018-05-09

## 2018-05-09 NOTE — TELEPHONE ENCOUNTER
Forms received by: Fax    Purpose of Form:  Home Care    How the form needs to be returned for patient:  Fax    Form currently placed: SHIVANI inbox absence of SR Steph Tolbert CMA

## 2018-05-16 ENCOUNTER — APPOINTMENT (OUTPATIENT)
Dept: GENERAL RADIOLOGY | Facility: CLINIC | Age: 53
End: 2018-05-16
Attending: EMERGENCY MEDICINE
Payer: MEDICARE

## 2018-05-16 ENCOUNTER — HOSPITAL ENCOUNTER (EMERGENCY)
Facility: CLINIC | Age: 53
Discharge: HOME | End: 2018-05-16
Attending: EMERGENCY MEDICINE | Admitting: EMERGENCY MEDICINE
Payer: MEDICARE

## 2018-05-16 VITALS
TEMPERATURE: 98 F | DIASTOLIC BLOOD PRESSURE: 80 MMHG | SYSTOLIC BLOOD PRESSURE: 135 MMHG | OXYGEN SATURATION: 98 % | RESPIRATION RATE: 18 BRPM | HEART RATE: 80 BPM

## 2018-05-16 DIAGNOSIS — S32.010A CLOSED COMPRESSION FRACTURE OF FIRST LUMBAR VERTEBRA, INITIAL ENCOUNTER: ICD-10-CM
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
## 2018-05-16 PROCEDURE — 72100 X-RAY EXAM L-S SPINE 2/3 VWS: CPT

## 2018-05-16 PROCEDURE — 99284 EMERGENCY DEPT VISIT MOD MDM: CPT

## 2018-05-16 PROCEDURE — 25000132 ZZH RX MED GY IP 250 OP 250 PS 637: Mod: GY | Performed by: EMERGENCY MEDICINE

## 2018-05-16 PROCEDURE — 73502 X-RAY EXAM HIP UNI 2-3 VIEWS: CPT

## 2018-05-16 PROCEDURE — A9270 NON-COVERED ITEM OR SERVICE: HCPCS | Mod: GY | Performed by: EMERGENCY MEDICINE

## 2018-05-16 RX ORDER — HYDROCODONE BITARTRATE AND ACETAMINOPHEN 5; 325 MG/1; MG/1
1 TABLET ORAL EVERY 6 HOURS PRN
Qty: 10 TABLET | Refills: 0 | Status: SHIPPED | OUTPATIENT
Start: 2018-05-16 | End: 2018-05-21

## 2018-05-16 RX ORDER — HYDROCODONE BITARTRATE AND ACETAMINOPHEN 5; 325 MG/1; MG/1
1 TABLET ORAL ONCE
Status: COMPLETED | OUTPATIENT
Start: 2018-05-16 | End: 2018-05-16

## 2018-05-16 RX ORDER — IBUPROFEN 600 MG/1
600 TABLET, FILM COATED ORAL ONCE
Status: COMPLETED | OUTPATIENT
Start: 2018-05-16 | End: 2018-05-16

## 2018-05-16 RX ADMIN — IBUPROFEN 600 MG: 600 TABLET ORAL at 15:57

## 2018-05-16 RX ADMIN — HYDROCODONE BITARTRATE AND ACETAMINOPHEN 1 TABLET: 5; 325 TABLET ORAL at 16:11

## 2018-05-16 ASSESSMENT — ENCOUNTER SYMPTOMS
WEAKNESS: 0
NUMBNESS: 0
ARTHRALGIAS: 0
BACK PAIN: 1
WOUND: 0

## 2018-05-16 NOTE — ED PROVIDER NOTES
History     Chief Complaint:  Fall    The history is provided by the patient.      Yunior Angel is a 52 year old male with a history of traumatic brain injury who presents for evaluation following a fall. The patient had a right total hip replacement performed on 3/14. There were no complications with this, and he has been ambulatory with a walker since that time while attending regular rehab for this. While leaving his home this afternoon, his walker hit the edge of the door, causing the patient to fall forward to the ground. This was purely a mechanical fall, and he denies hitting his head or losing consciousness during this incident. Since that time he has been experiencing pain in his lower back. He has not had any pain or issues with the right hip, or any other issues since that time. Upon arrival here in the ED he denies any concerns aside from this lower back pain.    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  Smoking Status: No  Smokeless Tobacco: No  Alcohol Use: No  Marital Status:  Single      Review of Systems   Musculoskeletal: Positive for back pain. Negative for arthralgias.   Skin: Negative for wound.   Neurological: Negative for weakness and numbness.   All other systems reviewed and are negative.    Physical Exam   Vitals:  Patient Vitals for the past 24 hrs:   BP Temp Pulse Heart Rate Resp SpO2   05/16/18 1605 - - - - - 99 %   05/16/18 1500 128/71 - - - - 97 %   05/16/18 1437 122/69 98  F (36.7  C) 80 80  18 98 %     Physical Exam  Constitutional: Patient appears well-developed and well-nourished. There is mild distress.   Head: No external signs of trauma noted.  Neck: No JVD noted  Eyes: Pupils are equal, round, and reactive to light.   Cardiovascular: Normal rate, regular rhythm and normal heart sounds.  Exam reveals no gallop and no friction rub.  No murmur heard. Equal B/L peripheral pulses.  Pulmonary/Chest: Effort normal and breath sounds normal. No respiratory distress. Patient has no wheezes. Patient has no rales.   Abdominal: Soft. There is no tenderness.   Extremities: No edema noted. No bony pelvis or hip pain to palpation.  Back: No midline tenderness to palpation. There is a minimal amount of right lumbar paraspinal tenderness.  Neurological: Patient is alert and oriented to person, place, and time. He states he does have short term memory problems due to a prior TBI.   Skin: Skin is warm and dry. There is no diaphoresis noted. Well healed right hip surgical scar.    Emergency Department Course     Imaging:  Radiology findings were communicated with the family who voiced understanding of the findings.  XR Pelvis w Hip right G/E 2 views:  Postoperative changes of right total hip arthroplasty. Bone fragments or heterotopic ossification medial to the right proximal femur are unchanged. No evidence of acute fracture.  Per Radiologist.     Lumbar spine XR, 2-3 views:  There are 5 nonrib-bearing vertebrae. There is mild scoliosis, convex left. There is a compression fracture of L1 with approximately 20% loss of vertebral body height. This is of uncertain age. Sagittal alignment is normal.  Per Radiologist.     Interventions:  1557 Ibuprofen, 600 mg, PO  1611 [place], 325 mg, PO     Emergency Department Course:  Nursing notes and vitals reviewed.  1457 I had my initial encounter with the patient.  I performed an exam of the patient as documented above.   The patient was sent for a XR while in the emergency  department, results above.   1608 I rechecked the patient's condition.  1640 I updated the patient on his parents on the results of his imaging,   1710 I spoke with Dr. Maximo Koenig the PA for Spine Surgery regarding this patient.  1720 I spoke with the patient here in the ED.    1738 I discussed the treatment plan with the patient. They expressed understanding of this plan and consented to discharge. They will be discharged home with instructions for care and follow up. In addition, the patient will return to the emergency department with any new or worsening symptoms.  All questions were answered.    Impression & Plan      Medical Decision Making:  This 52-year-old male patient presents the ED after a fall.  Please see the HPI and exam for specifics.  The patient was found to have an age indeterminate L1 compression fracture.  On exam there really was not any midline tenderness though the patient was somewhat difficult to get a history from given a prior traumatic brain injury.  I discussed the case with spine surgery and they recommend an MRI for definitive imaging and outpatient follow-up.  The patient was able to ambulate and states that he feels much more comfortable after pain medications were given in the ED.  He would like to be discharged home and I think that is reasonable, but he should have close outpatient follow-up.  I did send a message to his primary care provider through the Epic messaging system and I have placed the initial order for the outpatient lumbar spine MRI as well.  I discussed the importance of follow-up with the patient as well as his parents were both in the room.  Anticipatory guidance given prior to discharge.    Diagnosis:    ICD-10-CM    1. Closed compression fracture of first lumbar vertebra, initial encounter (H) S32.010A MRI Lumbar spine w/o contrast   2. Fall, initial encounter W19.XXXA      Disposition:   Discharge    Discharge Medications:  New Prescriptions     HYDROCODONE-ACETAMINOPHEN (NORCO) 5-325 MG PER TABLET    Take 1 tablet by mouth every 6 hours as needed for severe pain     Scribe Disclosure:  I, Olman Willett, am serving as a scribe at 2:54 PM on 5/16/2018 to document services personally performed by Wilver Aggarwal DO, based on my observations and the provider's statements to me.    5/16/2018   [facility] [place] [facility] EMERGENCY DEPARTMENT       Wilver Aggarwal DO  05/16/18 7438

## 2018-05-16 NOTE — ED NOTES
"Pt parents coming out of hallway multiples times for update.  Father states, \"I can't believe this is taking so long.\" Pt father updated on ED protocol and wait times.  Pt did not accept teaching. Pt reports pain decreased but, \"I cant walk.\" Pt able to move legs in bed. MD Aggarwal updated.  "

## 2018-05-16 NOTE — DISCHARGE INSTRUCTIONS
Back Fracture (Compression Fracture)  Your spine stretches from the base of your skull to your tailbone. It's composed of 33 bones (vertebrae) stacked on top of one another. These bones are strong enough to support the weight of your upper body. Certain injuries, however, can damage one or more of the vertebrae and cause them to collapse. A collapsed bone in your spine is known as a compression fracture.    Causes of compression fracture  Many compression fractures result from osteoporosis. This disease thins your bones so they can't withstand normal pressure. Trauma from a car accident or hard fall can fracture even healthy vertebrae. In rare cases, vertebrae may fracture for unknown reasons.  When to go to the emergency room (ER)  Call 911 if you've been in an accident or had a fall and have neck or back pain, especially when pain occurs with any of these symptoms:    Loss of control over your bowels or bladder    Numbness or weakness    High fever    Unexplained back pain in a person with cancer  What to expect in the ER  A healthcare provider will ask about your health history and examine you. In some cases, you may have X-rays. You also may have other tests, such as a CT scan or MRI. These tests can provide detailed images of your bones and spinal cord.  Treatment  Treatment will depend on the type and cause of the fracture. You will be given medicine for pain. Severe fractures or those that cause nerve problems may need surgery. Many compression fractures mend on their own.  Follow-up  As you improve, you may be given exercises to strengthen your bones. If you have osteoporosis, your healthcare provider may prescribe a medicine to treat it. Sometimes you may have pain even after the bone has healed. In that case, your healthcare provider will discuss your  Date Last Reviewed: 9/30/2015 2000-2017 [employer]. [address] [place] [address], [place] [address]. All rights reserved. This information is  not intended as a substitute for professional medical care. Always follow your healthcare professional's instructions.

## 2018-05-16 NOTE — ED NOTES
Pt ambulated with assistance from walker. Pt complained of pain and throbbing during the trial and moved very slowly. Pt also complained of dizziness upon standing but it was resolved as he ambulated.

## 2018-05-16 NOTE — ED TRIAGE NOTES
Patient presents with complaints of a fall that happened this afternoon. Patient was using walker and got hooked onto doorway and fell. Patient is having pain in his mid back. Denies any numbness or tingling. Patient denies hitting his head or LOC. Patient states he had surgery on his right hip about one month ago. Patient states he did not hit his hip when he fell.  ABC intact without need for intervention at this time.

## 2018-05-16 NOTE — ED AVS SNAPSHOT
[facility] Emergency Department    [address]    [facility] [address]    Phone:  [phone]    Fax:  [phone]                                       Yunior Angel   MRN: [id number]    Department:  [facility] Emergency Department   Date of Visit:  5/16/2018           Patient Information     Date Of Birth          1965        Your diagnoses for this visit were:     Closed compression fracture of first lumbar vertebra, initial encounter (H)     Fall, initial encounter        You were seen by Wilver Aggarwal DO.      Follow-up Information     Follow up with Alberto Carlos Jr., MD. Call on 5/17/2018.    Specialties:  Family Practice, Obstetrics    Why:  To schedule follow up    Contact information:    [address] [place] [address]  [phone]          Call Obey Muller MD.    Specialty:  Neurosurgery    Why:  To schedule follow up AFTER completion of MRI    Contact information:    [address]  [place] [address]  [phone]          Follow up with [facility] Emergency Department.    Specialty:  EMERGENCY MEDICINE    Why:  If symptoms worsen    Contact information:    [address] [facility] [id number] [phone]        Discharge Instructions         Back Fracture (Compression Fracture)  Your spine stretches from the base of your skull to your tailbone. It's composed of 33 bones (vertebrae) stacked on top of one another. These bones are strong enough to support the weight of your upper body. Certain injuries, however, can damage one or more of the vertebrae and cause them to collapse. A collapsed bone in your spine is known as a compression fracture.    Causes of compression fracture  Many compression fractures result from osteoporosis. This disease thins your bones so they can't withstand normal pressure. Trauma from a car accident or hard fall can fracture even healthy vertebrae. In rare cases,  vertebrae may fracture for unknown reasons.  When to go to the emergency room (ER)  Call 911 if you've been in an accident or had a fall and have neck or back pain, especially when pain occurs with any of these symptoms:    Loss of control over your bowels or bladder    Numbness or weakness    High fever    Unexplained back pain in a person with cancer  What to expect in the ER  A healthcare provider will ask about your health history and examine you. In some cases, you may have X-rays. You also may have other tests, such as a CT scan or MRI. These tests can provide detailed images of your bones and spinal cord.  Treatment  Treatment will depend on the type and cause of the fracture. You will be given medicine for pain. Severe fractures or those that cause nerve problems may need surgery. Many compression fractures mend on their own.  Follow-up  As you improve, you may be given exercises to strengthen your bones. If you have osteoporosis, your healthcare provider may prescribe a medicine to treat it. Sometimes you may have pain even after the bone has healed. In that case, your healthcare provider will discuss your  Date Last Reviewed: 9/30/2015 2000-2017 [employer]. [address] [place] [address]. All rights reserved. This information is not intended as a substitute for professional medical care. Always follow your healthcare professional's instructions.          24 Hour Appointment Hotline       To make an appointment at any [facility] clinic, call [phone] ([phone]). If you don't have a family doctor or clinic, we will help you find one. [facility] clinics are conveniently located to serve the needs of you and your family.          ED Discharge Orders     MRI Lumbar spine w/o contrast                    Review of your medicines      START taking        Dose / Directions Last dose taken    HYDROcodone-acetaminophen 5-325 MG per tablet   Commonly known as:  NORCO   Dose:  1  tablet   Quantity:  10 tablet        Take 1 tablet by mouth every 6 hours as needed for severe pain   Refills:  0          Our records show that you are taking the medicines listed below. If these are incorrect, please call your family doctor or clinic.        Dose / Directions Last dose taken    aspirin 325 MG EC tablet   Quantity:  70 tablet        Take one Aspirin tab twice daily for 5 weeks.   Refills:  0        dicyclomine 10 MG capsule   Commonly known as:  BENTYL   Dose:  10 mg   Quantity:  240 capsule        Take 1 capsule (10 mg) by mouth 4 times daily (before meals and nightly)   Refills:  3        multivitamin, therapeutic Tabs tablet   Dose:  1 tablet        Take 1 tablet by mouth daily   Refills:  0        omeprazole 40 MG capsule   Commonly known as:  priLOSEC   Dose:  40 mg   Quantity:  90 capsule        Take 1 capsule (40 mg) by mouth daily Take 30-60 minutes before a meal.   Refills:  1        PARoxetine 40 MG tablet   Commonly known as:  PAXIL   Dose:  60 mg   Quantity:  90 tablet        Take 1.5 tablets (60 mg) by mouth every morning   Refills:  1        polyethylene glycol powder   Commonly known as:  MIRALAX/GLYCOLAX   Dose:  17 g        Take 17 g by mouth 2 times daily as needed   Refills:  0        senna-docusate 8.6-50 MG per tablet   Commonly known as:  SENOKOT-S;PERICOLACE   Dose:  1 tablet        Take 1 tablet by mouth daily as needed   Refills:  0        simvastatin 80 MG tablet   Commonly known as:  ZOCOR   Dose:  80 mg   Quantity:  90 tablet        Take 1 tablet (80 mg) by mouth At Bedtime   Refills:  3        TYLENOL PO   Dose:  650 mg        Take 650 mg by mouth every 6 hours as needed for mild pain or fever   Refills:  0        VITAMIN D (CHOLECALCIFEROL) PO   Dose:  1000 Units        Take 1,000 Units by mouth daily   Refills:  0                Information about OPIOIDS     PRESCRIPTION OPIOIDS: WHAT YOU NEED TO KNOW   You have a prescription for an opioid (narcotic) pain medicine.  Opioids can cause addiction. If you have a history of chemical dependency of any type, you are at a higher risk of becoming addicted to opioids. Only take this medicine after all other options have been tried. Take it for as short a time and as few doses as possible.     Do not:    Drive. If you drive while taking these medicines, you could be arrested for driving under the influence (DUI).    Operate heavy machinery    Do any other dangerous activities while taking these medicines.     Drink any alcohol while taking these medicines.      Take with any other medicines that contain acetaminophen. Read all labels carefully. Look for the word  acetaminophen  or  Tylenol.  Ask your pharmacist if you have questions or are unsure.    Store your pills in a secure place, locked if possible. We will not replace any lost or stolen medicine. If you don t finish your medicine, please throw away (dispose) as directed by your pharmacist. The [place] [employer]ution [employer] has more information about safe disposal: https://www.pca.[facility].[place].us/living-green/managing-unwanted-medications    All opioids tend to cause constipation. Drink plenty of water and eat foods that have a lot of fiber, such as fruits, vegetables, prune juice, apple juice and high-fiber cereal. Take a laxative (Miralax, milk of magnesia, Colace, Senna) if you don t move your bowels at least every other day.         Prescriptions were sent or printed at these locations (1 Prescription)                   Other Prescriptions                Printed at Department/Unit printer (1 of 1)         HYDROcodone-acetaminophen (NORCO) 5-325 MG per tablet                Procedures and tests performed during your visit     Lumbar spine XR, 2-3 views    XR Pelvis w Hip Right G/E 2 Views      Orders Needing Specimen Collection     None      Pending Results     No orders found from 5/14/2018 to 5/17/2018.            Pending Culture Results     No orders found from 5/14/2018 to  5/17/2018.            Pending Results Instructions     If you had any lab results that were not finalized at the time of your Discharge, you can call the ED Lab Result RN at [phone]. You will be contacted by this team for any positive Lab results or changes in treatment. The nurses are available 7 days a week from 10A to 6:30P.  You can leave a message 24 hours per day and they will return your call.        Test Results From Your Hospital Stay        5/16/2018  4:27 PM      Narrative     XR LUMBAR SPINE 2-3 VIEWS 5/16/2018 3:33 PM     HISTORY: fall, hx right hip replacement 1 month ago;     COMPARISON: None        Impression     IMPRESSION: There are 5 nonrib-bearing vertebrae. There is mild  scoliosis, convex left. There is a compression fracture of L1 with  approximately 20% loss of vertebral body height. This is of uncertain  age. Sagittal alignment is normal.    CHRISTIANO ALVAREZ MD         5/16/2018  4:28 PM      Narrative     XR PELVIS AND HIP RIGHT 2 VIEWS -  5/16/2018 3:33 PM     HISTORY:  fall, surgery of right hip 1 month ago; .     COMPARISON: 3/14/2018.        Impression     IMPRESSION: Postoperative changes of right total hip arthroplasty.  Bone fragments or heterotopic ossification medial to the right  proximal femur are unchanged. No evidence of acute fracture.    CHRISTIANO ALVAREZ MD                Clinical Quality Measure: Blood Pressure Screening     Your blood pressure was checked while you were in the emergency department today. The last reading we obtained was  BP: 128/71 . Please read the guidelines below about what these numbers mean and what you should do about them.  If your systolic blood pressure (the top number) is less than 120 and your diastolic blood pressure (the bottom number) is less than 80, then your blood pressure is normal. There is nothing more that you need to do about it.  If your systolic blood pressure (the top number) is 120-139 or your diastolic blood pressure (the bottom  "number) is 80-89, your blood pressure may be higher than it should be. You should have your blood pressure rechecked within a year by a primary care provider.  If your systolic blood pressure (the top number) is 140 or greater or your diastolic blood pressure (the bottom number) is 90 or greater, you may have high blood pressure. High blood pressure is treatable, but if left untreated over time it can put you at risk for heart attack, stroke, or kidney failure. You should have your blood pressure rechecked by a primary care provider within the next 4 weeks.  If your provider in the emergency department today gave you specific instructions to follow-up with your doctor or provider even sooner than that, you should follow that instruction and not wait for up to 4 weeks for your follow-up visit.        Thank you for choosing [facility]       Thank you for choosing [facility] for your care. Our goal is always to provide you with excellent care. Hearing back from our patients is one way we can continue to improve our services. Please take a few minutes to complete the written survey that you may receive in the mail after you visit with us. Thank you!        [employer] Information     [employer] lets you send messages to your doctor, view your test results, renew your prescriptions, schedule appointments and more. To sign up, go to [email or web address] . Click on \"Log in\" on the left side of the screen, which will take you to the Welcome page. Then click on \"Sign up Now\" on the right side of the page.     You will be asked to enter the access code listed below, as well as some personal information. Please follow the directions to create your username and password.     Your access code is: [email or web address]  Expires: 2018  4:44 PM     Your access code will  in 90 days. If you need help or a new code, please call your [facility] clinic or [phone].        Care EveryWhere ID     This is your Care EveryWhere ID. This could " be used by other organizations to access your [facility] medical records  [id number]        Equal Access to Services     RAMYA PORTILLO : Ruthie Gordon, carlyn tan, sylvie esquivel. So [facility] [phone].    ATENCIÓN: Si habla español, tiene a gamble disposición servicios gratuitos de asistencia lingüística. Llame al [phone].    We comply with applicable federal civil rights laws and [place] laws. We do not discriminate on the basis of race, color, national origin, age, disability, sex, sexual orientation, or gender identity.            After Visit Summary       This is your record. Keep this with you and show to your community pharmacist(s) and doctor(s) at your next visit.

## 2018-05-16 NOTE — ED AVS SNAPSHOT
[facility] Emergency Department    [address]    [facility] [address]    Phone:  [phone]    Fax:  [phone]                                       Yunior Angel   MRN: [id number]    Department:  [facility] Emergency Department   Date of Visit:  5/16/2018           After Visit Summary Signature Page     I have received my discharge instructions, and my questions have been answered. I have discussed any challenges I see with this plan with the nurse or doctor.    ..........................................................................................................................................  Patient/Patient Representative Signature      ..........................................................................................................................................  Patient Representative Print Name and Relationship to Patient    ..................................................               ................................................  Date                                            Time    ..........................................................................................................................................  Reviewed by Signature/Title    ...................................................              ..............................................  Date                                                            Time

## 2018-05-17 ENCOUNTER — PATIENT OUTREACH (OUTPATIENT)
Dept: CARE COORDINATION | Facility: CLINIC | Age: 53
End: 2018-05-17

## 2018-05-17 NOTE — PROGRESS NOTES
Clinic Care Coordination Contact  Care Coordination Communication    Chart review performed.   Refer to 05/04/2018 Telephone encounter re: [facility] services and potential group home placement, as initiated by patient's family.     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     Per 05/16/2018 ED Provider Notes:  Impression & Plan       Medical Decision Making:  This 52-year-old male patient presents the ED after a fall.  Please see the HPI and exam for specifics.  The patient was found to have an age indeterminate L1 compression fracture.  On exam there really was not any midline tenderness though the patient was somewhat difficult to get a history from given a prior traumatic brain injury.  I discussed the case with spine surgery and they recommend an MRI for definitive imaging and outpatient follow-up.  The patient was able to ambulate and states that he feels much more comfortable after pain medications were given in the ED.  He would like to be discharged home and I think that is reasonable, but he should have close outpatient follow-up.  I did send a message to his primary care provider through the  Epic messaging system and I have placed the initial order for the outpatient lumbar spine MRI as well.  I discussed the importance of follow-up with the patient as well as his parents were both in the room.  Anticipatory guidance given prior to discharge.     Diagnosis:      ICD-10-CM     1. Closed compression fracture of first lumbar vertebra, initial encounter (H) S32.010A MRI Lumbar spine w/o contrast   2. Fall, initial encounter W19.XXXA        Disposition:   Discharge     Discharge Medications:       New Prescriptions     HYDROCODONE-ACETAMINOPHEN (NORCO) 5-325 MG PER TABLET    Take 1 tablet by mouth every 6 hours as needed for severe pain      Scribe Disclosure:  I, Olman Willett, am serving as a scribe at 2:54 PM on 5/16/2018 to document services personally performed by Wilver Aggarwal DO, based on my observations and the provider's statements to me.     5/16/2018   [facility] EMERGENCY DEPARTMENT     Wilver Aggarwal DO  05/16/18 2300  -------------------------------------------------------------------------    Home Care Contact:              Home Care Agency: [facility]              Contact name () and phone number: Leona Montoya RN CM - #[phone]              Care Coordination contacted home care: Yes - spoke with ari Bergman.               Start of care date: 04/11/2018.      Patient is open to [facility] services at time.     Plan:     CCRN emailed [facility] RN CM - requested update upon DC from services.       RN/SW Care Coordinator will await notification from home care staff informing RN/SW Care Coordinator of patients discharge plans/needs. RN/SW Care Coordinator will review chart and outreach to home care every 4 weeks and as needed.      CCRN will outreach to patient/family upon DC from [facility] services to perform Initial Care Coordination assessment.     ENROLLMENT STATUS:   POTENTIAL     CARE COORDINATOR STATUS: Care Team Updated        STELLA Resendez  Health Services  Clinic Care Coordinator - Prior [place] Locations   Direct:  [phone] (voicemail available)

## 2018-05-21 ENCOUNTER — OFFICE VISIT (OUTPATIENT)
Dept: FAMILY MEDICINE | Facility: CLINIC | Age: 53
End: 2018-05-21
Payer: MEDICARE

## 2018-05-21 VITALS
BODY MASS INDEX: 27.35 KG/M2 | HEART RATE: 80 BPM | OXYGEN SATURATION: 97 % | WEIGHT: 191 LBS | TEMPERATURE: 98.8 F | HEIGHT: 70 IN | SYSTOLIC BLOOD PRESSURE: 104 MMHG | DIASTOLIC BLOOD PRESSURE: 62 MMHG

## 2018-05-21 DIAGNOSIS — M80.08XA AGE-RELATED OSTEOPOROSIS WITH CURRENT PATHOLOGICAL FRACTURE OF VERTEBRA, INITIAL ENCOUNTER (H): ICD-10-CM

## 2018-05-21 DIAGNOSIS — S32.010K CLOSED COMPRESSION FRACTURE OF L1 LUMBAR VERTEBRA WITH NONUNION, SUBSEQUENT ENCOUNTER: Primary | ICD-10-CM

## 2018-05-21 PROCEDURE — 99213 OFFICE O/P EST LOW 20 MIN: CPT | Performed by: FAMILY MEDICINE

## 2018-05-21 RX ORDER — HYDROCODONE BITARTRATE AND ACETAMINOPHEN 5; 325 MG/1; MG/1
1 TABLET ORAL EVERY 6 HOURS PRN
Qty: 20 TABLET | Refills: 0 | Status: SHIPPED | OUTPATIENT
Start: 2018-05-21 | End: 2018-06-25

## 2018-05-21 NOTE — PROGRESS NOTES
"  SUBJECTIVE:   Yunior Angel is a 52 year old male who presents to clinic today for the following health issues:      ED/UC Followup:    Facility:  [facility]  Date of visit: 5/16/2018  Reason for visit: Fall  Current Status: Still hurting pretty bad, feeling better when he is sitting     Has dx of L1 compression fracture.   MRI not scheduled but recommended   No prior osteoporosis dx   No chronic steroid use.     Vicodin helps but is out of meds    ROS:  General, neuro, sleep, psych, musculoskeletal system otherwise negative.     /62 (BP Location: Right arm, Cuff Size: Adult Regular)  Pulse 80  Temp 98.8  F (37.1  C) (Oral)  Ht 5' 10\" (1.778 m)  Wt 191 lb (86.6 kg)  SpO2 97%  BMI 27.41 kg/m2     General Appearance:  In mild distress   Appropriate questions  MKS: slow gait, using walker  NEURO: non focal    ASSESSMENT:  1. Closed compression fracture of L1 lumbar vertebra with nonunion, subsequent encounter  Arrange for MRI   Pain meds   Consider calcitonin if new fx   May need bisphosphonate  - DX Hip/Pelvis/Spine; Future  - HYDROcodone-acetaminophen (NORCO) 5-325 MG per tablet; Take 1 tablet by mouth every 6 hours as needed for severe pain  Dispense: 20 tablet; Refill: 0    2. Age-related osteoporosis with current pathological fracture of vertebra, initial encounter (H)     - DX Hip/Pelvis/Spine; Future   "

## 2018-05-21 NOTE — NURSING NOTE
"Chief Complaint   Patient presents with     ER F/U    /62 (BP Location: Right arm, Cuff Size: Adult Regular)  Pulse 80  Temp 98.8  F (37.1  C) (Oral)  Ht 5' 10\" (1.778 m)  Wt 191 lb (86.6 kg)  SpO2 97%  BMI 27.41 kg/m2 Body Mass Index is Body mass index is 27.41 kg/(m^2).  BP completed using cuff size : regular RIGHT ARM  Jamia Stevens MA        "

## 2018-05-21 NOTE — MR AVS SNAPSHOT
After Visit Summary   5/21/2018    Yunior Angel    MRN: [id number]           Patient Information     Date Of Birth          1965        Visit Information        Provider Department      5/21/2018 9:40 AM Mikey Rollins MD [facility]        Today's Diagnoses     Closed compression fracture of L1 lumbar vertebra with nonunion, subsequent encounter    -  1    Age-related osteoporosis with current pathological fracture of vertebra, initial encounter (H)            Follow-ups after your visit        Your next 10 appointments already scheduled     May 25, 2018  1:00 PM CDT   MR LUMBAR SPINE W/O CONTRAST with RHMR1   [facility] ([facility])    [address]  [facility] [address]   [phone]           Take your medicines as usual, unless your doctor tells you not to. Bring a list of your current medicines to your exam (including vitamins, minerals and over-the-counter drugs). Also bring the results of similar scans you may have had.  Please remove any body piercings and hair extensions before you arrive.  Follow your doctor s orders. If you do not, we may have to postpone your exam.  You may or may not receive IV contrast for this exam pending the discretion of the Radiologist.  You do not need to do anything special to prepare.  The MRI machine uses a strong magnet. Please wear clothes without metal (snaps, zippers). A sweatsuit works well, or we may give you a hospital gown.   **IMPORTANT** THE INSTRUCTIONS BELOW ARE ONLY FOR THOSE PATIENTS WHO HAVE BEEN PRESCRIBED SEDATION OR GENERAL ANESTHESIA DURING THEIR MRI PROCEDURE:  IF YOUR DOCTOR PRESCRIBED ORAL SEDATION (take medicine to help you relax during your exam):   You must get the medicine from your doctor (oral medication) before you arrive. Bring the medicine to the exam. Do not take it at home. You ll be told when to take it upon arriving for your exam.   Arrive one hour early. Bring someone  who can take you home after the test. Your medicine will make you sleepy. After the exam, you may not drive, take a bus or take a taxi by yourself.  IF YOUR DOCTOR PRESCRIBED IV SEDATION:   Arrive one hour early. Bring someone who can take you home after the test. Your medicine will make you sleepy. After the exam, you may not drive, take a bus or take a taxi by yourself.   No eating 6 hours before your exam. You may have clear liquids up until 4 hours before your exam. (Clear liquids include water, clear tea, black coffee and fruit juice without pulp.)  IF YOUR DOCTOR PRESCRIBED ANESTHESIA (be asleep for your exam):   Arrive 1 1/2 hours early. Bring someone who can take you home after the test. You may not drive, take a bus or take a taxi by yourself.   No eating 8 hours before your exam. You may have clear liquids up until 4 hours before your exam. (Clear liquids include water, clear tea, black coffee and fruit juice without pulp.)   You will spend four to five hours in the recovery room.  Please call the Imaging Department at your exam site with any questions.            Jun 07, 2018  7:30 AM CDT   DX HIP[facility]PELVIS/SPINE [facility]IDX[facility]    [address] [place] [address]              Please do not take any of the following 24 hours prior to the day of your exam: vitamins, calcium tablets, antacids.  If possible, please wear clothes without metal (snaps, zippers). A sweatsuit works well.              Future tests that were ordered for you today     Open Future Orders        Priority Expected Expires Ordered    DX Hip/Pelvis/Spine Routine  5/21/2019 5/21/2018            Who to contact     If you have questions or need follow up information about today's clinic visit or your schedule please contact [facility] [place] directly at [phone].  Normal or non-critical lab and imaging results will be communicated to you by  "MyChart, letter or phone within 4 business days after the clinic has received the results. If you do not hear from us within 7 days, please contact the clinic through [employer] or phone. If you have a critical or abnormal lab result, we will notify you by phone as soon as possible.  Submit refill requests through [employer] or call your pharmacy and they will forward the refill request to us. Please allow 3 business days for your refill to be completed.          Additional Information About Your Visit        [employer]isphere Information     Epuramat lets you send messages to your doctor, view your test results, renew your prescriptions, schedule appointments and more. To sign up, go to [email or web address] . Click on \"Log in\" on the left side of the screen, which will take you to the Welcome page. Then click on \"Sign up Now\" on the right side of the page.     You will be asked to enter the access code listed below, as well as some personal information. Please follow the directions to create your username and password.     Your access code is: [email or web address]  Expires: 2018  4:44 PM     Your access code will  in 90 days. If you need help or a new code, please call your [place] clinic or [phone].        Care EveryWhere ID     This is your Care EveryWhere ID. This could be used by other organizations to access your [place] medical records  [id number]        Your Vitals Were     Pulse Temperature Height Pulse Oximetry BMI (Body Mass Index)       80 98.8  F (37.1  C) (Oral) 5' 10\" (1.778 m) 97% 27.41 kg/m2        Blood Pressure from Last 3 Encounters:   18 104/62   18 135/80   18 125/68    Weight from Last 3 Encounters:   18 191 lb (86.6 kg)   18 191 lb 6.4 oz (86.8 kg)   18 192 lb 9.6 oz (87.4 kg)                 Where to get your medicines      Some of these will need a paper prescription and others can be bought over the counter.  Ask your nurse if you have questions.     Bring " a paper prescription for each of these medications     HYDROcodone-acetaminophen 5-325 MG per tablet         Information about OPIOIDS     PRESCRIPTION OPIOIDS: WHAT YOU NEED TO KNOW   You have a prescription for an opioid (narcotic) pain medicine. Opioids can cause addiction. If you have a history of chemical dependency of any type, you are at a higher risk of becoming addicted to opioids. Only take this medicine after all other options have been tried. Take it for as short a time and as few doses as possible.     Do not:    Drive. If you drive while taking these medicines, you could be arrested for driving under the influence (DUI).    Operate heavy machinery    Do any other dangerous activities while taking these medicines.     Drink any alcohol while taking these medicines.      Take with any other medicines that contain acetaminophen. Read all labels carefully. Look for the word  acetaminophen  or  Tylenol.  Ask your pharmacist if you have questions or are unsure.    Store your pills in a secure place, locked if possible. We will not replace any lost or stolen medicine. If you don t finish your medicine, please throw away (dispose) as directed by your pharmacist. The [place] Pollution Control Agency has more information about safe disposal: https://www.pca.[facility].[place].us/living-green/managing-unwanted-medications    All opioids tend to cause constipation. Drink plenty of water and eat foods that have a lot of fiber, such as fruits, vegetables, prune juice, apple juice and high-fiber cereal. Take a laxative (Miralax, milk of magnesia, Colace, Senna) if you don t move your bowels at least every other day.          Primary Care Provider Office Phone # Fax #    Alberto Carlos Jr., [phone] [address]  [place] [address]        Equal Access to Services     RAMYA PORTILLO AH: Ruthie manning Sotanvi, waaxda luqlili, qaybta sylvie nunez  ah. So [facility] [phone].    ATENCIÓN: Si courtney temple, tiene a gamble disposición servicios gratuitos de asistencia lingüística. [place] diallo [phone].    We comply with applicable federal civil rights laws and [place] laws. We do not discriminate on the basis of race, color, national origin, age, disability, sex, sexual orientation, or gender identity.            Thank you!     Thank you for choosing [facility]  for your care. Our goal is always to provide you with excellent care. Hearing back from our patients is one way we can continue to improve our services. Please take a few minutes to complete the written survey that you may receive in the mail after your visit with us. Thank you!             Your Updated Medication List - Protect others around you: Learn how to safely use, store and throw away your medicines at www.disposemymeds.org.          This list is accurate as of 5/21/18  2:26 PM.  Always use your most recent med list.                   Brand Name Dispense Instructions for use Diagnosis    aspirin 325 MG EC tablet     70 tablet    Take one Aspirin tab twice daily for 5 weeks.    Closed fracture of neck of right femur, initial encounter (H)       dicyclomine 10 MG capsule    BENTYL    240 capsule    Take 1 capsule (10 mg) by mouth 4 times daily (before meals and nightly)    Irritable bowel syndrome with diarrhea       HYDROcodone-acetaminophen 5-325 MG per tablet    NORCO    20 tablet    Take 1 tablet by mouth every 6 hours as needed for severe pain    Closed compression fracture of L1 lumbar vertebra with nonunion, subsequent encounter       multivitamin, therapeutic Tabs tablet      Take 1 tablet by mouth daily        omeprazole 40 MG capsule    priLOSEC    90 capsule    Take 1 capsule (40 mg) by mouth daily Take 30-60 minutes before a meal.    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

## 2018-05-23 ENCOUNTER — HOSPITAL ENCOUNTER (OUTPATIENT)
Dept: MRI IMAGING | Facility: CLINIC | Age: 53
Discharge: HOME | End: 2018-05-23
Attending: EMERGENCY MEDICINE | Admitting: EMERGENCY MEDICINE
Payer: MEDICARE

## 2018-05-23 DIAGNOSIS — S32.010A CLOSED COMPRESSION FRACTURE OF FIRST LUMBAR VERTEBRA, INITIAL ENCOUNTER: ICD-10-CM

## 2018-05-23 PROCEDURE — 72148 MRI LUMBAR SPINE W/O DYE: CPT

## 2018-05-24 ENCOUNTER — TELEPHONE (OUTPATIENT)
Dept: FAMILY MEDICINE | Facility: CLINIC | Age: 53
End: 2018-05-24

## 2018-05-24 NOTE — TELEPHONE ENCOUNTER
Leona with  home care calling. Reports that patient was going to be moving into group home and now family is reluctant. They will be having a care/case conference with the family next week and will need verbal OK for SW, PT and OT to be present during conference.    Verbal OK given for above. Leona verbalized understanding.    Will call back if further questions or concerns.    Dolores Kim, RN, BSN

## 2018-05-25 ENCOUNTER — TELEPHONE (OUTPATIENT)
Dept: FAMILY MEDICINE | Facility: CLINIC | Age: 53
End: 2018-05-25

## 2018-05-25 ENCOUNTER — DOCUMENTATION ONLY (OUTPATIENT)
Dept: CARE COORDINATION | Facility: CLINIC | Age: 53
End: 2018-05-25

## 2018-05-25 DIAGNOSIS — S32.010D CLOSED COMPRESSION FRACTURE OF L1 LUMBAR VERTEBRA WITH ROUTINE HEALING, SUBSEQUENT ENCOUNTER: Primary | ICD-10-CM

## 2018-05-25 RX ORDER — CALCITONIN SALMON 200 [IU]/.09ML
1 SPRAY, METERED NASAL DAILY
Qty: 1 BOTTLE | Refills: 1 | Status: SHIPPED | OUTPATIENT
Start: 2018-05-25 | End: 2019-06-26

## 2018-05-25 NOTE — TELEPHONE ENCOUNTER
Reason for Call:  Other     Detailed comments: Venessa from home care is calling saying the results are back from Yunior's MRI. She says to call the results to his mom Patsy. She was notified there is no consent to communicate on file. Venessa says she will have Patsy go in and sign one so we can convey those results to her. She says just to call Yunior and leave the results with him.    Phone Number Patient can be reached at: Cell number on file:    Telephone Information:   Mobile [phone]     Best Time: Anytime    Can we leave a detailed message on this number? YES    Call taken on 5/25/2018 at 9:50 AM by Dianna Greer

## 2018-05-25 NOTE — TELEPHONE ENCOUNTER
Mom returned a list of questions to me which I have transcribed below: Unfortunately he does not have mychart. I did discuss this with Patsy and she may sign up for it but did not want to at this time.    What is his injury?    Final MRI results?    Should he to go work on Tuesday?    What is plan of treatment?    How long (suzik) could his pain last?    After his total hip he is scheduled for Physical Therapy, should he forget that?     Reorder his pain med's? Or just take Tylenol for now?    Patsy [phone]    Dr. Rollins or Dr. Carlos please advise. Thank you!  Madelyn Aranda R.N.

## 2018-05-25 NOTE — TELEPHONE ENCOUNTER
MRI ordered by ED physician. Dr. Carlos would you be able to review/interpret result. Please advise. Thank you, Madelyn Aranda R.N.

## 2018-05-25 NOTE — PROGRESS NOTES
[facility] now requests orders and shares plan of care/discharge summaries for some patients through [employer].  Please REPLY TO THIS MESSAGE OR ROUTE BACK TO THE AUTHOR in order to give authorization for orders when needed.  This is considered a verbal order, you will still receive a faxed copy of orders for signature.  Thank you for your assistance in improving collaboration for our patients.    ORDER Requesting order for FU SW visit to facilitate Care Conf for long term care planning scheduled 5/30/18.

## 2018-05-25 NOTE — TELEPHONE ENCOUNTER
He does indeed have a compression fracture of L1 that is new and explains his pain.   I would like him to be seen by PMR to consider a brace or PT    He can walk if he feels stable on his feet.   I would like them to start on a nasal spray as we'd discussed, which should start to help heal the fracture.   He should see his PCP in the next week or two to get a follow up.  [facility].

## 2018-05-25 NOTE — TELEPHONE ENCOUNTER
Mother Patsy returned to clinic with paperwork and I discussed below from Dr. Rollins with her. She wanted RX transferred to [place]. I called [facility] and they got it ready for transfer. When I called [facility] pharmacy they do not carry in stock and would need to order. She says patient would need to call different pharmacies to see if they have on hand. I have left a message for Patsy with this information and advised her to call the pharmacy directly. Madelyn Aranda R.N.

## 2018-05-25 NOTE — TELEPHONE ENCOUNTER
Mother Patsy presented at . She is filling out new consent to communicate as on file does not have date/time. She will get signature and return with it. She is also legal guardian of Yunior and will bring that paperwork next time she can it is currently at her home in [place].     She is very anxious about how Yunior can proceed and MRI results due to call she received from Home care. She is writing down all of her questions now and will give to me. She does not know if he can walk and has other questions which once she returns to me I will put in this encounter. She wants Dr. Rollins or Dr. Carlos to review. She says she has to know today!! I informed her I will send to both Dr. Carlos and Dr. Rollins since he saw Yunior at last OV. If I do not hear from either by mid afternoon I told Patsy I will have Dr. Vega review as he is in clinic. By then she should have new consent to communicate returned to us.    I did inform her if the MRI was urgent she notification should have occurred when it was read.      I told Patsy I will speak to her regarding this before I leave the office for the day!    Dr. Rollins or Dr. Carlos please advise. Madelyn Aranda R.N.

## 2018-05-29 ENCOUNTER — TELEPHONE (OUTPATIENT)
Dept: FAMILY MEDICINE | Facility: CLINIC | Age: 53
End: 2018-05-29

## 2018-05-29 ENCOUNTER — NURSE TRIAGE (OUTPATIENT)
Dept: NURSING | Facility: CLINIC | Age: 53
End: 2018-05-29

## 2018-05-29 NOTE — TELEPHONE ENCOUNTER
Let's treat Yunior's compression fracture in the following fashion:    1. Tylenol should be the basis of our pain control if that's giving Yunior adequate pain relief.  He can take 1000 mg Tylenol three times daily.    2. If Tylenol isn't enough, we can augment this with  Calcitonin that Dr. Rollins prescribed earlier.  It looks like he's also got A prescription for Norco he could use.  Let's steer clear of ibuprofen as it looks like Yunior is already on a medication to treat GERD - ibuprofen could worsen the GERD issue.    3. I'll refer him to physical therapy to start treatment if he's not already doing that.    4. I don't think he needs a brace.  Studies aren't great in showing that a brace does much more than what the above treatments do.    5. We should evaluate Yunior for osteoporosis and consider starting him on a medication to strengthen his bones and reduce his risk for fracture.  We can do this sometime in the next 6-8 weeks as Yunior feels better from his fracture.  He can see me in follow up for this when we're ready to look at osteoporosis more deeply.    Alberto Carlos MD

## 2018-05-29 NOTE — TELEPHONE ENCOUNTER
Clinic Action Needed:  Please contact mother, Patsy, at [phone]; okay to leave message at that number.  Reason for Call:  Mother is asking if needs order for brace previously recommended by Dr. Rollins and where she should obtain brace.  Also has questions regarding therapy as recently sustained fracture of lumbar.  Routed to:  PCP Pool

## 2018-05-29 NOTE — TELEPHONE ENCOUNTER
Please see message from patient's mother, Patsy below. It appears that Patsy has stopped into clinic a couple of times along with phone calls. Are you able to review 5/21/18 OV notes, 5/23/18 MRI and 5/25/18 telephone encounter in AT, MD's absence and advise? Thank you.    Dolores Kim, RN, BSN  [facility]

## 2018-05-29 NOTE — TELEPHONE ENCOUNTER
Mother is asking if needs order for brace previously recommended by Dr. Rollins and where she should obtain brace.  Also has questions regarding therapy as recently sustained fracture of lumbar.  Message routed to PCP Pool.

## 2018-05-30 NOTE — TELEPHONE ENCOUNTER
I discussed in detail all of the below with Patsy Mojica's mother. Since Physical Therapy referral was done 5/2/18 new one may not need to be placed. But I told Patsy to check with her insurance and call us if she feels new Physical Therapy referral needed. Madelyn Aranda R.N.

## 2018-06-06 ENCOUNTER — TELEPHONE (OUTPATIENT)
Dept: FAMILY MEDICINE | Facility: CLINIC | Age: 53
End: 2018-06-06

## 2018-06-06 NOTE — TELEPHONE ENCOUNTER
Forms received by: Fax    Purpose of Form:  Home Care     How the form needs to be returned for patient:  Fax    Form currently placed: SHIVANI inbox in absence of SHIVANI Tolbert CMA

## 2018-06-07 ENCOUNTER — TELEPHONE (OUTPATIENT)
Dept: FAMILY MEDICINE | Facility: CLINIC | Age: 53
End: 2018-06-07

## 2018-06-07 DIAGNOSIS — S06.9XAS NEUROBEHAVIORAL SEQUELAE OF TRAUMATIC BRAIN INJURY (H): ICD-10-CM

## 2018-06-07 DIAGNOSIS — R46.89 NEUROBEHAVIORAL SEQUELAE OF TRAUMATIC BRAIN INJURY (H): ICD-10-CM

## 2018-06-07 DIAGNOSIS — K58.0 IRRITABLE BOWEL SYNDROME WITH DIARRHEA: ICD-10-CM

## 2018-06-07 RX ORDER — PAROXETINE 40 MG/1
60 TABLET, FILM COATED ORAL EVERY MORNING
Qty: 90 TABLET | Refills: 1 | Status: SHIPPED | OUTPATIENT
Start: 2018-06-07 | End: 2018-11-28

## 2018-06-07 RX ORDER — DICYCLOMINE HYDROCHLORIDE 10 MG/1
10 CAPSULE ORAL
Qty: 240 CAPSULE | Refills: 3 | Status: SHIPPED | OUTPATIENT
Start: 2018-06-07 | End: 2019-06-26

## 2018-06-07 NOTE — TELEPHONE ENCOUNTER
"  Requested Prescriptions   Pending Prescriptions Disp Refills     PARoxetine (PAXIL) 40 MG tablet  Last Written Prescription Date:  2/21/2018  Last Fill Quantity: 90 tablet,  # refills: 1    Last office visit: 2/21/2018 with prescribing provider:  Terrance   Future Office Visit:       90 tablet 1     Sig: Take 1.5 tablets (60 mg) by mouth every morning    SSRIs Protocol Passed    6/7/2018  9:13 AM    PHQ-9 SCORE 11/15/2013 12/10/2014   Total Score 3 3     OLIVER-7 SCORE 11/15/2013 12/10/2014   Total Score 0 3            Passed - Recent (12 mo) or future (30 days) visit within the authorizing provider's specialty    Patient had office visit in the last 12 months or has a visit in the next 30 days with authorizing provider or within the authorizing provider's specialty.  See \"Patient Info\" tab in inbasket, or \"Choose Columns\" in Meds & Orders section of the refill encounter.           Passed - Patient is age 18 or older                  dicyclomine (BENTYL) 10 MG capsule  Last Written Prescription Date:  8/29/2017  Last Fill Quantity: 240 capsule,  # refills: 3   Last office visit: 5/21/2018 with prescribing provider:  Terrance   Future Office Visit:       240 capsule 3     Sig: Take 1 capsule (10 mg) by mouth 4 times daily (before meals and nightly)    Oral Anticholinergic Agents Passed    6/7/2018  9:13 AM       Passed - Patient is of age 12 or older       Passed - Recent (12 mo) or future (30 days) visit with authorizing provider's specialty    Patient had office visit in the last 12 months or has a visit in the next 30 days with authorizing provider or within the authorizing provider's specialty.  See \"Patient Info\" tab in inbasket, or \"Choose Columns\" in Meds & Orders section of the refill encounter.              "

## 2018-06-07 NOTE — TELEPHONE ENCOUNTER
Routing refill request to provider for review/approval because:  PHQ-9 is not current.  No depression or anxiety on problem list.      EUSEBIO Hunter, RN, N  [facility]) [phone]

## 2018-06-07 NOTE — TELEPHONE ENCOUNTER
Reason for Call:  Other letter    Detailed comments: mother Patsy calling as pt needs a return to work note.  He was seen for a back fracture and was to start PT.  This has not been done yet due to confusion with the orders.  She states he does peace work with the [employer] and they have called and asked when he will be coming back and let her know they need that return to work note.  She will  at the  when ready.  Routing to PCP as well as provider pt saw in May.    Phone Number Patient can be reached at: Cell number on file:    Telephone Information:   Mobile [phone]       Best Time:     Can we leave a detailed message on this number? YES    Call taken on 6/7/2018 at 10:46 AM by Ping Garay

## 2018-06-07 NOTE — LETTER
June 12, 2018      Yunior Angel  [address]   [facility] [address]        To Whom It May Concern:    Yunior Angel was seen in our clinic. He may return to work with the following: limited to 4 hour workday and limited to light duty - lifting no greater than 10 pounds and no use of arms over shoulders on or about August 1, 2018.      Sincerely,        Alberto Carlos Jr, MD

## 2018-06-08 NOTE — TELEPHONE ENCOUNTER
Chart reviewed.  Rx sent to pt's preferred pharmacy.       [id number] - [place], [place] - [address] [facility] - [place], [place] - [facility] [address]   [facility] - [place], [place] - [address] [place] [address]    Alberto Carlos MD

## 2018-06-11 ENCOUNTER — MEDICAL CORRESPONDENCE (OUTPATIENT)
Dept: HEALTH INFORMATION MANAGEMENT | Facility: CLINIC | Age: 53
End: 2018-06-11

## 2018-06-12 NOTE — TELEPHONE ENCOUNTER
Called Patsy and let her know letter is ready. Brain has PT tomorrow for his first visit.  She asked that the letter be mailed to her home address.  She will call with any concerns.  Ping Garay

## 2018-06-12 NOTE — TELEPHONE ENCOUNTER
Letter written.  Yunior needs physical therapy and perhaps OT evaluation to help us with his walker/gait situation.   I don't see that he's been to physical therapy yet - he really needs to go.  I believe he has an order for this already.      Letter placed in TC inbasket to fax/mail or be picked up.      Please call Yunior's mother to advise her of above.    Alberto Carlos MD

## 2018-06-12 NOTE — TELEPHONE ENCOUNTER
Called mother and gave her info below.  Yunior has not been seen by physiatry or orthopedics.  Does pt need to be seen by these departments to be cleared?  Mom states pot works 4 hours per day and sits all day.  She states he is not missing much, but wonders if going back to work will help him recover as well.  SHe did also state that Yunior seems to be very dependent on the walker he is using and she wonders if he will ever not need the walker.  Can we clear him to go back to work or should he be seen to evaluate of this is okay?  Routing to PCP to advise.  Ping Garay

## 2018-06-13 ENCOUNTER — HOSPITAL ENCOUNTER (OUTPATIENT)
Dept: PHYSICAL THERAPY | Facility: CLINIC | Age: 53
Setting detail: THERAPIES SERIES
End: 2018-06-13
Attending: FAMILY MEDICINE
Payer: MEDICARE

## 2018-06-13 ENCOUNTER — PATIENT OUTREACH (OUTPATIENT)
Dept: CARE COORDINATION | Facility: CLINIC | Age: 53
End: 2018-06-13

## 2018-06-13 DIAGNOSIS — K21.00 GASTROESOPHAGEAL REFLUX DISEASE WITH ESOPHAGITIS: ICD-10-CM

## 2018-06-13 PROCEDURE — G8979 MOBILITY GOAL STATUS: HCPCS | Mod: GP,CJ | Performed by: PHYSICAL THERAPIST

## 2018-06-13 PROCEDURE — 97530 THERAPEUTIC ACTIVITIES: CPT | Mod: GP | Performed by: PHYSICAL THERAPIST

## 2018-06-13 PROCEDURE — G8978 MOBILITY CURRENT STATUS: HCPCS | Mod: GP,CL | Performed by: PHYSICAL THERAPIST

## 2018-06-13 PROCEDURE — 40000718 ZZHC STATISTIC PT DEPARTMENT ORTHO VISIT: Performed by: PHYSICAL THERAPIST

## 2018-06-13 PROCEDURE — 97110 THERAPEUTIC EXERCISES: CPT | Mod: GP | Performed by: PHYSICAL THERAPIST

## 2018-06-13 PROCEDURE — 97162 PT EVAL MOD COMPLEX 30 MIN: CPT | Mod: GP | Performed by: PHYSICAL THERAPIST

## 2018-06-13 NOTE — PROGRESS NOTES
[facility]        OUTPATIENT PHYSICAL THERAPY FUNCTIONAL EVALUATION  PLAN OF TREATMENT FOR OUTPATIENT REHABILITATION  (COMPLETE FOR INITIAL CLAIMS ONLY)  Patient's Last Name, First Name, M.I.  YOB: 1965  Yunior Angel     Provider's Name   [facility]   Medical Record No.  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      PT Diagnosis:  Impaired functional mobility and gait mechanics, R hip and low back pain Visits from SOC:  1                              __________________________________________________________________________________  Plan of Treatment/Functional Goals:  gait training, bed mobility training, balance training, neuromuscular re-education, ROM, strengthening, stretching, transfer training, manual therapy, joint mobilization           GOALS  Stairs  Yunior will be able to negotiate stairs in a reciprocal pattern with single rail with no c/o pain or instability for greater safety at home and in community.   08/17/18    Sit <> Stands  Yunior will demo ability to perform 8 reps sit <> stand from 17'' chair in 30 seconds, no UE support, to show improving strength, balance, and safety with functional transfers out of chairs at home.  08/17/18    25' Gait  Yunior will ambulate 25 feet in 7.6 sec or less, with LRAD, to indicate improved gait efficiency and reduced level of impairment (gait speed of ~1.0 m/sec at 7.6 sec, baseline 16.72 sec with  FWW).  08/17/18    TUG  Yunior will demo ability to perform the TUG in <13.5'' to indicate lower risk for falls and improved functional mobility (baseline score 26.15'' with FWW).  08/17/18    Activity  Yunior will report/demo ability to perform continuous activity including walking or R-stepper x 8' without pain for improved tolerance with daily activities and functional ambulation distances.  08/17/18    HEP  Yunior will demo (I) and good tolerance with HEP for continued improvement/management of condition and optimal level of function.  08/17/18    Therapy Frequency:  2 times/Week (2x/wk, decreasing frequency as appropriate)   Predicted Duration of Therapy Intervention:  8 wks    Solitario Pena, PT                                    I CERTIFY THE NEED FOR THESE SERVICES FURNISHED UNDER        THIS PLAN OF TREATMENT AND WHILE UNDER MY CARE     (Physician co-signature of this document indicates review and certification of the therapy plan).                Certification Date From:  06/13/18   Certification Date To:  08/17/18    Referring Provider:  Alberto Carlos MD    Initial Assessment  See Epic Evaluation- Start of Care Date: 06/13/18

## 2018-06-13 NOTE — TELEPHONE ENCOUNTER
"Requested Prescriptions   Pending Prescriptions Disp Refills     omeprazole (PRILOSEC) 40 MG capsule  Last Written Prescription Date:  9/28/2017  Last Fill Quantity: 90 capsule,  # refills: 1   Last office visit: 2/21/2018 with prescribing provider:  Terrance   Future Office Visit:       90 capsule 1     Sig: Take 1 capsule (40 mg) by mouth daily Take 30-60 minutes before a meal.    PPI Protocol Passed    6/13/2018  2:14 PM       Passed - Not on Clopidogrel (unless Pantoprazole ordered)       Passed - No diagnosis of osteoporosis on record       Passed - Recent (12 mo) or future (30 days) visit within the authorizing provider's specialty    Patient had office visit in the last 12 months or has a visit in the next 30 days with authorizing provider or within the authorizing provider's specialty.  See \"Patient Info\" tab in inbasket, or \"Choose Columns\" in Meds & Orders section of the refill encounter.           Passed - Patient is age 18 or older          "

## 2018-06-13 NOTE — PROGRESS NOTES
Clinic Care Coordination Contact  Care Coordination Communication    Home Care Contact:              Home Care Agency: [facility]               Care Coordination contacted home care: Yes - spoke with ari eBrgman.               Patient is currently open to services at this time.     Plan: RN/SW Care Coordinator will await notification from home care staff informing RN/SW Care Coordinator of patients discharge plans/needs. RN/SW Care Coordinator will review chart and outreach to home care every 4 weeks and as needed.    ENROLLMENT STATUS:  POTENTIAL   CARE COORDINATOR STATUS: Care Team current.     Yuki Carty, STELLA  [facility]  Clinic Care Coordinator - Prior [place] Locations   Direct:  [phone] (voicemail available)   (Today's Date: 06/13/2018)

## 2018-06-13 NOTE — PROGRESS NOTES
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 Zabala on 3/14/18 and was subsequently discharged to TCU, then home with home therapy services.  After returning home, he sustained a mechanical fall while using his walker on 5/14/18, hurt his back, found to have L1 compression fracture. Appears this is being treated conservatively, no bracing at this time.  Has been reliant on FWW since hip surgery, not using any AD prior to that.  Had not returned to work yet, but does work part time doing piece-work at a desk.  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  Anxiety, and migraines.   Pertinent Visual History  wears glasses   Prior level of function comment Indep PLOF, using FWW since hip surgery.  Recieves some help with housework.  Has elevator, but does take stairs at times.  Was told not to use the stairs from home PT after returning home.   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 Yes   Have you fallen and had an injury in the past year? Yes   Timed Up and Go score (seconds) 26.15   Is patient a fall risk? Yes;Department fall risk interventions implemented   Fall screen comments >13.5'' increased fall risk, using FWW currently   Pain   Patient currently in pain Yes   Pain location Mild R hip and low back pain, does not quantify   Pain rating unable to rate   Pain description comment pain tends to increase with movement, stretching R hip/leg, bed mobility increases back pain   Cognitive Status Examination   Orientation orientation to person, place and time   Memory impaired   Cognitive Comment Memory impairment due to TBI   Integumentary   Integumentary Comments unable to visualize R hip scar today due to clothing.  No bruising/swelling to lumbar area noted   Posture   Posture Forward head position;Protracted shoulders   Posture Comments R knee hyperextension with WB, R ankle resting in PF/inverted positioning, shifts weight to his left in standing.   Palpation   Palpation mild TTP along R incisional area along hip, mild TTP about T12-L2 region   Range of Motion (ROM)   ROM Comment Limited ROM about R hip, flexion PROM to ~95 deg, limited IR/adduction.  WNL LLE.  R ankle limitations at baseline due to longstanding weakness from TBI, PROM R ankle to just  shy of neutral DF.   Strength   Strength Comments Trace R ankle DF, 4+/5 R knee extension, proximal hip weakness evident s/p DANIELLE.  WNL LLE. Baseline RLE weakness secondary to TBI.   Bed Mobility   Bed Mobility Comments Indep, but painful about low back, not performing log roll   Transfer Skills   Transfer Comments sit <> stand transfers with definite need for UE support independently, FWW for stability in standing   Gait   Gait Comments Amb with slowed gait speed, FWW reliance, antalgic gait pattern due to longstanding deficits from TBI involving R leg, in addition to acute R hip/low back pain and weakness s/p injury/surgery.  R knee hyperextending with stance phase, decreased heel strike due to foot drop, compensatory steppage gait pattern with R hip hike during limb advancement.  Amb indep with FWW.   Gait Special Tests 25 Foot Timed Walk   Seconds 16.72   Comments FWW   Balance   Balance Comments Impaired static and dynamic balance noted, using FWW for stability with gait skills currently.   Balance Special Tests Timed Up and Go   Seconds 26.15 Seconds   Comments FWW   Sensory Examination   Sensory Perception Comments denies n/t   Coordination   Coordination Comments impaired coordination RLE with gait pattern   Muscle Tone   Muscle Tone Comments tone noted RLE at ankle and hip   Modality Interventions   Planned Modality Interventions Comments per therapist discretion   Planned Therapy Interventions   Planned Therapy Interventions gait training;bed mobility training;balance training;neuromuscular re-education;ROM;strengthening;stretching;transfer training;manual therapy;joint mobilization   Clinical Impression   Criteria for Skilled Therapeutic Interventions Met yes, treatment indicated   PT Diagnosis Impaired functional mobility and gait mechanics, R hip and low back pain   Influenced by the following impairments R hip and low back pain, spinal precautions yet secondary to recent lumbar compression fracture,  decreased R hip and spinal ROM/mobility, impaired balance, baseline RLE weakness/incoordination/tone/foot drop secondary to TBI, impaired cognition   Functional limitations due to impairments Impaired safety and tolerance with bed mobility skills, functional transfers, gait tolerance/stability, fall risk, inability to return to work and manage independently within his apartment   Clinical Presentation Evolving/Changing   Clinical Presentation Rationale complex PMH, multiple body systems involved including subacute hip and low back musculoskelatal issues, fall hx, unclear plan of care for lumbar compression fracture   Clinical Decision Making (Complexity) Moderate complexity   Therapy Frequency 2 times/Week  (2x/wk, decreasing frequency as appropriate)   Predicted Duration of Therapy Intervention (days/wks) 8 wks   Risk & Benefits of therapy have been explained Yes   Patient, Family & other staff in agreement with plan of care Yes   Education Assessment   Preferred Learning Style Demonstration;Pictures/video   Barriers to Learning Cognitive   Goal 1   Goal Identifier Stairs   Goal Description Yunior will be able to negotiate stairs in a reciprocal pattern with single rail with no c/o pain or instability for greater safety at home and in community.    Target Date 08/17/18   Goal 2   Goal Identifier Sit <> Stands   Goal Description Yunior will demo ability to perform 8 reps sit <> stand from 17'' chair in 30 seconds, no UE support, to show improving strength, balance, and safety with functional transfers out of chairs at home.   Target Date 08/17/18   Goal 3   Goal Identifier 25' Gait   Goal Description Yunior will ambulate 25 feet in 7.6 sec or less, with LRAD, to indicate improved gait efficiency and reduced level of impairment (gait speed of ~1.0 m/sec at 7.6 sec, baseline 16.72 sec with FWW).   Target Date 08/17/18   Goal 4   Goal Identifier TUG   Goal Description Yunior will demo ability to perform the TUG in <13.5''  to indicate lower risk for falls and improved functional mobility (baseline score 26.15'' with FWW).   Target Date 08/17/18   Goal 5   Goal Identifier Activity   Goal Description Yunior will report/demo ability to perform continuous activity including walking or R-stepper x 8' without pain for improved tolerance with daily activities and functional ambulation distances.   Target Date 08/17/18   Goal 6   Goal Identifier HEP   Goal Description Yunior will demo (I) and good tolerance with HEP for continued improvement/management of condition and optimal level of function.   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

## 2018-06-14 RX ORDER — OMEPRAZOLE 40 MG/1
40 CAPSULE, DELAYED RELEASE ORAL DAILY
Qty: 90 CAPSULE | Refills: 1 | Status: SHIPPED | OUTPATIENT
Start: 2018-06-14 | End: 2019-01-14

## 2018-06-14 NOTE — TELEPHONE ENCOUNTER
Prescription approved per [facility] Refill Protocol.  Dolores Kim, RN, BSN  [facility]

## 2018-06-15 ENCOUNTER — RADIANT APPOINTMENT (OUTPATIENT)
Dept: BONE DENSITY | Facility: CLINIC | Age: 53
End: 2018-06-15
Attending: FAMILY MEDICINE
Payer: MEDICARE

## 2018-06-15 ENCOUNTER — TELEPHONE (OUTPATIENT)
Dept: FAMILY MEDICINE | Facility: CLINIC | Age: 53
End: 2018-06-15

## 2018-06-15 ENCOUNTER — RADIANT APPOINTMENT (OUTPATIENT)
Dept: BONE DENSITY | Facility: CLINIC | Age: 53
End: 2018-06-15
Payer: MEDICARE

## 2018-06-15 DIAGNOSIS — S32.010K CLOSED COMPRESSION FRACTURE OF L1 LUMBAR VERTEBRA WITH NONUNION, SUBSEQUENT ENCOUNTER: ICD-10-CM

## 2018-06-15 DIAGNOSIS — M80.08XA AGE-RELATED OSTEOPOROSIS WITH CURRENT PATHOLOGICAL FRACTURE OF VERTEBRA, INITIAL ENCOUNTER (H): ICD-10-CM

## 2018-06-15 DIAGNOSIS — M62.838 MUSCLE SPASTICITY: ICD-10-CM

## 2018-06-15 PROCEDURE — 77081 DXA BONE DENSITY APPENDICULR: CPT | Performed by: INTERNAL MEDICINE

## 2018-06-15 RX ORDER — BACLOFEN 10 MG/1
10 TABLET ORAL 2 TIMES DAILY
Qty: 90 TABLET | Refills: 3 | OUTPATIENT
Start: 2018-06-15

## 2018-06-15 NOTE — TELEPHONE ENCOUNTER
This med was d/c'd 3/13/18.  I reached his mom and she states he is not on this medication.    Declined to pharmacy  Mindy Lyles RN- Triage [employer]

## 2018-06-20 ENCOUNTER — HOSPITAL ENCOUNTER (OUTPATIENT)
Dept: PHYSICAL THERAPY | Facility: CLINIC | Age: 53
Setting detail: THERAPIES SERIES
End: 2018-06-20
Attending: FAMILY MEDICINE
Payer: MEDICARE

## 2018-06-20 ENCOUNTER — TELEPHONE (OUTPATIENT)
Dept: FAMILY MEDICINE | Facility: CLINIC | Age: 53
End: 2018-06-20

## 2018-06-20 PROCEDURE — 97110 THERAPEUTIC EXERCISES: CPT | Mod: GP | Performed by: PHYSICAL THERAPIST

## 2018-06-20 PROCEDURE — 97112 NEUROMUSCULAR REEDUCATION: CPT | Mod: GP | Performed by: PHYSICAL THERAPIST

## 2018-06-20 PROCEDURE — 40000718 ZZHC STATISTIC PT DEPARTMENT ORTHO VISIT: Performed by: PHYSICAL THERAPIST

## 2018-06-20 PROCEDURE — 97530 THERAPEUTIC ACTIVITIES: CPT | Mod: GP | Performed by: PHYSICAL THERAPIST

## 2018-06-20 NOTE — TELEPHONE ENCOUNTER
Leona [phone] [facility] calling today. Home care will be setting Yunior's med's up once per week. Last week when she went out to do re--certification she was told by mother another agency is involved. But now has been clarified family wants [facility] to do home care.   Yunior will get a medication dispensing machine, it had been returned and now they are sending it back. Leona will be writing new certification for 9 weeks at a time.     Okay verbal order for continue home care given per protocol. Leona will figure out visits needed and then we will be informed. Madelyn Aranda R.N.

## 2018-06-25 ENCOUNTER — OFFICE VISIT (OUTPATIENT)
Dept: FAMILY MEDICINE | Facility: CLINIC | Age: 53
End: 2018-06-25
Payer: MEDICARE

## 2018-06-25 VITALS
BODY MASS INDEX: 27.71 KG/M2 | DIASTOLIC BLOOD PRESSURE: 68 MMHG | HEART RATE: 72 BPM | SYSTOLIC BLOOD PRESSURE: 121 MMHG | TEMPERATURE: 97.1 F | WEIGHT: 193.1 LBS | OXYGEN SATURATION: 98 %

## 2018-06-25 DIAGNOSIS — M80.00XD AGE-RELATED OSTEOPOROSIS WITH CURRENT PATHOLOGICAL FRACTURE WITH ROUTINE HEALING, SUBSEQUENT ENCOUNTER: Primary | ICD-10-CM

## 2018-06-25 DIAGNOSIS — G83.10 MONOPLEGIA OF LOWER LIMB AFFECTING DOMINANT SIDE (H): ICD-10-CM

## 2018-06-25 DIAGNOSIS — D56.8 OTHER THALASSEMIA (H): ICD-10-CM

## 2018-06-25 DIAGNOSIS — S72.001D CLOSED FRACTURE OF NECK OF RIGHT FEMUR WITH ROUTINE HEALING, SUBSEQUENT ENCOUNTER: ICD-10-CM

## 2018-06-25 LAB
BASOPHILS # BLD AUTO: 0.1 10E9/L (ref 0–0.2)
BASOPHILS NFR BLD AUTO: 1.9 %
DIFFERENTIAL METHOD BLD: ABNORMAL
EOSINOPHIL # BLD AUTO: 0.8 10E9/L (ref 0–0.7)
EOSINOPHIL NFR BLD AUTO: 10.6 %
ERYTHROCYTE [DISTWIDTH] IN BLOOD BY AUTOMATED COUNT: 16.3 % (ref 10–15)
HCT VFR BLD AUTO: 42.9 % (ref 40–53)
HGB BLD-MCNC: 14 G/DL (ref 13.3–17.7)
LYMPHOCYTES # BLD AUTO: 2.7 10E9/L (ref 0.8–5.3)
LYMPHOCYTES NFR BLD AUTO: 37.2 %
MCH RBC QN AUTO: 22.9 PG (ref 26.5–33)
MCHC RBC AUTO-ENTMCNC: 32.6 G/DL (ref 31.5–36.5)
MCV RBC AUTO: 70 FL (ref 78–100)
MONOCYTES # BLD AUTO: 0.5 10E9/L (ref 0–1.3)
MONOCYTES NFR BLD AUTO: 7 %
NEUTROPHILS # BLD AUTO: 3.1 10E9/L (ref 1.6–8.3)
NEUTROPHILS NFR BLD AUTO: 43.3 %
PLATELET # BLD AUTO: 388 10E9/L (ref 150–450)
RBC # BLD AUTO: 6.11 10E12/L (ref 4.4–5.9)
WBC # BLD AUTO: 7.2 10E9/L (ref 4–11)

## 2018-06-25 PROCEDURE — 80053 COMPREHEN METABOLIC PANEL: CPT | Performed by: FAMILY MEDICINE

## 2018-06-25 PROCEDURE — 36415 COLL VENOUS BLD VENIPUNCTURE: CPT | Performed by: FAMILY MEDICINE

## 2018-06-25 PROCEDURE — 85025 COMPLETE CBC W/AUTO DIFF WBC: CPT | Performed by: FAMILY MEDICINE

## 2018-06-25 PROCEDURE — 99214 OFFICE O/P EST MOD 30 MIN: CPT | Performed by: FAMILY MEDICINE

## 2018-06-25 PROCEDURE — 82306 VITAMIN D 25 HYDROXY: CPT | Performed by: FAMILY MEDICINE

## 2018-06-25 NOTE — LETTER
[facility]                      [phone]   June 27, 2018    Yunior Angel  [address]  [facility] [place] [address]      Dear Yunior,    Here is a summary of your recent test results:    -Liver and gallbladder tests are normal. (ALT,AST, Alk phos, bilirubin), kidney function is normal (Cr, GFR), Sodium is normal, Potassium is normal, Calcium is normal, Glucose is normal (diabetes screening test).   -Normal red blood cell (hgb) levels, normal white blood cell count and normal platelet levels.  Hemoglobin has risen back to the normal range compared to where it was three months ago.  This is good.   -Vitamin D level is normal, 1000 IU daily in diet or supplements is recommended.     Your test results are enclosed.      Please contact me if you have any questions.            Thank you very much for trusting [facility].     Healthy regards,  Alberto Carlos Jr, MD          Results for orders placed or performed in visit on 06/25/18   CBC with platelets differential   Result Value Ref Range    WBC 7.2 4.0 - 11.0 10e9/L    RBC Count 6.11 (H) 4.4 - 5.9 10e12/L    Hemoglobin 14.0 13.3 - 17.7 g/dL    Hematocrit 42.9 40.0 - 53.0 %    MCV 70 (L) 78 - 100 fl    MCH 22.9 (L) 26.5 - 33.0 pg    MCHC 32.6 31.5 - 36.5 g/dL    RDW 16.3 (H) 10.0 - 15.0 %    Platelet Count 388 150 - 450 10e9/L    Diff Method Automated Method     % Neutrophils 43.3 %    % Lymphocytes 37.2 %    % Monocytes 7.0 %    % Eosinophils 10.6 %    % Basophils 1.9 %    Absolute Neutrophil 3.1 1.6 - 8.3 10e9/L    Absolute Lymphocytes 2.7 0.8 - 5.3 10e9/L    Absolute Monocytes 0.5 0.0 - 1.3 10e9/L    Absolute Eosinophils 0.8 (H) 0.0 - 0.7 10e9/L    Absolute Basophils 0.1 0.0 - 0.2 10e9/L   Vitamin D Deficiency   Result Value Ref Range    Vitamin D Deficiency screening 43 20 - 75 ug/L   Comprehensive metabolic panel (BMP + Alb, Alk Phos, ALT, AST, Total. Bili, TP)   Result Value Ref Range    Sodium  143 133 - 144 mmol/L    Potassium 4.2 3.4 - 5.3 mmol/L    Chloride 107 94 - 109 mmol/L    Carbon Dioxide 27 20 - 32 mmol/L    Anion Gap 9 3 - 14 mmol/L    Glucose 98 70 - 99 mg/dL    Urea Nitrogen 18 7 - 30 mg/dL    Creatinine 0.84 0.66 - 1.25 mg/dL    GFR Estimate >90 >60 mL/min/1.7m2    GFR Estimate If Black >90 >60 mL/min/1.7m2    Calcium 9.7 8.5 - 10.1 mg/dL    Bilirubin Total 0.5 0.2 - 1.3 mg/dL    Albumin 4.0 3.4 - 5.0 g/dL    Protein Total 7.7 6.8 - 8.8 g/dL    Alkaline Phosphatase 88 40 - 150 U/L    ALT 32 0 - 70 U/L    AST 16 0 - 45 U/L

## 2018-06-25 NOTE — MR AVS SNAPSHOT
After Visit Summary   6/25/2018    Yunior Angel    MRN: [id number]           Patient Information     Date Of Birth          1965        Visit Information        Provider Department      6/25/2018 1:40 PM Alberto Carlos Jr., MD [facility]        Today's Diagnoses     Age-related osteoporosis with current pathological fracture with routine healing, subsequent encounter    -  1    Closed fracture of neck of right femur with routine healing, subsequent encounter        Beta Thalassemia Minor          Care Instructions      Osteoporosis: Understanding Bone Loss     A balanced system keeps building and resorbing bone.   The body has a natural system for maintaining bone. Understanding this system can help you learn how to maintain your bones.  A balanced system supports the body  The body is always losing (resorbing) and making bone. This process is called remodeling. Bone-resorbing cells take bone apart. They do this so the minerals can be used to repair an injury or make new bone. Bone-making cells form new bone using calcium and other minerals. These minerals come from the food you eat. When this bone-making system is in balance, the same amount of bone is built and resorbed.        An unbalanced system can t give support     An unblalanced system builds too little bone and resorbs too much.   Changes in hormone levels, activity, medications, or diet can affect the bone-making system. When the system gets out of balance, the amount of bone lost is greater than the amount of bone made. This can cause osteopenia (when bone starts to become less dense). Left untreated, bone loss gets worse, leading to osteoporosis. Weak bones can t support the body. In fact, they can fracture just from the weight of your body. This often happens in vertebrae (bones of the spine). When vertebrae fracture, parts of the spine compress. This causes the back to bend or hump over, and it can also cause  back pain.  Date Last Reviewed: 10/11/2015    8534-3224 [employer]. [address] [place] [address]21. All rights reserved. This information is not intended as a substitute for professional medical care. Always follow your healthcare professional's instructions.        Preventing Osteoporosis: Avoiding Bone Loss  Certain factors can speed up bone loss or decrease bone growth. For example, alcohol, cigarettes, and certain medicines reduce bone mass. Some foods make it hard for your body to absorb calcium.    Things to avoid  Here are things to avoid to help prevent osteoporosis:    Alcohol is toxic to bones. It is a major cause of bone loss. Heavy drinking can cause osteoporosis even if you have no other risk factors.    Smoking reduces bone mass. Smoking may also interfere with estrogen levels and cause early menopause.    Inactivity makes your bones lose strength and become thinner. Over time, thin bones may break. Women who aren't active are at a high risk for osteoporosis.    Certain medicines, such as cortisone, increase bone loss. They also decrease bone growth. Ask your healthcare provider about any side effects of your medicines, and how to prevent them.    Protein-rich or salty foods eaten in large amounts may deplete calcium.    Caffeine increases calcium loss. People who drink a lot of coffee, tea, or onelia lose more calcium than those who don't.  Date Last Reviewed: 10/17/2015    2308-8658 [employer]. [address] [place] [address] 12913. All rights reserved. This information is not intended as a substitute for professional medical care. Always follow your healthcare professional's instructions.                Follow-ups after your visit        Additional Services     ENDOCRINOLOGY ADULT REFERRAL       Your provider has referred you to: [facility]: [facility] [place] [phone]   [email or web address]      Please be aware  that coverage of these services is subject to the terms and limitations of your health insurance plan.  Call member services at your health plan with any benefit or coverage questions.      Please bring the following to your appointment:    >>   Any x-rays, CTs or MRIs which have been performed.  Contact the facility where they were done to arrange for  prior to your scheduled appointment.    >>   List of current medications   >>   This referral request   >>   Any documents/labs given to you for this referral                  Follow-up notes from your care team     Return in about 6 months (around 12/25/2018), or if symptoms worsen or fail to improve.      Your next 10 appointments already scheduled     Jun 27, 2018  2:00 PM CDT   Treatment 45 with Solitario Becky, PT   [facility] ([facility])    [address] [address]   [phone]            Jul 02, 2018  4:15 PM CDT   Treatment 45 with Solitario Becky, PT   [facility] ([facility])    [address] [address]   [phone]            Jul 09, 2018  4:15 PM CDT   Treatment 45 with Solitario Becky, PT   [facility] ([facility])    [address] [address]   [phone]            Jul 11, 2018  4:15 PM CDT   Treatment 45 with Solitario [place], PT   [facility] ([facility])    [address] [address]   [phone]            Jul 16, 2018  4:15 PM CDT   Treatment 45 with Solitario Becky, PT   [facility] ([facility])    [address] [address]   [phone]            Jul 18, 2018  4:15 PM CDT   Treatment 45 with Solitario [place], PT   [facility] ([facility])    [address] [address]   [phone]             Jul 23, 2018  4:15 PM CDT   Treatment 45 with Solitario [place], PT   [facility] ([facility])    [address] [facility] [address]   [phone]            Jul 25, 2018  4:15 PM CDT   Treatment 45 with Solitario Becky, PT   [facility] ([facility])    [address] [facility] [address]   [phone]            Jul 30, 2018  4:15 PM CDT   Treatment 45 with Solitario Becky, PT   [facility] ([facility])    [address] [facility] [address]   [phone]            Aug 01, 2018  4:15 PM CDT   Treatment 45 with Solitario [place], PT   [facility] ([facility])    [address] [facility] [id number] [phone]              Who to contact     If you have questions or need follow up information about today's clinic visit or your schedule please contact [facility] [place] directly at [phone].  Normal or non-critical lab and imaging results will be communicated to you by MyChart, letter or phone within 4 business days after the clinic has received the results. If you do not hear from us within 7 days, please contact the clinic through MyChart or phone. If you have a critical or abnormal lab result, we will notify you by phone as soon as possible.  Submit refill requests through [place] or call your pharmacy and they will forward the refill request to us. Please allow 3 business days for your refill to be completed.          Additional Information About Your Visit        Care EveryWhere ID     This is your Care EveryWhere ID. This could be used by other organizations to access your [place] medical records  [id number]        Your Vitals Were     Pulse Temperature Pulse Oximetry BMI (Body Mass Index)          72 97.1  F (36.2  C) (Oral) 98% 27.71 kg/m2         Blood Pressure from Last 3 Encounters:   06/25/18 121/68    05/21/18 104/62   05/16/18 135/80    Weight from Last 3 Encounters:   06/25/18 193 lb 1.6 oz (87.6 kg)   05/21/18 191 lb (86.6 kg)   04/06/18 191 lb 6.4 oz (86.8 kg)              We Performed the Following     CBC with platelets differential     Comprehensive metabolic panel (BMP + Alb, Alk Phos, ALT, AST, Total. Bili, TP)     ENDOCRINOLOGY ADULT REFERRAL     Vitamin D Deficiency        Primary Care Provider Office Phone # Fax #    Alberto Carlos Jr., [phone] [address]  [place] [address]        Equal Access to Services     [facility] : Hadii jules ku hadasho Sonikkiali, waaxda luqadaha, qaybta kaalmada adedinayada, sylvie ch. So [facility] [phone].    ATENCIÓN: Si habla español, tiene a gamble disposición servicios gratuitos de asistencia lingüística. Llame al [phone].    We comply with applicable federal civil rights laws and [place] laws. We do not discriminate on the basis of race, color, national origin, age, disability, sex, sexual orientation, or gender identity.            Thank you!     Thank you for choosing [facility]  for your care. Our goal is always to provide you with excellent care. Hearing back from our patients is one way we can continue to improve our services. Please take a few minutes to complete the written survey that you may receive in the mail after your visit with us. Thank you!             Your Updated Medication List - Protect others around you: Learn how to safely use, store and throw away your medicines at www.disposemymeds.org.          This list is accurate as of 6/25/18  2:11 PM.  Always use your most recent med list.                   Brand Name Dispense Instructions for use Diagnosis    aspirin 325 MG EC tablet     70 tablet    Take one Aspirin tab twice daily for 5 weeks.    Closed fracture of neck of right femur, initial encounter (H)       calcitonin (salmon) 200 UNIT/ACT nasal spray    MIACALCIN     1 Bottle    Spray 1 spray into one nostril alternating nostrils daily Alternate nostril each day.    Closed compression fracture of L1 lumbar vertebra with routine healing, subsequent encounter       dicyclomine 10 MG capsule    BENTYL    240 capsule    Take 1 capsule (10 mg) by mouth 4 times daily (before meals and nightly)    Irritable bowel syndrome with diarrhea       multivitamin, therapeutic Tabs tablet      Take 1 tablet by mouth daily        omeprazole 40 MG capsule    priLOSEC    90 capsule    Take 1 capsule (40 mg) by mouth daily Take 30-60 minutes before a meal.    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

## 2018-06-25 NOTE — PROGRESS NOTES
SUBJECTIVE:   Yunior Angel is a 52 year old male, accompanied by his mother, who presents to clinic today for the following health issues:    Follow up on Dexa scan results. See imaging for findings and impression. His DEXA scan from 6/15/18 showed severe osteoporosis on the basis of vertebral and hip fractures.     His mother notes pt broke his hip without preceding trauma.  Then shortly after breaking his hip, he fractured his lumbar vertebrae (L1). Pt notes his pain was severe initially, more so with his lumbar fracture, but his pain has gradually improved.    Pt declined HIV screening today.      Problem list and histories reviewed & adjusted, as indicated.  Additional history: as documented    Reviewed and updated as needed this visit by clinical staff  Tobacco  Allergies  Med Hx  Surg Hx  Fam Hx  Soc Hx      Reviewed and updated as needed this visit by Provider       ROS:  Constitutional, HEENT, cardiovascular, pulmonary, gi and gu systems are negative, except as otherwise noted.    This document serves as a record of the services and decisions personally performed and made by Alberto Carlos MD. It was created on his behalf by Ankur Oden, a trained medical scribe. The creation of this document is based on the provider's statements to the medical scribe.  Ankur Oden 1:53 PM June 25, 2018    OBJECTIVE:     /68 (BP Location: Right arm, Patient Position: Sitting, Cuff Size: Adult Regular)  Pulse 72  Temp 97.1  F (36.2  C) (Oral)  Wt 87.6 kg (193 lb 1.6 oz)  SpO2 98%  BMI 27.71 kg/m2  Body mass index is 27.71 kg/(m^2).  GENERAL: healthy, alert and no distress  PSYCH: mentation appears normal, affect normal/bright  NEURO: Ataxic gait.    Diagnostic Test Results:  No results found for this or any previous visit (from the past 24 hour(s)).    ASSESSMENT/PLAN:     (M80.00XD) Age-related osteoporosis with current pathological fracture with routine healing, subsequent encounter  (primary  encounter diagnosis)  Comment: His DEXA scan from 6/15/18 showed severe osteoporosis on the basis of vertebral and hip fractures. Given these findings, discussed with pt that he is at greater risk for additional bone fractures. Given he is male, young, has had two unprovoked fractures, and had DEXA scan that showed severe osteoporosis, I am wondering if pt is a candidate for FORTEO IV treatment for his osteoporosis. This simply seems like a higher risk situation and that an oral bisphosphonate may not be enough to meaningfully prevent an additional fracture.  However, in order to see if pt is a FORTEO candidate, I recommended referring him to endocrinologist for further evaluation and recommendations. After discussion, pt wants to go ahead and see Dr. Matt in [place]. In the interim, will draw blood work in the form of CMP to make sure his liver and kidney are fine, as well as looking at his vitamin D levels.  Plan: CBC with platelets differential, Vitamin D         Deficiency, Comprehensive metabolic panel (BMP         + Alb, Alk Phos, ALT, AST, Total. Bili, TP),         ENDOCRINOLOGY ADULT REFERRAL        Follow up based on labs.    (S72.001D) Closed fracture of neck of right femur with routine healing, subsequent encounter  Comment: See above. Will see what Dr. Matt recommends for treatment options.  Plan: CBC with platelets differential        Follow up based on labs.    (D56.8) Beta Thalassemia Minor  Comment: See above. Will draw blood work for further evaluation to ensure anemia is not contributing to bone fragility.  Plan: CBC with platelets differential        Follow up based on labs.    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  Plan: Follow up if needed.    The information in this document, created by the medical scribe for me, accurately reflects the services I personally performed and the decisions made by me.  I have reviewed and approved this document for accuracy prior to leaving the patient care area.  June 25, 2018 1:53 PM    Alberto Carlos Jr, MD  [facility]

## 2018-06-25 NOTE — PATIENT INSTRUCTIONS
Osteoporosis: Understanding Bone Loss     A balanced system keeps building and resorbing bone.   The body has a natural system for maintaining bone. Understanding this system can help you learn how to maintain your bones.  A balanced system supports the body  The body is always losing (resorbing) and making bone. This process is called remodeling. Bone-resorbing cells take bone apart. They do this so the minerals can be used to repair an injury or make new bone. Bone-making cells form new bone using calcium and other minerals. These minerals come from the food you eat. When this bone-making system is in balance, the same amount of bone is built and resorbed.        An unbalanced system can t give support     An unblalanced system builds too little bone and resorbs too much.   Changes in hormone levels, activity, medications, or diet can affect the bone-making system. When the system gets out of balance, the amount of bone lost is greater than the amount of bone made. This can cause osteopenia (when bone starts to become less dense). Left untreated, bone loss gets worse, leading to osteoporosis. Weak bones can t support the body. In fact, they can fracture just from the weight of your body. This often happens in vertebrae (bones of the spine). When vertebrae fracture, parts of the spine compress. This causes the back to bend or hump over, and it can also cause back pain.  Date Last Reviewed: 10/11/2015    4860-0156 [employer]. [address] [place] [address]359. All rights reserved. This information is not intended as a substitute for professional medical care. Always follow your healthcare professional's instructions.        Preventing Osteoporosis: Avoiding Bone Loss  Certain factors can speed up bone loss or decrease bone growth. For example, alcohol, cigarettes, and certain medicines reduce bone mass. Some foods make it hard for your body to absorb calcium.    Things to avoid  Here are  things to avoid to help prevent osteoporosis:    Alcohol is toxic to bones. It is a major cause of bone loss. Heavy drinking can cause osteoporosis even if you have no other risk factors.    Smoking reduces bone mass. Smoking may also interfere with estrogen levels and cause early menopause.    Inactivity makes your bones lose strength and become thinner. Over time, thin bones may break. Women who aren't active are at a high risk for osteoporosis.    Certain medicines, such as cortisone, increase bone loss. They also decrease bone growth. Ask your healthcare provider about any side effects of your medicines, and how to prevent them.    Protein-rich or salty foods eaten in large amounts may deplete calcium.    Caffeine increases calcium loss. People who drink a lot of coffee, tea, or onelia lose more calcium than those who don't.  Date Last Reviewed: 10/17/2015    8725-3844 [employer]. [address] [place] [address]50. All rights reserved. This information is not intended as a substitute for professional medical care. Always follow your healthcare professional's instructions.

## 2018-06-26 LAB
ALBUMIN SERPL-MCNC: 4 G/DL (ref 3.4–5)
ALP SERPL-CCNC: 88 U/L (ref 40–150)
ALT SERPL W P-5'-P-CCNC: 32 U/L (ref 0–70)
ANION GAP SERPL CALCULATED.3IONS-SCNC: 9 MMOL/L (ref 3–14)
AST SERPL W P-5'-P-CCNC: 16 U/L (ref 0–45)
BILIRUB SERPL-MCNC: 0.5 MG/DL (ref 0.2–1.3)
BUN SERPL-MCNC: 18 MG/DL (ref 7–30)
CALCIUM SERPL-MCNC: 9.7 MG/DL (ref 8.5–10.1)
CHLORIDE SERPL-SCNC: 107 MMOL/L (ref 94–109)
CO2 SERPL-SCNC: 27 MMOL/L (ref 20–32)
CREAT SERPL-MCNC: 0.84 MG/DL (ref 0.66–1.25)
DEPRECATED CALCIDIOL+CALCIFEROL SERPL-MC: 43 UG/L (ref 20–75)
GFR SERPL CREATININE-BSD FRML MDRD: >90 ML/MIN/1.7M2
GLUCOSE SERPL-MCNC: 98 MG/DL (ref 70–99)
POTASSIUM SERPL-SCNC: 4.2 MMOL/L (ref 3.4–5.3)
PROT SERPL-MCNC: 7.7 G/DL (ref 6.8–8.8)
SODIUM SERPL-SCNC: 143 MMOL/L (ref 133–144)

## 2018-06-26 NOTE — PROGRESS NOTES
Please send the following letter:    Mr. Angel,    -Liver and gallbladder tests are normal. (ALT,AST, Alk phos, bilirubin), kidney function is normal (Cr, GFR), Sodium is normal, Potassium is normal, Calcium is normal, Glucose is normal (diabetes screening test).   -Normal red blood cell (hgb) levels, normal white blood cell count and normal platelet levels.  Hemoglobin has risen back to the normal range compared to where it was three months ago.  This is good.  -Vitamin D level is normal, 1000 IU daily in diet or supplements is recommended.     If you have further questions about the interpretation of your labs, labtestsonline.org is a good website to check out for further information.      Please contact the clinic if you have additional questions.  Thank you.    Sincerely,    Alberto Carlos MD

## 2018-06-27 ENCOUNTER — HOSPITAL ENCOUNTER (OUTPATIENT)
Dept: PHYSICAL THERAPY | Facility: CLINIC | Age: 53
Setting detail: THERAPIES SERIES
End: 2018-06-27
Attending: FAMILY MEDICINE
Payer: MEDICARE

## 2018-06-27 PROCEDURE — 97116 GAIT TRAINING THERAPY: CPT | Mod: GP | Performed by: PHYSICAL THERAPIST

## 2018-06-27 PROCEDURE — 97110 THERAPEUTIC EXERCISES: CPT | Mod: GP | Performed by: PHYSICAL THERAPIST

## 2018-06-27 PROCEDURE — 40000718 ZZHC STATISTIC PT DEPARTMENT ORTHO VISIT: Performed by: PHYSICAL THERAPIST

## 2018-06-28 ENCOUNTER — TELEPHONE (OUTPATIENT)
Dept: FAMILY MEDICINE | Facility: CLINIC | Age: 53
End: 2018-06-28

## 2018-06-28 NOTE — TELEPHONE ENCOUNTER
Please see message from patient's mother below. Please advise. Thank you.  Dolores Kim RN, BSN  [facility]

## 2018-06-28 NOTE — TELEPHONE ENCOUNTER
Left a detailed voicemail for Patsy advised of MD MANISH's message below.  Dolores Kim RN, BSN  [facility]

## 2018-06-28 NOTE — TELEPHONE ENCOUNTER
Name of caller: Patsy  Relationship to Patient: Mother (consent on file)    Reason for Call:  Yunior saw Dr Carlos on 6/25/18. Dr Carlos said Yunior should see an Endocrinologist about his osteoporosis. Patsy said the first appointment they had was in mid September. Patsy said she thought Yunior was to get an appointment with them right away.    Please call Patsy to advise if Yunior should be seen sooner the middle of September.    Best phone number to reach pt at is: [phone]  Ok to leave a message with medical info? yes    Cori Frank  Patient Representative

## 2018-06-28 NOTE — TELEPHONE ENCOUNTER
He's OK to wait until mid-September if he wishes.  As an alternative, he could consider seeing Tereza Almeida who is a PA for our endocrinology department.  She's in [place] and might have sooner availability.  Please call Yunior's mother.    Alberto Carlos MD

## 2018-06-29 ENCOUNTER — TELEPHONE (OUTPATIENT)
Dept: FAMILY MEDICINE | Facility: CLINIC | Age: 53
End: 2018-06-29

## 2018-06-29 NOTE — TELEPHONE ENCOUNTER
I let her know and she is grateful for the reply.  Mindy Lyles RN- Triage [employer]        From 6:28/18 appt.   He's OK to wait until mid-September if he wishes.  As an alternative, he could consider seeing Tereza Almeida who is a PA for our endocrinology department.  She's in [place] and might have sooner availability.  Please call Yunior's mother.     Alberto Carlos MD

## 2018-06-29 NOTE — TELEPHONE ENCOUNTER
Date Forms was received: June 29, 2018    Forms received by: Fax    Purpose of Form:  Nursing Home Orders recertification    When the form is due:  ASAP    How the form needs to be returned for patient:  Fax    Form currently placed  SR inbox

## 2018-06-29 NOTE — TELEPHONE ENCOUNTER
Mom , Patsy calling again . Please call her back on a different # [phone]  Татьяна Carpenter, Clinic Receptionist

## 2018-06-29 NOTE — TELEPHONE ENCOUNTER
Reason for Call:  Other appointment    Detailed comments: Endocrinology appt.-- pt's mother called this afternoon and wanted to let the doctor know that the pt could not get in to see the endocrinologist until September 2018. Pt's mother is unaware if this is acceptable or if he needs to get in sooner. Please give pt;s mother a call back in regards to this. Thank you.    Phone Number Patient can be reached at: [phone]-- Patsy (mom)    Best Time:     Can we leave a detailed message on this number? YES    Call taken on 6/29/2018 at 12:36 PM by Mary Byrd

## 2018-07-09 ENCOUNTER — TELEPHONE (OUTPATIENT)
Dept: FAMILY MEDICINE | Facility: CLINIC | Age: 53
End: 2018-07-09

## 2018-07-09 ENCOUNTER — HOSPITAL ENCOUNTER (OUTPATIENT)
Dept: PHYSICAL THERAPY | Facility: CLINIC | Age: 53
Setting detail: THERAPIES SERIES
End: 2018-07-09
Attending: FAMILY MEDICINE
Payer: MEDICARE

## 2018-07-09 DIAGNOSIS — M62.838 MUSCLE SPASTICITY: ICD-10-CM

## 2018-07-09 DIAGNOSIS — G83.10 MONOPLEGIA OF LOWER LIMB AFFECTING DOMINANT SIDE (H): Primary | ICD-10-CM

## 2018-07-09 PROCEDURE — 97110 THERAPEUTIC EXERCISES: CPT | Mod: GP | Performed by: PHYSICAL THERAPIST

## 2018-07-09 PROCEDURE — 40000718 ZZHC STATISTIC PT DEPARTMENT ORTHO VISIT: Performed by: PHYSICAL THERAPIST

## 2018-07-09 PROCEDURE — 97116 GAIT TRAINING THERAPY: CPT | Mod: GP | Performed by: PHYSICAL THERAPIST

## 2018-07-10 RX ORDER — BACLOFEN 10 MG/1
10 TABLET ORAL 2 TIMES DAILY
Qty: 180 TABLET | Refills: 3 | Status: SHIPPED | OUTPATIENT
Start: 2018-07-10 | End: 2019-12-23

## 2018-07-13 ENCOUNTER — PATIENT OUTREACH (OUTPATIENT)
Dept: CARE COORDINATION | Facility: CLINIC | Age: 53
End: 2018-07-13

## 2018-07-13 NOTE — PROGRESS NOTES
Clinic Care Coordination Contact  Care Coordination Communication    Home Care Contact:              Home Care Agency: [facility] [phone]              Care Coordination contacted home care: Yes, spoke with ari Bergman.    Patient is current with [facility] services.     Plan: RN/SW Care Coordinator will await notification from home care staff informing RN/SW Care Coordinator of patients discharge plans/needs. RN/SW Care Coordinator will review chart and outreach to home care every 4 weeks and as needed.      Yuki Carty, STELLA  [facility]  Clinic Care Coordinator - [place] Locations   Direct:  [phone] (voicemail available)   (Today's Date: 07/13/2018)

## 2018-07-16 ENCOUNTER — HOSPITAL ENCOUNTER (OUTPATIENT)
Dept: PHYSICAL THERAPY | Facility: CLINIC | Age: 53
Setting detail: THERAPIES SERIES
End: 2018-07-16
Attending: FAMILY MEDICINE
Payer: MEDICARE

## 2018-07-16 PROCEDURE — 97112 NEUROMUSCULAR REEDUCATION: CPT | Mod: GP | Performed by: PHYSICAL THERAPIST

## 2018-07-16 PROCEDURE — 40000718 ZZHC STATISTIC PT DEPARTMENT ORTHO VISIT: Performed by: PHYSICAL THERAPIST

## 2018-07-16 PROCEDURE — 97110 THERAPEUTIC EXERCISES: CPT | Mod: GP | Performed by: PHYSICAL THERAPIST

## 2018-07-18 ENCOUNTER — HOSPITAL ENCOUNTER (OUTPATIENT)
Dept: PHYSICAL THERAPY | Facility: CLINIC | Age: 53
Setting detail: THERAPIES SERIES
End: 2018-07-18
Attending: FAMILY MEDICINE
Payer: MEDICARE

## 2018-07-18 PROCEDURE — 97110 THERAPEUTIC EXERCISES: CPT | Mod: GP | Performed by: PHYSICAL THERAPIST

## 2018-07-18 PROCEDURE — 97116 GAIT TRAINING THERAPY: CPT | Mod: GP | Performed by: PHYSICAL THERAPIST

## 2018-07-18 PROCEDURE — 40000718 ZZHC STATISTIC PT DEPARTMENT ORTHO VISIT: Performed by: PHYSICAL THERAPIST

## 2018-07-25 ENCOUNTER — HOSPITAL ENCOUNTER (OUTPATIENT)
Dept: PHYSICAL THERAPY | Facility: CLINIC | Age: 53
Setting detail: THERAPIES SERIES
End: 2018-07-25
Attending: FAMILY MEDICINE
Payer: MEDICARE

## 2018-07-25 PROCEDURE — 40000718 ZZHC STATISTIC PT DEPARTMENT ORTHO VISIT: Performed by: PHYSICAL THERAPIST

## 2018-07-25 PROCEDURE — 97110 THERAPEUTIC EXERCISES: CPT | Mod: GP | Performed by: PHYSICAL THERAPIST

## 2018-07-25 PROCEDURE — 97116 GAIT TRAINING THERAPY: CPT | Mod: GP | Performed by: PHYSICAL THERAPIST

## 2018-07-30 ENCOUNTER — TELEPHONE (OUTPATIENT)
Dept: FAMILY MEDICINE | Facility: CLINIC | Age: 53
End: 2018-07-30

## 2018-07-30 NOTE — TELEPHONE ENCOUNTER
Date Forms was received: July 30, 2018    Forms received by: Fax    Purpose of Form:  Nursing Home Orders plan of care    When the form is due:  ASAP    How the form needs to be returned for patient:  Fax    Form currently placed SR inbox

## 2018-08-01 ENCOUNTER — HOSPITAL ENCOUNTER (OUTPATIENT)
Dept: PHYSICAL THERAPY | Facility: CLINIC | Age: 53
Setting detail: THERAPIES SERIES
End: 2018-08-01
Attending: FAMILY MEDICINE
Payer: MEDICARE

## 2018-08-01 DIAGNOSIS — Z53.9 DIAGNOSIS NOT YET DEFINED: Primary | ICD-10-CM

## 2018-08-01 PROCEDURE — 40000718 ZZHC STATISTIC PT DEPARTMENT ORTHO VISIT: Performed by: PHYSICAL THERAPIST

## 2018-08-01 PROCEDURE — 97116 GAIT TRAINING THERAPY: CPT | Mod: GP | Performed by: PHYSICAL THERAPIST

## 2018-08-01 PROCEDURE — G0179 MD RECERTIFICATION HHA PT: HCPCS | Performed by: FAMILY MEDICINE

## 2018-08-01 PROCEDURE — 97110 THERAPEUTIC EXERCISES: CPT | Mod: GP | Performed by: PHYSICAL THERAPIST

## 2018-08-13 ENCOUNTER — TELEPHONE (OUTPATIENT)
Dept: FAMILY MEDICINE | Facility: CLINIC | Age: 53
End: 2018-08-13

## 2018-08-13 NOTE — TELEPHONE ENCOUNTER
Date Forms was received: August 13, 2018    Forms received by: Fax    Purpose of Form:  Nursing Home Orders [facility]    When the form is due:  ASAP    How the form needs to be returned for patient:  Fax    Form currently placed  SR inbox

## 2018-08-15 ENCOUNTER — PATIENT OUTREACH (OUTPATIENT)
Dept: CARE COORDINATION | Facility: CLINIC | Age: 53
End: 2018-08-15

## 2018-08-15 ENCOUNTER — MEDICAL CORRESPONDENCE (OUTPATIENT)
Dept: HEALTH INFORMATION MANAGEMENT | Facility: CLINIC | Age: 53
End: 2018-08-15

## 2018-08-15 NOTE — PROGRESS NOTES
Clinic Care Coordination Contact  Care Coordination Communication    Home Care Contact:              Home Care Agency: [facility]              Care Coordination contacted home care: Yes - spoke with ari Morales.   #[phone] (main line)              Patient is current with services.     Plan: RN/SW Care Coordinator will await notification from home care staff informing RN/SW Care Coordinator of patients discharge plans/needs. RN/SW Care Coordinator will review chart and outreach to home care every 4 weeks and as needed.      Yuki Carty RN  [facility]  Clinic Care Coordinator - Prior [place] Locations   Direct:  [phone] (voicemail available)   (Today's Date: 08/15/2018)

## 2018-08-27 ENCOUNTER — HOSPITAL ENCOUNTER (OUTPATIENT)
Dept: PHYSICAL THERAPY | Facility: CLINIC | Age: 53
Setting detail: THERAPIES SERIES
End: 2018-08-27
Attending: FAMILY MEDICINE
Payer: MEDICARE

## 2018-08-27 PROCEDURE — G8979 MOBILITY GOAL STATUS: HCPCS | Mod: GP,CJ | Performed by: PHYSICAL THERAPIST

## 2018-08-27 PROCEDURE — G8980 MOBILITY D/C STATUS: HCPCS | Mod: GP,CJ | Performed by: PHYSICAL THERAPIST

## 2018-08-27 PROCEDURE — 97110 THERAPEUTIC EXERCISES: CPT | Mod: GP | Performed by: PHYSICAL THERAPIST

## 2018-08-27 PROCEDURE — 97112 NEUROMUSCULAR REEDUCATION: CPT | Mod: GP | Performed by: PHYSICAL THERAPIST

## 2018-08-27 PROCEDURE — 40000718 ZZHC STATISTIC PT DEPARTMENT ORTHO VISIT: Performed by: PHYSICAL THERAPIST

## 2018-08-28 NOTE — PROGRESS NOTES
"Outpatient Physical Therapy Discharge Note     Patient: Yunior Angel  : 1965    Beginning/End Dates of Reporting Period:  18 to 2018    Referring Provider: Alberto Carlos MD    Therapy Diagnosis: Impaired Functional Mobility and Gait Mechanics, R hip and LBP     Client Self Report: Yunior reports no back or hip pain, R hip just feels stiff at times.  Walking with his cane stating \"I kind of like it\". Mother present, asking if Yunior is cleared to return to [facility] to use the NuStep machine.    Objective Measurements:  Objective Measure: 25' Walk  Details: 9.18'' with SEC, 9.53'' no assistive device    Objective Measure: TUG  Details: 13.15'' with SEC    Objective Measure: Sit <> Stands   Details: 8 reps, no UE support needed, moderate R knee hyperextension/bracing into chair at times     Goals:  Goal Identifier Stairs   Goal Description Yunior will be able to negotiate stairs in a reciprocal pattern with single rail with no c/o pain or instability for greater safety at home and in community.    Target Date 18   Date Met  18   Progress:     Goal Identifier Sit <> Stands   Goal Description Yunior will demo ability to perform 8 reps sit <> stand from 17'' chair in 30 seconds, no UE support, to show improving strength, balance, and safety with functional transfers out of chairs at home.   Target Date 18   Date Met  18 (8 reps, no UE support)   Progress:     Goal Identifier 25' Gait   Goal Description Yunior will ambulate 25 feet in 7.6 sec or less, with LRAD, to indicate improved gait efficiency and reduced level of impairment (gait speed of ~1.0 m/sec at 7.6 sec, baseline 16.72 sec with FWW).   Target Date 18   Date Met   (Goal not met, 9.18'' on : ~0.9 m/sec gait speed)   Progress:     Goal Identifier TUG   Goal Description Yunior will demo ability to perform the TUG in <13.5'' to indicate lower risk for falls and improved functional mobility (baseline score 26.15'' " with FWW).   Target Date 08/17/18   Date Met  08/01/18 (13.41'' with SEC L hand)   Progress:     Goal Identifier Activity   Goal Description Yunior will report/demo ability to perform continuous activity including walking or R-stepper x 8' without pain for improved tolerance with daily activities and functional ambulation distances.   Target Date 08/17/18   Date Met  08/01/18 (10+ minutes with good tolerance)   Progress:     Goal Identifier HEP   Goal Description Yunior will demo (I) and good tolerance with HEP for continued improvement/management of condition and optimal level of function.   Target Date 08/17/18 (Goal met 8/27)   Date Met      Progress:     Progress Toward Goals:   Progress this reporting period: Yunior was seen for a total of 9 PT visits to address impaired functional mobility/gait after recent fall with R hip fracture/ORIF repair, as well as subsequent lumbar compression fracture treated conservatively without bracing.  He responded well to therapy, currently denies LBP or hip pain, although continues to note that his R hip feels stiff.  Functionally, he appears to have returned to his baseline mobility status, which is antalgic in nature due to chronic RLE weakness from previous TBI. Significant R knee hyperextension with stance phase which is baseline.  He was instructed in an individualized HEP and advised to use SEC to help optimize gait stability and preservation of R knee function long term.  Pt and mother in agreement with plan, appropriate for discharge from PT at this time.    Plan:  Discharge from therapy.    Discharge: yes    Reason for Discharge: No further expectation of progress.    Equipment Issued: none, pt owned SEC    Discharge Plan: Patient to continue home program.

## 2018-08-28 NOTE — PROGRESS NOTES
[facility]      OUTPATIENT PHYSICAL THERAPY  PLAN OF TREATMENT FOR OUTPATIENT REHABILITATION    Patient's Last Name, First Name, M.I.                YOB: 1965  Yunior Angel                        Provider's Name  [facility] Medical Record No.  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) S06.9X0S, Status post total replacement of right hip Z96.641                        PT Diagnosis: Impaired functional mobility and gait mechanics, R hip and low back pain      _________________________________________________________________________________  Plan of Treatment: gait training, bed mobility training, balance training, neuromuscular re-education, ROM, strengthening, stretching, transfer training, manual therapy, joint mobilization    Frequency/Duration: 1x/wk x 1 wks     Goals:  Goal Identifier Stairs   Goal Description Yunior will be able to negotiate stairs in a reciprocal pattern with single rail with no c/o pain or instability for greater safety at home and in community.    Target Date 08/17/18   Date Met  08/27/18   Progress:     Goal Identifier Sit <> Stands   Goal Description Yunior will demo ability to perform 8 reps sit <> stand from 17'' chair in 30 seconds, no UE support, to show improving strength, balance, and safety with functional transfers out of chairs at home.   Target Date 08/17/18   Date Met  08/27/18 (8 reps, no UE support)   Progress:     Goal Identifier 25' Gait   Goal Description Yunior will  ambulate 25 feet in 7.6 sec or less, with LRAD, to indicate improved gait efficiency and reduced level of impairment (gait speed of ~1.0 m/sec at 7.6 sec, baseline 16.72 sec with FWW).   Target Date 08/17/18   Date Met   (Goal not met, 9.18'' on 8/27: ~0.9 m/sec gait speed)   Progress:     Goal Identifier TUG   Goal Description Yunior will demo ability to perform the TUG in <13.5'' to indicate lower risk for falls and improved functional mobility (baseline score 26.15'' with FWW).   Target Date 08/17/18   Date Met  08/01/18 (13.41'' with SEC L hand)   Progress:     Goal Identifier Activity   Goal Description Yunior will report/demo ability to perform continuous activity including walking or R-stepper x 8' without pain for improved tolerance with daily activities and functional ambulation distances.   Target Date 08/17/18   Date Met  08/01/18 (10+ minutes with good tolerance)   Progress:     Goal Identifier HEP   Goal Description Yunior will demo (I) and good tolerance with HEP for continued improvement/management of condition and optimal level of function.   Target Date 08/17/18 (Goal met 8/27)   Date Met      Progress:       Progress Toward Goals:   Progress this reporting period: Yunior has been seen for a total of 8 PT visits to address gait impairment, functional weakness and pain about his low back and R hip secondary to recent hip fracture/surgical repair and conservatively treated lumbar compression fracture. He has demonstrated good progress and is nearing his functional baseline status. But, due to inconsistent attendance with PT sessions and scheduling complications, his POC needs to be extended.  He will require 1 more visit to finalize his HEP and ensure proper carryover to optimize safety and function with mobility skills.    Certification date from 8/18/18 to 8/27/18.    Solitario Pena, PT          I CERTIFY THE NEED FOR THESE SERVICES FURNISHED UNDER        THIS PLAN OF TREATMENT AND WHILE UNDER MY CARE      (Physician co-signature of this document indicates review and certification of the therapy plan).                Referring Provider: Alberto Carlos MD

## 2018-08-30 DIAGNOSIS — E78.5 HYPERLIPIDEMIA LDL GOAL <160: ICD-10-CM

## 2018-08-30 RX ORDER — SIMVASTATIN 80 MG
80 TABLET ORAL AT BEDTIME
Qty: 90 TABLET | Refills: 3 | Status: CANCELLED | OUTPATIENT
Start: 2018-08-30

## 2018-08-30 NOTE — TELEPHONE ENCOUNTER
This was sent to another pharmacy. I called the requesting pharmacy and let her know to please call [place] to transfer rx  Mindy Lyles RN- Triage [employer]

## 2018-08-30 NOTE — TELEPHONE ENCOUNTER
"Requested Prescriptions   Pending Prescriptions Disp Refills     simvastatin (ZOCOR) 80 MG tablet  Request from different pharmacy  Last Written Prescription Date:  2/21/18  Last Fill Quantity: 90 tablet,  # refills: 3   Last Office Visit with G, P or [facility] prescribing provider:  6/25/18   Future Office Visit:      90 tablet 3     Sig: Take 1 tablet (80 mg) by mouth At Bedtime    Statins Protocol Passed    8/30/2018  9:28 AM       Passed - LDL on file in past 12 months    Recent Labs   Lab Test  02/21/18   1550   LDL  134*            Passed - No abnormal creatine kinase in past 12 months    Recent Labs   Lab Test  05/12/17   0904   CKT  116           Passed - Recent (12 mo) or future (30 days) visit within the authorizing provider's specialty    Patient had office visit in the last 12 months or has a visit in the next 30 days with authorizing provider or within the authorizing provider's specialty.  See \"Patient Info\" tab in inbasket, or \"Choose Columns\" in Meds & Orders section of the refill encounter.           Passed - Patient is age 18 or older          "

## 2018-09-20 ENCOUNTER — PATIENT OUTREACH (OUTPATIENT)
Dept: CARE COORDINATION | Facility: CLINIC | Age: 53
End: 2018-09-20

## 2018-09-20 NOTE — PROGRESS NOTES
Clinic Care Coordination Contact  Care Coordination Communication    Home Care Contact:              Home Care Agency: [facility]              Care Coordination contacted home care: No - verified home care status via [employer] application.               Patient is current with [facility] services at this time.     Plan: RN/SW Care Coordinator will await notification from home care staff informing RN/SW Care Coordinator of patients discharge plans/needs. RN/SW Care Coordinator will review chart and outreach to home care every 4 weeks and as needed.    ENROLLMENT STATUS:  Potential  CARE COORDINATOR STATUS: Care team current.     Yuki Carty RN  [facility]  Clinic Care Coordinator - Prior [place] and [place] Locations   Direct:  [phone] (voicemail available)   (Today's Date: 09/20/2018)

## 2018-10-16 ENCOUNTER — TELEPHONE (OUTPATIENT)
Dept: FAMILY MEDICINE | Facility: CLINIC | Age: 53
End: 2018-10-16

## 2018-10-16 DIAGNOSIS — Z53.9 DIAGNOSIS NOT YET DEFINED: Primary | ICD-10-CM

## 2018-10-16 PROCEDURE — G0179 MD RECERTIFICATION HHA PT: HCPCS | Performed by: FAMILY MEDICINE

## 2018-10-16 NOTE — TELEPHONE ENCOUNTER
Per fax, this HAS to be signed by PCP.  Form placed in PCP basket for next week when back in office.  Ping Garay

## 2018-10-16 NOTE — TELEPHONE ENCOUNTER
Date Forms was received: October 16, 2018    Forms received by: Fax    Purpose of Form:  Nursing Home Orders plan of care    When the form is due:  ASAP    How the form needs to be returned for patient:  Fax    Form currently placed  SW inbox as SR is out of office this week.

## 2018-10-17 ENCOUNTER — OFFICE VISIT (OUTPATIENT)
Dept: ENDOCRINOLOGY | Facility: CLINIC | Age: 53
End: 2018-10-17
Payer: MEDICARE

## 2018-10-17 VITALS
HEART RATE: 82 BPM | SYSTOLIC BLOOD PRESSURE: 110 MMHG | OXYGEN SATURATION: 98 % | WEIGHT: 196.5 LBS | BODY MASS INDEX: 28.13 KG/M2 | DIASTOLIC BLOOD PRESSURE: 60 MMHG | HEIGHT: 70 IN | TEMPERATURE: 97.5 F

## 2018-10-17 DIAGNOSIS — M80.00XD AGE-RELATED OSTEOPOROSIS WITH CURRENT PATHOLOGICAL FRACTURE WITH ROUTINE HEALING, SUBSEQUENT ENCOUNTER: Primary | ICD-10-CM

## 2018-10-17 DIAGNOSIS — E78.5 HYPERLIPIDEMIA LDL GOAL <160: ICD-10-CM

## 2018-10-17 PROCEDURE — 99204 OFFICE O/P NEW MOD 45 MIN: CPT | Performed by: INTERNAL MEDICINE

## 2018-10-17 NOTE — LETTER
10/17/2018         RE: Yunior Angel  [address]  [place] [facility] [address]        Dear Colleague,    Thank you for referring your patient, Yunior Angel, to the [facility]. Please see a copy of my visit note below.    Name: Yunior Angel  Seen in consultation with Alberto Carlos Jr. osteoporosis.   He is here with his mother.  HPI:  Yunior Angel is a 53 year old male who presents for the evaluation of osteoperosis .  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    H/o hip fracture in March 2018.  He presented to emergency department with acute right hip pain.  Questionable history about fall.  He did not recall a fall, but he has short-term memory deficit. Workup showed right femoral neck fracture.  He underwent ORIF.  Status post right hip arthroplasty  H/o verbebral fracture: In May 2018.  He presented to emergency department after a fall.  Patient tripped and fell from a standing height.  Workup at that time showedCompression fraction of L1 of uncertain age.  This was followed by DEXA scan consistent with osteoporosis as noted below    Also history of heterotropic ossification: Incidental on x-ray findings based on discharge summary March 2018. This was anterior to the right pubic inferior rami.  There was some surgical intervention.  Please refer to the orthopedics operative note for details.  This seems to be not uncommon in patients with previous neurologic injuries.  Radiation oncology was consulted and patient underwent radiation treatment ×1 per standard practice.  Smoke:No  Family History:Yes: h/o osteoporosis  Fractures:as above  Kidney stones: No  GI Surgery:No  Bisphosphonate exposure:  no  Exercise:goes to [employer].  Prostate History:  no  Diet:   Milk: 1   Yogurt/Cottage Cheese: 1   Ice Cream  Ca/Vitamin D: no calcium. 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     Past Surgical History:   Procedure Laterality Date     ARTHROPLASTY HIP Right 3/14/2018    Procedure: ARTHROPLASTY HIP;  Right total hip arthroplasty;  Surgeon: Parmjit Zabala MD;  Location:  OR     COLONOSCOPY N/A 12/31/2014    Procedure: COLONOSCOPY;  Surgeon: Inna Gonzalez MD;  Location:  GI     ESOPHAGOSCOPY, GASTROSCOPY, DUODENOSCOPY (EGD), COMBINED N/A 12/31/2014    Procedure: COMBINED ESOPHAGOSCOPY, GASTROSCOPY, DUODENOSCOPY (EGD), BIOPSY SINGLE OR MULTIPLE;  Surgeon: Inna Gonzalez MD;  Location:  GI     SURGICAL HISTORY OF -       foot surgery     SURGICAL HISTORY OF -   2006    1.  Avulsion of the right great toenail.  2.  Excision of bone cyst distal phalanx, right great toe.      Family Hx:  Family History   Problem Relation Age of Onset     Neurologic Disorder Father      migraines     Family History Negative Mother      Cancer - colorectal No family hx of      Colon Cancer No family hx of           Social Hx:  Social History     Social History     Marital status: Single     Spouse name: N/A     Number of children: N/A     Years of education: N/A     Occupational History     Not on file.     Social History Main Topics     Smoking status: Never Smoker     Smokeless tobacco: Never Used     Alcohol use No     Drug use: No     Sexual activity: No     Other Topics Concern     Not on file     Social History Narrative          MEDICATIONS:  has a current medication list which includes the following prescription(s):  "acetaminophen, aspirin, baclofen, calcitonin (salmon), dicyclomine, multivitamin, therapeutic, omeprazole, paroxetine, polyethylene glycol, senna-docusate, simvastatin, and cholecalciferol.    ROS     ROS: 10 point ROS neg other than the symptoms noted above in the HPI.    Physical Exam   VS: /60 (BP Location: Right arm, Patient Position: Chair, Cuff Size: Adult Large)  Pulse 82  Temp 97.5  F (36.4  C) (Oral)  Ht 1.778 m (5' 10\")  Wt 89.1 kg (196 lb 8 oz)  SpO2 98%  BMI 28.19 kg/m2  GENERAL: AXOX3, NAD, well dressed, answering questions appropriately, appears stated age.  HEENT: No exopthalmous, no proptosis, EOMI, no lig lag, no retraction  NECK: Thyroid normal in size, non tender, no nodules were palpated.  CV: RRR  LUNGS: CTAB  ABDOMEN: +BS  NEUROLOGY: CN grossly intact, no tremors  PSYCH: normal affect and mood      LABS:  BMP:  Last Basic Metabolic Panel:  Lab Results   Component Value Date     06/25/2018      Lab Results   Component Value Date    POTASSIUM 4.2 06/25/2018     Lab Results   Component Value Date    CHLORIDE 107 06/25/2018     Lab Results   Component Value Date    JUSTA 9.7 06/25/2018     Lab Results   Component Value Date    CO2 27 06/25/2018     Lab Results   Component Value Date    BUN 18 06/25/2018     Lab Results   Component Value Date    CR 0.84 06/25/2018     Lab Results   Component Value Date    GLC 98 06/25/2018       TFTs:  TSH   Date Value Ref Range Status   12/03/2009 1.07 0.4 - 5.0 mU/L Final   ]    LFTs:  Liver Function Studies -   Recent Labs   Lab Test  06/25/18   1416   PROTTOTAL  7.7   ALBUMIN  4.0   BILITOTAL  0.5   ALKPHOS  88   AST  16   ALT  32       PTH:     Vitamin D:  Vitamin D Deficiency Screening Results:  Lab Results   Component Value Date    VITDT 43 06/25/2018       BoneTurnOverMarkers:   Testosterone:    DEXA:  BONE DENSITOMETRY  [facility] [address] [place] [address]  6/15/2018       PATIENT: Yunior MONTOYA" "Jeanne  CHART: [id number]   :  1965  AGE:  52 year old  SEX:  male   REFERRING PROVIDER:  BRYANNA ADKINS      PROCEDURE:  Bone density scanning was performed using DXA technology of the lumbar spine and hip.  Scanning was performed on a Lunar Prodigy scanner.  Reporting is completed in the form of a T-score.  The T-score represents the standard deviation from peak bone mass based on a young healthy adult.      REFERENCE T-SCORES:       Normal                -1.0 and greater                                 Osteopenia         Between -1.0 and -2.5                                           Osteoporosis     -2.5 and less                                       RISK FACTORS:  Parent history of a osteoporosis with hip fracture, History of vertebral fracture, Hip fracture     CURRENT TREATMENT:  none listed      FINDINGS:               Lumbar Spine (L3-L4) T-score: -2.1, fracture L1 and marked degenerative changes present at L2, so only L3-4 are evaluated               Left Femoral Neck T-score:  -2.7               Forearm (radius 33%)      T-score:  1.3  The right femur is not acceptable for evaluation due to previous surgical repair.                               Lumbar (L3-L4) BMD: 1.000                                     Left Total Hip BMD: 0.789                                     Forearm (radius 33%) BMD: 0.896     IMPRESSION  Severe osteoporosis on the basis of vertebral and hip fractures.  Consider evaluation for secondary causes of osteoporosis.      Recommendations include ensuring adequate Calcium and Vitamin D.     The current NOF Guidelines recommend treatment for patients with prior hip or vertebral fracture, T-score -2.5 or below, or 10 year risk of any major osteoporotic fracture >20% or 10 year risk of hip fracture >3%, as calculated using the FRAX calculator (www.shef.ac.uk/FRAX or you can google \"FRAX\").       Based on these guidelines, treatment (in addition to calcium and vitamin D) is " recommended for this patient, after ruling out other causes of osteoporosis.  This is meant as an aid to clinical decision making; one must still use clinical judgement.        Follow up can be considered in 1-2 years.     All pertinent notes, labs, and images personally reviewed by me.     A/P  Mr.Brian LINDSAY Angel is a 53 year old here for the evaluation of:    #1 Osteoporosis:    Risk factors for low bone density include personal history of fracture, family history, , low Ca intake.    History of compression vertebral fracture of L1  History of hip fracture status post hip arthroplasty  Also history of heterotropic ossification status post radiation treatment  DEXA 2018 consistent with severe osteoporosis  Plan  Screen for secondary causes of osteoporosis as noted below  Adequate calcium and vitamin D intake  Consider medication management after above  Parathyroid Hormone Intact, Testosterone Free         and Total, Vitamin D Deficiency, Creatinine,         Calcium, TSH with free T4 reflex        Follow-up:  After above.    Crystal Matt MD  Endocrinology  [facility]  CC: Alberto Carlos Jr.    More than 50% of face to face time spent with Mr. Angel on counseling / coordinating his care.      All questions were answered.  The patient indicates understanding of the above issues and agrees with the plan set forth.      Addendum to above note and clinic visit:    Labs reviewed.    See result note/telephone encounter.            Again, thank you for allowing me to participate in the care of your patient.        Sincerely,        Crystal Matt MD

## 2018-10-17 NOTE — NURSING NOTE
"ENDOCRINOLOGY INTAKE FORM    Patient Name:  Yunior Angel  :  1965    Is patient Diabetic?   No  Does patient have non-diabetic or other endocrine issues?  Yes: osteoporosis    Vitals: /60 (BP Location: Right arm, Patient Position: Chair, Cuff Size: Adult Large)  Pulse 82  Temp 97.5  F (36.4  C) (Oral)  Ht 1.778 m (5' 10\")  Wt 89.1 kg (196 lb 8 oz)  SpO2 98%  BMI 28.19 kg/m2  BMI= Body mass index is 28.19 kg/(m^2).    Flu vaccine:  No  Pneumonia vaccine:  No    Smoking and Alcohol use:  Social History   Substance Use Topics     Smoking status: Never Smoker     Smokeless tobacco: Never Used     Alcohol use No       Maren Manuel CMA    "

## 2018-10-17 NOTE — PROGRESS NOTES
Name: Yunior Angel  Seen in consultation with Alberto Carlos Jr. osteoporosis.   He is here with his mother.  HPI:  Yunior Angel is a 53 year old male who presents for the evaluation of osteoperosis .  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    H/o hip fracture in March 2018.  He presented to emergency department with acute right hip pain.  Questionable history about fall.  He did not recall a fall, but he has short-term memory deficit. Workup showed right femoral neck fracture.  He underwent ORIF.  Status post right hip arthroplasty  H/o verbebral fracture: In May 2018.  He presented to emergency department after a fall.  Patient tripped and fell from a standing height.  Workup at that time showedCompression fraction of L1 of uncertain age.  This was followed by DEXA scan consistent with osteoporosis as noted below    Also history of heterotropic ossification: Incidental on x-ray findings based on discharge summary March 2018. This was anterior to the right pubic inferior rami.  There was some surgical intervention.  Please refer to the orthopedics operative note for details.  This seems to be not uncommon in patients with previous neurologic injuries.  Radiation oncology was consulted and patient underwent radiation treatment ×1 per standard practice.  Smoke:No  Family History:Yes: h/o osteoporosis  Fractures:as above  Kidney stones: No  GI Surgery:No  Bisphosphonate exposure:  no  Exercise:goes to [employer].  Prostate History: no  Diet:   Milk: 1   Yogurt/Cottage Cheese: 1   Ice Cream  Ca/Vitamin D: no calcium. 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     Past Surgical History:   Procedure Laterality Date     ARTHROPLASTY HIP Right 3/14/2018    Procedure: ARTHROPLASTY HIP;  Right total hip arthroplasty;  Surgeon: Parmjit Zabala MD;  Location: [facility] OR     COLONOSCOPY N/A 12/31/2014    Procedure: COLONOSCOPY;  Surgeon: Inna Gonzalez MD;  Location:  GI     ESOPHAGOSCOPY, GASTROSCOPY, DUODENOSCOPY (EGD), COMBINED N/A 12/31/2014    Procedure: COMBINED ESOPHAGOSCOPY, GASTROSCOPY, DUODENOSCOPY (EGD), BIOPSY SINGLE OR MULTIPLE;  Surgeon: Inna Gonzalez MD;  Location:  GI     SURGICAL HISTORY OF -       foot surgery     SURGICAL HISTORY OF -   2006    1.  Avulsion of the right great toenail.  2.  Excision of bone cyst distal phalanx, right great toe.      Family Hx:  Family History   Problem Relation Age of Onset     Neurologic Disorder Father      migraines     Family History Negative Mother      Cancer - colorectal No family hx of      Colon Cancer No family hx of           Social Hx:  Social History     Social History     Marital status: Single     Spouse name: N/A     Number of children: N/A     Years of education: N/A     Occupational History     Not on file.     Social History Main Topics     Smoking status: Never Smoker     Smokeless tobacco: Never Used     Alcohol use No     Drug use: No     Sexual activity: No     Other Topics Concern     Not on file     Social History Narrative          MEDICATIONS:  has a current medication list which includes the following prescription(s): acetaminophen, aspirin, baclofen, calcitonin (salmon), dicyclomine, multivitamin, therapeutic, omeprazole, paroxetine, polyethylene glycol, senna-docusate, simvastatin, and cholecalciferol.    ROS     ROS: 10 point ROS neg other than the symptoms noted above in the  "HPI.    Physical Exam   VS: /60 (BP Location: Right arm, Patient Position: Chair, Cuff Size: Adult Large)  Pulse 82  Temp 97.5  F (36.4  C) (Oral)  Ht 1.778 m (5' 10\")  Wt 89.1 kg (196 lb 8 oz)  SpO2 98%  BMI 28.19 kg/m2  GENERAL: AXOX3, NAD, well dressed, answering questions appropriately, appears stated age.  HEENT: No exopthalmous, no proptosis, EOMI, no lig lag, no retraction  NECK: Thyroid normal in size, non tender, no nodules were palpated.  CV: RRR  LUNGS: CTAB  ABDOMEN: +BS  NEUROLOGY: CN grossly intact, no tremors  PSYCH: normal affect and mood      LABS:  BMP:  Last Basic Metabolic Panel:  Lab Results   Component Value Date     2018      Lab Results   Component Value Date    POTASSIUM 4.2 2018     Lab Results   Component Value Date    CHLORIDE 107 2018     Lab Results   Component Value Date    JUSTA 9.7 2018     Lab Results   Component Value Date    CO2 27 2018     Lab Results   Component Value Date    BUN 18 2018     Lab Results   Component Value Date    CR 0.84 2018     Lab Results   Component Value Date    GLC 98 2018       TFTs:  TSH   Date Value Ref Range Status   2009 1.07 0.4 - 5.0 mU/L Final   ]    LFTs:  Liver Function Studies -   Recent Labs   Lab Test  18   1416   PROTTOTAL  7.7   ALBUMIN  4.0   BILITOTAL  0.5   ALKPHOS  88   AST  16   ALT  32       PTH:     Vitamin D:  Vitamin D Deficiency Screening Results:  Lab Results   Component Value Date    VITDT 43 2018       BoneTurnOverMarkers:   Testosterone:    DEXA:  BONE DENSITOMETRY  [facility] [address] [place] [address]  6/15/2018       PATIENT: Yunior Angel  CHART: [id number]   :  1965  AGE:  52 year old  SEX:  male   REFERRING PROVIDER:  BRYANNA ADKINS      PROCEDURE:  Bone density scanning was performed using DXA technology of the lumbar spine and hip.  Scanning was performed on a Lunar Prodigy scanner. " " Reporting is completed in the form of a T-score.  The T-score represents the standard deviation from peak bone mass based on a young healthy adult.      REFERENCE T-SCORES:       Normal                -1.0 and greater                                 Osteopenia         Between -1.0 and -2.5                                           Osteoporosis     -2.5 and less                                       RISK FACTORS:  Parent history of a osteoporosis with hip fracture, History of vertebral fracture, Hip fracture     CURRENT TREATMENT:  none listed      FINDINGS:               Lumbar Spine (L3-L4) T-score: -2.1, fracture L1 and marked degenerative changes present at L2, so only L3-4 are evaluated               Left Femoral Neck T-score:  -2.7               Forearm (radius 33%)      T-score:  1.3  The right femur is not acceptable for evaluation due to previous surgical repair.                               Lumbar (L3-L4) BMD: 1.000                                     Left Total Hip BMD: 0.789                                     Forearm (radius 33%) BMD: 0.896     IMPRESSION  Severe osteoporosis on the basis of vertebral and hip fractures.  Consider evaluation for secondary causes of osteoporosis.      Recommendations include ensuring adequate Calcium and Vitamin D.     The current NOF Guidelines recommend treatment for patients with prior hip or vertebral fracture, T-score -2.5 or below, or 10 year risk of any major osteoporotic fracture >20% or 10 year risk of hip fracture >3%, as calculated using the FRAX calculator (www.shef.ac.uk/FRAX or you can google \"FRAX\").       Based on these guidelines, treatment (in addition to calcium and vitamin D) is recommended for this patient, after ruling out other causes of osteoporosis.  This is meant as an aid to clinical decision making; one must still use clinical judgement.        Follow up can be considered in 1-2 years.     All pertinent notes, labs, and images personally " reviewed by me.     A/P  Mr.Brian LINDSAY Angel is a 53 year old here for the evaluation of:    #1 Osteoporosis:    Risk factors for low bone density include personal history of fracture, family history, , low Ca intake.    History of compression vertebral fracture of L1  History of hip fracture status post hip arthroplasty  Also history of heterotropic ossification status post radiation treatment  DEXA 2018 consistent with severe osteoporosis  Plan  Screen for secondary causes of osteoporosis as noted below  Adequate calcium and vitamin D intake  Consider medication management after above  Parathyroid Hormone Intact, Testosterone Free         and Total, Vitamin D Deficiency, Creatinine,         Calcium, TSH with free T4 reflex        Follow-up:  After above.    Crystal Matt MD  Endocrinology  [facility]  CC: Alberto Carlos Jr.    More than 50% of face to face time spent with Mr. Angel on counseling / coordinating his care.      All questions were answered.  The patient indicates understanding of the above issues and agrees with the plan set forth.      Addendum to above note and clinic visit:    Labs reviewed.    See result note/telephone encounter.

## 2018-10-17 NOTE — PATIENT INSTRUCTIONS
[facility] & [place] locations   Dr Matt, Endocrinology Department      [facility]   [address]  [place] [address]  Appointment [phone]  Fax: [phone]  [place]: Monday and Tuesday         [facility]   [address]  [place] [address]  Appointment [phone]  Fax: [phone]  [place]: Wednesday and Thursday            Morning fasting sample- labs  Need to make lab only appointment.  Follow up with endocrinology after that.    Check cost of FORTEO or TYMLOS with insurance.    The pt was advised to    Maintain an adequate calcium and vitamin D intake and to supplement vitamin D if needed to maintain serum levels of 25 hydroxy D (25 OH D) between 30-60 ng/ml.    Limit alcohol intake to no more than 2 servings per day.    Limit caffeine intake.    Maintain an active lifestyle including weight-bearing exercises for at least 30 mins daily.    Take measures to reduce the risk of falling.    You should get 1200 mg/day calcium in divided doses of no more than 500 mg/dose.  This INCLUDES what is in your food as well as what is in any supplements you may be taking.    Vit D about 1000 IU/day ( unless you have vit D deficiency- in that case higher dose)  Dietary sources of calcium:: These also contain vitamin D  Milk                            8 oz            300 mg calcium  Yogurt                          1 cup           400 mg calcium   Hard cheese                     1.5 oz          300 mg  Cottage cheese                  2 cup           300 mg  Orange juice with Calcium       8 oz            300 mg  Low fat dairy sources are recommended      You should get 30 minutes of moderate weight bearing exercise on most days of the week .  Weight bearing exercise includes such things as walking, jogging, hiking, dancing.  You should also get Strength training 2 or more times/week in addition to  other weight -being exercise. Strength training uses weight or resistance beyond that seen in everyday activities -(pilates, weight training with free weights, weight machines or resistance bands)

## 2018-10-17 NOTE — MR AVS SNAPSHOT
After Visit Summary   10/17/2018    Yunior Angel    MRN: [id number]           Patient Information     Date Of Birth          1965        Visit Information        Provider Department      10/17/2018 11:00 AM Crystal Matt MD [facility]        Today's Diagnoses     Age-related osteoporosis with current pathological fracture with routine healing, subsequent encounter    -  1    Hyperlipidemia LDL goal <160          Care Instructions    [facility] & [place] locations   Dr Matt, Endocrinology Department      [facility]   [address]  [place] [address]  Appointment [phone]  Fax: [phone]  [place]: Monday and Tuesday         [place] [address]  [place] [address]  Appointment [phone]  Fax: [phone]  [place]: Wednesday and Thursday            Morning fasting sample- labs  Need to make lab only appointment.  Follow up with endocrinology after that.    Check cost of FORTEO or TYMLOS with insurance.    The pt was advised to    Maintain an adequate calcium and vitamin D intake and to supplement vitamin D if needed to maintain serum levels of 25 hydroxy D (25 OH D) between 30-60 ng/ml.    Limit alcohol intake to no more than 2 servings per day.    Limit caffeine intake.    Maintain an active lifestyle including weight-bearing exercises for at least 30 mins daily.    Take measures to reduce the risk of falling.    You should get 1200 mg/day calcium in divided doses of no more than 500 mg/dose.  This INCLUDES what is in your food as well as what is in any supplements you may be taking.    Vit D about 1000 IU/day ( unless you have vit D deficiency- in that case higher dose)  Dietary sources of calcium:: These also contain vitamin D  Milk                            8 oz            300 mg calcium  Yogurt                          1 cup            400 mg calcium   Hard cheese                     1.5 oz          300 mg  Cottage cheese                  2 cup           300 mg  Orange juice with Calcium       8 oz            300 mg  Low fat dairy sources are recommended      You should get 30 minutes of moderate weight bearing exercise on most days of the week .  Weight bearing exercise includes such things as walking, jogging, hiking, dancing.  You should also get Strength training 2 or more times/week in addition to other weight -being exercise. Strength training uses weight or resistance beyond that seen in everyday activities -(pilates, weight training with free weights, weight machines or resistance bands)                Follow-ups after your visit        Future tests that were ordered for you today     Open Future Orders        Priority Expected Expires Ordered    Testosterone Free and Total Routine  9/17/2019 10/17/2018    TSH with free T4 reflex Routine  9/17/2019 10/17/2018    Vitamin D Deficiency Routine  9/17/2019 10/17/2018    Creatinine Routine  9/17/2019 10/17/2018    Calcium Routine  9/17/2019 10/17/2018    Parathyroid Hormone Intact Routine  9/17/2019 10/17/2018            Who to contact     If you have questions or need follow up information about today's clinic visit or your schedule please contact [facility] directly at [phone].  Normal or non-critical lab and imaging results will be communicated to you by MyChart, letter or phone within 4 business days after the clinic has received the results. If you do not hear from us within 7 days, please contact the clinic through MyChart or phone. If you have a critical or abnormal lab result, we will notify you by phone as soon as possible.  Submit refill requests through [employer] or call your pharmacy and they will forward the refill request to us. Please allow 3 business days for your refill to be completed.          Additional Information About Your Visit        Care  "EveryWhere ID     This is your Care EveryWhere ID. This could be used by other organizations to access your [facility] medical records  [id number]        Your Vitals Were     Pulse Temperature Height Pulse Oximetry BMI (Body Mass Index)       82 97.5  F (36.4  C) (Oral) 1.778 m (5' 10\") 98% 28.19 kg/m2        Blood Pressure from Last 3 Encounters:   10/17/18 110/60   06/25/18 121/68   05/21/18 104/62    Weight from Last 3 Encounters:   10/17/18 89.1 kg (196 lb 8 oz)   06/25/18 87.6 kg (193 lb 1.6 oz)   05/21/18 86.6 kg (191 lb)               Primary Care Provider Office Phone # Fax #    Alberto Carlos Jr., [phone] [address]  [place] [address]        Equal Access to Services     [facility]: Hadii aad ku hadasho Soomaali, waaxda luqadaha, qaybta kaalmada adeegyada, waxay idiin hayaan umesh kharash laLeighaan . So [facility] [phone].    ATENCIÓN: Si habla español, tiene a gamble disposición servicios gratuitos de asistencia lingüística. [place] al [phone].    We comply with applicable federal civil rights laws and [place] laws. We do not discriminate on the basis of race, color, national origin, age, disability, sex, sexual orientation, or gender identity.            Thank you!     Thank you for choosing [facility]  for your care. Our goal is always to provide you with excellent care. Hearing back from our patients is one way we can continue to improve our services. Please take a few minutes to complete the written survey that you may receive in the mail after your visit with us. Thank you!             Your Updated Medication List - Protect others around you: Learn how to safely use, store and throw away your medicines at www.disposemymeds.org.          This list is accurate as of 10/17/18 11:44 AM.  Always use your most recent med list.                   Brand Name Dispense Instructions for use Diagnosis    aspirin 325 MG EC tablet     70 tablet    Take one Aspirin " tab twice daily for 5 weeks.    Closed fracture of neck of right femur, initial encounter (H)       baclofen 10 MG tablet    LIORESAL    180 tablet    Take 1 tablet (10 mg) by mouth 2 times daily    Muscle spasticity       calcitonin (salmon) 200 UNIT/ACT nasal spray    MIACALCIN    1 Bottle    Spray 1 spray into one nostril alternating nostrils daily Alternate nostril each day.    Closed compression fracture of L1 lumbar vertebra with routine healing, subsequent encounter       dicyclomine 10 MG capsule    BENTYL    240 capsule    Take 1 capsule (10 mg) by mouth 4 times daily (before meals and nightly)    Irritable bowel syndrome with diarrhea       multivitamin, therapeutic Tabs tablet      Take 1 tablet by mouth daily        omeprazole 40 MG capsule    priLOSEC    90 capsule    Take 1 capsule (40 mg) by mouth daily Take 30-60 minutes before a meal.    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

## 2018-10-22 ENCOUNTER — PATIENT OUTREACH (OUTPATIENT)
Dept: CARE COORDINATION | Facility: CLINIC | Age: 53
End: 2018-10-22

## 2018-10-22 NOTE — PROGRESS NOTES
Clinic Care Coordination Contact  Care Coordination Communication    Home Care Contact:              Home Care Agency: [facility]              Care Coordination contacted home care: No; status confirmed via review of [employer] Application.              Patient is active with [facility] services.     Plan:     RN/SW Care Coordinator will await notification from home care staff informing RN/SW Care Coordinator of patients discharge plans/needs.     RN/SW Care Coordinator will review chart and outreach to home care every 4 weeks and as needed.      ENROLLMENT STATUS:  Potential     CARE COORDINATOR STATUS: Care team current.     NOTE- Writer will be out of the office from Wednesday, November 7th through Friday, December 7th, 2018.  Delegated care coordination colleagues are available to assist the patient during writer's absence, as the need arises.   Thank you.     Yuki Carty, RN  [facility]  Clinic Care Coordinator - [facility] Locations   Direct:  [phone] (voicemail available)   (Today's Date: 10/22/2018

## 2018-10-26 NOTE — PLAN OF CARE
"Subjective:       Patient ID: Cat Denson is a 35 y.o. White female who presents for return patient evaluation for chronic renal failure.    She has no uremic symptoms.  There have been no recent hospitalizations or procedures.  She has a headache this am.      Review of Systems   Constitutional: Positive for unexpected weight change. Negative for appetite change, chills and fever.   Eyes: Positive for visual disturbance (L eye).   Respiratory: Negative for cough and shortness of breath.    Cardiovascular: Negative for chest pain and leg swelling.   Gastrointestinal: Negative for diarrhea, nausea and vomiting.   Endocrine: Positive for cold intolerance.   Genitourinary: Positive for difficulty urinating (incontinence since cva). Negative for dysuria and hematuria.   Musculoskeletal: Positive for arthralgias. Negative for myalgias.   Skin: Negative for rash.   Neurological: Positive for weakness (L arm and leg since cva) and headaches.   Psychiatric/Behavioral: Positive for decreased concentration (memory since cva). Negative for sleep disturbance.       The past medical, family and social histories were reviewed for this encounter.     BP 98/66   Pulse (!) 50   Ht 5' 3" (1.6 m)   Wt 60.4 kg (133 lb 2.5 oz)   LMP 09/27/2018   SpO2 100%   BMI 23.59 kg/m²     Objective:      Physical Exam   Constitutional: She is oriented to person, place, and time. She appears well-developed and well-nourished. No distress.   HENT:   Head: Normocephalic and atraumatic.   Eyes: Conjunctivae are normal.   Neck: Neck supple. No JVD present.   Cardiovascular: Normal rate, regular rhythm and normal heart sounds. Exam reveals no gallop and no friction rub.   No murmur heard.  Pulmonary/Chest: Effort normal and breath sounds normal. No respiratory distress. She has no wheezes. She has no rales.   Abdominal: Soft. Bowel sounds are normal. She exhibits no distension. There is no tenderness.   Musculoskeletal: She exhibits no " Problem: Patient Care Overview  Goal: Plan of Care/Patient Progress Review  Outcome: Improving  Afeb, vss, does have prior numbness and lack of mobility in his right foot from TBI. PO Oxycodone for pain. Radiation Treatment X1 today. Voided only 100 ml so bladder scanned 2130 for 192 and was able to void another 100 ml. Dressing with scant dried drainage and only mild edema.  Will be transferred to TCU tomorrow for further therapy.             edema.   Neurological: She is alert and oriented to person, place, and time.   Skin: Skin is warm and dry. No rash noted.   Psychiatric: She has a normal mood and affect.   Vitals reviewed.      Assessment:       1. Chronic kidney disease, stage III (moderate)        Plan:   Return to clinic in 4 months.  Labs for next visit include rp, pth, ua, upc.  Baseline creatinine is 1.3-1.5 since 2013.  PTH is 128 with a calcium of 8.5.  Blood pressure is controlled on the current regimen.  She has a bland urine sediment with normal kidneys structurally and relatively stable function.  She does not appear to have any active SLE disease and does not apparently have a history of renal involvement.  Her ds DNA and complement levels are normal.  Renal ultrasound shows R 8.6 cm, L 8.7 cm.  I would consider stopping the atenolol given her blood pressure.  I will send this note to Dr. Norman to get his opinion.

## 2018-11-27 ENCOUNTER — DOCUMENTATION ONLY (OUTPATIENT)
Dept: OTHER | Facility: CLINIC | Age: 53
End: 2018-11-27

## 2018-11-27 PROBLEM — Z71.89 ACP (ADVANCE CARE PLANNING): Chronic | Status: RESOLVED | Noted: 2018-04-27 | Resolved: 2018-11-27

## 2018-11-28 DIAGNOSIS — S06.9XAS NEUROBEHAVIORAL SEQUELAE OF TRAUMATIC BRAIN INJURY (H): ICD-10-CM

## 2018-11-28 DIAGNOSIS — M80.00XD AGE-RELATED OSTEOPOROSIS WITH CURRENT PATHOLOGICAL FRACTURE WITH ROUTINE HEALING, SUBSEQUENT ENCOUNTER: ICD-10-CM

## 2018-11-28 DIAGNOSIS — R46.89 NEUROBEHAVIORAL SEQUELAE OF TRAUMATIC BRAIN INJURY (H): ICD-10-CM

## 2018-11-28 DIAGNOSIS — E78.5 HYPERLIPIDEMIA LDL GOAL <160: ICD-10-CM

## 2018-11-28 LAB — PTH-INTACT SERPL-MCNC: 80 PG/ML (ref 18–80)

## 2018-11-28 PROCEDURE — 82306 VITAMIN D 25 HYDROXY: CPT | Performed by: INTERNAL MEDICINE

## 2018-11-28 PROCEDURE — 36415 COLL VENOUS BLD VENIPUNCTURE: CPT | Performed by: INTERNAL MEDICINE

## 2018-11-28 PROCEDURE — 82565 ASSAY OF CREATININE: CPT | Performed by: INTERNAL MEDICINE

## 2018-11-28 PROCEDURE — 84270 ASSAY OF SEX HORMONE GLOBUL: CPT | Performed by: INTERNAL MEDICINE

## 2018-11-28 PROCEDURE — 84443 ASSAY THYROID STIM HORMONE: CPT | Performed by: INTERNAL MEDICINE

## 2018-11-28 PROCEDURE — 83970 ASSAY OF PARATHORMONE: CPT | Performed by: INTERNAL MEDICINE

## 2018-11-28 PROCEDURE — 82310 ASSAY OF CALCIUM: CPT | Performed by: INTERNAL MEDICINE

## 2018-11-28 PROCEDURE — 84403 ASSAY OF TOTAL TESTOSTERONE: CPT | Performed by: INTERNAL MEDICINE

## 2018-11-28 RX ORDER — PAROXETINE 40 MG/1
60 TABLET, FILM COATED ORAL EVERY MORNING
Qty: 90 TABLET | Refills: 1 | Status: SHIPPED | OUTPATIENT
Start: 2018-11-28 | End: 2019-04-25

## 2018-11-28 NOTE — TELEPHONE ENCOUNTER
"Requested Prescriptions   Pending Prescriptions Disp Refills     PARoxetine (PAXIL) 40 MG tablet  Last Written Prescription Date:  6/7/18  Last Fill Quantity: 90,  # refills: 1   Last office visit: 6/25/2018 with prescribing provider:  Terrance   Future Office Visit:   Next 5 appointments (look out 90 days)     Dec 06, 2018  3:00 PM CST   [facility]    [address] [facility] [address]  [facility] [id number] [phone]                  90 tablet 1     Sig: Take 1.5 tablets (60 mg) by mouth every morning    SSRIs Protocol Passed    11/28/2018  1:33 PM       Passed - Recent (12 mo) or future (30 days) visit within the authorizing provider's specialty    Patient had office visit in the last 12 months or has a visit in the next 30 days with authorizing provider or within the authorizing provider's specialty.  See \"Patient Info\" tab in inbasket, or \"Choose Columns\" in Meds & Orders section of the refill encounter.             Passed - Patient is age 18 or older          "

## 2018-11-29 LAB
CALCIUM SERPL-MCNC: 9.5 MG/DL (ref 8.5–10.1)
CREAT SERPL-MCNC: 0.85 MG/DL (ref 0.66–1.25)
DEPRECATED CALCIDIOL+CALCIFEROL SERPL-MC: 45 UG/L (ref 20–75)
GFR SERPL CREATININE-BSD FRML MDRD: >90 ML/MIN/1.7M2
TSH SERPL DL<=0.005 MIU/L-ACNC: 0.79 MU/L (ref 0.4–4)

## 2018-11-30 LAB
SHBG SERPL-SCNC: 36 NMOL/L (ref 11–80)
TESTOST FREE SERPL-MCNC: 3.33 NG/DL (ref 4.7–24.4)
TESTOST SERPL-MCNC: 186 NG/DL (ref 240–950)

## 2018-12-05 ENCOUNTER — TELEPHONE (OUTPATIENT)
Dept: FAMILY MEDICINE | Facility: CLINIC | Age: 53
End: 2018-12-05

## 2018-12-05 NOTE — TELEPHONE ENCOUNTER
[facility]  Home care. [phone]    She sets up medications weekly for Yunior. Is asking for another 2 months of services for this.     Skilled nurse visits for weekly medication management x 9 weeks, with 2-3 PRN visits.     Approved per RN protocol. Madelyn Aranda R.N.

## 2018-12-06 ENCOUNTER — OFFICE VISIT (OUTPATIENT)
Dept: ENDOCRINOLOGY | Facility: CLINIC | Age: 53
End: 2018-12-06
Payer: MEDICARE

## 2018-12-06 VITALS
WEIGHT: 197.3 LBS | HEART RATE: 82 BPM | DIASTOLIC BLOOD PRESSURE: 70 MMHG | HEIGHT: 70 IN | OXYGEN SATURATION: 98 % | TEMPERATURE: 97.6 F | BODY MASS INDEX: 28.24 KG/M2 | SYSTOLIC BLOOD PRESSURE: 114 MMHG

## 2018-12-06 DIAGNOSIS — R79.89 LOW TESTOSTERONE IN MALE: ICD-10-CM

## 2018-12-06 DIAGNOSIS — S72.001D CLOSED FRACTURE OF NECK OF RIGHT FEMUR WITH ROUTINE HEALING, SUBSEQUENT ENCOUNTER: ICD-10-CM

## 2018-12-06 DIAGNOSIS — M80.00XD AGE-RELATED OSTEOPOROSIS WITH CURRENT PATHOLOGICAL FRACTURE WITH ROUTINE HEALING, SUBSEQUENT ENCOUNTER: Primary | ICD-10-CM

## 2018-12-06 DIAGNOSIS — E78.5 HYPERLIPIDEMIA LDL GOAL <160: ICD-10-CM

## 2018-12-06 DIAGNOSIS — Z96.641 STATUS POST TOTAL REPLACEMENT OF RIGHT HIP: ICD-10-CM

## 2018-12-06 PROCEDURE — 99214 OFFICE O/P EST MOD 30 MIN: CPT | Performed by: INTERNAL MEDICINE

## 2018-12-06 RX ORDER — ALENDRONATE SODIUM 70 MG/1
TABLET ORAL
Qty: 4 TABLET | Refills: 11 | Status: SHIPPED | OUTPATIENT
Start: 2018-12-06 | End: 2020-01-27

## 2018-12-06 NOTE — PROGRESS NOTES
Name: Yunior Angel  Seen for f/u of osteoporosis.   He is here with his father.  HPI:  Yunior Angel is a 53 year old male who presents for the follow up evaluation of osteoperosis .  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    H/o hip fracture in March 2018.  He presented to emergency department with acute right hip pain.  Questionable history about fall.  He did not recall a fall, but he has short-term memory deficit. Workup showed right femoral neck fracture.  He underwent ORIF.  Status post right hip arthroplasty.    H/o verbebral fracture: In May 2018.  He presented to emergency department after a fall.  Patient tripped and fell from a standing height.  Workup at that time showedCompression fraction of L1 of uncertain age.  This was followed by DEXA scan consistent with osteoporosis as noted below.  Normal thyroid function tests, parathyroid hormone, vitamin D and calcium level.  Testosterone noted to be low as discussed below      Also history of heterotropic ossification: Incidental on x-ray findings based on discharge summary March 2018. This was anterior to the right pubic inferior rami.  There was some surgical intervention.  Please refer to the orthopedics operative note for details.  This seems to be not uncommon in patients with previous neurologic injuries.  Radiation oncology was consulted and patient underwent radiation treatment ×1 per standard practice.    Smoke:No  Family History:Yes: h/o osteoporosis  Fractures:as above  Kidney stones: No  GI Surgery:No  Bisphosphonate exposure:  no  Exercise:goes to [employer].  Prostate History: no  Diet:   Milk: 1   Yogurt/Cottage Cheese: 1   Ice Cream  Ca/Vitamin D: no calcium. 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     Past Surgical History:   Procedure Laterality Date     ARTHROPLASTY HIP Right 3/14/2018    Procedure: ARTHROPLASTY HIP;  Right total hip arthroplasty;  Surgeon: Parmjit Zabala MD;  Location: [facility] OR     COLONOSCOPY N/A 12/31/2014    Procedure: COLONOSCOPY;  Surgeon: Inna Gonzalez MD;  Location:  GI     ESOPHAGOSCOPY, GASTROSCOPY, DUODENOSCOPY (EGD), COMBINED N/A 12/31/2014    Procedure: COMBINED ESOPHAGOSCOPY, GASTROSCOPY, DUODENOSCOPY (EGD), BIOPSY SINGLE OR MULTIPLE;  Surgeon: Inna Gonzalez MD;  Location:  GI     SURGICAL HISTORY OF -       foot surgery     SURGICAL HISTORY OF -   2006    1.  Avulsion of the right great toenail.  2.  Excision of bone cyst distal phalanx, right great toe.      Family Hx:  Family History   Problem Relation Age of Onset     Neurologic Disorder Father      migraines     Family History Negative Mother      Cancer - colorectal No family hx of      Colon Cancer No family hx of           Social Hx:  Social History     Social History     Marital status: Single     Spouse name: N/A     Number of children: N/A     Years of education: N/A     Occupational History     Not on file.     Social History Main Topics     Smoking status: Never Smoker     Smokeless tobacco: Never Used     Alcohol use No     Drug use: No     Sexual activity: No     Other Topics Concern     Not on file     Social History Narrative          MEDICATIONS:  has a current medication list which includes the following prescription(s): acetaminophen, aspirin, baclofen, calcitonin (salmon), dicyclomine, multivitamin, therapeutic, omeprazole, paroxetine, polyethylene glycol, senna-docusate,  "simvastatin, and cholecalciferol.    ROS     ROS: 10 point ROS neg other than the symptoms noted above in the HPI.    Physical Exam   VS: /70 (BP Location: Left arm, Patient Position: Chair, Cuff Size: Adult Regular)  Pulse 82  Temp 97.6  F (36.4  C) (Oral)  Ht 1.778 m (5' 10\")  Wt 89.5 kg (197 lb 4.8 oz)  SpO2 98%  BMI 28.31 kg/m2  GENERAL: AXOX3, NAD, well dressed, answering questions appropriately, appears stated age.  HEENT: No exopthalmous, no proptosis, EOMI, no lig lag, no retraction  NECK: Thyroid normal in size, non tender, no nodules were palpated.  CV: RRR  LUNGS: CTAB  ABDOMEN: +BS  NEUROLOGY: CN grossly intact, no tremors  PSYCH: normal affect and mood      LABS:  Component      Latest Ref Rng & Units 11/28/2018   Testosterone Total      240 - 950 ng/dL 186 (L)   Sex Hormone Binding Globulin      11 - 80 nmol/L 36   Free Testosterone Calculated      4.7 - 24.4 ng/dL 3.33 (L)   Creatinine      0.66 - 1.25 mg/dL 0.85   GFR Estimate      >60 mL/min/1.7m2 >90   GFR Estimate If Black      >60 mL/min/1.7m2 >90   Parathyroid Hormone Intact      18 - 80 pg/mL 80   TSH      0.40 - 4.00 mU/L 0.79   Vitamin D Deficiency screening      20 - 75 ug/L 45   Calcium      8.5 - 10.1 mg/dL 9.5     BMP:  Last Basic Metabolic Panel:  Lab Results   Component Value Date     06/25/2018      Lab Results   Component Value Date    POTASSIUM 4.2 06/25/2018     Lab Results   Component Value Date    CHLORIDE 107 06/25/2018     Lab Results   Component Value Date    JUSTA 9.7 06/25/2018     Lab Results   Component Value Date    CO2 27 06/25/2018     Lab Results   Component Value Date    BUN 18 06/25/2018     Lab Results   Component Value Date    CR 0.84 06/25/2018     Lab Results   Component Value Date    GLC 98 06/25/2018       TFTs:  TSH   Date Value Ref Range Status   11/28/2018 0.79 0.40 - 4.00 mU/L Final   ]    LFTs:  Liver Function Studies -   Recent Labs   Lab Test  06/25/18   1416   PROTTOTAL  7.7   ALBUMIN  " 4.0   BILITOTAL  0.5   ALKPHOS  88   AST  16   ALT  32       PTH:     Vitamin D:  Vitamin D Deficiency Screening Results:  Lab Results   Component Value Date    VITDT 45 2018    VITDT 43 2018       BoneTurnOverMarkers:   Testosterone:    DEXA:  BONE DENSITOMETRY  [facility] [address] [place] [address]  6/15/2018       PATIENT: Yunior Angel  CHART: [id number]   :  1965  AGE:  52 year old  SEX:  male   REFERRING PROVIDER:  BRYANNA ADKINS      PROCEDURE:  Bone density scanning was performed using DXA technology of the lumbar spine and hip.  Scanning was performed on a Lunar Prodigy scanner.  Reporting is completed in the form of a T-score.  The T-score represents the standard deviation from peak bone mass based on a young healthy adult.      REFERENCE T-SCORES:       Normal                -1.0 and greater                                 Osteopenia         Between -1.0 and -2.5                                           Osteoporosis     -2.5 and less                                       RISK FACTORS:  Parent history of a osteoporosis with hip fracture, History of vertebral fracture, Hip fracture     CURRENT TREATMENT:  none listed      FINDINGS:               Lumbar Spine (L3-L4) T-score: -2.1, fracture L1 and marked degenerative changes present at L2, so only L3-4 are evaluated               Left Femoral Neck T-score:  -2.7               Forearm (radius 33%)      T-score:  1.3  The right femur is not acceptable for evaluation due to previous surgical repair.                               Lumbar (L3-L4) BMD: 1.000                                     Left Total Hip BMD: 0.789                                     Forearm (radius 33%) BMD: 0.896     IMPRESSION  Severe osteoporosis on the basis of vertebral and hip fractures.  Consider evaluation for secondary causes of osteoporosis.      Recommendations include ensuring adequate Calcium and Vitamin D.     The  "current NOF Guidelines recommend treatment for patients with prior hip or vertebral fracture, T-score -2.5 or below, or 10 year risk of any major osteoporotic fracture >20% or 10 year risk of hip fracture >3%, as calculated using the FRAX calculator (www.shef.ac.uk/FRAX or you can google \"FRAX\").       Based on these guidelines, treatment (in addition to calcium and vitamin D) is recommended for this patient, after ruling out other causes of osteoporosis.  This is meant as an aid to clinical decision making; one must still use clinical judgement.        Follow up can be considered in 1-2 years.     All pertinent notes, labs, and images personally reviewed by me.     A/P  .Yunior Angel is a 53 year old here for the evaluation of:    #1 Osteoporosis:  Risk factors for low bone density include personal history of fracture, family history, , low Ca intake.    History of compression vertebral fracture of L1  History of hip fracture status post hip arthroplasty  Also history of heterotropic ossification status post radiation treatment  DEXA 2018 consistent with severe osteoporosis  Normal thyroid function tests, parathyroid hormone, vitamin D and calcium level.  Testosterone noted to be low as discussed below  Plan:  Start Fosamax 70 mg/ once a week (12/6/2018)  Adequate calcium and vitamin D intake  The pt was advised to    Maintain an adequate calcium and vitamin D intake and to supplement vitamin D if needed to maintain serum levels of 25 hydroxy D (25 OH D) between 30-60 ng/ml.    Limit alcohol intake to no more than 2 servings per day.    Limit caffeine intake.    Maintain an active lifestyle including weight-bearing exercises for at least 30 mins daily.    Take measures to reduce the risk of falling.    #2.  Low testosterone/Male hypogonadism:  Testosterone noted to be low on workup of osteoporosis.  Low testosterone X1  Plan  Discussed diagnosis, pathophysiology, management and treatment options of " condition with pt.  Repeat testosterone lab.  Needs morning fasting sample  Based on that consider testosterone replacement.    Follow-up:  After above.    Crystal Matt MD  Endocrinology  [facility]  CC: Alberto Carlos Jr.    More than 50% of face to face time spent with Mr. Angel on counseling / coordinating his care.      All questions were answered.  The patient indicates understanding of the above issues and agrees with the plan set forth.      Addendum to above note and clinic visit:    Labs reviewed.    See result note/telephone encounter.

## 2018-12-06 NOTE — NURSING NOTE
"ENDOCRINOLOGY INTAKE FORM    Patient Name:  Yunior Angel  :  1965    Is patient Diabetic?   No  Does patient have non-diabetic or other endocrine issues?  Yes: osteoporosis    Vitals: /70 (BP Location: Left arm, Patient Position: Chair, Cuff Size: Adult Regular)  Pulse 82  Temp 97.6  F (36.4  C) (Oral)  Ht 1.778 m (5' 10\")  Wt 89.5 kg (197 lb 4.8 oz)  SpO2 98%  BMI 28.31 kg/m2  BMI= Body mass index is 28.31 kg/(m^2).    Flu vaccine:  unknown  Pneumonia vaccine:  No    Smoking and Alcohol use:  Social History   Substance Use Topics     Smoking status: Never Smoker     Smokeless tobacco: Never Used     Alcohol use No       Maren Manuel CMA    "

## 2018-12-06 NOTE — PATIENT INSTRUCTIONS
[facility]   Dr Matt, Endocrinology Department      [facility]   [address]  [place] [address]  Appointment [phone]  Fax: [phone]  [place]: Monday and Tuesday         [facility]   [address]  [place] [address]  Appointment [phone]  Fax: [phone]  [place]: Wednesday and Thursday            Start FOSAMAX 70 mg ONCE a week    Needs morning fasting labs. You can eat after that.  Need to make lab only appointment.- for morning time.  Please make a lab appointment for blood work and follow up clinic appointment in 1 week after that to discuss results.      Instructions on Fosamax use and side effects - particularly esophageal adverse events - are carefully reviewed with him. This drug must be taken upon arising for the day on an empty stomach, with a large 6-8 ounce glass of water; he must remain NPO in the upright position for at least 30 minutes afterwards and until after the first food of the day. If esophageal irritation is noted, he will stop the drug and call my office.  Treatment with bisphosphonate therapy will decrease fracture risk 50-70%.   There is risk of osteonecrosis of the jaw in patients using bisphosphonates is approximately 1700-1/100,000, with development most likely related to invasive dental procedures. If an invasive dental procedure was necessary, preferably stop the bisphosphonate approximately 3 months prior to reduce the risk. Let your dentist know that you are on this medication.  The data available at this time suggests that there is probably a small increase risk of atypical (nontraumatic) subtrochanteric fractures of the femur in patients on bisphosphonate therapy compared to those not on it. One large study suggested that for every 100 fractures prevented with bisphosphonate therapy, less than one femur fracture will  occur. Other studies suggest one episode per 2,500 patient years. Patient should call with leg pain.

## 2018-12-06 NOTE — MR AVS SNAPSHOT
After Visit Summary   2018    Yunior Angel    MRN: [id number]           Patient Information     Date Of Birth          1965        Visit Information        Provider Department      2018 3:00 PM Crystal Matt MD [facility]        Today's Diagnoses     Age-related osteoporosis with current pathological fracture with routine healing, subsequent encounter    -  1    Hyperlipidemia LDL goal <160        Closed fracture of neck of right femur with routine healing, subsequent encounter        Status post total replacement of right hip        Low testosterone in male          Care Instructions    [facility] & [place] locations   Dr Matt, Endocrinology Department      [facility]   [address]  [place] [address]  Appointment [phone]  Fax: [phone]  [place]: Monday and Tuesday         [place] [address] [facility] [address]  [place] [address]  Appointment [phone]  Fax: [phone]  [place]: Wednesday and Thursday            Start FOSAMAX 70 mg ONCE a week    Needs morning fasting labs. You can eat after that.  Need to make lab only appointment.- for morning time.    Instructions on Fosamax use and side effects - particularly esophageal adverse events - are carefully reviewed with him. This drug must be taken upon arising for the day on an empty stomach, with a large 6-8 ounce glass of water; he must remain NPO in the upright position for at least 30 minutes afterwards and until after the first food of the day. If esophageal irritation is noted, he will stop the drug and call my office.  Treatment with bisphosphonate therapy will decrease fracture risk 50-70%.   There is risk of osteonecrosis of the jaw in patients using bisphosphonates is approximately 1700-1/100,000, with development most likely related to invasive dental procedures. If an  invasive dental procedure was necessary, preferably stop the bisphosphonate approximately 3 months prior to reduce the risk. Let your dentist know that you are on this medication.  The data available at this time suggests that there is probably a small increase risk of atypical (nontraumatic) subtrochanteric fractures of the femur in patients on bisphosphonate therapy compared to those not on it. One large study suggested that for every 100 fractures prevented with bisphosphonate therapy, less than one femur fracture will occur. Other studies suggest one episode per 2,500 patient years. Patient should call with leg pain.                  Follow-ups after your visit        Future tests that were ordered for you today     Open Future Orders        Priority Expected Expires Ordered    Testosterone Free and Total Routine  11/6/2019 12/6/2018    Estradiol Routine  11/6/2019 12/6/2018    Ferritin Routine  11/6/2019 12/6/2018    Follicle stimulating hormone Routine  11/6/2019 12/6/2018    Lutropin Routine  11/6/2019 12/6/2018    Hemoglobin Routine  11/6/2019 12/6/2018    Prolactin Routine  11/6/2019 12/6/2018            Who to contact     If you have questions or need follow up information about today's clinic visit or your schedule please contact [facility] directly at [phone].  Normal or non-critical lab and imaging results will be communicated to you by MyChart, letter or phone within 4 business days after the clinic has received the results. If you do not hear from us within 7 days, please contact the clinic through MyChart or phone. If you have a critical or abnormal lab result, we will notify you by phone as soon as possible.  Submit refill requests through tomoguides or call your pharmacy and they will forward the refill request to us. Please allow 3 business days for your refill to be completed.          Additional Information About Your Visit        Care EveryWhere ID     This is your Care  "EveryWhere ID. This could be used by other organizations to access your [facility] medical records  [id number]        Your Vitals Were     Pulse Temperature Height Pulse Oximetry BMI (Body Mass Index)       82 97.6  F (36.4  C) (Oral) 1.778 m (5' 10\") 98% 28.31 kg/m2        Blood Pressure from Last 3 Encounters:   12/06/18 114/70   10/17/18 110/60   06/25/18 121/68    Weight from Last 3 Encounters:   12/06/18 89.5 kg (197 lb 4.8 oz)   10/17/18 89.1 kg (196 lb 8 oz)   06/25/18 87.6 kg (193 lb 1.6 oz)                 Today's Medication Changes          These changes are accurate as of 12/6/18  3:24 PM.  If you have any questions, ask your nurse or doctor.               Start taking these medicines.        Dose/Directions    alendronate 70 MG tablet   Commonly known as:  FOSAMAX   Used for:  Age-related osteoporosis with current pathological fracture with routine healing, subsequent encounter   Started by:  Crystal Matt MD        Take 1 tablet (70 mg) by mouth with 8oz water every 7 days 30 minutes before breakfast and remain upright during this time.   Quantity:  4 tablet   Refills:  11            Where to get your medicines      These medications were sent to [facility] [address] [address]     Phone:  [phone]     alendronate 70 MG tablet                Primary Care Provider Office Phone # Fax #    Alberto aCrlos Jr., [phone] [address] [address]        Equal Access to Services     [facility]: Hadii jules bolanos hadasho Soomaali, waaxda luqadaha, qaybta kaalmada sylvie hammer. So [facility] [phone].    ATENCIÓN: Si habla español, tiene a gamble disposición servicios gratuitos de asistencia lingüística. Llame al [phone].    We comply with applicable federal civil rights laws and [place] laws. We do not discriminate on the basis of race, " color, national origin, age, disability, sex, sexual orientation, or gender identity.            Thank you!     Thank you for choosing [facility]  for your care. Our goal is always to provide you with excellent care. Hearing back from our patients is one way we can continue to improve our services. Please take a few minutes to complete the written survey that you may receive in the mail after your visit with us. Thank you!             Your Updated Medication List - Protect others around you: Learn how to safely use, store and throw away your medicines at www.disposemymeds.org.          This list is accurate as of 12/6/18  3:24 PM.  Always use your most recent med list.                   Brand Name Dispense Instructions for use Diagnosis    alendronate 70 MG tablet    FOSAMAX    4 tablet    Take 1 tablet (70 mg) by mouth with 8oz water every 7 days 30 minutes before breakfast and remain upright during this time.    Age-related osteoporosis with current pathological fracture with routine healing, subsequent encounter       aspirin 325 MG EC tablet    ASA    70 tablet    Take one Aspirin tab twice daily for 5 weeks.    Closed fracture of neck of right femur, initial encounter (H)       baclofen 10 MG tablet    LIORESAL    180 tablet    Take 1 tablet (10 mg) by mouth 2 times daily    Muscle spasticity       calcitonin (salmon) 200 UNIT/ACT nasal spray    MIACALCIN    1 Bottle    Spray 1 spray into one nostril alternating nostrils daily Alternate nostril each day.    Closed compression fracture of L1 lumbar vertebra with routine healing, subsequent encounter       dicyclomine 10 MG capsule    BENTYL    240 capsule    Take 1 capsule (10 mg) by mouth 4 times daily (before meals and nightly)    Irritable bowel syndrome with diarrhea       multivitamin, therapeutic Tabs tablet      Take 1 tablet by mouth daily        omeprazole 40 MG DR capsule    priLOSEC    90 capsule    Take 1 capsule (40 mg) by mouth  daily Take 30-60 minutes before a meal.    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

## 2018-12-06 NOTE — LETTER
12/6/2018         RE: Yunior Angel  [address]  [facility] [address]        Dear Colleague,    Thank you for referring your patient, Yunior Angel, to the [facility]. Please see a copy of my visit note below.    Name: Yunior Angel  Seen for f/u of osteoporosis.   He is here with his father.  HPI:  Yunior Angel is a 53 year old male who presents for the follow up evaluation of osteoperosis .  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    H/o hip fracture in March 2018.  He presented to emergency department with acute right hip pain.  Questionable history about fall.  He did not recall a fall, but he has short-term memory deficit. Workup showed right femoral neck fracture.  He underwent ORIF.  Status post right hip arthroplasty.    H/o verbebral fracture: In May 2018.  He presented to emergency department after a fall.  Patient tripped and fell from a standing height.  Workup at that time showedCompression fraction of L1 of uncertain age.  This was followed by DEXA scan consistent with osteoporosis as noted below.  Normal thyroid function tests, parathyroid hormone, vitamin D and calcium level.  Testosterone noted to be low as discussed below      Also history of heterotropic ossification: Incidental on x-ray findings based on discharge summary March 2018. This was anterior to the right pubic inferior rami.  There was some surgical intervention.  Please refer to the orthopedics operative note for details.  This seems to be not uncommon in patients with previous neurologic injuries.  Radiation oncology was consulted and patient underwent radiation treatment ×1 per standard practice.    Smoke:No  Family History:Yes: h/o osteoporosis  Fractures:as  above  Kidney stones: No  GI Surgery:No  Bisphosphonate exposure:  no  Exercise:goes to [employer].  Prostate History: no  Diet:   Milk: 1   Yogurt/Cottage Cheese: 1   Ice Cream  Ca/Vitamin D: no calcium. 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     Past Surgical History:   Procedure Laterality Date     ARTHROPLASTY HIP Right 3/14/2018    Procedure: ARTHROPLASTY HIP;  Right total hip arthroplasty;  Surgeon: Parmjit Zabala MD;  Location:  OR     COLONOSCOPY N/A 12/31/2014    Procedure: COLONOSCOPY;  Surgeon: Inna Gonzalez MD;  Location:  GI     ESOPHAGOSCOPY, GASTROSCOPY, DUODENOSCOPY (EGD), COMBINED N/A 12/31/2014    Procedure: COMBINED ESOPHAGOSCOPY, GASTROSCOPY, DUODENOSCOPY (EGD), BIOPSY SINGLE OR MULTIPLE;  Surgeon: Inna Gonzalez MD;  Location:  GI     SURGICAL HISTORY OF -       foot surgery     SURGICAL HISTORY OF -   2006    1.  Avulsion of the right great toenail.  2.  Excision of bone cyst distal phalanx, right great toe.      Family Hx:  Family History   Problem Relation Age of Onset     Neurologic Disorder Father      migraines     Family History Negative Mother      Cancer - colorectal No family hx of      Colon Cancer No family hx of           Social Hx:  Social History     Social History     Marital status: Single     Spouse name: N/A     Number of children: N/A     Years of education: N/A     Occupational History     Not on file.     Social History Main Topics     Smoking status: Never Smoker     Smokeless tobacco: Never Used     Alcohol use No     Drug use: No     Sexual activity: No     Other Topics Concern     Not on file     Social History  "Narrative          MEDICATIONS:  has a current medication list which includes the following prescription(s): acetaminophen, aspirin, baclofen, calcitonin (salmon), dicyclomine, multivitamin, therapeutic, omeprazole, paroxetine, polyethylene glycol, senna-docusate, simvastatin, and cholecalciferol.    ROS     ROS: 10 point ROS neg other than the symptoms noted above in the HPI.    Physical Exam   VS: /70 (BP Location: Left arm, Patient Position: Chair, Cuff Size: Adult Regular)  Pulse 82  Temp 97.6  F (36.4  C) (Oral)  Ht 1.778 m (5' 10\")  Wt 89.5 kg (197 lb 4.8 oz)  SpO2 98%  BMI 28.31 kg/m2  GENERAL: AXOX3, NAD, well dressed, answering questions appropriately, appears stated age.  HEENT: No exopthalmous, no proptosis, EOMI, no lig lag, no retraction  NECK: Thyroid normal in size, non tender, no nodules were palpated.  CV: RRR  LUNGS: CTAB  ABDOMEN: +BS  NEUROLOGY: CN grossly intact, no tremors  PSYCH: normal affect and mood      LABS:  Component      Latest Ref Rng & Units 11/28/2018   Testosterone Total      240 - 950 ng/dL 186 (L)   Sex Hormone Binding Globulin      11 - 80 nmol/L 36   Free Testosterone Calculated      4.7 - 24.4 ng/dL 3.33 (L)   Creatinine      0.66 - 1.25 mg/dL 0.85   GFR Estimate      >60 mL/min/1.7m2 >90   GFR Estimate If Black      >60 mL/min/1.7m2 >90   Parathyroid Hormone Intact      18 - 80 pg/mL 80   TSH      0.40 - 4.00 mU/L 0.79   Vitamin D Deficiency screening      20 - 75 ug/L 45   Calcium      8.5 - 10.1 mg/dL 9.5     BMP:  Last Basic Metabolic Panel:  Lab Results   Component Value Date     06/25/2018      Lab Results   Component Value Date    POTASSIUM 4.2 06/25/2018     Lab Results   Component Value Date    CHLORIDE 107 06/25/2018     Lab Results   Component Value Date    JUSTA 9.7 06/25/2018     Lab Results   Component Value Date    CO2 27 06/25/2018     Lab Results   Component Value Date    BUN 18 06/25/2018     Lab Results   Component Value Date    CR 0.84 " 2018     Lab Results   Component Value Date    GLC 98 2018       TFTs:  TSH   Date Value Ref Range Status   2018 0.79 0.40 - 4.00 mU/L Final   ]    LFTs:  Liver Function Studies -   Recent Labs   Lab Test  18   1416   PROTTOTAL  7.7   ALBUMIN  4.0   BILITOTAL  0.5   ALKPHOS  88   AST  16   ALT  32       PTH:     Vitamin D:  Vitamin D Deficiency Screening Results:  Lab Results   Component Value Date    VITDT 45 2018    VITDT 43 2018       BoneTurnOverMarkers:   Testosterone:    DEXA:  BONE DENSITOMETRY  [facility] [address] [place] [address]  6/15/2018       PATIENT: Yunior Angel  CHART: [id number]   :  1965  AGE:  52 year old  SEX:  male   REFERRING PROVIDER:  BRYANNA ADKINS      PROCEDURE:  Bone density scanning was performed using DXA technology of the lumbar spine and hip.  Scanning was performed on a Lunar Prodigy scanner.  Reporting is completed in the form of a T-score.  The T-score represents the standard deviation from peak bone mass based on a young healthy adult.      REFERENCE T-SCORES:       Normal                -1.0 and greater                                 Osteopenia         Between -1.0 and -2.5                                           Osteoporosis     -2.5 and less                                       RISK FACTORS:  Parent history of a osteoporosis with hip fracture, History of vertebral fracture, Hip fracture     CURRENT TREATMENT:  none listed      FINDINGS:               Lumbar Spine (L3-L4) T-score: -2.1, fracture L1 and marked degenerative changes present at L2, so only L3-4 are evaluated               Left Femoral Neck T-score:  -2.7               Forearm (radius 33%)      T-score:  1.3  The right femur is not acceptable for evaluation due to previous surgical repair.                               Lumbar (L3-L4) BMD: 1.000                                     Left Total Hip BMD: 0.789                       "               Forearm (radius 33%) BMD: 0.896     IMPRESSION  Severe osteoporosis on the basis of vertebral and hip fractures.  Consider evaluation for secondary causes of osteoporosis.      Recommendations include ensuring adequate Calcium and Vitamin D.     The current NOF Guidelines recommend treatment for patients with prior hip or vertebral fracture, T-score -2.5 or below, or 10 year risk of any major osteoporotic fracture >20% or 10 year risk of hip fracture >3%, as calculated using the FRAX calculator (www.shef.ac.uk/FRAX or you can google \"FRAX\").       Based on these guidelines, treatment (in addition to calcium and vitamin D) is recommended for this patient, after ruling out other causes of osteoporosis.  This is meant as an aid to clinical decision making; one must still use clinical judgement.        Follow up can be considered in 1-2 years.     All pertinent notes, labs, and images personally reviewed by me.     A/P  Mr.Brian LINDSAY Angel is a 53 year old here for the evaluation of:    #1 Osteoporosis:  Risk factors for low bone density include personal history of fracture, family history, , low Ca intake.    History of compression vertebral fracture of L1  History of hip fracture status post hip arthroplasty  Also history of heterotropic ossification status post radiation treatment  DEXA 2018 consistent with severe osteoporosis  Normal thyroid function tests, parathyroid hormone, vitamin D and calcium level.  Testosterone noted to be low as discussed below  Plan:  Start Fosamax 70 mg/ once a week (12/6/2018)  Adequate calcium and vitamin D intake  The pt was advised to    Maintain an adequate calcium and vitamin D intake and to supplement vitamin D if needed to maintain serum levels of 25 hydroxy D (25 OH D) between 30-60 ng/ml.    Limit alcohol intake to no more than 2 servings per day.    Limit caffeine intake.    Maintain an active lifestyle including weight-bearing exercises for at least 30 " mins daily.    Take measures to reduce the risk of falling.    #2.  Low testosterone/Male hypogonadism:  Testosterone noted to be low on workup of osteoporosis.  Low testosterone X1  Plan  Discussed diagnosis, pathophysiology, management and treatment options of condition with pt.  Repeat testosterone lab.  Needs morning fasting sample  Based on that consider testosterone replacement.    Follow-up:  After above.    Crystal Matt MD  Endocrinology  [facility]  CC: Alberto Carlos Jr.    More than 50% of face to face time spent with Mr. Angel on counseling / coordinating his care.      All questions were answered.  The patient indicates understanding of the above issues and agrees with the plan set forth.      Addendum to above note and clinic visit:    Labs reviewed.    See result note/telephone encounter.            Again, thank you for allowing me to participate in the care of your patient.        Sincerely,        Crystal Matt MD

## 2018-12-07 ENCOUNTER — TELEPHONE (OUTPATIENT)
Dept: FAMILY MEDICINE | Facility: CLINIC | Age: 53
End: 2018-12-07

## 2018-12-07 DIAGNOSIS — Z53.9 DIAGNOSIS NOT YET DEFINED: Primary | ICD-10-CM

## 2018-12-07 PROCEDURE — G0179 MD RECERTIFICATION HHA PT: HCPCS | Performed by: FAMILY MEDICINE

## 2018-12-07 NOTE — TELEPHONE ENCOUNTER
Date Forms was received: December 7, 2018    Forms received by: Fax    Purpose of Form:  Nursing Home Orders plan of care    When the form is due:  ASAP    How the form needs to be returned for patient:  Fax    Form currently placed  SR inbox

## 2018-12-12 ENCOUNTER — PATIENT OUTREACH (OUTPATIENT)
Dept: CARE COORDINATION | Facility: CLINIC | Age: 53
End: 2018-12-12

## 2018-12-12 NOTE — PROGRESS NOTES
Clinic Care Coordination Contact  Care Coordination Communication    Home Care Contact:              Home Care Agency: [facility]              Care Coordination contacted home care: No -  Status reviewed via Homecare Advisor- Production application.                Patient is current with [facility] services at this time.     Plan: RN/SW Care Coordinator will await notification from home care staff informing RN/SW Care Coordinator of patients discharge plans/needs. RN/SW Care Coordinator will review chart and outreach to home care every 4 weeks and as needed.      Yuki Carty, STELLA  [facility]  Clinic Care Coordinator - [facility] locations   Direct:  [phone] (voicemail available)   (Today's Date: 12/12/2018)

## 2018-12-14 DIAGNOSIS — R79.89 LOW TESTOSTERONE IN MALE: ICD-10-CM

## 2018-12-14 LAB
ESTRADIOL SERPL-MCNC: 48 PG/ML (ref 6–50)
FSH SERPL-ACNC: 8 IU/L (ref 0.7–10.8)
HGB BLD-MCNC: 14.9 G/DL (ref 13.3–17.7)
LH SERPL-ACNC: 2.2 IU/L (ref 1.5–9.3)
PROLACTIN SERPL-MCNC: 6 UG/L (ref 2–18)

## 2018-12-14 PROCEDURE — 83001 ASSAY OF GONADOTROPIN (FSH): CPT | Performed by: INTERNAL MEDICINE

## 2018-12-14 PROCEDURE — 36415 COLL VENOUS BLD VENIPUNCTURE: CPT | Performed by: INTERNAL MEDICINE

## 2018-12-14 PROCEDURE — 82670 ASSAY OF TOTAL ESTRADIOL: CPT | Performed by: INTERNAL MEDICINE

## 2018-12-14 PROCEDURE — 84270 ASSAY OF SEX HORMONE GLOBUL: CPT | Performed by: INTERNAL MEDICINE

## 2018-12-14 PROCEDURE — 82728 ASSAY OF FERRITIN: CPT | Performed by: INTERNAL MEDICINE

## 2018-12-14 PROCEDURE — 85018 HEMOGLOBIN: CPT | Performed by: INTERNAL MEDICINE

## 2018-12-14 PROCEDURE — 83002 ASSAY OF GONADOTROPIN (LH): CPT | Performed by: INTERNAL MEDICINE

## 2018-12-14 PROCEDURE — 84146 ASSAY OF PROLACTIN: CPT | Performed by: INTERNAL MEDICINE

## 2018-12-14 PROCEDURE — 84403 ASSAY OF TOTAL TESTOSTERONE: CPT | Performed by: INTERNAL MEDICINE

## 2018-12-16 LAB — FERRITIN SERPL-MCNC: 16 NG/ML (ref 26–388)

## 2018-12-18 LAB
SHBG SERPL-SCNC: 40 NMOL/L (ref 11–80)
TESTOST FREE SERPL-MCNC: 4.64 NG/DL (ref 4.7–24.4)
TESTOST SERPL-MCNC: 269 NG/DL (ref 240–950)

## 2019-01-09 ENCOUNTER — PATIENT OUTREACH (OUTPATIENT)
Dept: CARE COORDINATION | Facility: CLINIC | Age: 54
End: 2019-01-09

## 2019-01-09 DIAGNOSIS — K21.00 GASTROESOPHAGEAL REFLUX DISEASE WITH ESOPHAGITIS: ICD-10-CM

## 2019-01-09 NOTE — TELEPHONE ENCOUNTER
"Requested Prescriptions   Pending Prescriptions Disp Refills     omeprazole (PRILOSEC) 40 MG DR capsule  Last Written Prescription Date:  6/14/2018  Last Fill Quantity: 90 capsule,  # refills: 1   Last office visit: 6/25/2018 with prescribing provider:  Terrance   Future Office Visit:       90 capsule 1     Sig: Take 1 capsule (40 mg) by mouth daily Take 30-60 minutes before a meal.    PPI Protocol Failed - 1/9/2019  2:45 PM       Failed - No diagnosis of osteoporosis on record       Passed - Not on Clopidogrel (unless Pantoprazole ordered)       Passed - Recent (12 mo) or future (30 days) visit within the authorizing provider's specialty    Patient had office visit in the last 12 months or has a visit in the next 30 days with authorizing provider or within the authorizing provider's specialty.  See \"Patient Info\" tab in inbasket, or \"Choose Columns\" in Meds & Orders section of the refill encounter.             Passed - Medication is active on med list       Passed - Patient is age 18 or older          " Discharged

## 2019-01-09 NOTE — PROGRESS NOTES
Clinic Care Coordination Contact  Care Coordination Communication    Home Care Contact:              Home Care Agency: [facility]# [phone]              Contact name () and phone number: Leona Morris RN CM (no direct contact information known)               Care Coordination contacted home care: No - verified patient's status via Homecare Advisor PROD application.                Start of care date:  04/11/2018   Patient is chronic on home care services.    Current disciplines:  RN, CYNTHIA   Certification period:  12/07/2018 through 02/04/2019   Patient has a TBI CM - Kenia Patel with [facility] [employer] [facility]; Aylin Man, Supervisor ([phone]).    Plan:     Closing to clinic care coordination services at this time as he has been chronic on home care.     ENROLLMENT STATUS:   Not a Candidate    CARE COORDINATOR STATUS: Care team updated.     Yuki Carty RN  [facility]  Clinic Care Coordinator - [facility]   Direct:  [phone] (voicemail available)   (Today's Date: 01/09/2019)

## 2019-01-10 NOTE — TELEPHONE ENCOUNTER
Routing refill request to provider for review/approval because:  Dx of osteoporosis on record.  EUSEBIO MimsN, RN  [facility]

## 2019-01-14 RX ORDER — OMEPRAZOLE 40 MG/1
40 CAPSULE, DELAYED RELEASE ORAL DAILY
Qty: 90 CAPSULE | Refills: 1 | Status: SHIPPED | OUTPATIENT
Start: 2019-01-14 | End: 2019-06-26

## 2019-02-18 ENCOUNTER — TELEPHONE (OUTPATIENT)
Dept: FAMILY MEDICINE | Facility: CLINIC | Age: 54
End: 2019-02-18

## 2019-02-18 NOTE — PLAN OF CARE
Problem: Hip Arthroplasty (Total, Partial) (Adult)  Goal: Signs and Symptoms of Listed Potential Problems Will be Absent, Minimized or Managed (Hip Arthroplasty)  Signs and symptoms of listed potential problems will be absent, minimized or managed by discharge/transition of care (reference Hip Arthroplasty (Total, Partial) (Adult) CPG).   Alert to self, disoriented to time, place and situation, also STM loss. Tolerating a regular diet well. Dressing CDI with scant dried drainage. Ice for comfort. Taking Oxycodone for pain management which has been effective. A1 with walker and gait belt. D/C to [facility] today with medical transport.        High Dose Vitamin A Counseling: Side effects reviewed, pt to contact office should one occur. Azithromycin Pregnancy And Lactation Text: This medication is considered safe during pregnancy and is also secreted in breast milk. Isotretinoin Pregnancy And Lactation Text: This medication is Pregnancy Category X and is considered extremely dangerous during pregnancy. It is unknown if it is excreted in breast milk. Topical Clindamycin Pregnancy And Lactation Text: This medication is Pregnancy Category B and is considered safe during pregnancy. It is unknown if it is excreted in breast milk. Doxycycline Counseling:  Patient counseled regarding possible photosensitivity and increased risk for sunburn.  Patient instructed to avoid sunlight, if possible.  When exposed to sunlight, patients should wear protective clothing, sunglasses, and sunscreen.  The patient was instructed to call the office immediately if the following severe adverse effects occur:  hearing changes, easy bruising/bleeding, severe headache, or vision changes.  The patient verbalized understanding of the proper use and possible adverse effects of doxycycline.  All of the patient's questions and concerns were addressed. Topical Retinoid Pregnancy And Lactation Text: This medication is Pregnancy Category C. It is unknown if this medication is excreted in breast milk. Bactrim Counseling:  I discussed with the patient the risks of sulfa antibiotics including but not limited to GI upset, allergic reaction, drug rash, diarrhea, dizziness, photosensitivity, and yeast infections.  Rarely, more serious reactions can occur including but not limited to aplastic anemia, agranulocytosis, methemoglobinemia, blood dyscrasias, liver or kidney failure, lung infiltrates or desquamative/blistering drug rashes. Azithromycin Counseling:  I discussed with the patient the risks of azithromycin including but not limited to GI upset, allergic reaction, drug rash, diarrhea, and yeast infections. Dapsone Pregnancy And Lactation Text: This medication is Pregnancy Category C and is not considered safe during pregnancy or breast feeding. Benzoyl Peroxide Counseling: Patient counseled that medicine may cause skin irritation and bleach clothing.  In the event of skin irritation, the patient was advised to reduce the amount of the drug applied or use it less frequently.   The patient verbalized understanding of the proper use and possible adverse effects of benzoyl peroxide.  All of the patient's questions and concerns were addressed. Isotretinoin Counseling: Patient should get monthly blood tests, not donate blood, not drive at night if vision affected, not share medication, and not undergo elective surgery for 6 months after tx completed. Side effects reviewed, pt to contact office should one occur. High Dose Vitamin A Pregnancy And Lactation Text: High dose vitamin A therapy is contraindicated during pregnancy and breast feeding. Spironolactone Pregnancy And Lactation Text: This medication can cause feminization of the male fetus and should be avoided during pregnancy. The active metabolite is also found in breast milk. Birth Control Pills Counseling: Birth Control Pill Counseling: I discussed with the patient the potential side effects of OCPs including but not limited to increased risk of stroke, heart attack, thrombophlebitis, deep venous thrombosis, hepatic adenomas, breast changes, GI upset, headaches, and depression.  The patient verbalized understanding of the proper use and possible adverse effects of OCPs. All of the patient's questions and concerns were addressed. Erythromycin Counseling:  I discussed with the patient the risks of erythromycin including but not limited to GI upset, allergic reaction, drug rash, diarrhea, increase in liver enzymes, and yeast infections. Detail Level: Zone Benzoyl Peroxide Pregnancy And Lactation Text: This medication is Pregnancy Category C. It is unknown if benzoyl peroxide is excreted in breast milk. Topical Clindamycin Counseling: Patient counseled that this medication may cause skin irritation or allergic reactions.  In the event of skin irritation, the patient was advised to reduce the amount of the drug applied or use it less frequently.   The patient verbalized understanding of the proper use and possible adverse effects of clindamycin.  All of the patient's questions and concerns were addressed. Dapsone Counseling: I discussed with the patient the risks of dapsone including but not limited to hemolytic anemia, agranulocytosis, rashes, methemoglobinemia, kidney failure, peripheral neuropathy, headaches, GI upset, and liver toxicity.  Patients who start dapsone require monitoring including baseline LFTs and weekly CBCs for the first month, then every month thereafter.  The patient verbalized understanding of the proper use and possible adverse effects of dapsone.  All of the patient's questions and concerns were addressed. Tazorac Counseling:  Patient advised that medication is irritating and drying.  Patient may need to apply sparingly and wash off after an hour before eventually leaving it on overnight.  The patient verbalized understanding of the proper use and possible adverse effects of tazorac.  All of the patient's questions and concerns were addressed. Topical Sulfur Applications Pregnancy And Lactation Text: This medication is Pregnancy Category C and has an unknown safety profile during pregnancy. It is unknown if this topical medication is excreted in breast milk. Tetracycline Counseling: Patient counseled regarding possible photosensitivity and increased risk for sunburn.  Patient instructed to avoid sunlight, if possible.  When exposed to sunlight, patients should wear protective clothing, sunglasses, and sunscreen.  The patient was instructed to call the office immediately if the following severe adverse effects occur:  hearing changes, easy bruising/bleeding, severe headache, or vision changes.  The patient verbalized understanding of the proper use and possible adverse effects of tetracycline.  All of the patient's questions and concerns were addressed. Patient understands to avoid pregnancy while on therapy due to potential birth defects. Use Enhanced Medication Counseling?: No Topical Sulfur Applications Counseling: Topical Sulfur Counseling: Patient counseled that this medication may cause skin irritation or allergic reactions.  In the event of skin irritation, the patient was advised to reduce the amount of the drug applied or use it less frequently.   The patient verbalized understanding of the proper use and possible adverse effects of topical sulfur application.  All of the patient's questions and concerns were addressed. Tazorac Pregnancy And Lactation Text: This medication is not safe during pregnancy. It is unknown if this medication is excreted in breast milk. Spironolactone Counseling: Patient advised regarding risks of diarrhea, abdominal pain, hyperkalemia, birth defects (for female patients), liver toxicity and renal toxicity. The patient may need blood work to monitor liver and kidney function and potassium levels while on therapy. The patient verbalized understanding of the proper use and possible adverse effects of spironolactone.  All of the patient's questions and concerns were addressed. Minocycline Counseling: Patient advised regarding possible photosensitivity and discoloration of the teeth, skin, lips, tongue and gums.  Patient instructed to avoid sunlight, if possible.  When exposed to sunlight, patients should wear protective clothing, sunglasses, and sunscreen.  The patient was instructed to call the office immediately if the following severe adverse effects occur:  hearing changes, easy bruising/bleeding, severe headache, or vision changes.  The patient verbalized understanding of the proper use and possible adverse effects of minocycline.  All of the patient's questions and concerns were addressed. Bactrim Pregnancy And Lactation Text: This medication is Pregnancy Category D and is known to cause fetal risk.  It is also excreted in breast milk. Doxycycline Pregnancy And Lactation Text: This medication is Pregnancy Category D and not consider safe during pregnancy. It is also excreted in breast milk but is considered safe for shorter treatment courses. Topical Retinoid counseling:  Patient advised to apply a pea-sized amount only at bedtime and wait 30 minutes after washing their face before applying.  If too drying, patient may add a non-comedogenic moisturizer. The patient verbalized understanding of the proper use and possible adverse effects of retinoids.  All of the patient's questions and concerns were addressed. Erythromycin Pregnancy And Lactation Text: This medication is Pregnancy Category B and is considered safe during pregnancy. It is also excreted in breast milk. Minocycline Pregnancy And Lactation Text: This medication is Pregnancy Category D and not consider safe during pregnancy. It is also excreted in breast milk. Birth Control Pills Pregnancy And Lactation Text: This medication should be avoided if pregnant and for the first 30 days post-partum.

## 2019-02-18 NOTE — TELEPHONE ENCOUNTER
Date Forms was received: February 18, 2019    Forms received by: Fax    Purpose of Form:  Nursing Home Orders POC    When the form is due:  ASAP    How the form needs to be returned for patient:  Fax    Form currently placed  SR inbox as has to be signed by PCP.

## 2019-02-21 ENCOUNTER — TELEPHONE (OUTPATIENT)
Dept: FAMILY MEDICINE | Facility: CLINIC | Age: 54
End: 2019-02-21

## 2019-02-21 NOTE — TELEPHONE ENCOUNTER
Date Forms was received: February 21, 2019    Forms received by: Fax    Purpose of Form:  [facility]    When the form is due:  ASAP    How the form needs to be returned for patient:  Fax    Form currently placed  SW inbox as SR is out office and form needs to be returned by tomorrow 2/22/2019.  Also need signed med list.  Placed with form to be signed.

## 2019-02-22 ENCOUNTER — TRANSFERRED RECORDS (OUTPATIENT)
Dept: HEALTH INFORMATION MANAGEMENT | Facility: CLINIC | Age: 54
End: 2019-02-22

## 2019-02-22 NOTE — TELEPHONE ENCOUNTER
Kenia calling from [facility] calling. Kenia needs a couple progress notes from previous office visits (1-2) would be helpful for , since they do not have a lot of information regarding patient.    Forms need to be faxed again as fax may not be working and forms need to be re-faxed: [phone] or main fax is: [phone].    Kenia can be reached at: [phone], ok to leave detailed message.    Routing to BESSIE and Dr Vega.    EUSEBIO OrellanaN, RN  [employer] [facility]

## 2019-03-01 NOTE — PROGRESS NOTES
SUBJECTIVE:                                                    Yunior Angel is a 53 year old male who presents to clinic today for the following health issues:      Joint Pain - needs an order for PT -  Has a form for disability parking certificate     Onset: ongoing issue     Description:   Location: right leg  Character: no pain    Intensity: moderate    Progression of Symptoms: same, its just not getting better    Accompanying Signs & Symptoms:  Other symptoms: weakness of leg    History:   Previous similar pain: no       Precipitating factors:   Trauma or overuse: YES- broke his hip this past summer    Alleviating factors:  Improved by: nothing    Therapies Tried and outcome: would like to continue physical therapy      Yunior has history of TBI which has left him with a chronic neurologic deficit. He has been living independently at [facility]. His mom has been concerned about him taking his medications, being susceptible to falls and he will therefore will be moving to assisted living facility in [place].    She requests documentation to help him get out of his current apartment lease due to medical issue.      Problem list and histories reviewed & adjusted, as indicated.  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)     Muscle spasticity     Fracture of femoral neck, right (H)     S/P total hip arthroplasty     Age-related  "osteoporosis with current pathological fracture with routine healing, subsequent encounter     Low testosterone in male     Past Surgical History:   Procedure Laterality Date     ARTHROPLASTY HIP Right 3/14/2018    Procedure: ARTHROPLASTY HIP;  Right total hip arthroplasty;  Surgeon: Parmjit Zabala MD;  Location: [facility] OR     COLONOSCOPY N/A 12/31/2014    Procedure: COLONOSCOPY;  Surgeon: Inna Gonzalez MD;  Location:  GI     ESOPHAGOSCOPY, GASTROSCOPY, DUODENOSCOPY (EGD), COMBINED N/A 12/31/2014    Procedure: COMBINED ESOPHAGOSCOPY, GASTROSCOPY, DUODENOSCOPY (EGD), BIOPSY SINGLE OR MULTIPLE;  Surgeon: Inna Gonzalez MD;  Location:  GI     SURGICAL HISTORY OF -       foot surgery     SURGICAL HISTORY OF -   2006    1.  Avulsion of the right great toenail.  2.  Excision of bone cyst distal phalanx, right great toe.        Social History     Tobacco Use     Smoking status: Never Smoker     Smokeless tobacco: Never Used   Substance Use Topics     Alcohol use: No     Alcohol/week: 0.0 oz     Family History   Problem Relation Age of Onset     Neurologic Disorder Father         migraines     Family History Negative Mother      Cancer - colorectal No family hx of      Colon Cancer No family hx of            ROS:  Constitutional, HEENT, cardiovascular, pulmonary, gi and gu systems are negative, except as otherwise noted.    OBJECTIVE:     /78 (BP Location: Right arm, Patient Position: Sitting, Cuff Size: Adult Regular)   Pulse 76   Temp 97.7  F (36.5  C) (Oral)   Ht 1.778 m (5' 10\")   Wt 85.3 kg (188 lb)   SpO2 99%   BMI 26.98 kg/m    Body mass index is 26.98 kg/m .  GENERAL: healthy, alert and no distress  MS: ataxic gait  PSYCH: mentation appears normal, affect normal/bright    Diagnostic Test Results:  none     ASSESSMENT/PLAN:   Agree with ongoing physical therapy for strength and balance. Order placed. Discussed documentation to get out of lease. Advised checking with apartment " complex to see if they have any forms or requirements needed. Can write letter and even get input from patient's PCP if needed.    1. 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 Referral to physical therapy also placed.  - [facility]FERR[facility]; Future      2. Closed head injury, sequela  - [facility] REFERRAL; Future    3. Age-related osteoporosis with current pathological fracture with routine healing, subsequent encounter  - [facility], AND CHIR[facility]RACTIC REFERRAL; Future    4. 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 Status post total replacement of right hip  - [facility], [facility] CHIR[facility]CTIC REFERRAL; Future      Star Vega,   [facility]

## 2019-03-04 ENCOUNTER — OFFICE VISIT (OUTPATIENT)
Dept: FAMILY MEDICINE | Facility: CLINIC | Age: 54
End: 2019-03-04
Payer: MEDICARE

## 2019-03-04 VITALS
DIASTOLIC BLOOD PRESSURE: 78 MMHG | TEMPERATURE: 97.7 F | OXYGEN SATURATION: 99 % | HEIGHT: 70 IN | HEART RATE: 76 BPM | BODY MASS INDEX: 26.92 KG/M2 | SYSTOLIC BLOOD PRESSURE: 112 MMHG | WEIGHT: 188 LBS

## 2019-03-04 DIAGNOSIS — G83.10 MONOPLEGIA OF LOWER LIMB AFFECTING DOMINANT SIDE (H): ICD-10-CM

## 2019-03-04 DIAGNOSIS — Z96.641 STATUS POST TOTAL REPLACEMENT OF RIGHT HIP: ICD-10-CM

## 2019-03-04 DIAGNOSIS — R46.89 NEUROBEHAVIORAL SEQUELAE OF TRAUMATIC BRAIN INJURY (H): ICD-10-CM

## 2019-03-04 DIAGNOSIS — Z12.11 SCREEN FOR COLON CANCER: Primary | ICD-10-CM

## 2019-03-04 DIAGNOSIS — S06.9XAS NEUROBEHAVIORAL SEQUELAE OF TRAUMATIC BRAIN INJURY (H): ICD-10-CM

## 2019-03-04 DIAGNOSIS — S09.90XS CLOSED HEAD INJURY, SEQUELA: ICD-10-CM

## 2019-03-04 DIAGNOSIS — M80.00XD AGE-RELATED OSTEOPOROSIS WITH CURRENT PATHOLOGICAL FRACTURE WITH ROUTINE HEALING, SUBSEQUENT ENCOUNTER: ICD-10-CM

## 2019-03-04 PROCEDURE — 99214 OFFICE O/P EST MOD 30 MIN: CPT | Performed by: FAMILY MEDICINE

## 2019-03-04 ASSESSMENT — MIFFLIN-ST. JEOR: SCORE: 1699.01

## 2019-03-20 ENCOUNTER — TELEPHONE (OUTPATIENT)
Dept: FAMILY MEDICINE | Facility: CLINIC | Age: 54
End: 2019-03-20

## 2019-03-20 NOTE — TELEPHONE ENCOUNTER
Date Forms was received: March 20, 2019    Forms received by: Fax    Purpose of Form:  Reasonable Accommodation form    When the form is due:  ASAP    How the form needs to be returned for patient:  Fax    Form currently placed  SR inbox

## 2019-03-25 ENCOUNTER — THERAPY VISIT (OUTPATIENT)
Dept: PHYSICAL THERAPY | Facility: CLINIC | Age: 54
End: 2019-03-25
Attending: FAMILY MEDICINE
Payer: MEDICARE

## 2019-03-25 DIAGNOSIS — M80.00XD AGE-RELATED OSTEOPOROSIS WITH CURRENT PATHOLOGICAL FRACTURE WITH ROUTINE HEALING, SUBSEQUENT ENCOUNTER: ICD-10-CM

## 2019-03-25 DIAGNOSIS — R46.89 NEUROBEHAVIORAL SEQUELAE OF TRAUMATIC BRAIN INJURY (H): ICD-10-CM

## 2019-03-25 DIAGNOSIS — S06.9XAS NEUROBEHAVIORAL SEQUELAE OF TRAUMATIC BRAIN INJURY (H): ICD-10-CM

## 2019-03-25 DIAGNOSIS — Z96.641 STATUS POST TOTAL REPLACEMENT OF RIGHT HIP: ICD-10-CM

## 2019-03-25 DIAGNOSIS — G83.10 MONOPLEGIA OF LOWER LIMB AFFECTING DOMINANT SIDE (H): Primary | ICD-10-CM

## 2019-03-25 DIAGNOSIS — R26.89 BALANCE PROBLEM: ICD-10-CM

## 2019-03-25 DIAGNOSIS — R29.898 WEAKNESS OF RIGHT LOWER EXTREMITY: ICD-10-CM

## 2019-03-25 DIAGNOSIS — S09.90XS CLOSED HEAD INJURY, SEQUELA: ICD-10-CM

## 2019-03-25 DIAGNOSIS — R26.9 ALTERED GAIT: ICD-10-CM

## 2019-03-25 PROCEDURE — 97163 PT EVAL HIGH COMPLEX 45 MIN: CPT | Mod: GP | Performed by: PHYSICAL THERAPIST

## 2019-03-25 PROCEDURE — 97110 THERAPEUTIC EXERCISES: CPT | Mod: GP | Performed by: PHYSICAL THERAPIST

## 2019-03-25 NOTE — PROGRESS NOTES
[facility]: Physical Therapy Initial Evaluation   Mar 25, 2019  Precautions/Restrictions/Physician instructions:   Per Orders: Continue with balance and strengthening    Therapist Impression: Yunior is a 53 year-old male who presents to physical therapy with a primary complaint of weakness in his right lower extremity following a hip replacement in March 2018. He completed physical therapy after the procedure, but remains dependent on a cane which he was not prior. Clinical findings were limited today due to time, but included altered gait, postural deviations, and weakness of the right lower extremity when attempting exercises. Rehabilitation prognosis is fair due to long standing deficits in the right lower extremity form the head injury, as well as patient cognition--including memory--and his living environment where he lives by himself.     Subjective:   Chief Complaint: Weakness in right leg   Pain: Light soreness every once in a while - in the front of the right knee   Numbness/Tingling: None   Weakness: right lower extremity   Stiffness: None  New/Recurrent/Chronic: Chronic  DOI/onset: Spring 2018   Referral Date: 3/4/2019 - Star Vega DO  Mechanism of onset: Fall with subsequent right total hip replacement  PMH/surgical history/trauma:    - Head injury (1991 residual right sided symptoms)   - Right hip replacement (after a fall/fracture ; March 2018)  General health as reported by patient: Good    Medications: Anti-depressants, Bone density,   Previous Treatment (Effect): PT (helpful),   Imaging: None indicated  AM/PM: just gets tired throughout the day  Pain Profile: N/A  Progression of Symptoms since onset: not worse since the fracture, just persistent   Hx of Falls: Had one fall in March with the broken hip, and another in May where he had a back fracture.   Sleeping: No disturbance   Current Functional Status: exercise - not able to do exercise the way that he used to ; stairs  - gets pain in the front of the right with stairs, can do a reciporical gait, but needs the handrail. ; walking - can get around the store and walk with use of a cane   Previous Functional Status: walking - did not need a cane ; exercise - was able to do bike and treadmill without issues, even run on treadmill ; stairs - no restrictions with stairs.   Current HEP/exercise regimen: walking  Transportation to Therapy: Does not drive  Live with Others: Lives alone    Patient's Goal(s): Patient would like to get more confident - specifically be able to walk without the cane.         Objective:    Posture: Right genu recurvatum    Gait: Neurologic gait with demonstrable right lower extremity weakness ; use of cane in the left hand    Other:   - Patient was started late and subjective took longer than normal given his more complicated history. We also discussed therapy with [facility] versus at [facility] to determine would be more appropriate. We determined that we would start therapy with [facility] and refer [facility] to [facility] if findings/progress deemed them more fitting with the patient's needs and goals.   - Patient became fatigue with bridge.  - Patient had more difficulty performing a straight leg raise on the right leg compared to the left.         Assessment/Plan:    Patient is a 53 year old male with right lower extremtiy complaints.    Patient has the following significant findings with corresponding treatment plan.                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) ; Status post total replacement of right hip   PT Diagnosis: Right lower extremity weakness with altered gait and balance deficits  Pain -  hot/cold therapy, manual therapy, splint/taping/bracing/orthotics, self management, education,  directional preference exercise and home program  Decreased strength - therapeutic exercise, therapeutic activities and home program  Impaired balance - neuro re-education, gait training, therapeutic activities, adaptive equipment/assistive device and home program  Impaired gait - gait training, assistive devices and home program  Decreased function - therapeutic activities and home program  Impaired posture - neuro re-education, therapeutic activities and home program        Therapy Evaluation Codes:   1) History comprised of:   Personal factors that impact the plan of care:      Cognition, Living environment and Social history/culture.    Comorbidity factors that impact the plan of care are:      History of head injury and right hip replacement.     Medications impacting care: None.  2) Examination of Body Systems comprised of:   Body structures and functions that impact the plan of care:      Ankle, Head and Knee.   Activity limitations that impact the plan of care are:      Stairs, Walking and Fitness Activities.  3) Clinical presentation characteristics are:   Unstable/Unpredictable.  4) Decision-Making    High complexity using standardized patient assessment instrument and/or measureable assessment of functional outcome.  Cumulative Therapy Evaluation is: High complexity.    Previous and current functional limitations:  (See Goal Flow Sheet for this information)    Short term and Long term goals: (See Goal Flow Sheet for this information)     Communication ability:  Patient appears to be able to clearly communicate and understand verbal and written communication and follow directions correctly.  Treatment Explanation - The following has been discussed with the patient:   RX ordered/plan of care  Anticipated outcomes  Possible risks and side effects  This patient would benefit from PT intervention to resume normal activities.   Rehab potential is fair.    Frequency:  1 X week, once daily  Duration:  for 4 weeks  tapering to 2 X a month over 4 weeks  Discharge Plan:  Achieve all LTG.  Independent in home treatment program.  Reach maximal therapeutic benefit.    Please refer to the daily flowsheet for treatment today, total treatment time and time spent performing 1:1 timed codes.

## 2019-03-25 NOTE — LETTER
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)  Closed head injury, sequela  Age-related osteoporosis with current pathological fracture with routine healing, subsequent encounter  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 He completed physical therapy after the procedure, but remains dependent on a cane which he was not prior. Clinical findings were limited today due to time, but included altered gait, postural deviations, and weakness of the right lower extremity when attempting exercises. Rehabilitation prognosis is fair due to long standing deficits in the right lower extremity form the head injury, as well as patient cognition--including memory--and his living environment where he lives by himself.   Subjective:   Chief Complaint: Weakness in right leg   Pain: Light soreness every once in a while - in the front of the right knee   Numbness/Tingling: None   Weakness: right lower extremity   Stiffness: None  New/Recurrent/Chronic:  Chronic  DOI/onset: Spring 2018   PATIENTS NAME:  Yunior Angel   : 1965  Referral Date: 3/4/2019 - Star Vega DO  Mechanism of onset: Fall with subsequent right total hip replacement  PMH/surgical history/trauma:    - Head injury ( residual right sided symptoms)   - Right hip replacement (after a fall/fracture ; 2018)  General health as reported by patient: Good    Medications: Anti-depressants, Bone density,   Previous Treatment (Effect): PT (helpful),   Imaging: None indicated  AM/PM: just gets tired throughout the day  Pain Profile: N/A  Progression of Symptoms since onset: not worse since the fracture, just persistent   Hx of Falls: Had one fall in March with the broken hip, and another in May where he had a back fracture.   Sleeping: No disturbance   Current Functional Status: exercise - not able to do exercise the way that he used to ; stairs - gets pain in the front of the right with stairs, can do a reciporical gait, but needs the handrail. ; walking - can get around the store and walk with use of a cane   Previous Functional Status: walking - did not need a cane ; exercise - was able to do bike and treadmill without issues, even run on treadmill ; stairs - no restrictions with stairs.   Current HEP/exercise regimen: walking  Transportation to Therapy: Does not drive  Live with Others: Lives alone  Patient's Goal(s): Patient would like to get more confident - specifically be able to walk without the cane.     Objective:    Posture: Right genu recurvatum    Gait: Neurologic gait with demonstrable right lower extremity weakness ; use of cane in the left hand    Other:   - Patient was started late and subjective took longer than normal given his more complicated history. We also discussed therapy with [facility] to determine would be more appropriate. We determined that we would start therapy with [facility] and refer [facility] to [facility] if findings/progress deemed them more  fitting with the patient's needs and goals.   - Patient became fatigue with bridge.  - Patient had more difficulty performing a straight leg raise on the right leg compared to the left.   PATIENTS NAME:  Yunior Angel   : 1965    Assessment/Plan:    Patient is a 53 year old male with right lower extremtiy complaints.    Patient has the following significant findings with corresponding treatment plan.                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) ; Status post total replacement of right hip   PT Diagnosis: Right lower extremity weakness with altered gait and balance deficits  Pain -  hot/cold therapy, manual therapy, splint/taping/bracing/orthotics, self management, education, directional preference exercise and home program  Decreased strength - therapeutic exercise, therapeutic activities and home program  Impaired balance - neuro re-education, gait training, therapeutic activities, adaptive equipment/assistive device and home program  Impaired gait - gait training, assistive devices and home program  Decreased function - therapeutic activities and home program  Impaired posture - neuro re-education, therapeutic activities and home program    Therapy Evaluation Codes:   1) History comprised of:   Personal factors that impact the plan of care:      Cognition, Living environment and Social history/culture.    Comorbidity factors that impact the plan of care are:      History of head injury and right hip replacement.     Medications impacting care: None.  2) Examination of Body Systems comprised of:   Body structures and functions that impact the plan of care:      Ankle, Head and Knee.   Activity limitations that impact the plan of care are:      Stairs, Walking and Fitness  "Activities.  3) Clinical presentation characteristics are:   Unstable/Unpredictable.  4) Decision-Making    High complexity using standardized patient assessment instrument and/or measureable assessment of functional outcome.  Cumulative Therapy Evaluation is: High complexity.    Previous and current functional limitations:  (See Goal Flow Sheet for this information)    Short term and Long term goals: (See Goal Flow Sheet for this information)     Communication ability:  Patient appears to be able to clearly communicate and understand verbal and written communication and follow directions correctly.  Treatment Explanation - The following has been discussed with the patient:   RX ordered/plan of care  Anticipated outcomes  Possible risks and side effects                      PATIENTS NAME:  Yunior Angel   : 1965    This patient would benefit from PT intervention to resume normal activities.   Rehab potential is fair.    Frequency:  1 X week, once daily  Duration:  for 4 weeks tapering to 2 X a month over 4 weeks  Discharge Plan:  Achieve all LTG.  Independent in home treatment program.  Reach maximal therapeutic benefit.      Caregiver Signature/Credentials _____________________________ Date ________      Treating Provider: Leo Hairston DPT  I have reviewed and certified the need for these services and plan of treatment while under my care.        PHYSICIAN'S SIGNATURE:   _____________________________________  Date___________           Star Vega DO    Certification period:  Beginning of Cert date period: 19 to  End of Cert period date: 19     Functional Level Progress Report: Please see attached \"Goal Flow sheet for Functional level.\"    ____X____ Continue Services or       ________ DC Services                Service dates: From  SOC Date: 19 date to present                         "

## 2019-03-26 PROBLEM — R29.898 WEAKNESS OF RIGHT LOWER EXTREMITY: Status: ACTIVE | Noted: 2019-03-26

## 2019-03-26 PROBLEM — R26.9 ALTERED GAIT: Status: ACTIVE | Noted: 2019-03-26

## 2019-03-26 PROBLEM — R26.89 BALANCE PROBLEM: Status: ACTIVE | Noted: 2019-03-26

## 2019-04-02 ENCOUNTER — TELEPHONE (OUTPATIENT)
Dept: FAMILY MEDICINE | Facility: CLINIC | Age: 54
End: 2019-04-02

## 2019-04-02 NOTE — TELEPHONE ENCOUNTER
Date Forms was received: April 2, 2019    Forms received by: Fax    Purpose of Form:  PT/OT Orders [facility] orders    When the form is due:  ASAP    How the form needs to be returned for patient:  Fax    Form currently placed  SW inbox

## 2019-04-05 ENCOUNTER — THERAPY VISIT (OUTPATIENT)
Dept: PHYSICAL THERAPY | Facility: CLINIC | Age: 54
End: 2019-04-05
Payer: MEDICARE

## 2019-04-05 DIAGNOSIS — R26.9 ALTERED GAIT: ICD-10-CM

## 2019-04-05 DIAGNOSIS — R26.89 BALANCE PROBLEM: ICD-10-CM

## 2019-04-05 DIAGNOSIS — R29.898 WEAKNESS OF RIGHT LOWER EXTREMITY: ICD-10-CM

## 2019-04-05 PROCEDURE — 97110 THERAPEUTIC EXERCISES: CPT | Mod: GP | Performed by: PHYSICAL THERAPIST

## 2019-04-12 ENCOUNTER — TELEPHONE (OUTPATIENT)
Dept: FAMILY MEDICINE | Facility: CLINIC | Age: 54
End: 2019-04-12

## 2019-04-12 NOTE — TELEPHONE ENCOUNTER
Reason for Call:  Form, our goal is to have forms completed with 72 hours, however, some forms may require a visit or additional information.    Type of letter, form or note:  Home Health Certification    Who is the form from?: Home care    Where did the form come from: fax    What clinic location was the form placed at?: [place]    Where the form was placed: Given to Madelyn COTE RN    What number is listed as a contact on the form?:        Additional comments:     Call taken on 4/12/2019 at 3:27 PM by Ping Garay

## 2019-04-12 NOTE — TELEPHONE ENCOUNTER
Form reviewed, in process. Medications in Epic do not match medication list from [facility]. I will place call to [facility] to further discuss. Madelyn Aranda R.N.

## 2019-04-15 NOTE — TELEPHONE ENCOUNTER
MED REC DONE    Discrepancies:  Fosamax   Epic: Take 1 tablet (70 mg) by mouth with 8 oz water every 7 days 30 minutes before breakfast and remain upright during this time.   Form: 12/19/2018 Take 1 tablet (70 mg) by oral route Every week   Every week reason: Osteoporosis in male patient    Senokot-S/Periocolate 8.6-50 mg   Epic: Take 1 tablet by mouth daily as needed   Form: 04/11/2018 take 1 tab by oral route Every day as needed    May hold one day if too loose    Acetaminophen (Tylenol)   Epic: Take 650 mg by mouth every 6 hours as needed for mild pain or fever   Form: 4/11/2018 take 2 tablets (650 MG) by oral route Every 6 hours as needed        Actual dose he is taking is 500 mg BID  Aspirin 325 mg   Epic: Take one Aspirin tab twice daily for 5 weeks   Form: 4/11/2018 take 1 tablet (325 mg) by oral route Every day    Dicyclomine HCL 10 mg    Epic: Take 1 capsule (10 mg) by mouth 4 times daily (before meals and nightly)   Form: 4/11/2018 take 1 cap by oral route 2 times per day    Omeprazole 40 mg   Epic: Take 1 capsule (40 mg) by mouth daily. Take 30-60 minutes before a meal   Form: Delayed release 4/11/18 take 1 capsule (40 mg) by oral route Every day    Paxil 40 mg    Epic: Take 1.5 tablets (60 mg) by mouth every morning   Form: Take 1.5 tablets by oral route Every day     Simvastatin 80 mg   Epic: Take 1 tablet (80 mg) by mouth At Bedtime   Form: 4/11/2018 take 1 tab by oral route Bedtime      Meds on Epic but NOT on Form:    Baclofen 10 mg--Take 1 tablet (10 mg) by mouth 2 times daily    Calcitonin (Miacalcin) 200unit/ACT --Spray 1 spray into one nostril alternating nostrils daily. Alternate nostril each day.     Cholecalciferol--Take 1,000 units by mouth daily.         Meds on form but NOT on Epic:   Calcium + D 600 mg (1,500 mg) - 200 unit tablet 11/01/2018 take 1 cap by oral route every day  Reason: Osteoporosis.   1 tab twice a day.     PROBIOTIC probiotic capsule  4/11/18 take 1 cap by oral route Every  day    Vitamin D3 1,000 unit capsule  4/11/2018 take 1 cap by oral route Every day    Routing to PCP for further review/recommendations/orders. Form placed in provider box.   Madelyn Aranda R.N.

## 2019-04-17 DIAGNOSIS — Z53.9 DIAGNOSIS NOT YET DEFINED: Primary | ICD-10-CM

## 2019-04-17 PROCEDURE — G0179 MD RECERTIFICATION HHA PT: HCPCS | Performed by: FAMILY MEDICINE

## 2019-04-19 NOTE — TELEPHONE ENCOUNTER
Form signed and placed in TC basket.    Alberto Carlos MD                 

## 2019-04-24 DIAGNOSIS — R46.89 NEUROBEHAVIORAL SEQUELAE OF TRAUMATIC BRAIN INJURY (H): ICD-10-CM

## 2019-04-24 DIAGNOSIS — S06.9XAS NEUROBEHAVIORAL SEQUELAE OF TRAUMATIC BRAIN INJURY (H): ICD-10-CM

## 2019-04-25 RX ORDER — PAROXETINE 40 MG/1
60 TABLET, FILM COATED ORAL EVERY MORNING
Qty: 135 TABLET | Refills: 1 | Status: SHIPPED | OUTPATIENT
Start: 2019-04-25 | End: 2019-12-13

## 2019-04-26 ENCOUNTER — THERAPY VISIT (OUTPATIENT)
Dept: PHYSICAL THERAPY | Facility: CLINIC | Age: 54
End: 2019-04-26
Payer: MEDICARE

## 2019-04-26 DIAGNOSIS — R29.898 WEAKNESS OF RIGHT LOWER EXTREMITY: ICD-10-CM

## 2019-04-26 DIAGNOSIS — R26.9 ALTERED GAIT: ICD-10-CM

## 2019-04-26 DIAGNOSIS — R26.89 BALANCE PROBLEM: ICD-10-CM

## 2019-04-26 PROCEDURE — 97110 THERAPEUTIC EXERCISES: CPT | Mod: GP | Performed by: PHYSICAL THERAPY ASSISTANT

## 2019-05-03 ENCOUNTER — TELEPHONE (OUTPATIENT)
Dept: PHYSICAL THERAPY | Facility: CLINIC | Age: 54
End: 2019-05-03

## 2019-05-03 ENCOUNTER — THERAPY VISIT (OUTPATIENT)
Dept: PHYSICAL THERAPY | Facility: CLINIC | Age: 54
End: 2019-05-03
Payer: MEDICARE

## 2019-05-03 DIAGNOSIS — R26.9 ALTERED GAIT: ICD-10-CM

## 2019-05-03 DIAGNOSIS — R26.89 BALANCE PROBLEM: ICD-10-CM

## 2019-05-03 DIAGNOSIS — R29.898 WEAKNESS OF RIGHT LOWER EXTREMITY: ICD-10-CM

## 2019-05-03 PROCEDURE — 97110 THERAPEUTIC EXERCISES: CPT | Mod: GP | Performed by: PHYSICAL THERAPY ASSISTANT

## 2019-05-10 ENCOUNTER — THERAPY VISIT (OUTPATIENT)
Dept: PHYSICAL THERAPY | Facility: CLINIC | Age: 54
End: 2019-05-10
Payer: MEDICARE

## 2019-05-10 DIAGNOSIS — R26.89 BALANCE PROBLEM: ICD-10-CM

## 2019-05-10 DIAGNOSIS — R29.898 WEAKNESS OF RIGHT LOWER EXTREMITY: ICD-10-CM

## 2019-05-10 DIAGNOSIS — R26.9 ALTERED GAIT: ICD-10-CM

## 2019-05-13 ENCOUNTER — TELEPHONE (OUTPATIENT)
Dept: FAMILY MEDICINE | Facility: CLINIC | Age: 54
End: 2019-05-13

## 2019-05-13 DIAGNOSIS — S72.001D CLOSED FRACTURE OF NECK OF RIGHT FEMUR WITH ROUTINE HEALING, SUBSEQUENT ENCOUNTER: ICD-10-CM

## 2019-05-13 DIAGNOSIS — R26.9 ALTERED GAIT: Primary | ICD-10-CM

## 2019-05-13 DIAGNOSIS — G83.10 MONOPLEGIA OF LOWER LIMB AFFECTING DOMINANT SIDE (H): ICD-10-CM

## 2019-05-13 NOTE — TELEPHONE ENCOUNTER
Reason for Call:  Other call back    Detailed comments: Mother is stating pt seen at PT and are requesting that he received a leg brace.  Need to have order for this.  Wondering if needs an appt to do this    Phone Number Patient can be reached at: Other phone number:  [phone]     Best Time: anytime    Can we leave a detailed message on this number? YES    Call taken on 5/13/2019 at 9:47 AM by Sandy Rabago

## 2019-05-13 NOTE — TELEPHONE ENCOUNTER
Please see message from patient's mother below along with PT visit notes from 5/10/19. Please advise. Thank you.  EUSEBIO MimsN, RN  [facility]

## 2019-05-13 NOTE — TELEPHONE ENCOUNTER
Sent note to Yunior's therapist asking for specifics on the type of brace he requires for his leg.  Will await response from her.    Alberto Carlos MD

## 2019-05-14 NOTE — TELEPHONE ENCOUNTER
The requested prescription(s) has/have been approved and has/have been printed and signed. This was forwarded to the patient care pool to fax to the patient's preferred pharmacy.         Alberto Carlos Jr

## 2019-05-15 NOTE — TELEPHONE ENCOUNTER
Script signed and placed in TC box. Can we verify where he needs this faxed to?  Electronically Signed By: Katia Merrill PA-C

## 2019-05-15 NOTE — TELEPHONE ENCOUNTER
I have pended the requested refill(s).  Please have one of the other MDs print and sign the DME prescription(s) in my absence.     Alberto Carlos MD

## 2019-05-16 NOTE — TELEPHONE ENCOUNTER
Spoke with Patsy to find out the facility this is going to as I can find the fax number.  DME order faxed to [facility].  Ping Garay

## 2019-05-16 NOTE — TELEPHONE ENCOUNTER
Called number below and left message to call back.  Need to know where to fax order.    Ping Garay

## 2019-05-16 NOTE — TELEPHONE ENCOUNTER
Reason for Call:  Other call back    Detailed comments: pts mother Patsy called and I told her we need the fax number where to send rxs for leg braces. She will be calling back later today with that number. Thank you.    Phone Number Patient can be reached at: Home number on file [phone] (home)    Best Time: any    Can we leave a detailed message on this number? YES    Call taken on 5/16/2019 at 9:51 AM by Mami Kessler

## 2019-06-04 ENCOUNTER — TELEPHONE (OUTPATIENT)
Dept: FAMILY MEDICINE | Facility: CLINIC | Age: 54
End: 2019-06-04

## 2019-06-04 NOTE — TELEPHONE ENCOUNTER
[facility] home care nurseLeona calling requesting home care orders for on going orders for weekly follow ups- medications. Please advise  # [phone]    Thank you  Estee Johnson

## 2019-06-04 NOTE — TELEPHONE ENCOUNTER
Left a detailed voicemail for Leona giving verbal OK to proceed with continuation of care orders. Advised to call back if in need of further orders.  Dolores Kim, EUSEBION, RN  [facility]

## 2019-06-11 ENCOUNTER — OFFICE VISIT (OUTPATIENT)
Dept: FAMILY MEDICINE | Facility: CLINIC | Age: 54
End: 2019-06-11
Payer: MEDICARE

## 2019-06-11 ENCOUNTER — ANCILLARY PROCEDURE (OUTPATIENT)
Dept: GENERAL RADIOLOGY | Facility: CLINIC | Age: 54
End: 2019-06-11
Attending: FAMILY MEDICINE
Payer: MEDICARE

## 2019-06-11 VITALS
HEIGHT: 70 IN | TEMPERATURE: 98.1 F | OXYGEN SATURATION: 97 % | WEIGHT: 192 LBS | BODY MASS INDEX: 27.49 KG/M2 | HEART RATE: 83 BPM | SYSTOLIC BLOOD PRESSURE: 110 MMHG | DIASTOLIC BLOOD PRESSURE: 62 MMHG

## 2019-06-11 DIAGNOSIS — M79.671 RIGHT FOOT PAIN: Primary | ICD-10-CM

## 2019-06-11 DIAGNOSIS — M79.671 RIGHT FOOT PAIN: ICD-10-CM

## 2019-06-11 PROCEDURE — 73630 X-RAY EXAM OF FOOT: CPT | Mod: RT

## 2019-06-11 PROCEDURE — 99213 OFFICE O/P EST LOW 20 MIN: CPT | Performed by: FAMILY MEDICINE

## 2019-06-11 ASSESSMENT — MIFFLIN-ST. JEOR: SCORE: 1717.16

## 2019-06-12 NOTE — PROGRESS NOTES
Subjective:  HPI                    Objective:  System    Physical Exam    General     ROS    Assessment/Plan:    DISCHARGE REPORT    Progress reporting period is from 4/5/19 to 5/10/19.      SUBJECTIVE  Subjective changes noted by patient:  Patient still has the weakness in the right L/E..      Current pain level is .       Previous pain level was:       Changes in function:  Yes (See Goal flowsheet attached for changes in current functional level)     Adverse reaction to treatment or activity: None     OBJECTIVE  Changes noted in objective findings:  Yes,   Objective: Treatment today was with Orthotics and Prostheses to determine the type of brace for correct walking pattern.  Patient is unable to activate the right gluteal and needed to have an elevated right L/E. Patient has not returned for any further PT at this time.

## 2019-06-13 ENCOUNTER — NURSE TRIAGE (OUTPATIENT)
Dept: FAMILY MEDICINE | Facility: CLINIC | Age: 54
End: 2019-06-13

## 2019-06-13 NOTE — TELEPHONE ENCOUNTER
Left a detailed voicemail for Patsy advising of DO SHIVANI's message below. Will leave encounter open for now in case Patsy decides she would like the podiatry referral.  EUSEBIO MimsN, RN  [facility]

## 2019-06-13 NOTE — TELEPHONE ENCOUNTER
Please see message from patient's mother below. Xray results are final, however, no comment yet by provider. Please review and advise when able. Thank you.  EUSEBIO MimsN, RN  [facility]

## 2019-06-13 NOTE — TELEPHONE ENCOUNTER
Please call patient / patient's mother with XR results. It demonstrated a chronic deformity of the big toe. I am unsure what the cause for this is but we could have him see podiatry for further evaluation and recommendation. There isn't any underlying bony abnormality to correspond to the painful lump on the outside of the foot. I think this is just a painful callus that has probably appeared over time due to friction. Using a pumice stone, lotion, and well-fitting shoes can help with this issue. Podiatry could also follow up on this as well. If they would like a podiatry referral, I will put one in for Yunior.    Star Vega,   6/13/2019 3:50 PM

## 2019-06-13 NOTE — TELEPHONE ENCOUNTER
Name of caller: Patsy   Relationship to Patient: Mother    Reason for Call:  Patsy would like to get the results of the foot x-ray that was taken on 6/11/19. At the time of the appointment Yunior said he didn't have much pain. However, yesterday he could hardly walk on it.    Patsy would like a call back to discuss what they should do next for his foot pain.    Best phone number to reach pt at is: [phone]  Ok to leave a message with medical info? yes    Cori Frank  Patient Representative

## 2019-06-17 PROBLEM — R29.898 WEAKNESS OF RIGHT LOWER EXTREMITY: Status: RESOLVED | Noted: 2019-03-26 | Resolved: 2019-06-17

## 2019-06-17 PROBLEM — R26.89 BALANCE PROBLEM: Status: RESOLVED | Noted: 2019-03-26 | Resolved: 2019-06-17

## 2019-06-17 PROBLEM — R26.9 ALTERED GAIT: Status: RESOLVED | Noted: 2019-03-26 | Resolved: 2019-06-17

## 2019-06-17 NOTE — PROGRESS NOTES
Assessment/Plan:    ASSESSMENT/PLAN  Updated problem list and treatment plan: Diagnosis 1:  Right lower extremity weakness with altered gait and balance deficitsSTG/LTGs have been met:  Yes (See Goal flow sheet completed today.)  Progress toward STG/LTGs have been made:  Yes (See Goal flow sheet completed today.)  Assessment of Progress: The patient's condition is improving.  The patient's condition has potential to improve.  Patient is meeting short term goals and is progressing towards long term goals.  Self Management Plans:  Patient has been instructed in a home treatment program.  Patient  has been instructed in self management of symptoms.  Patient continues to require the following intervention to meet STG and LT's:  PT intervention is no longer required to meet STG/LTG.    Recommendations:  This patient is ready to be discharged from therapy and continue their home treatment program.    Please refer to the daily flowsheet for treatment today, total treatment time and time spent performing 1:1 timed codes.

## 2019-06-21 ENCOUNTER — TELEPHONE (OUTPATIENT)
Dept: FAMILY MEDICINE | Facility: CLINIC | Age: 54
End: 2019-06-21

## 2019-06-21 DIAGNOSIS — K21.00 GASTROESOPHAGEAL REFLUX DISEASE WITH ESOPHAGITIS: ICD-10-CM

## 2019-06-21 DIAGNOSIS — S72.001A CLOSED FRACTURE OF NECK OF RIGHT FEMUR, INITIAL ENCOUNTER (H): ICD-10-CM

## 2019-06-21 DIAGNOSIS — K58.0 IRRITABLE BOWEL SYNDROME WITH DIARRHEA: ICD-10-CM

## 2019-06-21 NOTE — TELEPHONE ENCOUNTER
Reason for Call:  Form, our goal is to have forms completed with 72 hours, however, some forms may require a visit or additional information.    Type of letter, form or note:  Home Health Certification    Who is the form from?: Home care    Where did the form come from: form was faxed in    What clinic location was the form placed at?: [place]    Where the form was placed: Given to Madelyn COTE RN    What number is listed as a contact on the form?:        Additional comments:     Call taken on 6/21/2019 at 9:40 AM by Ping Garay

## 2019-06-24 NOTE — TELEPHONE ENCOUNTER
MED REC DONE     Discrepancies:      Fosamax            Epic: Take 1 tablet (70 mg) by mouth with 8oz water every 7 days 30 minutes before breakfast and remain  upright during this time.           Form:  12/19/2018Take 1 tablet  (70 mg) by oral route every  week. Reason Osteoporosis is male patient.        Aspirin 325 mg   Epic: Take one Aspirin tab twice daily for 5 weeks.    Form: 4/11/2018 Take one tablet (325 mg) by oral route every  day.     Dicyclomine HCL (Bentyl) 10 mg   Epic: Take 1 capsule (10 mg) by mouth 4 times daily (before  meals and nightly)   Form: 4/11/2018 take 1 cap by oral route 2 times per day.     Omeprazole 40 mg   Epic: Take 1 capsule (40 mg) by mouth daily Take 30-60  minutes before a meal.   Form: 4/11/2018 Take 1 cap (40 mg) by oral route every day.    Acetaminophen    Epic: Take 650 mg by mouth every 6 hours as needed for  mild pain or fever.   Form: Take 2 tablets (650 mg) by oral route every 6 hours as  needed.    Actual dose he is taking is 500 mg BID    Meds on Epic but NOT  on Form:         Baclofen 10 mg.    Take 1 tablet (10 mg) by mouth 2 times daily     Calcitonin ([place]) 200 unit/ACT    Spray 1 spray into one nostril alternating nostrils daily                          Alternate nostril each day.     Cholecalciferol    Take 1,000 Units by mouth daily      Polyethylene Glycol 3350    (Polyethylene Glycol 3350)    Take 17 g by mouth 2 times daily as needed        Meds on Form but NOT on Epic :     Calcium + D 600 mg (1,500mg)-200 unit tablet  11/1/2018    Take 1 cap by oral route every day. Reason:   Osteoporosis     Probiotic capsule   4/11/2018    Take 1 cap by oral route every day.      Vitamin D2 1,000 unit capsule  4/11/2018    Take 1 cap by oral route every day.          Routing to PCP for further review/recommendations/orders/ Teal folder placed on desk for Dr. Carlos. Madelyn Aranda R.N.

## 2019-06-26 DIAGNOSIS — Z53.9 DIAGNOSIS NOT YET DEFINED: Primary | ICD-10-CM

## 2019-06-26 PROCEDURE — G0179 MD RECERTIFICATION HHA PT: HCPCS | Performed by: FAMILY MEDICINE

## 2019-06-26 RX ORDER — OMEPRAZOLE 40 MG/1
40 CAPSULE, DELAYED RELEASE ORAL DAILY
Qty: 90 CAPSULE | Refills: 1
Start: 2019-06-26 | End: 2019-07-17

## 2019-06-26 RX ORDER — MULTIVIT-MIN/IRON/FOLIC ACID/K 18-600-40
1000 CAPSULE ORAL DAILY
COMMUNITY
Start: 2019-06-26 | End: 2020-09-29

## 2019-06-26 RX ORDER — ACETAMINOPHEN 500 MG
500 TABLET ORAL 2 TIMES DAILY PRN
COMMUNITY
Start: 2019-06-26 | End: 2019-12-23

## 2019-06-26 RX ORDER — DICYCLOMINE HYDROCHLORIDE 10 MG/1
10 CAPSULE ORAL 2 TIMES DAILY
Qty: 240 CAPSULE | Refills: 3
Start: 2019-06-26 | End: 2020-03-05

## 2019-06-26 NOTE — TELEPHONE ENCOUNTER
Med lists between home care & [facility] reconciled.  Form signed and placed in TC basket.    Alberto Carlos MD

## 2019-07-01 ENCOUNTER — OFFICE VISIT (OUTPATIENT)
Dept: PODIATRY | Facility: CLINIC | Age: 54
End: 2019-07-01
Payer: MEDICARE

## 2019-07-01 VITALS
WEIGHT: 192 LBS | HEIGHT: 70 IN | DIASTOLIC BLOOD PRESSURE: 68 MMHG | BODY MASS INDEX: 27.49 KG/M2 | SYSTOLIC BLOOD PRESSURE: 116 MMHG

## 2019-07-01 DIAGNOSIS — L60.3 DYSTROPHIC NAIL: ICD-10-CM

## 2019-07-01 DIAGNOSIS — M19.071 ARTHRITIS OF RIGHT FOOT: Primary | ICD-10-CM

## 2019-07-01 DIAGNOSIS — Q66.71 PES CAVUS OF RIGHT FOOT: ICD-10-CM

## 2019-07-01 PROCEDURE — 99203 OFFICE O/P NEW LOW 30 MIN: CPT | Performed by: PODIATRIST

## 2019-07-01 ASSESSMENT — MIFFLIN-ST. JEOR: SCORE: 1717.16

## 2019-07-01 NOTE — PROGRESS NOTES
"PATIENT HISTORY:  Star Vega PA-C requested I see this patient for their foot issue.  Yunior Angel is a 53 year old male who presents to clinic for right great toe. Patient here with mom. Patient had a brain injury and has some trouble with finding the right words. Mom notes his right great toe is \"larger\" than his left. Also has a bump on the side of the right foot that wasn't there a few months ago. Currently no pain but mom states it was painful last week. No specific injury to the foot.     Review of Systems:  Patient denies fever, chills, rash, wound, stiffness, numbness, weakness, heart burn, blood in stool, chest pain with activity, calf pain when walking, shortness of breath with activity, chronic cough, easy bleeding/bruising, swelling of ankles, excessive thirst, fatigue, depression, anxiety.  Patient admits to swelling at times.     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        PAST SURGICAL HISTORY:   Past Surgical History:   Procedure Laterality Date     ARTHROPLASTY HIP Right 3/14/2018    Procedure: ARTHROPLASTY HIP;  Right total hip arthroplasty;  Surgeon: Parmjit Zabala MD;  Location:  OR     COLONOSCOPY N/A 12/31/2014    Procedure: COLONOSCOPY;  Surgeon: Inna Gonzalez MD;  Location:  GI     ESOPHAGOSCOPY, GASTROSCOPY, DUODENOSCOPY (EGD), COMBINED N/A 12/31/2014    Procedure: COMBINED ESOPHAGOSCOPY, GASTROSCOPY, DUODENOSCOPY (EGD), BIOPSY SINGLE OR MULTIPLE;  Surgeon: Inna Gonzalez MD;  Location:  GI     SURGICAL HISTORY OF -       foot surgery     SURGICAL HISTORY OF -   2006    1.  Avulsion of the right great toenail.  2.  Excision of bone cyst distal phalanx, right great toe.         MEDICATIONS:   Current " Outpatient Medications:      Acetaminophen (TYLENOL PO), Take 650 mg by mouth every 6 hours as needed for mild pain or fever, Disp: , Rfl:      acetaminophen (TYLENOL) 500 MG tablet, Take 1 tablet (500 mg) by mouth 2 times daily as needed for mild pain, Disp: , Rfl:      alendronate (FOSAMAX) 70 MG tablet, Take 1 tablet (70 mg) by mouth with 8oz water every 7 days 30 minutes before breakfast and remain upright during this time., Disp: 4 tablet, Rfl: 11     aspirin (ASA) 325 MG EC tablet, Take 1 tablet (325 mg) by mouth daily, Disp: 70 tablet, Rfl: 0     baclofen (LIORESAL) 10 MG tablet, Take 1 tablet (10 mg) by mouth 2 times daily, Disp: 180 tablet, Rfl: 3     Calcium-Vitamin D 600-200 MG-UNIT TABS, Take 1 tablet by mouth daily, Disp: , Rfl:      dicyclomine (BENTYL) 10 MG capsule, Take 1 capsule (10 mg) by mouth 2 times daily, Disp: 240 capsule, Rfl: 3     multivitamin, therapeutic (THERA-VIT) TABS tablet, Take 1 tablet by mouth daily, Disp: , Rfl:      omeprazole (PRILOSEC) 40 MG DR capsule, Take 1 capsule (40 mg) by mouth daily, Disp: 90 capsule, Rfl: 1     order for DME, Equipment being ordered: leg brace - Custom Hinged AFO with a lift, Disp: 1 Units, Rfl: 0     PARoxetine (PAXIL) 40 MG tablet, Take 1.5 tablets (60 mg) by mouth every morning, Disp: 135 tablet, Rfl: 1     polyethylene glycol (MIRALAX/GLYCOLAX) powder, Take 17 g by mouth 2 times daily as needed , Disp: , Rfl:      senna-docusate (SENOKOT-S;PERICOLACE) 8.6-50 MG per tablet, Take 1 tablet by mouth daily as needed , Disp: , Rfl:      simvastatin (ZOCOR) 80 MG tablet, Take 1 tablet (80 mg) by mouth At Bedtime, Disp: 90 tablet, Rfl: 3     Vitamin D, Cholecalciferol, 1000 units TABS, Take 1 tablet (1,000 Units) by mouth daily, Disp: , Rfl:      ALLERGIES:    Allergies   Allergen Reactions     Insect Extract      red ants        SOCIAL HISTORY:   Social History     Socioeconomic History     Marital status: Single     Spouse name: Not on file     Number  "of children: Not on file     Years of education: Not on file     Highest education level: Not on file   Occupational History     Not on file   Social Needs     Financial resource strain: Not on file     Food insecurity:     Worry: Not on file     Inability: Not on file     Transportation needs:     Medical: Not on file     Non-medical: Not on file   Tobacco Use     Smoking status: Never Smoker     Smokeless tobacco: Never Used   Substance and Sexual Activity     Alcohol use: No     Alcohol/week: 0.0 oz     Drug use: No     Sexual activity: Never   Lifestyle     Physical activity:     Days per week: Not on file     Minutes per session: Not on file     Stress: Not on file   Relationships     Social connections:     Talks on phone: Not on file     Gets together: Not on file     Attends Shinto service: Not on file     Active member of club or organization: Not on file     Attends meetings of clubs or organizations: Not on file     Relationship status: Not on file     Intimate partner violence:     Fear of current or ex partner: Not on file     Emotionally abused: Not on file     Physically abused: Not on file     Forced sexual activity: Not on file   Other Topics Concern     Parent/sibling w/ CABG, MI or angioplasty before 65F 55M? Not Asked   Social History Narrative     Not on file        FAMILY HISTORY:   Family History   Problem Relation Age of Onset     Neurologic Disorder Father         migraines     Family History Negative Mother      Cancer - colorectal No family hx of      Colon Cancer No family hx of         EXAM:Vitals: /68   Ht 1.778 m (5' 10\")   Wt 87.1 kg (192 lb)   BMI 27.55 kg/m    BMI= Body mass index is 27.55 kg/m .    General appearance: Patient is alert and fully cooperative with history & exam.  No sign of distress is noted during the visit.     Psychiatric: Affect is pleasant & appropriate.  Patient appears motivated to improve health.     Respiratory: Breathing is regular & unlabored " "while sitting.     HEENT: Hearing is intact to spoken word.  Speech is clear.  No gross evidence of visual impairment that would impact ambulation.     Dermatologic: Skin is intact to both lower extremities without significant lesions, rash or abrasion.  No paronychia or evidence of soft tissue infection is noted. Right great toenail is thickened, dystrophic.      Vascular: DP & PT pulses are intact & regular bilaterally.   edema to right foot but no varicosities noted.  CFT and skin temperature is normal to both lower extremities.     Neurologic: Lower extremity sensation is diminished to feet.      Musculoskeletal: Patient is ambulatory without assistive device or brace.  Some spasming of right foot. Soft, prominent area to medial right interphalangeal joint. No fluctuance noted. Prominent right 5th metatarsal base laterally. Increase arch height right foot.  Muscle strength is 4/5 to all lower groups.     Radiographs: right foot xray: No acute fracture or dislocation. No bony abnormality to  cause lump. Chronic deformity of the distal phalanx of the great toe.     ASSESSMENT:    Arthritis of right foot  Dystrophic nail  Pes cavus of right foot     PLAN:  Reviewed patient's chart in [facility]. Reviewed xrays. The \"large area\" on right great toe just appears to be some swelling. Talked about arthritis and treatments including orthotics, injections, icing, NSAIDS, bracing, physical therapy, compounding pain cream, or surgical intervention.    If pain recurs, could try injection or ultimaetly fusion of interphalangeal joint but would not recommend that at this time.   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     Clinically no pain. Would monitor for now.        Ema Yousif DPM, Podiatry/Foot and Ankle Surgery    Weight management plan: Patient was referred to their PCP to discuss a diet and exercise plan.  "

## 2019-07-01 NOTE — LETTER
"    7/1/2019         RE: Yunior Angel  [address] [place] [address]        Dear Colleague,    Thank you for referring your patient, Yunior Angel, to the [facility] (formerly [facility]). Please see a copy of my visit note below.    PATIENT HISTORY:  Star Vega PA-C requested I see this patient for their foot issue.  Yunior Angel is a 53 year old male who presents to clinic for right great toe. Patient here with mom. Patient had a brain injury and has some trouble with finding the right words. Mom notes his right great toe is \"larger\" than his left. Also has a bump on the side of the right foot that wasn't there a few months ago. Currently no pain but mom states it was painful last week. No specific injury to the foot.     Review of Systems:  Patient denies fever, chills, rash, wound, stiffness, numbness, weakness, heart burn, blood in stool, chest pain with activity, calf pain when walking, shortness of breath with activity, chronic cough, easy bleeding/bruising, swelling of ankles, excessive thirst, fatigue, depression, anxiety.  Patient admits to swelling at times.     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        PAST SURGICAL HISTORY:   Past Surgical History:   Procedure Laterality Date     ARTHROPLASTY HIP Right 3/14/2018    Procedure: ARTHROPLASTY HIP;  Right total hip arthroplasty;  Surgeon: Parmjit Zabala MD;  Location:  OR     COLONOSCOPY N/A 12/31/2014    Procedure: COLONOSCOPY;  Surgeon: Inna Gonzalez MD;  Location:  GI     ESOPHAGOSCOPY, GASTROSCOPY, DUODENOSCOPY (EGD), COMBINED N/A 12/31/2014    Procedure: COMBINED ESOPHAGOSCOPY, GASTROSCOPY, DUODENOSCOPY (EGD), BIOPSY SINGLE OR MULTIPLE;  Surgeon: Inna Gonzalez MD; "  Location:  GI     SURGICAL HISTORY OF -       foot surgery     SURGICAL HISTORY OF -   2006    1.  Avulsion of the right great toenail.  2.  Excision of bone cyst distal phalanx, right great toe.         MEDICATIONS:   Current Outpatient Medications:      Acetaminophen (TYLENOL PO), Take 650 mg by mouth every 6 hours as needed for mild pain or fever, Disp: , Rfl:      acetaminophen (TYLENOL) 500 MG tablet, Take 1 tablet (500 mg) by mouth 2 times daily as needed for mild pain, Disp: , Rfl:      alendronate (FOSAMAX) 70 MG tablet, Take 1 tablet (70 mg) by mouth with 8oz water every 7 days 30 minutes before breakfast and remain upright during this time., Disp: 4 tablet, Rfl: 11     aspirin (ASA) 325 MG EC tablet, Take 1 tablet (325 mg) by mouth daily, Disp: 70 tablet, Rfl: 0     baclofen (LIORESAL) 10 MG tablet, Take 1 tablet (10 mg) by mouth 2 times daily, Disp: 180 tablet, Rfl: 3     Calcium-Vitamin D 600-200 MG-UNIT TABS, Take 1 tablet by mouth daily, Disp: , Rfl:      dicyclomine (BENTYL) 10 MG capsule, Take 1 capsule (10 mg) by mouth 2 times daily, Disp: 240 capsule, Rfl: 3     multivitamin, therapeutic (THERA-VIT) TABS tablet, Take 1 tablet by mouth daily, Disp: , Rfl:      omeprazole (PRILOSEC) 40 MG DR capsule, Take 1 capsule (40 mg) by mouth daily, Disp: 90 capsule, Rfl: 1     order for DME, Equipment being ordered: leg brace - Custom Hinged AFO with a lift, Disp: 1 Units, Rfl: 0     PARoxetine (PAXIL) 40 MG tablet, Take 1.5 tablets (60 mg) by mouth every morning, Disp: 135 tablet, Rfl: 1     polyethylene glycol (MIRALAX/GLYCOLAX) powder, Take 17 g by mouth 2 times daily as needed , Disp: , Rfl:      senna-docusate (SENOKOT-S;PERICOLACE) 8.6-50 MG per tablet, Take 1 tablet by mouth daily as needed , Disp: , Rfl:      simvastatin (ZOCOR) 80 MG tablet, Take 1 tablet (80 mg) by mouth At Bedtime, Disp: 90 tablet, Rfl: 3     Vitamin D, Cholecalciferol, 1000 units TABS, Take 1 tablet (1,000 Units) by mouth daily,  "Disp: , Rfl:      ALLERGIES:    Allergies   Allergen Reactions     Insect Extract      red ants        SOCIAL HISTORY:   Social History     Socioeconomic History     Marital status: Single     Spouse name: Not on file     Number of children: Not on file     Years of education: Not on file     Highest education level: Not on file   Occupational History     Not on file   Social Needs     Financial resource strain: Not on file     Food insecurity:     Worry: Not on file     Inability: Not on file     Transportation needs:     Medical: Not on file     Non-medical: Not on file   Tobacco Use     Smoking status: Never Smoker     Smokeless tobacco: Never Used   Substance and Sexual Activity     Alcohol use: No     Alcohol/week: 0.0 oz     Drug use: No     Sexual activity: Never   Lifestyle     Physical activity:     Days per week: Not on file     Minutes per session: Not on file     Stress: Not on file   Relationships     Social connections:     Talks on phone: Not on file     Gets together: Not on file     Attends Yazidi service: Not on file     Active member of club or organization: Not on file     Attends meetings of clubs or organizations: Not on file     Relationship status: Not on file     Intimate partner violence:     Fear of current or ex partner: Not on file     Emotionally abused: Not on file     Physically abused: Not on file     Forced sexual activity: Not on file   Other Topics Concern     Parent/sibling w/ CABG, MI or angioplasty before 65F 55M? Not Asked   Social History Narrative     Not on file        FAMILY HISTORY:   Family History   Problem Relation Age of Onset     Neurologic Disorder Father         migraines     Family History Negative Mother      Cancer - colorectal No family hx of      Colon Cancer No family hx of         EXAM:Vitals: /68   Ht 1.778 m (5' 10\")   Wt 87.1 kg (192 lb)   BMI 27.55 kg/m     BMI= Body mass index is 27.55 kg/m .    General appearance: Patient is alert and fully " "cooperative with history & exam.  No sign of distress is noted during the visit.     Psychiatric: Affect is pleasant & appropriate.  Patient appears motivated to improve health.     Respiratory: Breathing is regular & unlabored while sitting.     HEENT: Hearing is intact to spoken word.  Speech is clear.  No gross evidence of visual impairment that would impact ambulation.     Dermatologic: Skin is intact to both lower extremities without significant lesions, rash or abrasion.  No paronychia or evidence of soft tissue infection is noted. Right great toenail is thickened, dystrophic.      Vascular: DP & PT pulses are intact & regular bilaterally.   edema to right foot but no varicosities noted.  CFT and skin temperature is normal to both lower extremities.     Neurologic: Lower extremity sensation is diminished to feet.      Musculoskeletal: Patient is ambulatory without assistive device or brace.  Some spasming of right foot. Soft, prominent area to medial right interphalangeal joint. No fluctuance noted. Prominent right 5th metatarsal base laterally. Increase arch height right foot.  Muscle strength is 4/5 to all lower groups.     Radiographs: right foot xray: No acute fracture or dislocation. No bony abnormality to  cause lump. Chronic deformity of the distal phalanx of the great toe.     ASSESSMENT:    Arthritis of right foot  Dystrophic nail  Pes cavus of right foot     PLAN:  Reviewed patient[facility] in [facility]. Reviewed xrays. The \"large area\" on right great toe just appears to be some swelling. Talked about arthritis and treatments including orthotics, injections, icing, NSAIDS, bracing, physical therapy, compounding pain cream, or surgical intervention.    If pain recurs, could try injection or ultimaetly fusion of interphalangeal joint but would not recommend that at this time.   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     Clinically no pain. Would monitor for now. "        Ema Yousif DPM, Podiatry/Foot and Ankle Surgery    Weight management plan: Patient was referred to their PCP to discuss a diet and exercise plan.    Again, thank you for allowing me to participate in the care of your patient.        Sincerely,        Ema Yousif DPM, Podiatry/Foot and Ankle Surgery

## 2019-07-01 NOTE — PATIENT INSTRUCTIONS
Thank you for choosing [facility]!    DR. LEÓN'S CLINIC SCHEDULE  MONDAY AM - NYE TUESDAY - [place]   [address] [address] [place] [address] [place] [address]   [phone] / [phone] / [phone]       WEDNESDAY - [place] FRIDAY AM - [facility]   [address]   [place] [address] [place] [address]   [phone] / [phone]       FRIDAY PM - [place] SCHEDULE SURGERY: [phone]   [address] BILLING QUESTIONS: [phone]   [place] [address] AFTER HOURS: [phone]   [phone] / [phone] APPOINTMENTS: [phone]     Consumer Price Line (CPL) [phone]       OSTEOARTHRITIS OF THE FOOT & ANKLE  Osteoarthritis is a condition characterized by the breakdown and eventual loss of cartilage in one or more joints. Cartilage (the connective tissue found at the end of the bones in the joints) protects and cushions the bones during movement. When cartilage deteriorates or is lost, symptoms develop that can restrict one s ability to easily perform daily activities.  Osteoarthritis is also known as degenerative arthritis, reflecting its nature to develop as part of the aging process. As the most common form of arthritis, osteoarthritis affects millions of Americans. Some people refer to osteoarthritis simply as arthritis, even though there are many different types of arthritis.  Osteoarthritis appears at various joints throughout the body, including the hands, feet, spine, hips, and knees. In the foot, the disease most frequently occurs in the big toe, although it is also often found in the midfoot and ankle.  CAUSES  Osteoarthritis is considered a  wear and tear  disease because the cartilage in the joint wears down with repeated stress and use over time. As the cartilage deteriorates and gets thinner, the bones lose their protective covering and  eventually may rub together, causing pain and inflammation of the joint.  An injury may also lead to osteoarthritis, although it may take months or years after the injury for the condition to develop. For example, osteoarthritis in the big toe is often caused by kicking or jamming the toe, or by dropping something on the toe. Osteoarthritis in the midfoot is often caused by dropping something on it, or by a sprain or fracture. In the ankle, osteoarthritis is usually caused by a fracture and occasionally by a severe sprain.  Sometimes osteoarthritis develops as a result of abnormal foot mechanics such as flat feet or high arches. A flat foot causes less stability in the ligaments (bands of tissue that connect bones), resulting in excessive strain on the joints, which can cause arthritis. A high arch is rigid and lacks mobility, causing a jamming of joints that creates an increased risk of arthritis.  SYMPTOMS  People with osteoarthritis in the foot or ankle experience, in varying degrees, one or more of the following: Pain and stiffness in the joint, swelling in or near the joint, or difficulty walking or bending the joint.   Some patients with osteoarthritis also develop a bone spur (a bony protrusion) at the affected joint. Shoe pressure may cause pain at the site of a bone spur, and in some cases blisters or calluses may form over its surface. Bone spurs can also limit the movement of the joint.    DIAGNOSIS  In diagnosing osteoarthritis, the foot and ankle surgeon will examine the foot thoroughly, looking for swelling in the joint, limited mobility, and pain with movement. In some cases, deformity and/or enlargement (spur) of the joint may be noted. X-rays may be ordered to evaluate the extent of the disease.  NON-SURGICAL TREATMENT  To help relieve symptoms, the surgeon may begin treating osteoarthritis with one or more of the following non-surgical approaches:  Oral medications. Nonsteroidal anti-inflammatory  drugs (NSAIDs), such as ibuprofen, are often helpful in reducing the inflammation and pain. Occasionally a prescription for a steroid medication is needed to adequately reduce symptoms.   Orthotic devices. Custom orthotic devices (shoe inserts) are often prescribed to provide support to improve the foot s mechanics or cushioning to help minimize pain.   Bracing. Bracing, which restricts motion and supports the joint, can reduce pain during walking and help prevent further deformity.   Immobilization. Protecting the foot from movement by wearing a cast or removable cast-boot may be necessary to allow the inflammation to resolve.   Steroid injections. In some cases, steroid injections are applied to the affected joint to deliver anti-inflammatory medication.   Physical therapy. Exercises to strengthen the muscles, especially when the osteoarthritis occurs in the ankle, may give the patient greater stability and help avoid injury that might worsen the condition.   DO I NEED SURGERY?  When osteoarthritis has progressed substantially or failed to improve with non-surgical treatment, surgery may be recommended. In advanced cases, surgery may be the only option. The goal of surgery is to decrease pain and improve function. The foot and ankle surgeon will consider a number of factors when selecting the procedure best suited to the patient s condition and lifestyle.      NAIL FUNGUS / ONYCHOMYCOSIS   Nail fungus is not a hygiene problem and will not likely lead to significant medical   problems. The nails may get thick causing pain and possibly local skin infection.   Treatments include debridement (trimming), oral antifungals, topical antifungals and complete removal of the nail. Most fungal nails are not treated.   Topicals such as tea tree oil can be helpful for surface fungus and may, at best, limit   progression. Over the counter creams (such as Lamisil) can also be used however, their effectiveness is also quite low.   Topical treatment with Pen lac is expensive and often not covered by insurance. Pen lac has an approximate 8% success rate. Topical therapy recommendations is to apply twice a day for at least 3-4 months as it takes 9 months for new nail to grow out.    Experts suggest soaking your feet for 15 to 20 minutes in a mixture of 1 cup vinegar to 4 cups warm water. Be sure to rinse well and pat your feet dry when you're done. You can soak your feet like this daily. But if your skin becomes irritated, try soaking only two to three times a week. Vicks VapoRub, as with vinegar, there have been no controlled clinical trials to assess the effectiveness of Vicks VapoRub on nail fungus, but there have been numerous anecdotal reports that it works. There's no consensus on how often to apply this product, so check with your doctor before using it on your nails.     Oral therapies include Sporanox and Lamisil. Oral therapies are also expensive and not very effective. Side effects such as liver disease are the main concern. Return of fungus is common even if the treatment worked.     Other Tips:  - Penlac nail medication apply daily x 4 months; remove old polish first day of each week  - Antifungal cream/powder (Zeasorb) - apply daily to feet and shoes x 2 months  - Clean shoes with Lysol or in washing machine every few weeks  - Rotate shoe gear; give them 24 hours to dry out between days wearing them  - Clean pair of socks in morning, clean pair in afternoon if your feet sweat  - Shower shoes used in public showers/pools        BODY WEIGHT AND YOUR FEET  The following information is included in the after visit summary for all patients. Body weight can be a sensitive issue to discuss in clinic, but we think the following information is very important. Although we focus on the feet and ankles, we do support the overall health of our patients.     Many things can cause foot and ankle problems. Foot structure, activity level, foot  mechanics and injuries are common causes of pain. One very important issue that often goes unmentioned, is body weight. Extra weight can cause increased stress on muscles, ligaments, bones and tendons. Sometimes just a few extra pounds is all it takes to put one over her/his threshold. Without reducing that stress, it can be difficult to alleviate pain. As Foot & Ankle specialists, our job is addressing the lower extremity problem and possible causes. Regarding extra body weight, we encourage patients to discuss diet and weight management plans with their primary care doctors. It is this team approach that gives you the best opportunity for pain relief and getting you back on your feet.      [facility] has a Comprehensive Weight Management Program. This program includes counseling, education, non-surgical and surgical approaches to weight loss. If you are interested in learning more either talk to you primary care provider or call [phone].

## 2019-07-17 DIAGNOSIS — K21.00 GASTROESOPHAGEAL REFLUX DISEASE WITH ESOPHAGITIS: ICD-10-CM

## 2019-07-17 RX ORDER — OMEPRAZOLE 40 MG/1
40 CAPSULE, DELAYED RELEASE ORAL DAILY
Qty: 90 CAPSULE | Refills: 1 | Status: SHIPPED | OUTPATIENT
Start: 2019-07-17 | End: 2020-03-18

## 2019-07-17 NOTE — TELEPHONE ENCOUNTER
Reviewed last refill   90 capsule 1 1/14/2019 6/26/2019 No     Prescription approved per [facility] RN Refill Protocol. Madelyn Aranda R.N.

## 2019-07-17 NOTE — TELEPHONE ENCOUNTER
"Requested Prescriptions   Pending Prescriptions Disp Refills     omeprazole (PRILOSEC) 40 MG DR capsule  New Pharmacy  Last Written Prescription Date:  6/26/2019 ???  Last Fill Quantity: 90 capsule,  # refills: 1   Last office visit: 6/11/2019 with prescribing provider:  Silvia     Future Office Visit:       90 capsule 1     Sig: Take 1 capsule (40 mg) by mouth daily       PPI Protocol Failed - 7/17/2019  3:14 PM        Failed - No diagnosis of osteoporosis on record        Passed - Not on Clopidogrel (unless Pantoprazole ordered)        Passed - Recent (12 mo) or future (30 days) visit within the authorizing provider's specialty     Patient had office visit in the last 12 months or has a visit in the next 30 days with authorizing provider or within the authorizing provider's specialty.  See \"Patient Info\" tab in inbasket, or \"Choose Columns\" in Meds & Orders section of the refill encounter.              Passed - Medication is active on med list        Passed - Patient is age 18 or older        "

## 2019-07-31 DIAGNOSIS — E78.5 HYPERLIPIDEMIA LDL GOAL <160: ICD-10-CM

## 2019-07-31 RX ORDER — SIMVASTATIN 80 MG
80 TABLET ORAL AT BEDTIME
Qty: 90 TABLET | Refills: 3 | Status: SHIPPED | OUTPATIENT
Start: 2019-07-31 | End: 2020-09-29

## 2019-07-31 NOTE — PROGRESS NOTES
Leona with [facility] was calling to change pharmacy for this medication (Simvastatin). Writer sent over new RX to [facility] on [address]. Leona also would like an order for Re-certification to administer meds. Verbal order given.    Jacob Nelson RN   [facility]

## 2019-08-12 ENCOUNTER — TELEPHONE (OUTPATIENT)
Dept: FAMILY MEDICINE | Facility: CLINIC | Age: 54
End: 2019-08-12

## 2019-08-12 DIAGNOSIS — Z53.9 DIAGNOSIS NOT YET DEFINED: Primary | ICD-10-CM

## 2019-08-12 DIAGNOSIS — S72.001A CLOSED FRACTURE OF NECK OF RIGHT FEMUR, INITIAL ENCOUNTER (H): ICD-10-CM

## 2019-08-12 DIAGNOSIS — K58.0 IRRITABLE BOWEL SYNDROME WITH DIARRHEA: Primary | ICD-10-CM

## 2019-08-12 PROCEDURE — G0179 MD RECERTIFICATION HHA PT: HCPCS | Performed by: FAMILY MEDICINE

## 2019-08-12 RX ORDER — NICOTINE 14MG/24HR
250 PATCH, TRANSDERMAL 24 HOURS TRANSDERMAL DAILY
COMMUNITY
Start: 2019-08-12 | End: 2020-06-10

## 2019-08-12 NOTE — TELEPHONE ENCOUNTER
MED REC DONE     Discrepancies:      Calcium + D           Epic: Take 1 tablet PO daily           Form:  Take 1 cap PO daily, 1 tab twice a day      Meds on Epic but NOT  on Form:       Miralax, take 17g PO BID PRN      Meds on Form but NOT on Epic :     Probiotic, take 1 cap PO daily       Routing to PCP for further review/recommendations/orders    WERNER Mims, RN  [facility]

## 2019-08-12 NOTE — TELEPHONE ENCOUNTER
Reason for Call:  Form, our goal is to have forms completed with 72 hours, however, some forms may require a visit or additional information.    Type of letter, form or note:  Home Health Certification    Who is the form from?: Home care    Where did the form come from: form was faxed in    What clinic location was the form placed at?: [facility]    Where the form was placed: Given to MA/STELLA--Dolores MONTOYA RN    What number is listed as a contact on the form?:        Additional comments:     Call taken on 8/12/2019 at 9:46 AM by Ping Garay

## 2019-10-23 ENCOUNTER — TELEPHONE (OUTPATIENT)
Dept: FAMILY MEDICINE | Facility: CLINIC | Age: 54
End: 2019-10-23

## 2019-10-23 NOTE — TELEPHONE ENCOUNTER
Reason for Call:  Form, our goal is to have forms completed with 72 hours, however, some forms may require a visit or additional information.    Type of letter, form or note:  Home Health Certification    Who is the form from?: Home care    Where did the form come from: form was faxed in    What clinic location was the form placed at?: [facility]    Where the form was placed: Given to MA/STELLA Joshi RN    What number is listed as a contact on the form?:        Additional comments:     Call taken on 10/23/2019 at 2:41 PM by Ping Garay

## 2019-10-23 NOTE — TELEPHONE ENCOUNTER
Review started, unable to complete, form placed on desk of Dolores MONTOYA RN to review tomorrow. Madelyn Aranda R.N.

## 2019-10-24 NOTE — TELEPHONE ENCOUNTER
MED REC DONE     Discrepancies:    See my notes on home care form - message me if questions     Meds on Epic but NOT  on Form:       Miralax - take 17g PO BID PRN      Meds on Form but NOT on Epic :     None    Routing to PCP for further review/recommendations/orders    EUSEBIO MimsN, RN  [facility]

## 2019-10-31 DIAGNOSIS — Z53.9 DIAGNOSIS NOT YET DEFINED: Primary | ICD-10-CM

## 2019-10-31 PROCEDURE — G0179 MD RECERTIFICATION HHA PT: HCPCS | Performed by: FAMILY MEDICINE

## 2019-12-12 DIAGNOSIS — R46.89 NEUROBEHAVIORAL SEQUELAE OF TRAUMATIC BRAIN INJURY (H): ICD-10-CM

## 2019-12-12 DIAGNOSIS — S06.9XAS NEUROBEHAVIORAL SEQUELAE OF TRAUMATIC BRAIN INJURY (H): ICD-10-CM

## 2019-12-12 NOTE — TELEPHONE ENCOUNTER
"Requested Prescriptions   Pending Prescriptions Disp Refills     PARoxetine (PAXIL) 40 MG tablet  Last Written Prescription Date:  4/25/19  Last Fill Quantity: 135,  # refills: 1   Last Office Visit: 6/11/2019   Future Office Visit:      135 tablet 1     Sig: Take 1.5 tablets (60 mg) by mouth every morning       SSRIs Protocol Passed - 12/12/2019  8:15 AM   PHQ-9 SCORE 11/15/2013 12/10/2014   PHQ-9 Total Score 3 3     OLIVER-7 SCORE 11/15/2013 12/10/2014   Total Score 0 3        Passed - Recent (12 mo) or future (30 days) visit within the authorizing provider's specialty     Patient has had an office visit with the authorizing provider or a provider within the authorizing providers department within the previous 12 mos or has a future within next 30 days. See \"Patient Info\" tab in inbasket, or \"Choose Columns\" in Meds & Orders section of the refill encounter.            Passed - Medication is active on med list        Passed - Patient is age 18 or older          "

## 2019-12-13 RX ORDER — PAROXETINE 40 MG/1
60 TABLET, FILM COATED ORAL EVERY MORNING
Qty: 135 TABLET | Refills: 1 | Status: SHIPPED | OUTPATIENT
Start: 2019-12-13 | End: 2020-08-10

## 2019-12-13 NOTE — TELEPHONE ENCOUNTER
Prescription approved per [facility] RN Refill Protocol. Madelyn Aranda R.N.

## 2019-12-18 ENCOUNTER — TELEPHONE (OUTPATIENT)
Dept: FAMILY MEDICINE | Facility: CLINIC | Age: 54
End: 2019-12-18

## 2019-12-18 DIAGNOSIS — M62.838 MUSCLE SPASTICITY: ICD-10-CM

## 2019-12-18 DIAGNOSIS — S72.001A CLOSED FRACTURE OF NECK OF RIGHT FEMUR, INITIAL ENCOUNTER (H): ICD-10-CM

## 2019-12-18 DIAGNOSIS — K58.0 IRRITABLE BOWEL SYNDROME WITH DIARRHEA: Primary | ICD-10-CM

## 2019-12-18 NOTE — TELEPHONE ENCOUNTER
Reason for Call:  Form, our goal is to have forms completed with 72 hours, however, some forms may require a visit or additional information.    Type of letter, form or note:  Home Health Certification    Who is the form from?: Home care    Where did the form come from: form was faxed in    What clinic location was the form placed at?: [facility]    Where the form was placed: Given to MA/STELLA HANNAH RN    What number is listed as a contact on the form?:        Additional comments:     Call taken on 12/18/2019 at 12:32 PM by Ping Garay

## 2019-12-18 NOTE — TELEPHONE ENCOUNTER
MED REC DONE     Discrepancies:      Baclofen           Take 1 tablet (10 mg) by mouth 2 times daily           Form:   1 tablet (10 mg) by mouth 3  times daily         Acetaminophen 500 mg name of med   Epic: Take 1 tablet (500 mg) by mouth 2 times daily as needed for mild  pain    Form: 325 mg    Take 2 tablets (650mg) by oral route every 6 hours as     Needed.    Miralax   Epic: Take 17 g by mouth 2 times daily as needed    Form: Take 17 G by oral route EVERY DAY as needed.     Meds on Epic but NOT  on Form:      Meds on Form but NOT on Epic :        Vitamin D 1.000 unit    Take 1 cap by oral route every day.           Routing to PCP for further review/recommendations/orders.   Teal folder placed on Dr. Carlos desk/inbox. Madelyn Aranda R.N.

## 2019-12-23 DIAGNOSIS — Z53.9 DIAGNOSIS NOT YET DEFINED: Primary | ICD-10-CM

## 2019-12-23 PROCEDURE — G0179 MD RECERTIFICATION HHA PT: HCPCS | Performed by: FAMILY MEDICINE

## 2019-12-23 RX ORDER — ACETAMINOPHEN 325 MG/1
650 TABLET ORAL EVERY 6 HOURS PRN
COMMUNITY
Start: 2019-12-23 | End: 2020-08-07

## 2019-12-23 RX ORDER — BACLOFEN 10 MG/1
10 TABLET ORAL 3 TIMES DAILY
Qty: 180 TABLET | Refills: 3 | COMMUNITY
Start: 2019-12-23 | End: 2020-03-02

## 2019-12-23 RX ORDER — POLYETHYLENE GLYCOL 3350 17 G/17G
17 POWDER, FOR SOLUTION ORAL DAILY PRN
COMMUNITY
Start: 2019-12-23 | End: 2020-09-29

## 2020-01-22 DIAGNOSIS — M80.00XD AGE-RELATED OSTEOPOROSIS WITH CURRENT PATHOLOGICAL FRACTURE WITH ROUTINE HEALING, SUBSEQUENT ENCOUNTER: ICD-10-CM

## 2020-01-22 NOTE — TELEPHONE ENCOUNTER
"Requested Prescriptions   Pending Prescriptions Disp Refills     alendronate (FOSAMAX) 70 MG tablet [Pharmacy Med Name: ALENDRONATE 70MG TABLETS] 4 tablet 11     Sig: TAKE 1 TABLET BY MOUTH EVERY WEEK. TAKE 30 MINUTES BEFORE FIRST MEAL OF THE DAY WITH WATER AND REMAIN UPRIGHT FOR 30 MINUTES   Last Written Prescription Date:  12/06/18  Last Fill Quantity: 4,  # refills: 11   Last office visit: 12/6/2018 with prescribing provider:  yes   Future Office Visit:        Bisphosphonates Failed - 1/22/2020 11:01 AM        Failed - Recent (12 mo) or future (30 days) visit within the authorizing provider's specialty     Patient has had an office visit with the authorizing provider or a provider within the authorizing providers department within the previous 12 mos or has a future within next 30 days. See \"Patient Info\" tab in inbasket, or \"Choose Columns\" in Meds & Orders section of the refill encounter.              Failed - Normal serum creatinine on file within past 12 months     Recent Labs   Lab Test 11/28/18  0936   CR 0.85             Passed - Dexa on file within past 2 years     Please review last Dexa result.           Passed - Medication is active on med list        Passed - Patient is age 18 or older          "

## 2020-01-22 NOTE — LETTER
[facility]  [address], [address]  [place], [place]  [address]                                            TEL:[phone]  FAX:[phone]      Yunior Angel  [address] [place] [place] [address]      January 28, 2020    Dear Yunior       You are due for labs and clinic visit.     Limited amount of your medication was sent.      I am sorry but I will not be able to refill further prescription at this time until you make a lab and clinic visit appointment.  Please make a lab appointment for blood work and follow up clinic appointment in 1 week after that to discuss results.    Please make an appointment as soon as possible because my schedule fills up quickly.     Let me know if you have any questions.    Dr. Crystal Matt

## 2020-01-27 RX ORDER — ALENDRONATE SODIUM 70 MG/1
TABLET ORAL
Qty: 4 TABLET | Refills: 1 | Status: SHIPPED | OUTPATIENT
Start: 2020-01-27 | End: 2020-04-13

## 2020-01-27 NOTE — TELEPHONE ENCOUNTER
Last visit: 12/2018    Patient is due for clinic visit.     Limited amount of Rx sent. No refills.    I am sorry but I will not be able to refill further prescription at this time until Yunior makes clinic visit appointment.    Please help schedule.  Let me know if you have any questions.    Thank you.      Crystal Matt MD

## 2020-01-30 ENCOUNTER — TELEPHONE (OUTPATIENT)
Dept: FAMILY MEDICINE | Facility: CLINIC | Age: 55
End: 2020-01-30

## 2020-01-30 NOTE — TELEPHONE ENCOUNTER
Spoke with Leona and verbal OK for orders below. Leona verbalized understanding and agrees with plan.    Will call back if further questions or concerns.    EUSEBIO MimsN, RN  [facility]

## 2020-02-03 ENCOUNTER — TELEPHONE (OUTPATIENT)
Dept: FAMILY MEDICINE | Facility: CLINIC | Age: 55
End: 2020-02-03

## 2020-02-03 NOTE — TELEPHONE ENCOUNTER
Reason for Call:  Form, our goal is to have forms completed with 72 hours, however, some forms may require a visit or additional information.    Type of letter, form or note:  Home Health Certification    Who is the form from?: Home care    Where did the form come from: form was faxed in    What clinic location was the form placed at?: [place]    Where the form was placed: Given to MA/STELLA Bernal    What number is listed as a contact on the form?:        Additional comments:     Call taken on 2/3/2020 at 3:05 PM by Ping Garay

## 2020-02-03 NOTE — TELEPHONE ENCOUNTER
MED REC DONE     Discrepancies:      None    Meds on Epic but NOT  on Form:       None      Meds on Form but NOT on Epic :     None      Routing to PCP for further review/recommendations/orders    WERNER Mims, RN  [facility]

## 2020-02-05 DIAGNOSIS — Z53.9 DIAGNOSIS NOT YET DEFINED: Primary | ICD-10-CM

## 2020-02-05 PROCEDURE — G0179 MD RECERTIFICATION HHA PT: HCPCS | Performed by: FAMILY MEDICINE

## 2020-02-28 ENCOUNTER — TELEPHONE (OUTPATIENT)
Dept: FAMILY MEDICINE | Facility: CLINIC | Age: 55
End: 2020-02-28

## 2020-02-28 DIAGNOSIS — M62.838 MUSCLE SPASTICITY: ICD-10-CM

## 2020-02-28 NOTE — TELEPHONE ENCOUNTER
I do not see refill request for Baclofen. Per chart review appears Dr. Carlos marked this as discontinued on 12/23/19    This Order Has Been Discontinued     Order Status Reason By On   Discontinued None Alberto Carlos Jr., MD 12/23/19 1310     However more current Rx from that same date on 12/23/19 Rx is listed as historical--that patient is taking.      Dr. Carlos please clarify/advise. Of note patient may also need to be seen.     Madelyn Aranda R.N.

## 2020-02-28 NOTE — TELEPHONE ENCOUNTER
Reason for Call:  Medication or medication refill:    Do you use a [facility]?  Name of the pharmacy and phone number for the current request:      [id number] - [address] AT [facility] [address] [facility] [address]      Name of the medication requested: baclofen (LIORESAL) 10 MG tablet and dicyclomine (BENTYL) 10 MG capsule    Other request: Was just sitting in his chart never sent to a pharmacy if it could be done today that would be great    Phone number patient can be reached at: Home number on file [phone] (home)    Best Time: Today    Call taken on 2/28/2020 at 11:35 AM by Arabella Naranjo

## 2020-03-02 RX ORDER — BACLOFEN 10 MG/1
10 TABLET ORAL 3 TIMES DAILY
Qty: 180 TABLET | Refills: 3 | Status: SHIPPED | OUTPATIENT
Start: 2020-03-02 | End: 2021-03-10

## 2020-03-03 NOTE — TELEPHONE ENCOUNTER
I believe Yunior is actually taking this for chronic muscle spasticity.  I have refilled it, sending it to   [id number] - [place], [place] - [address]  [address]  [facility] [address]  Phone: [phone] Fax: [phone]    [facility][id number] - [place], [place] - [address] [place] [address]  [address]  [facility] [address]  Phone: [phone] Fax: [phone]       Alberto Carlos MD

## 2020-03-04 DIAGNOSIS — K58.0 IRRITABLE BOWEL SYNDROME WITH DIARRHEA: ICD-10-CM

## 2020-03-04 NOTE — TELEPHONE ENCOUNTER
Reason for Call:  Medication or medication refill:    Do you use a [facility]?  Name of the pharmacy and phone number for the current request:  [facility]    Name of the medication requested: dicyclomine    Other request: Leona from  Home care calling as they requested this refill last week, but the only med refilled was Baclofen.  Pt has enough dicyclomine to last til Friday.  Can we send in refill?  I can see an active rx from June 2019, but it says no print out for status, so this was never sent anywhere.  Now using [facility] in [place].    Can we leave a detailed message on this number? YES    Phone number patient can be reached at: Cell number on file:    Telephone Information:   Mobile [phone]       Best Time:     Call taken on 3/4/2020 at 10:38 AM by Ping Garay

## 2020-03-04 NOTE — TELEPHONE ENCOUNTER
"Requested Prescriptions   Pending Prescriptions Disp Refills     dicyclomine (BENTYL) 10 MG capsule  See Note Below  Medication may not be due for a refill.  Last Written Prescription Date:  6/26/2019  Last Fill Quantity: 240 capsule,  # refills: 3   Last office visit: 6/11/2019 with prescribing provider:  Silvia     Future Office Visit:   Next 5 appointments (look out 90 days)    Apr 15, 2020  4:00 PM CDT  Return Visit with Crystal Matt MD  [facility] [address] [facility] [address]  [facility] [address] [phone]            240 capsule 3     Sig: Take 1 capsule (10 mg) by mouth 2 times daily       Oral Anticholinergic Agents Passed - 3/4/2020 10:41 AM        Passed - Patient is of age 12 or older        Passed - Recent (12 mo) or future (30 days) visit with authorizing provider's specialty     Patient has had an office visit with the authorizing provider or a provider within the authorizing providers department within the previous 12 mos or has a future within next 30 days. See \"Patient Info\" tab in inbasket, or \"Choose Columns\" in Meds & Orders section of the refill encounter.              Passed - Medication is active on med list        "

## 2020-03-05 RX ORDER — DICYCLOMINE HYDROCHLORIDE 10 MG/1
10 CAPSULE ORAL 2 TIMES DAILY
Qty: 240 CAPSULE | Refills: 1 | Status: SHIPPED | OUTPATIENT
Start: 2020-03-05 | End: 2020-09-29

## 2020-03-16 DIAGNOSIS — K21.00 GASTROESOPHAGEAL REFLUX DISEASE WITH ESOPHAGITIS: ICD-10-CM

## 2020-03-17 NOTE — TELEPHONE ENCOUNTER
"Requested Prescriptions   Pending Prescriptions Disp Refills     omeprazole (PRILOSEC) 40 MG DR capsule  Last Written Prescription Date:  7/17/20109  Last Fill Quantity: 90 capsule,  # refills: 1   Last office visit: 6/11/2019 with prescribing provider:  Silvia     Future Office Visit:   Next 5 appointments (look out 90 days)    Apr 15, 2020  4:00 PM CDT  Return Visit with Crystal Matt MD  [facility] ([facility]) [address] [facility] [address]  [facility] [id number] [phone]            90 capsule 1     Sig: Take 1 capsule (40 mg) by mouth daily       PPI Protocol Failed - 3/16/2020  4:49 PM        Failed - No diagnosis of osteoporosis on record        Passed - Not on Clopidogrel (unless Pantoprazole ordered)        Passed - Recent (12 mo) or future (30 days) visit within the authorizing provider's specialty     Patient has had an office visit with the authorizing provider or a provider within the authorizing providers department within the previous 12 mos or has a future within next 30 days. See \"Patient Info\" tab in inbasket, or \"Choose Columns\" in Meds & Orders section of the refill encounter.              Passed - Medication is active on med list        Passed - Patient is age 18 or older           "

## 2020-03-17 NOTE — TELEPHONE ENCOUNTER
Routing refill request to provider for review/approval because:  Dx of osteoporosis on file. Failed protocol.    Annita Coombs RN, BSN  [facility]

## 2020-03-18 ENCOUNTER — DOCUMENTATION ONLY (OUTPATIENT)
Dept: ENDOCRINOLOGY | Facility: CLINIC | Age: 55
End: 2020-03-18

## 2020-03-18 DIAGNOSIS — M80.00XD AGE-RELATED OSTEOPOROSIS WITH CURRENT PATHOLOGICAL FRACTURE WITH ROUTINE HEALING, SUBSEQUENT ENCOUNTER: Primary | ICD-10-CM

## 2020-03-18 RX ORDER — OMEPRAZOLE 40 MG/1
40 CAPSULE, DELAYED RELEASE ORAL DAILY
Qty: 90 CAPSULE | Refills: 1 | Status: SHIPPED | OUTPATIENT
Start: 2020-03-18 | End: 2020-04-13

## 2020-03-18 NOTE — PROGRESS NOTES
Can get DEXA scan. But not able to order in Epic.  Will discuss in next visit.  No labs are needed at this time.

## 2020-04-08 ENCOUNTER — TELEPHONE (OUTPATIENT)
Dept: FAMILY MEDICINE | Facility: CLINIC | Age: 55
End: 2020-04-08

## 2020-04-08 NOTE — TELEPHONE ENCOUNTER
Date Forms was received: April 8, 2020    Forms received by: Fax    Purpose of Form:  Home care orders    When the form is due:  ASAP    How the form needs to be returned for patient:  Fax    Form currently placed  Given to MD SULAIMAN to sign in absence of MD MANISH

## 2020-04-09 DIAGNOSIS — K21.00 GASTROESOPHAGEAL REFLUX DISEASE WITH ESOPHAGITIS: ICD-10-CM

## 2020-04-09 NOTE — TELEPHONE ENCOUNTER
"omeprazole (PRILOSEC) 40 MG DR capsule   Last Written Prescription Date:  3/18/2020  Last Fill Quantity: 90,  # refills: 1   Last office visit: 6/11/2019 with prescribing provider:  Star Vega, DO   Future Office Visit: QUANG  Next 5 appointments (look out 90 days)    Apr 15, 2020  4:00 PM CDT  Return Visit with Crystal Matt MD  [facility] [address] [facility] [address]  [facility] [address] [phone]             Requested Prescriptions   Pending Prescriptions Disp Refills     omeprazole (PRILOSEC) 40 MG DR capsule 90 capsule 1     Sig: Take 1 capsule (40 mg) by mouth daily       PPI Protocol Failed - 4/9/2020 12:44 PM        Failed - No diagnosis of osteoporosis on record        Passed - Not on Clopidogrel (unless Pantoprazole ordered)        Passed - Recent (12 mo) or future (30 days) visit within the authorizing provider's specialty     Patient has had an office visit with the authorizing provider or a provider within the authorizing providers department within the previous 12 mos or has a future within next 30 days. See \"Patient Info\" tab in inbasket, or \"Choose Columns\" in Meds & Orders section of the refill encounter.              Passed - Medication is active on med list        Passed - Patient is age 18 or older             "

## 2020-04-13 ENCOUNTER — TELEPHONE (OUTPATIENT)
Dept: ENDOCRINOLOGY | Facility: CLINIC | Age: 55
End: 2020-04-13

## 2020-04-13 DIAGNOSIS — M80.00XD AGE-RELATED OSTEOPOROSIS WITH CURRENT PATHOLOGICAL FRACTURE WITH ROUTINE HEALING, SUBSEQUENT ENCOUNTER: Primary | ICD-10-CM

## 2020-04-13 DIAGNOSIS — M81.0 AGE-RELATED OSTEOPOROSIS WITHOUT CURRENT PATHOLOGICAL FRACTURE: ICD-10-CM

## 2020-04-13 DIAGNOSIS — R79.89 LOW TESTOSTERONE IN MALE: ICD-10-CM

## 2020-04-13 DIAGNOSIS — M80.00XD AGE-RELATED OSTEOPOROSIS WITH CURRENT PATHOLOGICAL FRACTURE WITH ROUTINE HEALING, SUBSEQUENT ENCOUNTER: ICD-10-CM

## 2020-04-13 RX ORDER — OMEPRAZOLE 40 MG/1
40 CAPSULE, DELAYED RELEASE ORAL DAILY
Qty: 90 CAPSULE | Refills: 0 | Status: SHIPPED | OUTPATIENT
Start: 2020-04-13 | End: 2020-09-29

## 2020-04-13 RX ORDER — ALENDRONATE SODIUM 70 MG/1
TABLET ORAL
Qty: 4 TABLET | Refills: 3 | Status: SHIPPED | OUTPATIENT
Start: 2020-04-13 | End: 2020-08-03

## 2020-04-13 NOTE — TELEPHONE ENCOUNTER
Patient will reschedule to July.  Please order dexa and labs.    Maren Manuel CMA  [facility]  Josy/Filiberto

## 2020-04-13 NOTE — TELEPHONE ENCOUNTER
Approved per [facility] CPA.   Maco Montgomery Rph.  Medication Therapy Management Provider  [phone]

## 2020-04-13 NOTE — TELEPHONE ENCOUNTER
"Requested Prescriptions   Pending Prescriptions Disp Refills     alendronate (FOSAMAX) 70 MG tablet  Last Written Prescription Date:  01/27/20  Last Fill Quantity: 4,  # refills: 1   Last office visit: 12/6/2018 with prescribing provider:  12/06/18   Future Office Visit:     4 tablet 1     Sig: TAKE 1 TABLET BY MOUTH EVERY WEEK. TAKE 30 MINUTES BEFORE FIRST MEAL OF THE DAY WITH WATER AND REMAIN UPRIGHT FOR 30 MINUTES       Bisphosphonates Failed - 4/13/2020 11:14 AM        Failed - Recent (12 mo) or future (30 days) visit within the authorizing provider's specialty     Patient has had an office visit with the authorizing provider or a provider within the authorizing providers department within the previous 12 mos or has a future within next 30 days. See \"Patient Info\" tab in inbasket, or \"Choose Columns\" in Meds & Orders section of the refill encounter.              Failed - Normal serum creatinine on file within past 12 months     Recent Labs   Lab Test 11/28/18  0936   CR 0.85       Ok to refill medication if creatinine is low          Passed - Dexa on file within past 2 years     Please review last Dexa result.           Passed - Medication is active on med list        Passed - Patient is age 18 or older             "

## 2020-04-15 ENCOUNTER — TELEPHONE (OUTPATIENT)
Dept: FAMILY MEDICINE | Facility: CLINIC | Age: 55
End: 2020-04-15

## 2020-04-15 NOTE — TELEPHONE ENCOUNTER
Reason for Call:  Form, our goal is to have forms completed with 72 hours, however, some forms may require a visit or additional information.    Type of letter, form or note:  Home Health Certification    Who is the form from?: Home care    Where did the form come from: form was faxed in    What clinic location was the form placed at?: [place]    Where the form was placed: Given to MA/STELLA HANNAH    What number is listed as a contact on the form?:        Additional comments:     Call taken on 4/15/2020 at 11:58 AM by Ping Garay

## 2020-04-17 NOTE — TELEPHONE ENCOUNTER
MED REC DONE     Discrepancies:      None  Meds on Epic but NOT  on Form:       None      Meds on Form but NOT on Epic :     None        Routing to PCP for further review/recommendations/orders--to sign. All orders match!    Paper copy given to  to give to Dr. Carlos or covering provider.           Madelyn Aranda R.N.

## 2020-04-20 DIAGNOSIS — Z53.9 DIAGNOSIS NOT YET DEFINED: Primary | ICD-10-CM

## 2020-04-20 PROCEDURE — G0179 MD RECERTIFICATION HHA PT: HCPCS | Performed by: FAMILY MEDICINE

## 2020-04-20 NOTE — TELEPHONE ENCOUNTER
Form was faxed to Dr. Carlos via [employer], faxed back to me, faxed to home care and sent to scanning.  Ping Garay

## 2020-06-10 ENCOUNTER — TELEPHONE (OUTPATIENT)
Dept: FAMILY MEDICINE | Facility: CLINIC | Age: 55
End: 2020-06-10

## 2020-06-10 DIAGNOSIS — K58.0 IRRITABLE BOWEL SYNDROME WITH DIARRHEA: ICD-10-CM

## 2020-06-10 DIAGNOSIS — Z53.9 DIAGNOSIS NOT YET DEFINED: Primary | ICD-10-CM

## 2020-06-10 PROCEDURE — G0179 MD RECERTIFICATION HHA PT: HCPCS | Performed by: FAMILY MEDICINE

## 2020-06-10 RX ORDER — NICOTINE 14MG/24HR
250 PATCH, TRANSDERMAL 24 HOURS TRANSDERMAL 2 TIMES DAILY
Start: 2020-06-10 | End: 2020-09-29

## 2020-06-10 NOTE — TELEPHONE ENCOUNTER
Updated dose to BID on [facility] medication list.  Form completed via [employer] and faxed.    Alberto Carlos MD

## 2020-06-10 NOTE — TELEPHONE ENCOUNTER
Reason for Call:  Form, our goal is to have forms completed with 72 hours, however, some forms may require a visit or additional information.    Type of letter, form or note:  Home Health Certification    Who is the form from?: Home care    Where did the form come from: form was faxed in    What clinic location was the form placed at?: [place]    Where the form was placed: Given to MA/STELLA--Madelyn HANNAH RN    What number is listed as a contact on the form?:        Additional comments:     Call taken on 6/10/2020 at 9:26 AM by Ping Garay

## 2020-08-05 DIAGNOSIS — S06.9XAS NEUROBEHAVIORAL SEQUELAE OF TRAUMATIC BRAIN INJURY (H): ICD-10-CM

## 2020-08-05 DIAGNOSIS — R46.89 NEUROBEHAVIORAL SEQUELAE OF TRAUMATIC BRAIN INJURY (H): ICD-10-CM

## 2020-08-05 NOTE — TELEPHONE ENCOUNTER
Patient is overdue for office visit annual. Last office visit was July of 2019. Routing to team to inform and assist with scheduling virtual visit with provider. Once patient is scheduled for visit, route back to triage to address refill. Thank you very much.    Annita Coombs RN, BSN  [facility]

## 2020-08-07 ENCOUNTER — TELEPHONE (OUTPATIENT)
Dept: FAMILY MEDICINE | Facility: CLINIC | Age: 55
End: 2020-08-07

## 2020-08-07 DIAGNOSIS — S72.001A CLOSED FRACTURE OF NECK OF RIGHT FEMUR, INITIAL ENCOUNTER (H): ICD-10-CM

## 2020-08-07 RX ORDER — ACETAMINOPHEN 325 MG/1
650 TABLET ORAL EVERY 6 HOURS PRN
Qty: 100 TABLET | Refills: 3 | COMMUNITY
Start: 2020-08-07 | End: 2020-09-29

## 2020-08-07 NOTE — TELEPHONE ENCOUNTER
Routing refill request to provider for review/approval because:  A break in medication.  Last prescribed 12/13/19 for # 135 with 1 refill.  Pt has appt scheduled for 9/29.    Rosmery MUNOZ RN  EP Triage

## 2020-08-07 NOTE — TELEPHONE ENCOUNTER
Reason for Call:  Form, our goal is to have forms completed with 72 hours, however, some forms may require a visit or additional information.    Type of letter, form or note:  Home Health Certification    Who is the form from?: Home care    Where did the form come from: form was faxed in    What clinic location was the form placed at?: [place]    Where the form was placed: Given to MA/STELLA HANNAH    What number is listed as a contact on the form?:        Additional comments:     Call taken on 8/7/2020 at 9:10 AM by Ping Garay

## 2020-08-10 RX ORDER — PAROXETINE 40 MG/1
TABLET, FILM COATED ORAL
Qty: 135 TABLET | Refills: 1 | Status: SHIPPED | OUTPATIENT
Start: 2020-08-10 | End: 2021-03-24

## 2020-08-12 DIAGNOSIS — Z53.9 DIAGNOSIS NOT YET DEFINED: Primary | ICD-10-CM

## 2020-08-20 ENCOUNTER — ANCILLARY PROCEDURE (OUTPATIENT)
Dept: BONE DENSITY | Facility: CLINIC | Age: 55
End: 2020-08-20
Payer: MEDICARE

## 2020-08-20 DIAGNOSIS — M81.0 AGE-RELATED OSTEOPOROSIS WITHOUT CURRENT PATHOLOGICAL FRACTURE: ICD-10-CM

## 2020-08-20 DIAGNOSIS — M85.80 SENILE OSTEOPENIA: ICD-10-CM

## 2020-08-20 PROCEDURE — 77081 DXA BONE DENSITY APPENDICULR: CPT | Performed by: INTERNAL MEDICINE

## 2020-08-20 PROCEDURE — 77080 DXA BONE DENSITY AXIAL: CPT | Mod: 59 | Performed by: INTERNAL MEDICINE

## 2020-08-25 ENCOUNTER — TELEPHONE (OUTPATIENT)
Dept: ENDOCRINOLOGY | Facility: CLINIC | Age: 55
End: 2020-08-25

## 2020-08-25 NOTE — TELEPHONE ENCOUNTER
DEXA 8/2020 results:    Severe osteoporosis on the basis of hip and vertebral fractures.      There has been no significant change in bone density of the lumbar spine. There has been no significant change in bone density of the hip. There has been no significant change in bone density of the forearm.      Please help schedule virtual visit in next few weeks to discuss labs and next steps.  Let me know if you have any questions or if needs to be added on to schedule.    Thank you.    Crystal Matt MD

## 2020-08-25 NOTE — LETTER
[facility] [place]  [address], [address]  [place], [place]  [address]                                            TEL:[phone]  FAX:[phone]      Yunior Angel  [address] [place] [address]  [place] [address]      August 25, 2020    Dear Yunior       DEXA 8/2020 results:     Severe osteoporosis on the basis of hip and vertebral fractures.      There has been no significant change in bone density of the lumbar spine. There has been no significant change in bone density of the hip. There has been no significant change in bone density of the forearm.      Please help virtual visit in next few weeks to discuss labs and next steps.  Let me know if you have any questions.     Thank you.     Crystal Matt MD

## 2020-08-25 NOTE — RESULT ENCOUNTER NOTE
Labs/results noted. Please see telephone encounter dated 8/25/2020.    Please send pt a copy of DEXA results.

## 2020-08-29 DIAGNOSIS — M80.00XD AGE-RELATED OSTEOPOROSIS WITH CURRENT PATHOLOGICAL FRACTURE WITH ROUTINE HEALING, SUBSEQUENT ENCOUNTER: ICD-10-CM

## 2020-08-31 RX ORDER — ALENDRONATE SODIUM 70 MG/1
TABLET ORAL
Qty: 12 TABLET | Refills: 1 | OUTPATIENT
Start: 2020-08-31

## 2020-09-24 ENCOUNTER — TELEPHONE (OUTPATIENT)
Dept: FAMILY MEDICINE | Facility: CLINIC | Age: 55
End: 2020-09-24

## 2020-09-25 NOTE — TELEPHONE ENCOUNTER
Good evening    Its just time for our next two months of home care orders.  He is essentially unchanged, no recent falls and med compliance is about the same.  He does plan to go to [facility] within the next few weeks to get his flu vaccine.  I will update in Epic when this happens    Requesting   SN 1 x w x 9 w  and 2 prn  For weekly assess and med set up     Your positive reply will be taken as my order  Any questions do not hesitate to call    Leona Morris RN  [phone]

## 2020-09-28 NOTE — PROGRESS NOTES
"Pre-Visit Planning - SB3 assignment has not been confirmed. Please review at visit if able. Thank you.    Chart review: Last seen by our office on 6/11/19 with Dr. Vega for foot pain. X-ray completed:  It demonstrated a chronic deformity of the big toe. I am unsure what the cause for this is but we could have him see podiatry for further evaluation and recommendation. There isn't any underlying bony abnormality to correspond to the painful lump on the outside of the foot. I think this is just a painful callus that has probably appeared over time due to friction. Using a pumice stone, lotion, and well-fitting shoes can help with this issue. Podiatry could also follow up on this as well. If they would like a podiatry referral, I will put one in for Jayden Vega, DO    7/1/19, patient followed up with Dr. Yousif in podiatry: ASSESSMENT:    Arthritis of right foot  Dystrophic nail  Pes cavus of right foot  PLAN:  Reviewed patient's chart in [facility]. Reviewed xrays. The \"large area\" on right great toe just appears to be some swelling. Talked about arthritis and treatments including orthotics, injections, icing, NSAIDS, bracing, physical therapy, compounding pain cream, or surgical intervention.  If pain recurs, could try injection or ultimaetly fusion of interphalangeal joint but would not recommend that at this time.   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   Clinically no pain. Would monitor for now.   Ema Yousif DPM, Podiatry/Foot and Ankle Surgery    8/20/20 patient had Dexa scan done as ordered by Dr. Matt:  IMPRESSION  Severe osteoporosis on the basis of hip and vertebral fractures.   There has been no significant change in bone density of the lumbar spine. There has been no significant change in bone density of the hip. There has been no significant change in bone density of the forearm.   Recommendations include ensuring adequate Calcium and Vitamin " D.  Follow up can be considered in 2-3 years.     Next 5 appointments (look out 90 days)    Sep 29, 2020  9:50 AM CDT  (Arrive by 9:25 AM)  Adult Preventative Visit with Star Vega DO  [facility] [place] ([facility]) [address]  [place] [id number] [phone]        Appointment Notes for this encounter:   last px unknown, fasting labs, ask masked    Questionnaires Reviewed/Assigned  No additional questionnaires are needed    Patient contact not needed.   Dolores Kim, EUSEBION, RN  [facility]

## 2020-09-29 ENCOUNTER — OFFICE VISIT (OUTPATIENT)
Dept: FAMILY MEDICINE | Facility: CLINIC | Age: 55
End: 2020-09-29
Payer: COMMERCIAL

## 2020-09-29 VITALS
HEIGHT: 70 IN | TEMPERATURE: 98.6 F | HEART RATE: 72 BPM | WEIGHT: 190 LBS | SYSTOLIC BLOOD PRESSURE: 114 MMHG | BODY MASS INDEX: 27.2 KG/M2 | DIASTOLIC BLOOD PRESSURE: 66 MMHG | OXYGEN SATURATION: 97 %

## 2020-09-29 DIAGNOSIS — Z86.0100 HISTORY OF COLONIC POLYPS: ICD-10-CM

## 2020-09-29 DIAGNOSIS — E78.5 HYPERLIPIDEMIA LDL GOAL <160: ICD-10-CM

## 2020-09-29 DIAGNOSIS — Z12.11 SCREEN FOR COLON CANCER: ICD-10-CM

## 2020-09-29 DIAGNOSIS — Z13.1 SCREENING FOR DIABETES MELLITUS: ICD-10-CM

## 2020-09-29 DIAGNOSIS — Z00.00 ROUTINE GENERAL MEDICAL EXAMINATION AT A HEALTH CARE FACILITY: Primary | ICD-10-CM

## 2020-09-29 DIAGNOSIS — Z12.5 SCREENING FOR PROSTATE CANCER: ICD-10-CM

## 2020-09-29 LAB
ERYTHROCYTE [DISTWIDTH] IN BLOOD BY AUTOMATED COUNT: 16.1 % (ref 10–15)
HCT VFR BLD AUTO: 44.9 % (ref 40–53)
HGB BLD-MCNC: 14.6 G/DL (ref 13.3–17.7)
MCH RBC QN AUTO: 23.5 PG (ref 26.5–33)
MCHC RBC AUTO-ENTMCNC: 32.5 G/DL (ref 31.5–36.5)
MCV RBC AUTO: 72 FL (ref 78–100)
PLATELET # BLD AUTO: 382 10E9/L (ref 150–450)
RBC # BLD AUTO: 6.2 10E12/L (ref 4.4–5.9)
WBC # BLD AUTO: 6.5 10E9/L (ref 4–11)

## 2020-09-29 PROCEDURE — 90682 RIV4 VACC RECOMBINANT DNA IM: CPT | Performed by: FAMILY MEDICINE

## 2020-09-29 PROCEDURE — 99396 PREV VISIT EST AGE 40-64: CPT | Mod: 25 | Performed by: FAMILY MEDICINE

## 2020-09-29 PROCEDURE — 85027 COMPLETE CBC AUTOMATED: CPT | Performed by: FAMILY MEDICINE

## 2020-09-29 PROCEDURE — G0103 PSA SCREENING: HCPCS | Performed by: FAMILY MEDICINE

## 2020-09-29 PROCEDURE — 36415 COLL VENOUS BLD VENIPUNCTURE: CPT | Performed by: FAMILY MEDICINE

## 2020-09-29 PROCEDURE — 80061 LIPID PANEL: CPT | Performed by: FAMILY MEDICINE

## 2020-09-29 PROCEDURE — 80053 COMPREHEN METABOLIC PANEL: CPT | Performed by: FAMILY MEDICINE

## 2020-09-29 PROCEDURE — 90471 IMMUNIZATION ADMIN: CPT | Performed by: FAMILY MEDICINE

## 2020-09-29 RX ORDER — SIMVASTATIN 80 MG
80 TABLET ORAL AT BEDTIME
Qty: 90 TABLET | Refills: 3 | Status: SHIPPED | OUTPATIENT
Start: 2020-09-29 | End: 2020-10-07

## 2020-09-29 RX ORDER — AMOXICILLIN 250 MG
1 CAPSULE ORAL DAILY PRN
COMMUNITY
Start: 2020-09-29

## 2020-09-29 RX ORDER — MULTIVIT-MIN/IRON/FOLIC ACID/K 18-600-40
2000 CAPSULE ORAL DAILY
COMMUNITY
Start: 2020-09-29 | End: 2020-10-07

## 2020-09-29 ASSESSMENT — ACTIVITIES OF DAILY LIVING (ADL)
CURRENT_FUNCTION: TELEPHONE REQUIRES ASSISTANCE
CURRENT_FUNCTION: PREPARING MEALS REQUIRES ASSISTANCE
CURRENT_FUNCTION: TRANSPORTATION REQUIRES ASSISTANCE
CURRENT_FUNCTION: HOUSEWORK REQUIRES ASSISTANCE
CURRENT_FUNCTION: SHOPPING REQUIRES ASSISTANCE

## 2020-09-29 ASSESSMENT — MIFFLIN-ST. JEOR: SCORE: 1698.08

## 2020-09-29 NOTE — LETTER
October 6, 2020      Yuniorchelly Angel  [address]   [place] [address]        Dear ,    We are writing to inform you of your test results.    -Normal red blood cell (hgb) levels, normal white blood cell count and normal platelet levels.   -PSA (prostate specific antigen) test is normal.  This indicates a low likelihood of prostate cancer.  ADVISE: rechecking this in 1 year.   -Cholesterol levels are at your goal levels.  ADVISE: continuing your medication, a regular exercise program with at least 150 minutes of aerobic exercise per week, and eating a low saturated fat/low carbohydrate diet.  Also, you should recheck this fasting cholesterol panel in 12 months.   -Liver and gallbladder tests are normal (ALT,AST, Alk phos, bilirubin), kidney function is normal (Cr, GFR), sodium is normal, potassium is normal, calcium is normal, glucose is normal.     Resulted Orders   Lipid panel reflex to direct LDL Fasting   Result Value Ref Range    Cholesterol 189 <200 mg/dL    Triglycerides 264 (H) <150 mg/dL      Comment:      Borderline high:  150-199 mg/dl  High:             200-499 mg/dl  Very high:       >499 mg/dl  Fasting specimen      HDL Cholesterol 36 (L) >39 mg/dL    LDL Cholesterol Calculated 100 (H) <100 mg/dL      Comment:      Above desirable:  100-129 mg/dl  Borderline High:  130-159 mg/dL  High:             160-189 mg/dL  Very high:       >189 mg/dl      Non HDL Cholesterol 153 (H) <130 mg/dL      Comment:      Above Desirable:  130-159 mg/dl  Borderline high:  160-189 mg/dl  High:             190-219 mg/dl  Very high:       >219 mg/dl     CBC with Platelets     Result Value Ref Range    WBC 6.5 4.0 - 11.0 10e9/L    RBC Count 6.20 (H) 4.4 - 5.9 10e12/L    Hemoglobin 14.6 13.3 - 17.7 g/dL    Hematocrit 44.9 40.0 - 53.0 %    MCV 72 (L) 78 - 100 fl    MCH 23.5 (L) 26.5 - 33.0 pg    MCHC 32.5 31.5 - 36.5 g/dL    RDW 16.1 (H) 10.0 - 15.0 %    Platelet Count 382 150 - 450 10e9/L   Comprehensive  metabolic panel (BMP + Alb, Alk Phos, ALT, AST, Total. Bili, TP)   Result Value Ref Range    Sodium 141 133 - 144 mmol/L    Potassium 4.4 3.4 - 5.3 mmol/L    Chloride 108 94 - 109 mmol/L    Carbon Dioxide 27 20 - 32 mmol/L    Anion Gap 6 3 - 14 mmol/L    Glucose 100 (H) 70 - 99 mg/dL      Comment:      Fasting specimen    Urea Nitrogen 20 7 - 30 mg/dL    Creatinine 0.91 0.66 - 1.25 mg/dL    GFR Estimate >90 >60 mL/min/[1.73_m2]      Comment:      Non  GFR Calc  Starting 12/18/2018, serum creatinine based estimated GFR (eGFR) will be   calculated using the Chronic Kidney Disease Epidemiology Collaboration   (CKD-EPI) equation.      GFR Estimate If Black >90 >60 mL/min/[1.73_m2]      Comment:       GFR Calc  Starting 12/18/2018, serum creatinine based estimated GFR (eGFR) will be   calculated using the Chronic Kidney Disease Epidemiology Collaboration   (CKD-EPI) equation.      Calcium 9.6 8.5 - 10.1 mg/dL    Bilirubin Total 0.3 0.2 - 1.3 mg/dL    Albumin 3.9 3.4 - 5.0 g/dL    Protein Total 7.6 6.8 - 8.8 g/dL    Alkaline Phosphatase 49 40 - 150 U/L    ALT 28 0 - 70 U/L    AST 15 0 - 45 U/L   PSA, screen   Result Value Ref Range    PSA 0.37 0 - 4 ug/L      Comment:      Assay Method:  Chemiluminescence using Siemens Vista analyzer       If you have any questions or concerns, please call the clinic at the number listed above.       Sincerely,        Star Vega, DO

## 2020-09-29 NOTE — PROGRESS NOTES
"SUBJECTIVE:   CC: Yunior Angel is an 55 year old male who presents for preventative health visit.       Patient has been advised of split billing requirements and indicates understanding: Yes  Healthy Habits:     In general, how would you rate your overall health?  Good    Frequency of exercise:  2-3 days/week    Duration of exercise:  30-45 minutes    Do you usually eat at least 4 servings of fruit and vegetables a day, include whole grains    & fiber and avoid regularly eating high fat or \"junk\" foods?  No    Taking medications regularly:  Yes    Medication side effects:  None    Ability to successfully perform activities of daily living:  Telephone requires assistance, transportation requires assistance, shopping requires assistance, preparing meals requires assistance and housework requires assistance    Home Safety:  No safety concerns identified    Hearing Impairment:  No hearing concerns    In the past 6 months, have you been bothered by leaking of urine?  No    In general, how would you rate your overall mental or emotional health?  Good      PHQ-2 Total Score: 0    Additional concerns today:  No    He goes to the [facility] to exercise - treadmill or biking and then some weights as well.       Today's PHQ-2 Score:   PHQ-2 ( 1999 [employer]) 9/29/2020   Q1: Little interest or pleasure in doing things 0   Q2: Feeling down, depressed or hopeless 0   PHQ-2 Score 0   Q1: Little interest or pleasure in doing things Not at all   Q2: Feeling down, depressed or hopeless Not at all   PHQ-2 Score 0       Abuse: Current or Past(Physical, Sexual or Emotional)- No  Do you feel safe in your environment? Yes        Social History     Tobacco Use     Smoking status: Never Smoker     Smokeless tobacco: Never Used   Substance Use Topics     Alcohol use: No     Alcohol/week: 0.0 standard drinks     If you drink alcohol do you typically have >3 drinks per day or >7 drinks per week? Not applicable    Alcohol Use 9/29/2020   Prescreen: " >3 drinks/day or >7 drinks/week? Not Applicable   Prescreen: >3 drinks/day or >7 drinks/week? -       Last PSA: No results found for: PSA    Reviewed orders with patient. 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      Past Surgical History:   Procedure Laterality Date     ARTHROPLASTY HIP Right 3/14/2018    Procedure: ARTHROPLASTY HIP;  Right total hip arthroplasty;  Surgeon: Parmjit Zabala MD;  Location:  OR     COLONOSCOPY N/A 12/31/2014    Procedure: COLONOSCOPY;  Surgeon: Inna Gonzalez MD;  Location:  GI     ESOPHAGOSCOPY, GASTROSCOPY, DUODENOSCOPY (EGD), COMBINED N/A 12/31/2014    Procedure: COMBINED ESOPHAGOSCOPY, GASTROSCOPY, DUODENOSCOPY (EGD), BIOPSY SINGLE OR MULTIPLE;  Surgeon: Inna Gonzalez MD;  Location:  GI     SURGICAL HISTORY OF -       foot surgery     SURGICAL HISTORY OF -   2006    1.  Avulsion of the right great toenail.  2.  Excision of bone cyst distal phalanx, right great toe.        Review of Systems  CONSTITUTIONAL: NEGATIVE for fever, chills, change in weight  INTEGUMENTARY/SKIN: NEGATIVE for worrisome rashes, moles or lesions  EYES: NEGATIVE for vision changes or irritation  ENT: NEGATIVE for ear, mouth and throat problems  RESP: NEGATIVE for significant cough or SOB  CV: NEGATIVE for chest pain, palpitations or peripheral edema  GI: NEGATIVE for nausea, abdominal pain, heartburn, or change in bowel habits   male: negative for dysuria, hematuria, decreased urinary stream,  "erectile dysfunction, urethral discharge  MUSCULOSKELETAL: NEGATIVE for significant arthralgias or myalgia  NEURO: NEGATIVE for weakness, dizziness or paresthesias  PSYCHIATRIC: NEGATIVE for changes in mood or affect    OBJECTIVE:   /66   Pulse 72   Temp 98.6  F (37  C) (Tympanic)   Ht 1.778 m (5' 10\")   Wt 86.2 kg (190 lb)   SpO2 97%   BMI 27.26 kg/m      Physical Exam  GENERAL: healthy, alert and no distress  EYES: Eyes grossly normal to inspection, PERRL and conjunctivae and sclerae normal  HENT: ear canals and TM's normal, nose and mouth without ulcers or lesions  NECK: no adenopathy and no asymmetry, masses, or scars  RESP: lungs clear to auscultation - no rales, rhonchi or wheezes  CV: regular rate and rhythm, normal S1 S2, no S3 or S4, no murmur, click or rub, no peripheral edema and peripheral pulses strong  ABDOMEN: soft, nontender, no hepatosplenomegaly, no masses and bowel sounds normal  MS: no gross musculoskeletal defects noted, no edema  SKIN: no suspicious lesions or rashes  PSYCH: mentation appears normal, affect normal/bright    Diagnostic Test Results:  Labs reviewed in Epic    ASSESSMENT/PLAN:   1. Routine general medical examination at a health care facility: we reviewed medications today and adjusted GI/bowel/IBS medications to simplify his regimen. Discontinued Bentyl, Prilosec, and Miralax. Can continue Senna PRN for constipation. If any increase in IBS symptoms, GERD symptoms, we could restart medications.  - CBC with Platelets      2. Screen for colon cancer  - GASTROENTEROLOGY ADULT REF PROCEDURE ONLY; Future    3. History of colonic polyps  - GASTROENTEROLOGY ADULT REF PROCEDURE ONLY; Future    4. Hyperlipidemia LDL goal <160  - Lipid panel reflex to direct LDL Fasting  - simvastatin (ZOCOR) 80 MG tablet; Take 1 tablet (80 mg) by mouth At Bedtime  Dispense: 90 tablet; Refill: 3    5. Screening for prostate cancer  - PSA, screen    6. Screening for diabetes mellitus  - " "Comprehensive metabolic panel (BMP + Alb, Alk Phos, ALT, AST, Total. Bili, TP)    Patient has been advised of split billing requirements and indicates understanding: Yes  COUNSELING:   Reviewed preventive health counseling, as reflected in patient instructions       Regular exercise       Healthy diet/nutrition    Estimated body mass index is 27.26 kg/m  as calculated from the following:    Height as of this encounter: 1.778 m (5' 10\").    Weight as of this encounter: 86.2 kg (190 lb).     Weight management plan: Discussed healthy diet and exercise guidelines    He reports that he has never smoked. He has never used smokeless tobacco.      Counseling Resources:  ATP IV Guidelines  Pooled Cohorts Equation Calculator  FRAX Risk Assessment  ICSI Preventive Guidelines  Dietary Guidelines for Americans, 2010  USDA's MyPlate  ASA Prophylaxis  Lung CA Screening    Star Vega,   [facility]  "

## 2020-09-30 LAB
ALBUMIN SERPL-MCNC: 3.9 G/DL (ref 3.4–5)
ALP SERPL-CCNC: 49 U/L (ref 40–150)
ALT SERPL W P-5'-P-CCNC: 28 U/L (ref 0–70)
ANION GAP SERPL CALCULATED.3IONS-SCNC: 6 MMOL/L (ref 3–14)
AST SERPL W P-5'-P-CCNC: 15 U/L (ref 0–45)
BILIRUB SERPL-MCNC: 0.3 MG/DL (ref 0.2–1.3)
BUN SERPL-MCNC: 20 MG/DL (ref 7–30)
CALCIUM SERPL-MCNC: 9.6 MG/DL (ref 8.5–10.1)
CHLORIDE SERPL-SCNC: 108 MMOL/L (ref 94–109)
CHOLEST SERPL-MCNC: 189 MG/DL
CO2 SERPL-SCNC: 27 MMOL/L (ref 20–32)
CREAT SERPL-MCNC: 0.91 MG/DL (ref 0.66–1.25)
GFR SERPL CREATININE-BSD FRML MDRD: >90 ML/MIN/{1.73_M2}
GLUCOSE SERPL-MCNC: 100 MG/DL (ref 70–99)
HDLC SERPL-MCNC: 36 MG/DL
LDLC SERPL CALC-MCNC: 100 MG/DL
NONHDLC SERPL-MCNC: 153 MG/DL
POTASSIUM SERPL-SCNC: 4.4 MMOL/L (ref 3.4–5.3)
PROT SERPL-MCNC: 7.6 G/DL (ref 6.8–8.8)
PSA SERPL-ACNC: 0.37 UG/L (ref 0–4)
SODIUM SERPL-SCNC: 141 MMOL/L (ref 133–144)
TRIGL SERPL-MCNC: 264 MG/DL

## 2020-10-01 ENCOUNTER — TELEPHONE (OUTPATIENT)
Dept: FAMILY MEDICINE | Facility: CLINIC | Age: 55
End: 2020-10-01

## 2020-10-01 DIAGNOSIS — K58.0 IRRITABLE BOWEL SYNDROME WITH DIARRHEA: Primary | ICD-10-CM

## 2020-10-01 DIAGNOSIS — S72.001A CLOSED FRACTURE OF NECK OF RIGHT FEMUR, INITIAL ENCOUNTER (H): ICD-10-CM

## 2020-10-07 DIAGNOSIS — Z53.9 DIAGNOSIS NOT YET DEFINED: Primary | ICD-10-CM

## 2020-10-07 DIAGNOSIS — E78.5 HYPERLIPIDEMIA LDL GOAL <160: ICD-10-CM

## 2020-10-07 RX ORDER — MULTIVIT-MIN/IRON/FOLIC ACID/K 18-600-40
1000 CAPSULE ORAL DAILY
COMMUNITY
Start: 2020-10-07

## 2020-10-07 RX ORDER — DICYCLOMINE HYDROCHLORIDE 10 MG/1
10 CAPSULE ORAL 2 TIMES DAILY
Start: 2020-10-07 | End: 2021-01-06

## 2020-10-07 RX ORDER — SIMVASTATIN 80 MG
80 TABLET ORAL AT BEDTIME
Qty: 90 TABLET | Refills: 3 | Status: SHIPPED | OUTPATIENT
Start: 2020-10-07 | End: 2023-09-06

## 2020-10-07 RX ORDER — ACETAMINOPHEN 325 MG/1
650 TABLET ORAL EVERY 6 HOURS PRN
COMMUNITY
Start: 2020-10-07 | End: 2020-10-15

## 2020-10-07 RX ORDER — POLYETHYLENE GLYCOL 3350 17 G/17G
17 POWDER, FOR SOLUTION ORAL DAILY PRN
COMMUNITY
Start: 2020-10-07 | End: 2021-01-22

## 2020-10-07 NOTE — TELEPHONE ENCOUNTER
"Requested Prescriptions   Pending Prescriptions Disp Refills     simvastatin (ZOCOR) 80 MG tablet 90 tablet 3     Sig: Take 1 tablet (80 mg) by mouth At Bedtime       Statins Protocol Passed - 10/7/2020 11:18 AM        Passed - LDL on file in past 12 months     Recent Labs   Lab Test 09/29/20  1036   *             Passed - No abnormal creatine kinase in past 12 months     Recent Labs   Lab Test 05/12/17  0904                   Passed - Recent (12 mo) or future (30 days) visit within the authorizing provider's specialty     Patient has had an office visit with the authorizing provider or a provider within the authorizing providers department within the previous 12 mos or has a future within next 30 days. See \"Patient Info\" tab in inbasket, or \"Choose Columns\" in Meds & Orders section of the refill encounter.              Passed - Medication is active on med list        Passed - Patient is age 18 or older             "

## 2020-10-07 NOTE — TELEPHONE ENCOUNTER
Already sent 9/29/20 by Dr. Vega but [facility] is saying they need new script because they show no refills. Ok to send? There is a dosage warning. Also LDL out of range and creatinine kinase overdue. Unclear if Dr. Vega or Dr. Carlos is PCP?

## 2020-10-08 NOTE — TELEPHONE ENCOUNTER
Rx written on 9/29 was for 90 tablets with 3 refills. Will try resending same Rx to pharmacy.    Star Vega,   10/7/2020 11:13 PM

## 2020-10-15 ENCOUNTER — VIRTUAL VISIT (OUTPATIENT)
Dept: ENDOCRINOLOGY | Facility: CLINIC | Age: 55
End: 2020-10-15
Payer: COMMERCIAL

## 2020-10-15 DIAGNOSIS — M80.00XD AGE-RELATED OSTEOPOROSIS WITH CURRENT PATHOLOGICAL FRACTURE WITH ROUTINE HEALING, SUBSEQUENT ENCOUNTER: Primary | ICD-10-CM

## 2020-10-15 DIAGNOSIS — M85.80 SENILE OSTEOPENIA: ICD-10-CM

## 2020-10-15 DIAGNOSIS — R79.89 LOW TESTOSTERONE IN MALE: ICD-10-CM

## 2020-10-15 PROCEDURE — 99214 OFFICE O/P EST MOD 30 MIN: CPT | Mod: 95 | Performed by: INTERNAL MEDICINE

## 2020-10-15 RX ORDER — ALENDRONATE SODIUM 70 MG/1
TABLET ORAL
Qty: 12 TABLET | Refills: 3 | Status: SHIPPED | OUTPATIENT
Start: 2020-10-15 | End: 2023-04-11

## 2020-10-15 NOTE — PROGRESS NOTES
"This is a telephone encounter.  Declined video.  10/15/2020  Spoke with mother who is his co-guardian.    Start time: 9:36    End time: 9:59    More than 10 min spent reviewing chart, labs, results, imaging studies and in patient communication.    In the setting of COVID-19 outbreak patients visits are transitioned to phone visits/ virtual visits as per system and leadership guidelines.  I have personally reviewed data including notes, lab tests. I have reviewed and interpreted images personally.  This encounter is potentially equivalent to established 4 level (32563) clinic visit.    The patient has been notified of following:      \"This telephone visit will be conducted via a call between you and your physician/provider. We have found that certain health care needs can be provided without the need for a physical exam.  This service lets us provide the care you need with a short phone conversation.  If a prescription is necessary we can send it directly to your pharmacy.  If lab work is needed we can place an order for that and you can then stop by our lab to have the test done at a later time.     We will bill your insurance company for this service.  Please check with your medical insurance if this type of visit is covered. You may be responsible for the cost of this type of visit if insurance coverage is denied.  The typical cost is $30 (10min), $59 (11-20min) and $85 (21-30min).  Most often these visits are shorter than 10 minutes. With new updates with corona virus patient might be billed as clinic visit.     If during the course of the call the physician/provider feels a telephone visit is not appropriate, you will not be charged for this service.\"      Past medical history, social history, family history, allergy and medications were reviewed and updated as appropriate.  Reviewed all pertinent labs, notes and imaging studies as appropriate.    Patient verbally consented to phone visit today: " yes.    Disclaimer: This note consists of symbols derived from keyboarding, dictation and/or voice recognition software. As a result, there may be errors in the script that have gone undetected. Please consider this when interpreting information found in this chart.        ROS neg expect noted in notes.  Patient feels well at this time and denies any tachycardia, palpitations, heat intolerance, tremor, insomnia, diarrhea, or unexplained weight loss.  Patient also denies  cold intolerance, constipation, or unexplained weight gain.     Name: Yunior Angel  For f/u of osteoporosis. Last visit 2018.  HPI:  Yunior Angel is a 55 year old male. Visit for f/u of osteoporosis.  Spoke with his mother- Patsy who is his co-guardian.    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    H/o hip fracture in March 2018.  He presented to emergency department with acute right hip pain.  Questionable history about fall.  He did not recall a fall, but he has short-term memory deficit. Workup showed right femoral neck fracture.  He underwent ORIF.  Status post right hip arthroplasty.    H/o verbebral fracture: In May 2018.  He presented to emergency department after a fall.  Patient tripped and fell from a standing height.  Workup at that time showedCompression fraction of L1 of uncertain age.  This was followed by DEXA scan consistent with osteoporosis as noted below.  Normal thyroid function tests, parathyroid hormone, vitamin D and calcium level.  Testosterone noted to be low as discussed below      Also history of heterotropic ossification: Incidental on x-ray findings based on discharge summary March 2018. This was anterior to the right pubic inferior rami.  There was some surgical intervention.  Please refer to the  orthopedics operative note for details.  This seems to be not uncommon in patients with previous neurologic injuries.  Radiation oncology was consulted and patient underwent radiation treatment ×1 per standard practice.    Started Fosamax 70 mg/ once a week (12/6/2018)  F/u DEXA 8/2020: No significant change in bone density of the lumbar spine or spine.  Patsy ( his mother) reports that Yunior is doing OK.  Is using cane for walking. No major concerns.  Smoke:No  Family History:Yes: h/o osteoporosis  Fractures:as above  Kidney stones: No  GI Surgery:No  Bisphosphonate exposure:  no  Exercise:goes to [employer].  Prostate History: no  Diet:   Milk: 1   Yogurt/Cottage Cheese: 1   Ice Cream  Ca/Vitamin D: Calcium 600-1200 mg/day . 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     Past Surgical History:   Procedure Laterality Date     ARTHROPLASTY HIP Right 3/14/2018    Procedure: ARTHROPLASTY HIP;  Right total hip arthroplasty;  Surgeon: Parmjit Zabala MD;  Location:  OR     COLONOSCOPY N/A 12/31/2014    Procedure: COLONOSCOPY;  Surgeon: Inna Gonzalez MD;  Location:  GI     ESOPHAGOSCOPY, GASTROSCOPY, DUODENOSCOPY (EGD), COMBINED N/A 12/31/2014    Procedure: COMBINED ESOPHAGOSCOPY, GASTROSCOPY, DUODENOSCOPY (EGD), BIOPSY SINGLE OR MULTIPLE;  Surgeon: Inna Gonzalez MD;  Location:  GI     SURGICAL HISTORY OF -       foot surgery     SURGICAL HISTORY OF -   2006    1.  Avulsion of the right great toenail.  2.  Excision of bone cyst distal phalanx, right great toe.      Family Hx:  Family History   Problem Relation Age of Onset     Migraines Father      Family History Negative  Mother      Cancer - colorectal No family hx of      Colon Cancer No family hx of           Social Hx:  Social History     Socioeconomic History     Marital status: Single     Spouse name: Not on file     Number of children: Not on file     Years of education: Not on file     Highest education level: Not on file   Occupational History     Not on file   Social Needs     Financial resource strain: Not on file     Food insecurity     Worry: Not on file     Inability: Not on file     Transportation needs     Medical: Not on file     Non-medical: Not on file   Tobacco Use     Smoking status: Never Smoker     Smokeless tobacco: Never Used   Substance and Sexual Activity     Alcohol use: No     Alcohol/week: 0.0 standard drinks     Drug use: No     Sexual activity: Never   Lifestyle     Physical activity     Days per week: Not on file     Minutes per session: Not on file     Stress: Not on file   Relationships     Social connections     Talks on phone: Not on file     Gets together: Not on file     Attends Tenriism service: Not on file     Active member of club or organization: Not on file     Attends meetings of clubs or organizations: Not on file     Relationship status: Not on file     Intimate partner violence     Fear of current or ex partner: Not on file     Emotionally abused: Not on file     Physically abused: Not on file     Forced sexual activity: Not on file   Other Topics Concern     Parent/sibling w/ CABG, MI or angioplasty before 65F 55M? Not Asked   Social History Narrative     Not on file          MEDICATIONS:  has a current medication list which includes the following prescription(s): alendronate, aspirin, baclofen, calcium-vitamin d, dicyclomine, multivitamin, therapeutic, order for dme, paroxetine, polyethylene glycol, senna-docusate, simvastatin, and vitamin d (cholecalciferol).    ROS     ROS: 10 point ROS neg other than the symptoms noted above in the HPI.    Physical Exam   VS: There were no vitals  taken for this visit.      LABS:  Component      Latest Ref Rng & Units 11/28/2018   Testosterone Total      240 - 950 ng/dL 186 (L)   Sex Hormone Binding Globulin      11 - 80 nmol/L 36   Free Testosterone Calculated      4.7 - 24.4 ng/dL 3.33 (L)   Creatinine      0.66 - 1.25 mg/dL 0.85   GFR Estimate      >60 mL/min/1.7m2 >90   GFR Estimate If Black      >60 mL/min/1.7m2 >90   Parathyroid Hormone Intact      18 - 80 pg/mL 80   TSH      0.40 - 4.00 mU/L 0.79   Vitamin D Deficiency screening      20 - 75 ug/L 45   Calcium      8.5 - 10.1 mg/dL 9.5     BMP:  Last Basic Metabolic Panel:  Lab Results   Component Value Date     06/25/2018      Lab Results   Component Value Date    POTASSIUM 4.2 06/25/2018     Lab Results   Component Value Date    CHLORIDE 107 06/25/2018     Lab Results   Component Value Date    JUSTA 9.7 06/25/2018     Lab Results   Component Value Date    CO2 27 06/25/2018     Lab Results   Component Value Date    BUN 18 06/25/2018     Lab Results   Component Value Date    CR 0.84 06/25/2018     Lab Results   Component Value Date    GLC 98 06/25/2018       TFTs:  TSH   Date Value Ref Range Status   11/28/2018 0.79 0.40 - 4.00 mU/L Final     LFTs:  Liver Function Studies -   Recent Labs   Lab Test 09/29/20  1036   PROTTOTAL 7.6   ALBUMIN 3.9   BILITOTAL 0.3   ALKPHOS 49   AST 15   ALT 28       PTH:   ENDO CALCIUM LABS-UMP Latest Ref Rng & Units 11/28/2018   PARATHYROID HORMONE INTACT 18 - 80 pg/mL 80     Vitamin D:  Vitamin D Deficiency Screening Results:  Lab Results   Component Value Date    VITDT 45 11/28/2018    VITDT 43 06/25/2018       BoneTurnOverMarkers:     Testosterone:  ENDO PITUITARY LABS-UMP Latest Ref Rng & Units 12/14/2018 11/28/2018   TESTOSTERONE TOTAL 240 - 950 ng/dL 269 186 (L)     DEXA:  DEXA 2018:             RISK FACTORS:  Parent history of a osteoporosis with hip fracture, History of vertebral fracture, Hip fracture     CURRENT TREATMENT:  none  "listed      FINDINGS:               Lumbar Spine (L3-L4) T-score: -2.1, fracture L1 and marked degenerative changes present at L2, so only L3-4 are evaluated               Left Femoral Neck T-score:  -2.7               Forearm (radius 33%)      T-score:  1.3  The right femur is not acceptable for evaluation due to previous surgical repair.                               Lumbar (L3-L4) BMD: 1.000                                     Left Total Hip BMD: 0.789                                     Forearm (radius 33%) BMD: 0.896     IMPRESSION  Severe osteoporosis on the basis of vertebral and hip fractures.  Consider evaluation for secondary causes of osteoporosis.      Recommendations include ensuring adequate Calcium and Vitamin D.     The current NOF Guidelines recommend treatment for patients with prior hip or vertebral fracture, T-score -2.5 or below, or 10 year risk of any major osteoporotic fracture >20% or 10 year risk of hip fracture >3%, as calculated using the FRAX calculator (www.shef.ac.uk/FRAX or you can google \"FRAX\").       Based on these guidelines, treatment (in addition to calcium and vitamin D) is recommended for this patient, after ruling out other causes of osteoporosis.  This is meant as an aid to clinical decision making; one must still use clinical judgement.     DEXA 8/2020:  RISK FACTORS: Low testosterone, Parent history of a hip fracture, Fracture of hip, vertebral fracture, follow up severe osteoporosis     CURRENT TREATMENT:  Calcium, Fosamax (alendronate)     FINDINGS:               Lumbar Spine (L2-L4)      T-score:  -1.5, vertebral fracture at L1 so only L2-4 are evaluated               Right Femoral Neck          T-score:  -2.8               Forearm (radius 33%)      T-score:  1.1  The left femur is not acceptable for evaluation due to previous arthroplasty.                               Lumbar (L2-L4) BMD: 1.069  Previous: 1.042                                       Right Total Hip BMD: " 0.779  Previous: 0.789               Forearm (radius 33%) BMD: 1.102   Previous; 1.114     Comparison is made to another DXA performed on the same [employer]  machine on 6/15/2018.       IMPRESSION  Severe osteoporosis on the basis of hip and vertebral fractures.      There has been no significant change in bone density of the lumbar spine. There has been no significant change in bone density of the hip. There has been no significant change in bone density of the forearm.      Recommendations include ensuring adequate Calcium and Vitamin D.        Follow up can be considered in 2-3 years.   ___________________       Follow up can be considered in 1-2 years.     All pertinent notes, labs, and images personally reviewed by me.     A/P  #1 Osteoporosis:  Risk factors for low bone density include personal history of fracture, family history, , low Ca intake.    History of compression vertebral fracture of L1  History of hip fracture status post hip arthroplasty  Also history of heterotropic ossification status post radiation treatment  DEXA 2018 consistent with severe osteoporosis  Normal thyroid function tests, parathyroid hormone, vitamin D and calcium level.  Started Fosamax 70 mg/ once a week (12/6/2018)  F/u DEXA 8/2020: no significant change in bone density of the lumbar spine or spine.  Plan:  Discussed diagnosis, pathophysiology, management and treatment options of condition with pt.  DEXA 8/2020 appears stable.  Plan to continue Fosamax.  Repeat DEXA in 2 years (2022)  Adequate calcium and vitamin D intake  The pt was advised to    Maintain an adequate calcium and vitamin D intake and to supplement vitamin D if needed to maintain serum levels of 25 hydroxy D (25 OH D) between 30-60 ng/ml.    Limit alcohol intake to no more than 2 servings per day.    Limit caffeine intake.    Maintain an active lifestyle including weight-bearing exercises for at least 30 mins daily.    Take measures to reduce the  risk of falling.    #2.  Low testosterone/Male hypogonadism:  Testosterone noted to be low on workup of osteoporosis.  Low testosterone X1  Repeat in range.  Plan  Plan to continue to monitor.    Follow-up:  1 year.    Crystal Matt MD  Endocrinology  [facility]  CC: Alberto Carlos Jr.    More than 50% of face to face time spent with Mr. Angel on counseling / coordinating his care.      All questions were answered.  The patient indicates understanding of the above issues and agrees with the plan set forth.      Addendum to above note and clinic visit:    Labs reviewed.    See result note/telephone encounter.

## 2020-10-15 NOTE — PATIENT INSTRUCTIONS
[facility]   Dr Matt, Endocrinology Department      [facility]   [address]  [place] [address]  Appointment [phone]  Fax: [phone]  [place]: Monday and Tuesday         [place] [address]  [place] [address]  Appointment [phone]  Fax: [phone]  [place]: Wednesday and Thursday            Please check the cost coverage and copay with insurance before recommended tests, services and medications (especially if new medications are prescribed).     If ordered, please get blood work done 1 week prior to your next appointment so they will be available to Dr. Matt at your visit.

## 2020-10-15 NOTE — LETTER
"    10/15/2020         RE: Yunior Angel  [address]  [facility] [address]        Dear Colleague,    Thank you for referring your patient, Yunior Angel, to the [facility]. Please see a copy of my visit note below.    This is a telephone encounter.  Declined video.  10/15/2020  Spoke with mother who is his co-guardian.    Start time: 9:36    End time: 9:59    More than 10 min spent reviewing chart, labs, results, imaging studies and in patient communication.    In the setting of COVID-19 outbreak patients visits are transitioned to phone visits/ virtual visits as per system and leadership guidelines.  I have personally reviewed data including notes, lab tests. I have reviewed and interpreted images personally.  This encounter is potentially equivalent to established 4 level (11521) clinic visit.    The patient has been notified of following:      \"This telephone visit will be conducted via a call between you and your physician/provider. We have found that certain health care needs can be provided without the need for a physical exam.  This service lets us provide the care you need with a short phone conversation.  If a prescription is necessary we can send it directly to your pharmacy.  If lab work is needed we can place an order for that and you can then stop by our lab to have the test done at a later time.     We will bill your insurance company for this service.  Please check with your medical insurance if this type of visit is covered. You may be responsible for the cost of this type of visit if insurance coverage is denied.  The typical cost is $30 (10min), $59 (11-20min) and $85 (21-30min).  Most often these visits are shorter than 10 minutes. With new updates with corona virus patient might be billed as clinic visit.     If during the course of the call the physician/provider feels a telephone visit is not appropriate, you will not be charged for this service.\"      Past " medical history, social history, family history, allergy and medications were reviewed and updated as appropriate.  Reviewed all pertinent labs, notes and imaging studies as appropriate.    Patient verbally consented to phone visit today: yes.    Disclaimer: This note consists of symbols derived from keyboarding, dictation and/or voice recognition software. As a result, there may be errors in the script that have gone undetected. Please consider this when interpreting information found in this chart.        ROS neg expect noted in notes.  Patient feels well at this time and denies any tachycardia, palpitations, heat intolerance, tremor, insomnia, diarrhea, or unexplained weight loss.  Patient also denies  cold intolerance, constipation, or unexplained weight gain.     Name: Yunior Angel  For f/u of osteoporosis. Last visit 2018.  HPI:  Yunior Angel is a 55 year old male. Visit for f/u of osteoporosis.  Spoke with his mother- Patsy who is his co-guardian.    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    H/o hip fracture in March 2018.  He presented to emergency department with acute right hip pain.  Questionable history about fall.  He did not recall a fall, but he has short-term memory deficit. Workup showed right femoral neck fracture.  He underwent ORIF.  Status post right hip arthroplasty.    H/o verbebral fracture: In May 2018.  He presented to emergency department after a fall.  Patient tripped and fell from a standing height.  Workup at that time showedCompression fraction of L1 of uncertain age.  This was followed by DEXA scan consistent with osteoporosis as noted below.  Normal thyroid function tests, parathyroid hormone, vitamin D and calcium level.  Testosterone noted to be low as  discussed below      Also history of heterotropic ossification: Incidental on x-ray findings based on discharge summary March 2018. This was anterior to the right pubic inferior rami.  There was some surgical intervention.  Please refer to the orthopedics operative note for details.  This seems to be not uncommon in patients with previous neurologic injuries.  Radiation oncology was consulted and patient underwent radiation treatment ×1 per standard practice.    Started Fosamax 70 mg/ once a week (12/6/2018)  F/u DEXA 8/2020: No significant change in bone density of the lumbar spine or spine.  Patsy ( his mother) reports that Yunior is doing OK.  Is using cane for walking. No major concerns.  Smoke:No  Family History:Yes: h/o osteoporosis  Fractures:as above  Kidney stones: No  GI Surgery:No  Bisphosphonate exposure:  no  Exercise:goes to [employer].  Prostate History: no  Diet:   Milk: 1   Yogurt/Cottage Cheese: 1   Ice Cream  Ca/Vitamin D: Calcium 600-1200 mg/day . 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     Past Surgical History:   Procedure Laterality Date     ARTHROPLASTY HIP Right 3/14/2018    Procedure: ARTHROPLASTY HIP;  Right total hip arthroplasty;  Surgeon: Parmjit Zabala MD;  Location:  OR     COLONOSCOPY N/A 12/31/2014    Procedure: COLONOSCOPY;  Surgeon: Inna Gonzalez MD;  Location:  GI     ESOPHAGOSCOPY, GASTROSCOPY, DUODENOSCOPY (EGD), COMBINED N/A 12/31/2014    Procedure: COMBINED ESOPHAGOSCOPY, GASTROSCOPY, DUODENOSCOPY (EGD), BIOPSY SINGLE OR MULTIPLE;  Surgeon: Inna Gonzalez MD;  Location:  GI     SURGICAL HISTORY OF -       foot surgery      SURGICAL HISTORY OF -   2006    1.  Avulsion of the right great toenail.  2.  Excision of bone cyst distal phalanx, right great toe.      Family Hx:  Family History   Problem Relation Age of Onset     Migraines Father      Family History Negative Mother      Cancer - colorectal No family hx of      Colon Cancer No family hx of           Social Hx:  Social History     Socioeconomic History     Marital status: Single     Spouse name: Not on file     Number of children: Not on file     Years of education: Not on file     Highest education level: Not on file   Occupational History     Not on file   Social Needs     Financial resource strain: Not on file     Food insecurity     Worry: Not on file     Inability: Not on file     Transportation needs     Medical: Not on file     Non-medical: Not on file   Tobacco Use     Smoking status: Never Smoker     Smokeless tobacco: Never Used   Substance and Sexual Activity     Alcohol use: No     Alcohol/week: 0.0 standard drinks     Drug use: No     Sexual activity: Never   Lifestyle     Physical activity     Days per week: Not on file     Minutes per session: Not on file     Stress: Not on file   Relationships     Social connections     Talks on phone: Not on file     Gets together: Not on file     Attends Judaism service: Not on file     Active member of club or organization: Not on file     Attends meetings of clubs or organizations: Not on file     Relationship status: Not on file     Intimate partner violence     Fear of current or ex partner: Not on file     Emotionally abused: Not on file     Physically abused: Not on file     Forced sexual activity: Not on file   Other Topics Concern     Parent/sibling w/ CABG, MI or angioplasty before 65F 55M? Not Asked   Social History Narrative     Not on file          MEDICATIONS:  has a current medication list which includes the following prescription(s): alendronate, aspirin, baclofen, calcium-vitamin d, dicyclomine, multivitamin,  therapeutic, order for dme, paroxetine, polyethylene glycol, senna-docusate, simvastatin, and vitamin d (cholecalciferol).    ROS     ROS: 10 point ROS neg other than the symptoms noted above in the HPI.    Physical Exam   VS: There were no vitals taken for this visit.      LABS:  Component      Latest Ref Rng & Units 11/28/2018   Testosterone Total      240 - 950 ng/dL 186 (L)   Sex Hormone Binding Globulin      11 - 80 nmol/L 36   Free Testosterone Calculated      4.7 - 24.4 ng/dL 3.33 (L)   Creatinine      0.66 - 1.25 mg/dL 0.85   GFR Estimate      >60 mL/min/1.7m2 >90   GFR Estimate If Black      >60 mL/min/1.7m2 >90   Parathyroid Hormone Intact      18 - 80 pg/mL 80   TSH      0.40 - 4.00 mU/L 0.79   Vitamin D Deficiency screening      20 - 75 ug/L 45   Calcium      8.5 - 10.1 mg/dL 9.5     BMP:  Last Basic Metabolic Panel:  Lab Results   Component Value Date     06/25/2018      Lab Results   Component Value Date    POTASSIUM 4.2 06/25/2018     Lab Results   Component Value Date    CHLORIDE 107 06/25/2018     Lab Results   Component Value Date    JUSTA 9.7 06/25/2018     Lab Results   Component Value Date    CO2 27 06/25/2018     Lab Results   Component Value Date    BUN 18 06/25/2018     Lab Results   Component Value Date    CR 0.84 06/25/2018     Lab Results   Component Value Date    GLC 98 06/25/2018       TFTs:  TSH   Date Value Ref Range Status   11/28/2018 0.79 0.40 - 4.00 mU/L Final     LFTs:  Liver Function Studies -   Recent Labs   Lab Test 09/29/20  1036   PROTTOTAL 7.6   ALBUMIN 3.9   BILITOTAL 0.3   ALKPHOS 49   AST 15   ALT 28       PTH:   ENDO CALCIUM LABS-UMP Latest Ref Rng & Units 11/28/2018   PARATHYROID HORMONE INTACT 18 - 80 pg/mL 80     Vitamin D:  Vitamin D Deficiency Screening Results:  Lab Results   Component Value Date    VITDT 45 11/28/2018    VITDT 43 06/25/2018       BoneTurnOverMarkers:     Testosterone:  ENDO PITUITARY LABS-[facility] Latest Ref Rng & Units 12/14/2018 11/28/2018  "  TESTOSTERONE TOTAL 240 - 950 ng/dL 269 186 (L)     DEXA:  DEXA 2018:             RISK FACTORS:  Parent history of a osteoporosis with hip fracture, History of vertebral fracture, Hip fracture     CURRENT TREATMENT:  none listed      FINDINGS:               Lumbar Spine (L3-L4) T-score: -2.1, fracture L1 and marked degenerative changes present at L2, so only L3-4 are evaluated               Left Femoral Neck T-score:  -2.7               Forearm (radius 33%)      T-score:  1.3  The right femur is not acceptable for evaluation due to previous surgical repair.                               Lumbar (L3-L4) BMD: 1.000                                     Left Total Hip BMD: 0.789                                     Forearm (radius 33%) BMD: 0.896     IMPRESSION  Severe osteoporosis on the basis of vertebral and hip fractures.  Consider evaluation for secondary causes of osteoporosis.      Recommendations include ensuring adequate Calcium and Vitamin D.     The current NOF Guidelines recommend treatment for patients with prior hip or vertebral fracture, T-score -2.5 or below, or 10 year risk of any major osteoporotic fracture >20% or 10 year risk of hip fracture >3%, as calculated using the FRAX calculator (www.shef.ac.uk/FRAX or you can google \"FRAX\").       Based on these guidelines, treatment (in addition to calcium and vitamin D) is recommended for this patient, after ruling out other causes of osteoporosis.  This is meant as an aid to clinical decision making; one must still use clinical judgement.     DEXA 8/2020:  RISK FACTORS: Low testosterone, Parent history of a hip fracture, Fracture of hip, vertebral fracture, follow up severe osteoporosis     CURRENT TREATMENT:  Calcium, Fosamax (alendronate)     FINDINGS:               Lumbar Spine (L2-L4)      T-score:  -1.5, vertebral fracture at L1 so only L2-4 are evaluated               Right Femoral Neck          T-score:  -2.8               Forearm (radius 33%) "      T-score:  1.1  The left femur is not acceptable for evaluation due to previous arthroplasty.                               Lumbar (L2-L4) BMD: 1.069  Previous: 1.042                                       Right Total Hip BMD: 0.779  Previous: 0.789               Forearm (radius 33%) BMD: 1.102   Previous; 1.114     Comparison is made to another DXA performed on the same Lunar Prodig[employer]  machine on 6/15/2018.       IMPRESSION  Severe osteoporosis on the basis of hip and vertebral fractures.      There has been no significant change in bone density of the lumbar spine. There has been no significant change in bone density of the hip. There has been no significant change in bone density of the forearm.      Recommendations include ensuring adequate Calcium and Vitamin D.        Follow up can be considered in 2-3 years.   ___________________       Follow up can be considered in 1-2 years.     All pertinent notes, labs, and images personally reviewed by me.     A/P  #1 Osteoporosis:  Risk factors for low bone density include personal history of fracture, family history, , low Ca intake.    History of compression vertebral fracture of L1  History of hip fracture status post hip arthroplasty  Also history of heterotropic ossification status post radiation treatment  DEXA 2018 consistent with severe osteoporosis  Normal thyroid function tests, parathyroid hormone, vitamin D and calcium level.  Started Fosamax 70 mg/ once a week (12/6/2018)  F/u DEXA 8/2020: no significant change in bone density of the lumbar spine or spine.  Plan:  Discussed diagnosis, pathophysiology, management and treatment options of condition with pt.  DEXA 8/2020 appears stable.  Plan to continue Fosamax.  Repeat DEXA in 2 years (2022)  Adequate calcium and vitamin D intake  The pt was advised to    Maintain an adequate calcium and vitamin D intake and to supplement vitamin D if needed to maintain serum levels of 25 hydroxy D (25 OH D)  between 30-60 ng/ml.    Limit alcohol intake to no more than 2 servings per day.    Limit caffeine intake.    Maintain an active lifestyle including weight-bearing exercises for at least 30 mins daily.    Take measures to reduce the risk of falling.    #2.  Low testosterone/Male hypogonadism:  Testosterone noted to be low on workup of osteoporosis.  Low testosterone X1  Repeat in range.  Plan  Plan to continue to monitor.    Follow-up:  1 year.    Crystal Matt MD  Endocrinology  [facility]  CC: Alberto Carlos Jr.    More than 50% of face to face time spent with Mr. Angel on counseling / coordinating his care.      All questions were answered.  The patient indicates understanding of the above issues and agrees with the plan set forth.      Addendum to above note and clinic visit:    Labs reviewed.    See result note/telephone encounter.              Again, thank you for allowing me to participate in the care of your patient.        Sincerely,        Crystal Matt MD

## 2020-11-27 ENCOUNTER — TELEPHONE (OUTPATIENT)
Dept: FAMILY MEDICINE | Facility: CLINIC | Age: 55
End: 2020-11-27

## 2020-11-27 NOTE — TELEPHONE ENCOUNTER
Good morning team    It is just that time for further home care orders.  Yunior remains at home with SN visiting weekly for medication management issues and general.  Seems to have tolerated the med changes that were made a few months ago at his visit.  Requesting ongoing order  SN 1 x w x 9 w starting next week and 2 prn for med management  And general assess.  Thank you for your consideration any questions please do not hesitate to call    Leona Cadena RN  [phone].

## 2020-12-07 DIAGNOSIS — M80.00XD AGE-RELATED OSTEOPOROSIS WITH CURRENT PATHOLOGICAL FRACTURE WITH ROUTINE HEALING, SUBSEQUENT ENCOUNTER: ICD-10-CM

## 2020-12-08 RX ORDER — ALENDRONATE SODIUM 70 MG/1
TABLET ORAL
Qty: 12 TABLET | Refills: 3 | OUTPATIENT
Start: 2020-12-08

## 2020-12-08 NOTE — TELEPHONE ENCOUNTER
Medication refused, refill requested too soon. Prescription ordered on 10/15/20 for 90 day supply with 3 additional refills.

## 2020-12-23 DIAGNOSIS — Z53.9 DIAGNOSIS NOT YET DEFINED: Primary | ICD-10-CM

## 2020-12-23 PROCEDURE — G0179 MD RECERTIFICATION HHA PT: HCPCS | Performed by: FAMILY MEDICINE

## 2020-12-24 DIAGNOSIS — M80.00XD AGE-RELATED OSTEOPOROSIS WITH CURRENT PATHOLOGICAL FRACTURE WITH ROUTINE HEALING, SUBSEQUENT ENCOUNTER: ICD-10-CM

## 2020-12-24 RX ORDER — ALENDRONATE SODIUM 70 MG/1
TABLET ORAL
Qty: 12 TABLET | Refills: 3 | OUTPATIENT
Start: 2020-12-24

## 2020-12-29 ENCOUNTER — TELEPHONE (OUTPATIENT)
Dept: FAMILY MEDICINE | Facility: CLINIC | Age: 55
End: 2020-12-29

## 2020-12-29 DIAGNOSIS — M62.838 MUSCLE SPASTICITY: Primary | ICD-10-CM

## 2020-12-29 RX ORDER — ACETAMINOPHEN 325 MG/1
650 TABLET ORAL EVERY 6 HOURS PRN
Status: CANCELLED
Start: 2020-12-29

## 2020-12-29 NOTE — TELEPHONE ENCOUNTER
Reason for Call:  Form, our goal is to have forms completed with 72 hours, however, some forms may require a visit or additional information.    Type of letter, form or note:  Home Health Certification    Who is the form from?: Home care    Where did the form come from: form was faxed in    What clinic location was the form placed at?: [place]    Where the form was placed: Given to MA/STELLA--Steffen BORJA RN    What number is listed as a contact on the form?:        Additional comments:     Call taken on 12/29/2020 at 10:37 AM by Ping Garay

## 2020-12-29 NOTE — TELEPHONE ENCOUNTER
MED RECONCILIATION DONE    Discrepancies:      none    Meds on Epic but NOT  on Form:      dicyclomine (BENTYL) 10 MG capsule   10/7/2020  --   Sig - Route: Take 1 capsule (10 mg) by mouth 2 times daily - Oral           Meds on Form but NOT on Epic :    -Tylenol 325 mg; take 2 tablets (650 mg) PO Q6h PRN          Form currently on [facility] Desk in [place] RN Folder  Routing to PCP for further review/recommendations/orders.        Jacob Nelson RN  [facility]

## 2021-01-06 RX ORDER — ACETAMINOPHEN 325 MG/1
650 TABLET ORAL EVERY 6 HOURS PRN
COMMUNITY
Start: 2021-01-06 | End: 2021-07-02

## 2021-01-06 NOTE — TELEPHONE ENCOUNTER
Home health certification signed.  Medication reconciliation complete.  Form signed and placed in TC basket.    Alberto Carlos MD

## 2021-03-05 DIAGNOSIS — M62.838 MUSCLE SPASTICITY: ICD-10-CM

## 2021-03-08 NOTE — TELEPHONE ENCOUNTER
Routing refill request to provider for review/approval because:  Drug not on the [facility] refill protocol         Petra Aggarwal RN  [facility]

## 2021-03-10 RX ORDER — BACLOFEN 10 MG/1
TABLET ORAL
Qty: 180 TABLET | Refills: 3 | Status: SHIPPED | OUTPATIENT
Start: 2021-03-10 | End: 2021-07-02

## 2021-03-10 NOTE — TELEPHONE ENCOUNTER
Chart reviewed.  Rx sent to pt's preferred pharmacy.       [id number] - [place], [place] - [address]  [id number] - [place], [place] - [address]    Alberto Carlos MD

## 2021-03-12 ENCOUNTER — TELEPHONE (OUTPATIENT)
Dept: FAMILY MEDICINE | Facility: CLINIC | Age: 56
End: 2021-03-12

## 2021-03-12 NOTE — TELEPHONE ENCOUNTER
MED RECONCILIATION DONE  No discrepancies    [facility]      Routing to PCP for further review/recommendations/orders.        Petra Aggarwal RN  [facility]

## 2021-03-12 NOTE — TELEPHONE ENCOUNTER
Reason for Call:  Form, our goal is to have forms completed with 72 hours, however, some forms may require a visit or additional information.    Type of letter, form or note:  medical    Who is the form from?: Home care    Where did the form come from: form was faxed in    What clinic location was the form placed at?: [place]    Where the form was placed: Alberto Carlos MD Box/Folder    What number is listed as a contact on the form?: [phone]       Additional comments: Home health certification and plan of care    Call taken on 3/12/2021 at 7:26 AM by Dianna Greer

## 2021-03-22 ENCOUNTER — IMMUNIZATION (OUTPATIENT)
Dept: NURSING | Facility: CLINIC | Age: 56
End: 2021-03-22
Payer: COMMERCIAL

## 2021-03-22 PROCEDURE — 91300 PR COVID VAC PFIZER DIL RECON 30 MCG/0.3 ML IM: CPT

## 2021-03-22 PROCEDURE — 0001A PR COVID VAC PFIZER DIL RECON 30 MCG/0.3 ML IM: CPT

## 2021-03-24 DIAGNOSIS — S06.9XAS NEUROBEHAVIORAL SEQUELAE OF TRAUMATIC BRAIN INJURY (H): ICD-10-CM

## 2021-03-24 DIAGNOSIS — R46.89 NEUROBEHAVIORAL SEQUELAE OF TRAUMATIC BRAIN INJURY (H): ICD-10-CM

## 2021-03-26 RX ORDER — PAROXETINE 40 MG/1
TABLET, FILM COATED ORAL
Qty: 135 TABLET | Refills: 1 | Status: ON HOLD | OUTPATIENT
Start: 2021-03-26 | End: 2022-05-17

## 2021-03-30 ENCOUNTER — TELEPHONE (OUTPATIENT)
Dept: FAMILY MEDICINE | Facility: CLINIC | Age: 56
End: 2021-03-30

## 2021-03-30 NOTE — LETTER
[facility]  [address] [place] [id number]  [phone]       March 30, 2021    Yunior Angel  [address] [address]    To Whom it May Concern:    The above patient has multiple medical issues and would benefit significantly from a group home setting.  Please allow him to break his lease so that he can safely transition into this living situation as soon as possible.      Please contact our office with questions or concerns.      Sincerely,    Imelda Olguin PA-C for Dr. Star Vega

## 2021-03-30 NOTE — TELEPHONE ENCOUNTER
Reason for Call:  Other Letter    Detailed comments: Mother, Patsy, calling as they have found a group home for Yunior so he will not be living alone anymore, but they need a letter from us allowing him to break his lease.  Patsy states we did this about a year or more ago, but I cannot find the letter.  She states they have a home lined up for Yunior, but they need this letter to allow him to break his lease.  Patsy wants to  in clinic.  Please call her at [phone] when ready at .    Phone Number Patient can be reached at: Other phone number:  [phone]    Best Time:     Can we leave a detailed message on this number? YES    Call taken on 3/30/2021 at 3:48 PM by Ping Garay

## 2021-04-02 ENCOUNTER — TELEPHONE (OUTPATIENT)
Dept: FAMILY MEDICINE | Facility: CLINIC | Age: 56
End: 2021-04-02

## 2021-04-02 NOTE — TELEPHONE ENCOUNTER
Reason for Call:  Form, our goal is to have forms completed with 72 hours, however, some forms may require a visit or additional information.    Type of letter, form or note:  Home Health Certification    Who is the form from?: Home care    Where did the form come from: form was faxed in    What clinic location was the form placed at?: [place]    Where the form was placed: Dr. Carlos Box/Folder    What number is listed as a contact on the form?: [phone]       Additional comments: HHC and Plan of Care    Call taken on 4/2/2021 at 11:22 AM by Ronak MESSINA

## 2021-04-02 NOTE — TELEPHONE ENCOUNTER
MED RECONCILIATION DONE  No discrepancies    Form currently on [facility] Desk in [place] RN Folder      Routing to PCP for further review/recommendations/orders.        Petra Aggarwal RN  [facility]

## 2021-04-07 ENCOUNTER — MEDICAL CORRESPONDENCE (OUTPATIENT)
Dept: HEALTH INFORMATION MANAGEMENT | Facility: CLINIC | Age: 56
End: 2021-04-07

## 2021-04-07 ENCOUNTER — TELEPHONE (OUTPATIENT)
Dept: FAMILY MEDICINE | Facility: CLINIC | Age: 56
End: 2021-04-07

## 2021-04-07 NOTE — TELEPHONE ENCOUNTER
Completed forms faxed back to [facility]  at [phone].   Originals sent to be scanned.       Lucia Pablo

## 2021-04-07 NOTE — TELEPHONE ENCOUNTER
Completed forms faxed back to [facility]  at [phone].   Originals sent to be scanned.       Lucia Pablo

## 2021-04-07 NOTE — TELEPHONE ENCOUNTER
Reason for Call:  Form, our goal is to have forms completed with 72 hours, however, some forms may require a visit or additional information.    Type of letter, form or note:  medical    Who is the form from?: [employer] (if other please explain)    Where did the form come from: form was faxed in    What clinic location was the form placed at?: [place]    Where the form was placed: Alberto Carlos MD Box/Folder    What number is listed as a contact on the form?: [phone]    Call taken on 4/7/2021 at 12:06 PM by Dianna Greer

## 2021-04-12 ENCOUNTER — IMMUNIZATION (OUTPATIENT)
Dept: NURSING | Facility: CLINIC | Age: 56
End: 2021-04-12
Attending: INTERNAL MEDICINE
Payer: COMMERCIAL

## 2021-04-12 ENCOUNTER — TELEPHONE (OUTPATIENT)
Dept: FAMILY MEDICINE | Facility: CLINIC | Age: 56
End: 2021-04-12

## 2021-04-12 PROCEDURE — 91300 PR COVID VAC PFIZER DIL RECON 30 MCG/0.3 ML IM: CPT

## 2021-04-12 PROCEDURE — 0002A PR COVID VAC PFIZER DIL RECON 30 MCG/0.3 ML IM: CPT

## 2021-04-12 NOTE — TELEPHONE ENCOUNTER
Reason for Call:  Form, our goal is to have forms completed with 72 hours, however, some forms may require a visit or additional information.    Type of letter, form or note:  medical    Who is the form from?: Home care- [facility]    Where did the form come from: form was faxed in    What clinic location was the form placed at?: [place]    Where the form was placed: Dr Vega Box/Folder    What number is listed as a contact on the form?: [phone]           Call taken on 4/12/2021 at 5:46 PM by Skye Carpenter

## 2021-04-13 NOTE — TELEPHONE ENCOUNTER
Admission orders for [facility]    9/29/20 office notes (most recent) printed and attached    Med list printed - need MD signature on med page    Placed on Dr. Vega's bin

## 2021-04-13 NOTE — TELEPHONE ENCOUNTER
Sapna from [facility] is just following up the the forms.  The fax number for the facility is   [phone].

## 2021-04-14 ENCOUNTER — TELEPHONE (OUTPATIENT)
Dept: FAMILY MEDICINE | Facility: CLINIC | Age: 56
End: 2021-04-14

## 2021-04-14 NOTE — TELEPHONE ENCOUNTER
Reason for Call:  Form, our goal is to have forms completed with 72 hours, however, some forms may require a visit or additional information.    Type of letter, form or note:  Living Accommodation Request    Who is the form from?: [employer] (if other please explain)    Where did the form come from: form was faxed in    What clinic location was the form placed at?: [place]    Where the form was placed: Dr Carlos Box/Folder    What number is listed as a contact on the form?: [phone]        Call taken on 4/14/2021 at 2:59 PM by Skye Carpenter

## 2021-04-14 NOTE — TELEPHONE ENCOUNTER
This is a duplicate form from the one that was faxed to Luis [facility]cl[facility] on 4/7/21.  Steph Tolbert will fax again this evening.    Alberto Carlos MD

## 2021-04-15 NOTE — TELEPHONE ENCOUNTER
[facility] calling -     Requesting admission orders and last H&P -   BESSIE Myers faxed to # below.   Original sent to scanning      Petra Aggarwal RN  [facility]

## 2021-04-19 ENCOUNTER — TELEPHONE (OUTPATIENT)
Dept: FAMILY MEDICINE | Facility: CLINIC | Age: 56
End: 2021-04-19

## 2021-04-20 ENCOUNTER — MEDICAL CORRESPONDENCE (OUTPATIENT)
Dept: HEALTH INFORMATION MANAGEMENT | Facility: CLINIC | Age: 56
End: 2021-04-20

## 2021-04-20 NOTE — TELEPHONE ENCOUNTER
Form reviewed and signed by Dr. Vega    Form faxed and sent to scan  
Reason for Call:  Form, our goal is to have forms completed with 72 hours, however, some forms may require a visit or additional information.    Type of letter, form or note:  medical - physician orders    Who is the form from?: [facility] (if other please explain)    Where did the form come from: form was faxed in    What clinic location was the form placed at?: [place]    Where the form was placed: Dr. Carlos Box/Folder    What number is listed as a contact on the form?: [phone]       Call taken on 4/19/2021 at 10:41 AM by Ronak MESSINA        
Family

## 2021-05-18 ENCOUNTER — TELEPHONE (OUTPATIENT)
Dept: FAMILY MEDICINE | Facility: CLINIC | Age: 56
End: 2021-05-18

## 2021-05-18 NOTE — TELEPHONE ENCOUNTER
Reason for Call:  Form, our goal is to have forms completed with 72 hours, however, some forms may require a visit or additional information.    Type of letter, form or note:  medical    Who is the form from?: Behzad Strauss (if other please explain)    Where did the form come from: form was mailed in    What clinic location was the form placed at?: [facility]    Where the form was placed: Dr Carlos Box/Folder    What number is listed as a contact on the form?: [phone]       Additional comments:     Call taken on 5/18/2021 at 12:31 PM by Nikia Ervin

## 2021-05-20 NOTE — TELEPHONE ENCOUNTER
Completed forms faxed back to [facility] at [phone].   Originals sent to be scanned.       Lucia Pablo

## 2021-07-02 ENCOUNTER — OFFICE VISIT (OUTPATIENT)
Dept: INTERNAL MEDICINE | Facility: CLINIC | Age: 56
End: 2021-07-02
Payer: COMMERCIAL

## 2021-07-02 VITALS
RESPIRATION RATE: 16 BRPM | SYSTOLIC BLOOD PRESSURE: 122 MMHG | TEMPERATURE: 97.6 F | HEART RATE: 95 BPM | WEIGHT: 188 LBS | HEIGHT: 71 IN | OXYGEN SATURATION: 97 % | DIASTOLIC BLOOD PRESSURE: 72 MMHG | BODY MASS INDEX: 26.32 KG/M2

## 2021-07-02 DIAGNOSIS — M89.8X9 BONY PROMINENCE: ICD-10-CM

## 2021-07-02 DIAGNOSIS — M62.838 MUSCLE SPASTICITY: ICD-10-CM

## 2021-07-02 DIAGNOSIS — S91.209A AVULSION OF TOENAIL, INITIAL ENCOUNTER: Primary | ICD-10-CM

## 2021-07-02 PROCEDURE — 99213 OFFICE O/P EST LOW 20 MIN: CPT | Performed by: INTERNAL MEDICINE

## 2021-07-02 RX ORDER — BACLOFEN 10 MG/1
10 TABLET ORAL 2 TIMES DAILY
Qty: 180 TABLET | Refills: 0
Start: 2021-07-02

## 2021-07-02 ASSESSMENT — MIFFLIN-ST. JEOR: SCORE: 1704.89

## 2021-07-02 NOTE — LETTER
7/2/2021    Regarding:  Yunior Angel  [address]  [facility] [address]           To whom it may concern:     Yunior Angel may reduce his use of baclofen 10 mg tablet, from one tablet three times per day to one tablet twice per day, eliminating the mid-day dose.       If you have any further questions or problems, please contact our office.    Sincerely,        Efraín Ravi MD

## 2021-07-02 NOTE — PATIENT INSTRUCTIONS
Someone should be contacting you to try to get you in soon to see a foot specialist (podiatrist).   It looks like you have an injury to the base of the big toenail, and a prominent bony deformity on the outer side of the right foot.     See your regular doctor for your annual physical exam in October 2021.

## 2021-09-03 ENCOUNTER — OFFICE VISIT (OUTPATIENT)
Dept: PODIATRY | Facility: CLINIC | Age: 56
End: 2021-09-03
Payer: COMMERCIAL

## 2021-09-03 VITALS — BODY MASS INDEX: 26.47 KG/M2 | WEIGHT: 189.8 LBS | SYSTOLIC BLOOD PRESSURE: 114 MMHG | DIASTOLIC BLOOD PRESSURE: 62 MMHG

## 2021-09-03 DIAGNOSIS — L60.3 DYSTROPHIC NAIL: Primary | ICD-10-CM

## 2021-09-03 DIAGNOSIS — B35.1 ONYCHOMYCOSIS OF RIGHT GREAT TOE: ICD-10-CM

## 2021-09-03 DIAGNOSIS — Q66.70 PES CAVUS: ICD-10-CM

## 2021-09-03 PROCEDURE — 99213 OFFICE O/P EST LOW 20 MIN: CPT | Performed by: PODIATRIST

## 2021-09-03 NOTE — PATIENT INSTRUCTIONS
Thank you for choosing [facility] Podiatry / Foot & Ankle Surgery!    DR. LEÓN'S CLINIC:  [facility]   [phone]   [place] [address]   [phone]  [phone]      SCHEDULE SURGERY: [phone]   BILLING QUESTIONS: [phone]   APPOINTMENTS: [phone]   CONSUMER PRICE LINE: [phone]      NAIL FUNGUS / ONYCHOMYCOSIS   Nail fungus is not a hygiene problem and will likely not lead to significant medical problems. The nails may get thick causing pain and possibly local skin infection. Treatments include debridement (trimming), oral antifungals, topical antifungals and complete removal of the nail. Most fungal nails are not treated.     Topicals such as tea tree oil can be helpful for surface fungus and may, at best, limit progression. Over the counter creams (such as Lamisil) can also be used however, their effectiveness is also quite low.  Topical treatment with Pen lac is expensive and often not covered by insurance. Pen lac has an approximate 8% success rate. Topical therapy recommendations is to apply twice a day for at least 3-4 months as it takes 9 months for new nail to grow out.     Experts suggest soaking your feet for 15 to 20 minutes in a mixture of 1 cup vinegar to 4 cups warm water. Be sure to rinse well and pat your feet dry when you're done. You can soak your feet like this daily. But if your skin becomes irritated, try soaking only two to three times a week. Vicks VapoRub, as with vinegar, there have been no controlled clinical trials to assess the effectiveness of Vicks VapoRub on nail fungus, but there have been numerous anecdotal reports that it works. There's no consensus on how often to apply this product, so check with your doctor before using it on your nails.      Oral therapies include Sporanox and Lamisil. Oral therapies are also expensive and not very effective. Side effects such as liver disease are the main concern. Return of fungus is  common even if the treatment worked.      Other Tips:  - Penlac nail medication apply daily x 4 months; remove old polish first day of each week  - Antifungal cream/powder (Zeasorb) - apply daily to feet and shoes x 2 months  - Clean shoes with Lysol or in washing machine every few weeks  - Rotate shoe gear; give them 24 hours to dry out between days wearing them  - Clean pair of socks in morning, clean pair in afternoon if your feet sweat  - Shower shoes used in public showers/pools

## 2021-09-03 NOTE — PROGRESS NOTES
PATIENT HISTORY:  Dr. Ravi requested I see this patient for their foot issue.  Yunior Angel is a 55 year old male who presents to clinic for issues with his right great toenail.  Patient is here with his mom.  Notes that the nail is thick and loose.  They are concerned that it will catch and tear.  Patient denies specific injury but he does have some numbness to the feet.  Wondering what can be done for the nail.  Also is wondering what can be done for the bump on the side of his left foot.  Its not painful but they want to make sure it is not anything insidious.    Review of Systems:  Patient denies fever, chills, rash, wound, stiffness, limping, numbness,  heart burn, blood in stool, chest pain with activity, calf pain when walking, shortness of breath with activity, chronic cough, easy bleeding/bruising, swelling of ankles, excessive thirst, fatigue, depression, anxiety.  Patient admits to weakness.     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        PAST SURGICAL HISTORY:   Past Surgical History:   Procedure Laterality Date     ARTHROPLASTY HIP Right 3/14/2018    Procedure: ARTHROPLASTY HIP;  Right total hip arthroplasty;  Surgeon: Parmjit Zabala MD;  Location:  OR     COLONOSCOPY N/A 12/31/2014    Procedure: COLONOSCOPY;  Surgeon: Inna Gonzalez MD;  Location:  GI     ESOPHAGOSCOPY, GASTROSCOPY, DUODENOSCOPY (EGD), COMBINED N/A 12/31/2014    Procedure: COMBINED ESOPHAGOSCOPY, GASTROSCOPY, DUODENOSCOPY (EGD), BIOPSY SINGLE OR MULTIPLE;  Surgeon: Inna Gonzalez MD;  Location:  GI     SURGICAL HISTORY OF -       foot surgery     SURGICAL HISTORY OF -   2006    1.  Avulsion of the right great toenail.  2.  Excision of bone  cyst distal phalanx, right great toe.         MEDICATIONS:   Current Outpatient Medications:      alendronate (FOSAMAX) 70 MG tablet, TAKE 1 TABLET BY MOUTH ONCE WEEKLY. TAKE 30 MINUTES BEFORE FIRST MEAL OF THE DAY WITH WATER AND REMAIN UPRIGHT FOR 30 MINUTES, Disp: 12 tablet, Rfl: 3     aspirin (ASA) 325 MG EC tablet, Take 1 tablet (325 mg) by mouth daily, Disp: 70 tablet, Rfl: 0     baclofen (LIORESAL) 10 MG tablet, Take 1 tablet (10 mg) by mouth 2 times daily, Disp: 180 tablet, Rfl: 0     Calcium-Vitamin D 600-200 MG-UNIT TABS, Take 1 tablet by mouth 2 times daily, Disp: 90 tablet, Rfl: 0     multivitamin, therapeutic (THERA-VIT) TABS tablet, Take 1 tablet by mouth daily, Disp: , Rfl:      order for DME, Equipment being ordered: leg brace - Custom Hinged AFO with a lift, Disp: 1 Units, Rfl: 0     PARoxetine (PAXIL) 40 MG tablet, TAKE 1.5 TABLETS BY MOUTH EVERY MORNING, Disp: 135 tablet, Rfl: 1     senna-docusate (SENOKOT-S/PERICOLACE) 8.6-50 MG tablet, Take 1 tablet by mouth daily as needed for constipation, Disp:  , Rfl:      simvastatin (ZOCOR) 80 MG tablet, Take 1 tablet (80 mg) by mouth At Bedtime, Disp: 90 tablet, Rfl: 3     Vitamin D, Cholecalciferol, 25 MCG (1000 UT) TABS, Take 1 tablet (1,000 Units) by mouth daily, Disp: , Rfl:      ALLERGIES:    Allergies   Allergen Reactions     Insect Extract      red ants        SOCIAL HISTORY:   Social History     Socioeconomic History     Marital status: Single     Spouse name: Not on file     Number of children: Not on file     Years of education: Not on file     Highest education level: Not on file   Occupational History     Not on file   Tobacco Use     Smoking status: Never Smoker     Smokeless tobacco: Never Used   Substance and Sexual Activity     Alcohol use: No     Alcohol/week: 0.0 standard drinks     Drug use: No     Sexual activity: Never   Other Topics Concern     Parent/sibling w/ CABG, MI or angioplasty before 65F 55M? Not Asked   Social History  Narrative     Not on file     Social Determinants of Health     Financial Resource Strain:      Difficulty of Paying Living Expenses:    Food Insecurity:      Worried About Running Out of Food in the Last Year:      Ran Out of Food in the Last Year:    Transportation Needs:      Lack of Transportation (Medical):      Lack of Transportation (Non-Medical):    Physical Activity:      Days of Exercise per Week:      Minutes of Exercise per Session:    Stress:      Feeling of Stress :    Social Connections:      Frequency of Communication with Friends and Family:      Frequency of Social Gatherings with Friends and Family:      Attends Mandaeism Services:      Active Member of Clubs or Organizations:      Attends Club or Organization Meetings:      Marital Status:    Intimate Partner Violence:      Fear of Current or Ex-Partner:      Emotionally Abused:      Physically Abused:      Sexually Abused:         FAMILY HISTORY:   Family History   Problem Relation Age of Onset     Migraines Father      Family History Negative Mother      Cancer - colorectal No family hx of      Colon Cancer No family hx of         EXAM:Vitals: /62   Wt 86.1 kg (189 lb 12.8 oz)   BMI 26.47 kg/m    BMI= Body mass index is 26.47 kg/m .      General appearance: Patient is alert and fully cooperative with history & exam.  No sign of distress is noted during the visit.     Psychiatric: Affect is pleasant & appropriate.  Patient appears motivated to improve health.     Respiratory: Breathing is regular & unlabored while sitting.     HEENT: Hearing is intact to spoken word.  Speech is clear.  No gross evidence of visual impairment that would impact ambulation.     Dermatologic: Thickened, darkened, dystrophic right great toenail with subungual debris.  No open lesions or signs of infection noted.     Vascular: DP & PT pulses are intact & regular bilaterally.  No significant edema or varicosities noted.  CFT and skin temperature is normal to  both lower extremities.     Neurologic: Lower extremity sensation is intact to light touch.  No evidence of weakness or contracture in the lower extremities.  No evidence of neuropathy.     Musculoskeletal: Patient is ambulatory without assistive device or brace.  Patient has an AFO on his right foot.  Prominent fifth metatarsal base on the right foot.  Tonic spasm to the right foot noted.     ASSESSMENT:    Dystrophic nail  Onychomycosis of right great toe  Pes cavus       Medical Decision Making/Plan:  Reviewed patient's chart in epic. Discussed causes and treatments of nail fungus.  Explained that even if a culture comes back negative, a patient could still have nail fungus.  Discussed treatment options with patient and explained that there isn't one treatment that is 100% effective.  Discussed oral lamisil which is the most effective at about 70% but which can have liver effects.  Explained that if she wanted to try this that she would need serial blood draws to test her liver function.  Discussed over the counter antifungal creams.  Explained that these are about 50% effective and need to be applied once a day for about 6-8months.  Also talked about prescription penlac which is a nail laquer.  Again this is also only 50% effective.  Also discussed that if there was damage to the nail and the nail is now dystrophic that non of the above is going to change the nail.  If there was damage, there is note anything that can be done for the nail to correct it.  Discussed that if it becomes painful, we can remove the nail in clinic.        The nail the right great toenail was cut back today.  No charge.  Recommend using a nail file to keep the nail from getting large and thick.  We talked about removal but patient declined at this time.    Discussed the bump on the side of his foot is attached to a tendon and to decrease the bump surgery would be needed to detach the tendon shave the bone down and reattach the tendon.   They are not interested in surgery and will monitor for now.  Talked about padding with a 2 x 2 gauze and a large Band-Aid over the area.  All questions were answered to patient and patient's mom satisfaction and they will call further questions or concerns.    Patient risk factor: Patient is at low risk for infection.        Ema Yousif DPM, Podiatry/Foot and Ankle Surgery

## 2021-09-03 NOTE — LETTER
9/3/2021         RE: Yunior Angel  [address]  [facility] [address]        Dear Colleague,    Thank you for referring your patient, Yunior Angel, to the [facility]. Please see a copy of my visit note below.    PATIENT HISTORY:  Dr. Ravi requested I see this patient for their foot issue.  Yunior Angel is a 55 year old male who presents to clinic for issues with his right great toenail.  Patient is here with his mom.  Notes that the nail is thick and loose.  They are concerned that it will catch and tear.  Patient denies specific injury but he does have some numbness to the feet.  Wondering what can be done for the nail.  Also is wondering what can be done for the bump on the side of his left foot.  Its not painful but they want to make sure it is not anything insidious.    Review of Systems:  Patient denies fever, chills, rash, wound, stiffness, limping, numbness,  heart burn, blood in stool, chest pain with activity, calf pain when walking, shortness of breath with activity, chronic cough, easy bleeding/bruising, swelling of ankles, excessive thirst, fatigue, depression, anxiety.  Patient admits to weakness.     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        PAST SURGICAL HISTORY:   Past Surgical History:   Procedure Laterality Date     ARTHROPLASTY HIP Right 3/14/2018    Procedure: ARTHROPLASTY HIP;  Right total hip arthroplasty;  Surgeon: Parmjit Zabala MD;  Location:  OR     COLONOSCOPY N/A 12/31/2014    Procedure: COLONOSCOPY;  Surgeon: Inna Gonzalez MD;  Location:  GI     ESOPHAGOSCOPY, GASTROSCOPY, DUODENOSCOPY (EGD), COMBINED N/A 12/31/2014    Procedure: COMBINED  ESOPHAGOSCOPY, GASTROSCOPY, DUODENOSCOPY (EGD), BIOPSY SINGLE OR MULTIPLE;  Surgeon: Inna Gonzalez MD;  Location:  GI     SURGICAL HISTORY OF -       foot surgery     SURGICAL HISTORY OF -   2006    1.  Avulsion of the right great toenail.  2.  Excision of bone cyst distal phalanx, right great toe.         MEDICATIONS:   Current Outpatient Medications:      alendronate (FOSAMAX) 70 MG tablet, TAKE 1 TABLET BY MOUTH ONCE WEEKLY. TAKE 30 MINUTES BEFORE FIRST MEAL OF THE DAY WITH WATER AND REMAIN UPRIGHT FOR 30 MINUTES, Disp: 12 tablet, Rfl: 3     aspirin (ASA) 325 MG EC tablet, Take 1 tablet (325 mg) by mouth daily, Disp: 70 tablet, Rfl: 0     baclofen (LIORESAL) 10 MG tablet, Take 1 tablet (10 mg) by mouth 2 times daily, Disp: 180 tablet, Rfl: 0     Calcium-Vitamin D 600-200 MG-UNIT TABS, Take 1 tablet by mouth 2 times daily, Disp: 90 tablet, Rfl: 0     multivitamin, therapeutic (THERA-VIT) TABS tablet, Take 1 tablet by mouth daily, Disp: , Rfl:      order for DME, Equipment being ordered: leg brace - Custom Hinged AFO with a lift, Disp: 1 Units, Rfl: 0     PARoxetine (PAXIL) 40 MG tablet, TAKE 1.5 TABLETS BY MOUTH EVERY MORNING, Disp: 135 tablet, Rfl: 1     senna-docusate (SENOKOT-S/PERICOLACE) 8.6-50 MG tablet, Take 1 tablet by mouth daily as needed for constipation, Disp:  , Rfl:      simvastatin (ZOCOR) 80 MG tablet, Take 1 tablet (80 mg) by mouth At Bedtime, Disp: 90 tablet, Rfl: 3     Vitamin D, Cholecalciferol, 25 MCG (1000 UT) TABS, Take 1 tablet (1,000 Units) by mouth daily, Disp: , Rfl:      ALLERGIES:    Allergies   Allergen Reactions     Insect Extract      red ants        SOCIAL HISTORY:   Social History     Socioeconomic History     Marital status: Single     Spouse name: Not on file     Number of children: Not on file     Years of education: Not on file     Highest education level: Not on file   Occupational History     Not on file   Tobacco Use     Smoking status: Never Smoker      Smokeless tobacco: Never Used   Substance and Sexual Activity     Alcohol use: No     Alcohol/week: 0.0 standard drinks     Drug use: No     Sexual activity: Never   Other Topics Concern     Parent/sibling w/ CABG, MI or angioplasty before 65F 55M? Not Asked   Social History Narrative     Not on file     Social Determinants of Health     Financial Resource Strain:      Difficulty of Paying Living Expenses:    Food Insecurity:      Worried About Running Out of Food in the Last Year:      Ran Out of Food in the Last Year:    Transportation Needs:      Lack of Transportation (Medical):      Lack of Transportation (Non-Medical):    Physical Activity:      Days of Exercise per Week:      Minutes of Exercise per Session:    Stress:      Feeling of Stress :    Social Connections:      Frequency of Communication with Friends and Family:      Frequency of Social Gatherings with Friends and Family:      Attends Oriental orthodox Services:      Active Member of Clubs or Organizations:      Attends Club or Organization Meetings:      Marital Status:    Intimate Partner Violence:      Fear of Current or Ex-Partner:      Emotionally Abused:      Physically Abused:      Sexually Abused:         FAMILY HISTORY:   Family History   Problem Relation Age of Onset     Migraines Father      Family History Negative Mother      Cancer - colorectal No family hx of      Colon Cancer No family hx of         EXAM:Vitals: /62   Wt 86.1 kg (189 lb 12.8 oz)   BMI 26.47 kg/m    BMI= Body mass index is 26.47 kg/m .      General appearance: Patient is alert and fully cooperative with history & exam.  No sign of distress is noted during the visit.     Psychiatric: Affect is pleasant & appropriate.  Patient appears motivated to improve health.     Respiratory: Breathing is regular & unlabored while sitting.     HEENT: Hearing is intact to spoken word.  Speech is clear.  No gross evidence of visual impairment that would impact ambulation.      Dermatologic: Thickened, darkened, dystrophic right great toenail with subungual debris.  No open lesions or signs of infection noted.     Vascular: DP & PT pulses are intact & regular bilaterally.  No significant edema or varicosities noted.  CFT and skin temperature is normal to both lower extremities.     Neurologic: Lower extremity sensation is intact to light touch.  No evidence of weakness or contracture in the lower extremities.  No evidence of neuropathy.     Musculoskeletal: Patient is ambulatory without assistive device or brace.  Patient has an AFO on his right foot.  Prominent fifth metatarsal base on the right foot.  Tonic spasm to the right foot noted.     ASSESSMENT:    Dystrophic nail  Onychomycosis of right great toe  Pes cavus       Medical Decision Making/Plan:  Reviewed patient's chart in epic. Discussed causes and treatments of nail fungus.  Explained that even if a culture comes back negative, a patient could still have nail fungus.  Discussed treatment options with patient and explained that there isn't one treatment that is 100% effective.  Discussed oral lamisil which is the most effective at about 70% but which can have liver effects.  Explained that if she wanted to try this that she would need serial blood draws to test her liver function.  Discussed over the counter antifungal creams.  Explained that these are about 50% effective and need to be applied once a day for about 6-8months.  Also talked about prescription penlac which is a nail laquer.  Again this is also only 50% effective.  Also discussed that if there was damage to the nail and the nail is now dystrophic that non of the above is going to change the nail.  If there was damage, there is note anything that can be done for the nail to correct it.  Discussed that if it becomes painful, we can remove the nail in clinic.        The nail the right great toenail was cut back today.  No charge.  Recommend using a nail file to keep  the nail from getting large and thick.  We talked about removal but patient declined at this time.    Discussed the bump on the side of his foot is attached to a tendon and to decrease the bump surgery would be needed to detach the tendon shave the bone down and reattach the tendon.  They are not interested in surgery and will monitor for now.  Talked about padding with a 2 x 2 gauze and a large Band-Aid over the area.  All questions were answered to patient and patient's mom satisfaction and they will call further questions or concerns.    Patient risk factor: Patient is at low risk for infection.        Ema Yousif DPM, Podiatry/Foot and Ankle Surgery        Again, thank you for allowing me to participate in the care of your patient.        Sincerely,        Ema Yousif DPM, Podiatry/Foot and Ankle Surgery

## 2021-10-24 ENCOUNTER — HEALTH MAINTENANCE LETTER (OUTPATIENT)
Age: 56
End: 2021-10-24

## 2021-11-16 ENCOUNTER — LAB REQUISITION (OUTPATIENT)
Dept: LAB | Facility: CLINIC | Age: 56
End: 2021-11-16
Payer: COMMERCIAL

## 2021-11-16 DIAGNOSIS — R53.1 WEAKNESS: ICD-10-CM

## 2021-11-23 ENCOUNTER — LAB REQUISITION (OUTPATIENT)
Dept: LAB | Facility: CLINIC | Age: 56
End: 2021-11-23
Payer: COMMERCIAL

## 2021-11-23 DIAGNOSIS — R53.1 WEAKNESS: ICD-10-CM

## 2021-11-23 LAB
ANION GAP SERPL CALCULATED.3IONS-SCNC: 8 MMOL/L (ref 5–18)
BASOPHILS # BLD AUTO: 0.2 10E3/UL (ref 0–0.2)
BASOPHILS NFR BLD AUTO: 3 %
BUN SERPL-MCNC: 13 MG/DL (ref 8–22)
CALCIUM SERPL-MCNC: 10 MG/DL (ref 8.5–10.5)
CHLORIDE BLD-SCNC: 106 MMOL/L (ref 98–107)
CHOLEST SERPL-MCNC: 154 MG/DL
CO2 SERPL-SCNC: 28 MMOL/L (ref 22–31)
CREAT SERPL-MCNC: 0.89 MG/DL (ref 0.7–1.3)
EOSINOPHIL # BLD AUTO: 1 10E3/UL (ref 0–0.7)
EOSINOPHIL NFR BLD AUTO: 15 %
ERYTHROCYTE [DISTWIDTH] IN BLOOD BY AUTOMATED COUNT: 16.1 % (ref 10–15)
FASTING STATUS PATIENT QL REPORTED: NORMAL
GFR SERPL CREATININE-BSD FRML MDRD: >90 ML/MIN/1.73M2
GLUCOSE BLD-MCNC: 94 MG/DL (ref 70–125)
HBA1C MFR BLD: 5.6 %
HCT VFR BLD AUTO: 43.6 % (ref 40–53)
HDLC SERPL-MCNC: 40 MG/DL
HGB BLD-MCNC: 13.8 G/DL (ref 13.3–17.7)
IMM GRANULOCYTES # BLD: 0 10E3/UL
IMM GRANULOCYTES NFR BLD: 0 %
LDLC SERPL CALC-MCNC: 96 MG/DL
LYMPHOCYTES # BLD AUTO: 2.7 10E3/UL (ref 0.8–5.3)
LYMPHOCYTES NFR BLD AUTO: 38 %
MCH RBC QN AUTO: 23.4 PG (ref 26.5–33)
MCHC RBC AUTO-ENTMCNC: 31.7 G/DL (ref 31.5–36.5)
MCV RBC AUTO: 74 FL (ref 78–100)
MONOCYTES # BLD AUTO: 0.5 10E3/UL (ref 0–1.3)
MONOCYTES NFR BLD AUTO: 7 %
NEUTROPHILS # BLD AUTO: 2.6 10E3/UL (ref 1.6–8.3)
NEUTROPHILS NFR BLD AUTO: 37 %
NRBC # BLD AUTO: 0 10E3/UL
NRBC BLD AUTO-RTO: 0 /100
PLATELET # BLD AUTO: 388 10E3/UL (ref 150–450)
POTASSIUM BLD-SCNC: 4.5 MMOL/L (ref 3.5–5)
RBC # BLD AUTO: 5.89 10E6/UL (ref 4.4–5.9)
SODIUM SERPL-SCNC: 142 MMOL/L (ref 136–145)
TRIGL SERPL-MCNC: 92 MG/DL
TSH SERPL DL<=0.005 MIU/L-ACNC: 0.52 UIU/ML (ref 0.3–5)
WBC # BLD AUTO: 6.9 10E3/UL (ref 4–11)

## 2021-11-23 PROCEDURE — 80061 LIPID PANEL: CPT | Mod: ORL | Performed by: REGISTERED NURSE

## 2021-11-23 PROCEDURE — 84443 ASSAY THYROID STIM HORMONE: CPT | Mod: ORL | Performed by: REGISTERED NURSE

## 2021-11-23 PROCEDURE — 85025 COMPLETE CBC W/AUTO DIFF WBC: CPT | Mod: ORL

## 2021-11-23 PROCEDURE — 36415 COLL VENOUS BLD VENIPUNCTURE: CPT | Mod: ORL

## 2021-11-23 PROCEDURE — 82306 VITAMIN D 25 HYDROXY: CPT | Mod: ORL

## 2021-11-23 PROCEDURE — 83036 HEMOGLOBIN GLYCOSYLATED A1C: CPT | Mod: ORL | Performed by: REGISTERED NURSE

## 2021-11-23 PROCEDURE — 83036 HEMOGLOBIN GLYCOSYLATED A1C: CPT | Mod: ORL

## 2021-11-23 PROCEDURE — 84443 ASSAY THYROID STIM HORMONE: CPT | Mod: ORL

## 2021-11-23 PROCEDURE — 80048 BASIC METABOLIC PNL TOTAL CA: CPT | Mod: ORL

## 2021-11-23 PROCEDURE — P9604 ONE-WAY ALLOW PRORATED TRIP: HCPCS | Mod: ORL | Performed by: REGISTERED NURSE

## 2021-11-23 PROCEDURE — 82306 VITAMIN D 25 HYDROXY: CPT | Mod: ORL | Performed by: REGISTERED NURSE

## 2021-11-23 PROCEDURE — 80048 BASIC METABOLIC PNL TOTAL CA: CPT | Mod: ORL | Performed by: REGISTERED NURSE

## 2021-11-23 PROCEDURE — 80061 LIPID PANEL: CPT | Mod: ORL

## 2021-11-23 PROCEDURE — 36415 COLL VENOUS BLD VENIPUNCTURE: CPT | Mod: ORL | Performed by: REGISTERED NURSE

## 2021-11-23 PROCEDURE — 85025 COMPLETE CBC W/AUTO DIFF WBC: CPT | Mod: ORL | Performed by: REGISTERED NURSE

## 2021-11-24 LAB — DEPRECATED CALCIDIOL+CALCIFEROL SERPL-MC: 44 UG/L (ref 30–80)

## 2021-12-19 ENCOUNTER — HEALTH MAINTENANCE LETTER (OUTPATIENT)
Age: 56
End: 2021-12-19

## 2022-02-13 ENCOUNTER — HEALTH MAINTENANCE LETTER (OUTPATIENT)
Age: 57
End: 2022-02-13

## 2022-02-17 PROBLEM — M80.00XD AGE-RELATED OSTEOPOROSIS WITH CURRENT PATHOLOGICAL FRACTURE WITH ROUTINE HEALING, SUBSEQUENT ENCOUNTER: Status: ACTIVE | Noted: 2018-06-25

## 2022-05-02 ENCOUNTER — APPOINTMENT (OUTPATIENT)
Dept: GENERAL RADIOLOGY | Facility: CLINIC | Age: 57
DRG: 543 | End: 2022-05-02
Attending: EMERGENCY MEDICINE
Payer: COMMERCIAL

## 2022-05-02 ENCOUNTER — HOSPITAL ENCOUNTER (INPATIENT)
Facility: CLINIC | Age: 57
LOS: 15 days | Discharge: NURSING, REHAB OR LONG-TERM CARE | DRG: 543 | End: 2022-05-17
Attending: EMERGENCY MEDICINE | Admitting: INTERNAL MEDICINE
Payer: COMMERCIAL

## 2022-05-02 ENCOUNTER — APPOINTMENT (OUTPATIENT)
Dept: GENERAL RADIOLOGY | Facility: CLINIC | Age: 57
DRG: 543 | End: 2022-05-02
Attending: INTERNAL MEDICINE
Payer: COMMERCIAL

## 2022-05-02 ENCOUNTER — APPOINTMENT (OUTPATIENT)
Dept: ULTRASOUND IMAGING | Facility: CLINIC | Age: 57
DRG: 543 | End: 2022-05-02
Attending: EMERGENCY MEDICINE
Payer: COMMERCIAL

## 2022-05-02 ENCOUNTER — APPOINTMENT (OUTPATIENT)
Dept: CT IMAGING | Facility: CLINIC | Age: 57
DRG: 543 | End: 2022-05-02
Attending: EMERGENCY MEDICINE
Payer: COMMERCIAL

## 2022-05-02 ENCOUNTER — MEDICAL CORRESPONDENCE (OUTPATIENT)
Dept: HEALTH INFORMATION MANAGEMENT | Facility: CLINIC | Age: 57
End: 2022-05-02
Payer: COMMERCIAL

## 2022-05-02 DIAGNOSIS — I10 HYPERTENSION, UNSPECIFIED TYPE: ICD-10-CM

## 2022-05-02 DIAGNOSIS — R46.89 NEUROBEHAVIORAL SEQUELAE OF TRAUMATIC BRAIN INJURY (H): ICD-10-CM

## 2022-05-02 DIAGNOSIS — S72.111A CLOSED DISPLACED FRACTURE OF GREATER TROCHANTER OF RIGHT FEMUR, INITIAL ENCOUNTER (H): ICD-10-CM

## 2022-05-02 DIAGNOSIS — S06.9XAS NEUROBEHAVIORAL SEQUELAE OF TRAUMATIC BRAIN INJURY (H): ICD-10-CM

## 2022-05-02 DIAGNOSIS — Z96.641 STATUS POST TOTAL REPLACEMENT OF RIGHT HIP: Primary | ICD-10-CM

## 2022-05-02 DIAGNOSIS — R33.9 URINARY RETENTION: ICD-10-CM

## 2022-05-02 LAB
ABO/RH(D): NORMAL
ALBUMIN SERPL-MCNC: 3.6 G/DL (ref 3.4–5)
ALP SERPL-CCNC: 49 U/L (ref 40–150)
ALT SERPL W P-5'-P-CCNC: 30 U/L (ref 0–70)
ANION GAP SERPL CALCULATED.3IONS-SCNC: 5 MMOL/L (ref 3–14)
ANTIBODY SCREEN: NEGATIVE
APTT PPP: 33 SECONDS (ref 22–38)
AST SERPL W P-5'-P-CCNC: 42 U/L (ref 0–45)
BASOPHILS # BLD AUTO: 0.2 10E3/UL (ref 0–0.2)
BASOPHILS NFR BLD AUTO: 1 %
BILIRUB DIRECT SERPL-MCNC: 0.3 MG/DL (ref 0–0.2)
BILIRUB SERPL-MCNC: 1.4 MG/DL (ref 0.2–1.3)
BUN SERPL-MCNC: 20 MG/DL (ref 7–30)
CALCIUM SERPL-MCNC: 9.3 MG/DL (ref 8.5–10.1)
CHLORIDE BLD-SCNC: 107 MMOL/L (ref 94–109)
CO2 SERPL-SCNC: 27 MMOL/L (ref 20–32)
CREAT SERPL-MCNC: 1.13 MG/DL (ref 0.66–1.25)
EOSINOPHIL # BLD AUTO: 0.3 10E3/UL (ref 0–0.7)
EOSINOPHIL NFR BLD AUTO: 3 %
ERYTHROCYTE [DISTWIDTH] IN BLOOD BY AUTOMATED COUNT: 14.8 % (ref 10–15)
ETHANOL SERPL-MCNC: <0.01 G/DL
GFR SERPL CREATININE-BSD FRML MDRD: 76 ML/MIN/1.73M2
GLUCOSE BLD-MCNC: 103 MG/DL (ref 70–99)
HCT VFR BLD AUTO: 40.3 % (ref 40–53)
HGB BLD-MCNC: 12.7 G/DL (ref 13.3–17.7)
IMM GRANULOCYTES # BLD: 0 10E3/UL
IMM GRANULOCYTES NFR BLD: 0 %
INR PPP: 1.08 (ref 0.85–1.15)
INR PPP: 1.11 (ref 0.85–1.15)
LYMPHOCYTES # BLD AUTO: 3.5 10E3/UL (ref 0.8–5.3)
LYMPHOCYTES NFR BLD AUTO: 32 %
MCH RBC QN AUTO: 23 PG (ref 26.5–33)
MCHC RBC AUTO-ENTMCNC: 31.5 G/DL (ref 31.5–36.5)
MCV RBC AUTO: 73 FL (ref 78–100)
MONOCYTES # BLD AUTO: 0.9 10E3/UL (ref 0–1.3)
MONOCYTES NFR BLD AUTO: 9 %
NEUTROPHILS # BLD AUTO: 6 10E3/UL (ref 1.6–8.3)
NEUTROPHILS NFR BLD AUTO: 55 %
NRBC # BLD AUTO: 0 10E3/UL
NRBC BLD AUTO-RTO: 0 /100
PLATELET # BLD AUTO: 367 10E3/UL (ref 150–450)
POTASSIUM BLD-SCNC: 3.5 MMOL/L (ref 3.4–5.3)
PROT SERPL-MCNC: 6.6 G/DL (ref 6.8–8.8)
RBC # BLD AUTO: 5.51 10E6/UL (ref 4.4–5.9)
SARS-COV-2 RNA RESP QL NAA+PROBE: NEGATIVE
SODIUM SERPL-SCNC: 139 MMOL/L (ref 133–144)
SPECIMEN EXPIRATION DATE: NORMAL
WBC # BLD AUTO: 11 10E3/UL (ref 4–11)

## 2022-05-02 PROCEDURE — 250N000011 HC RX IP 250 OP 636: Performed by: EMERGENCY MEDICINE

## 2022-05-02 PROCEDURE — 93971 EXTREMITY STUDY: CPT | Mod: RT

## 2022-05-02 PROCEDURE — 36415 COLL VENOUS BLD VENIPUNCTURE: CPT | Performed by: EMERGENCY MEDICINE

## 2022-05-02 PROCEDURE — C9803 HOPD COVID-19 SPEC COLLECT: HCPCS

## 2022-05-02 PROCEDURE — 99223 1ST HOSP IP/OBS HIGH 75: CPT | Mod: AI | Performed by: INTERNAL MEDICINE

## 2022-05-02 PROCEDURE — 120N000001 HC R&B MED SURG/OB

## 2022-05-02 PROCEDURE — 93005 ELECTROCARDIOGRAM TRACING: CPT

## 2022-05-02 PROCEDURE — 73562 X-RAY EXAM OF KNEE 3: CPT | Mod: RT

## 2022-05-02 PROCEDURE — 99285 EMERGENCY DEPT VISIT HI MDM: CPT | Mod: 25

## 2022-05-02 PROCEDURE — 73501 X-RAY EXAM HIP UNI 1 VIEW: CPT | Mod: RT

## 2022-05-02 PROCEDURE — 85730 THROMBOPLASTIN TIME PARTIAL: CPT | Performed by: EMERGENCY MEDICINE

## 2022-05-02 PROCEDURE — 86850 RBC ANTIBODY SCREEN: CPT | Performed by: EMERGENCY MEDICINE

## 2022-05-02 PROCEDURE — 85025 COMPLETE CBC W/AUTO DIFF WBC: CPT | Performed by: EMERGENCY MEDICINE

## 2022-05-02 PROCEDURE — 96375 TX/PRO/DX INJ NEW DRUG ADDON: CPT

## 2022-05-02 PROCEDURE — 85610 PROTHROMBIN TIME: CPT | Performed by: INTERNAL MEDICINE

## 2022-05-02 PROCEDURE — U0003 INFECTIOUS AGENT DETECTION BY NUCLEIC ACID (DNA OR RNA); SEVERE ACUTE RESPIRATORY SYNDROME CORONAVIRUS 2 (SARS-COV-2) (CORONAVIRUS DISEASE [COVID-19]), AMPLIFIED PROBE TECHNIQUE, MAKING USE OF HIGH THROUGHPUT TECHNOLOGIES AS DESCRIBED BY CMS-2020-01-R: HCPCS | Performed by: EMERGENCY MEDICINE

## 2022-05-02 PROCEDURE — 73502 X-RAY EXAM HIP UNI 2-3 VIEWS: CPT

## 2022-05-02 PROCEDURE — 250N000013 HC RX MED GY IP 250 OP 250 PS 637: Performed by: INTERNAL MEDICINE

## 2022-05-02 PROCEDURE — 250N000011 HC RX IP 250 OP 636: Performed by: INTERNAL MEDICINE

## 2022-05-02 PROCEDURE — 96374 THER/PROPH/DIAG INJ IV PUSH: CPT

## 2022-05-02 PROCEDURE — 80053 COMPREHEN METABOLIC PANEL: CPT | Performed by: EMERGENCY MEDICINE

## 2022-05-02 PROCEDURE — 82248 BILIRUBIN DIRECT: CPT | Performed by: EMERGENCY MEDICINE

## 2022-05-02 PROCEDURE — 82077 ASSAY SPEC XCP UR&BREATH IA: CPT | Performed by: EMERGENCY MEDICINE

## 2022-05-02 PROCEDURE — 70450 CT HEAD/BRAIN W/O DYE: CPT

## 2022-05-02 PROCEDURE — 96376 TX/PRO/DX INJ SAME DRUG ADON: CPT

## 2022-05-02 PROCEDURE — 85610 PROTHROMBIN TIME: CPT | Performed by: EMERGENCY MEDICINE

## 2022-05-02 PROCEDURE — 36415 COLL VENOUS BLD VENIPUNCTURE: CPT | Performed by: INTERNAL MEDICINE

## 2022-05-02 PROCEDURE — 71045 X-RAY EXAM CHEST 1 VIEW: CPT

## 2022-05-02 PROCEDURE — 250N000013 HC RX MED GY IP 250 OP 250 PS 637: Performed by: EMERGENCY MEDICINE

## 2022-05-02 PROCEDURE — 86901 BLOOD TYPING SEROLOGIC RH(D): CPT | Performed by: EMERGENCY MEDICINE

## 2022-05-02 RX ORDER — IBUPROFEN 600 MG/1
600 TABLET, FILM COATED ORAL ONCE
Status: COMPLETED | OUTPATIENT
Start: 2022-05-02 | End: 2022-05-02

## 2022-05-02 RX ORDER — NALOXONE HYDROCHLORIDE 0.4 MG/ML
0.2 INJECTION, SOLUTION INTRAMUSCULAR; INTRAVENOUS; SUBCUTANEOUS
Status: DISCONTINUED | OUTPATIENT
Start: 2022-05-02 | End: 2022-05-17 | Stop reason: HOSPADM

## 2022-05-02 RX ORDER — LIDOCAINE 40 MG/G
CREAM TOPICAL
Status: DISCONTINUED | OUTPATIENT
Start: 2022-05-02 | End: 2022-05-17 | Stop reason: HOSPADM

## 2022-05-02 RX ORDER — MORPHINE SULFATE 4 MG/ML
4 INJECTION, SOLUTION INTRAMUSCULAR; INTRAVENOUS
Status: DISCONTINUED | OUTPATIENT
Start: 2022-05-02 | End: 2022-05-02

## 2022-05-02 RX ORDER — VITAMIN B COMPLEX
1000 TABLET ORAL DAILY
Status: DISCONTINUED | OUTPATIENT
Start: 2022-05-03 | End: 2022-05-17 | Stop reason: HOSPADM

## 2022-05-02 RX ORDER — OXYCODONE HYDROCHLORIDE 5 MG/1
5 TABLET ORAL EVERY 4 HOURS PRN
Status: DISCONTINUED | OUTPATIENT
Start: 2022-05-02 | End: 2022-05-17 | Stop reason: HOSPADM

## 2022-05-02 RX ORDER — NALOXONE HYDROCHLORIDE 0.4 MG/ML
0.4 INJECTION, SOLUTION INTRAMUSCULAR; INTRAVENOUS; SUBCUTANEOUS
Status: DISCONTINUED | OUTPATIENT
Start: 2022-05-02 | End: 2022-05-17 | Stop reason: HOSPADM

## 2022-05-02 RX ORDER — OXYCODONE HYDROCHLORIDE 5 MG/1
10 TABLET ORAL EVERY 4 HOURS PRN
Status: DISCONTINUED | OUTPATIENT
Start: 2022-05-02 | End: 2022-05-17 | Stop reason: HOSPADM

## 2022-05-02 RX ORDER — HYDROMORPHONE HCL IN WATER/PF 6 MG/30 ML
0.4 PATIENT CONTROLLED ANALGESIA SYRINGE INTRAVENOUS
Status: DISCONTINUED | OUTPATIENT
Start: 2022-05-02 | End: 2022-05-04 | Stop reason: ALTCHOICE

## 2022-05-02 RX ORDER — HYDROMORPHONE HCL IN WATER/PF 6 MG/30 ML
0.2 PATIENT CONTROLLED ANALGESIA SYRINGE INTRAVENOUS
Status: DISCONTINUED | OUTPATIENT
Start: 2022-05-02 | End: 2022-05-04 | Stop reason: ALTCHOICE

## 2022-05-02 RX ORDER — CYCLOBENZAPRINE HCL 10 MG
10 TABLET ORAL ONCE
Status: COMPLETED | OUTPATIENT
Start: 2022-05-02 | End: 2022-05-02

## 2022-05-02 RX ORDER — ONDANSETRON 2 MG/ML
4 INJECTION INTRAMUSCULAR; INTRAVENOUS EVERY 6 HOURS PRN
Status: DISCONTINUED | OUTPATIENT
Start: 2022-05-02 | End: 2022-05-17 | Stop reason: HOSPADM

## 2022-05-02 RX ORDER — PROCHLORPERAZINE 25 MG
25 SUPPOSITORY, RECTAL RECTAL EVERY 12 HOURS PRN
Status: DISCONTINUED | OUTPATIENT
Start: 2022-05-02 | End: 2022-05-17 | Stop reason: HOSPADM

## 2022-05-02 RX ORDER — PROCHLORPERAZINE MALEATE 5 MG
10 TABLET ORAL EVERY 6 HOURS PRN
Status: DISCONTINUED | OUTPATIENT
Start: 2022-05-02 | End: 2022-05-17 | Stop reason: HOSPADM

## 2022-05-02 RX ORDER — ACETAMINOPHEN 650 MG/1
650 SUPPOSITORY RECTAL EVERY 6 HOURS PRN
Status: DISCONTINUED | OUTPATIENT
Start: 2022-05-02 | End: 2022-05-17 | Stop reason: HOSPADM

## 2022-05-02 RX ORDER — AMOXICILLIN 250 MG
1 CAPSULE ORAL 2 TIMES DAILY PRN
Status: DISCONTINUED | OUTPATIENT
Start: 2022-05-02 | End: 2022-05-17 | Stop reason: HOSPADM

## 2022-05-02 RX ORDER — HALOPERIDOL 5 MG/ML
2 INJECTION INTRAMUSCULAR ONCE
Status: COMPLETED | OUTPATIENT
Start: 2022-05-02 | End: 2022-05-02

## 2022-05-02 RX ORDER — PAROXETINE 30 MG/1
60 TABLET, FILM COATED ORAL DAILY
Status: DISCONTINUED | OUTPATIENT
Start: 2022-05-03 | End: 2022-05-03

## 2022-05-02 RX ORDER — ACETAMINOPHEN 325 MG/1
650 TABLET ORAL EVERY 6 HOURS PRN
Status: DISCONTINUED | OUTPATIENT
Start: 2022-05-02 | End: 2022-05-17 | Stop reason: HOSPADM

## 2022-05-02 RX ORDER — AMOXICILLIN 250 MG
2 CAPSULE ORAL 2 TIMES DAILY PRN
Status: DISCONTINUED | OUTPATIENT
Start: 2022-05-02 | End: 2022-05-17 | Stop reason: HOSPADM

## 2022-05-02 RX ORDER — SIMVASTATIN 40 MG
40 TABLET ORAL AT BEDTIME
Status: DISCONTINUED | OUTPATIENT
Start: 2022-05-02 | End: 2022-05-17 | Stop reason: HOSPADM

## 2022-05-02 RX ORDER — PANTOPRAZOLE SODIUM 40 MG/1
40 TABLET, DELAYED RELEASE ORAL
Status: DISCONTINUED | OUTPATIENT
Start: 2022-05-04 | End: 2022-05-17 | Stop reason: HOSPADM

## 2022-05-02 RX ORDER — LORAZEPAM 2 MG/ML
1 INJECTION INTRAMUSCULAR ONCE
Status: COMPLETED | OUTPATIENT
Start: 2022-05-02 | End: 2022-05-02

## 2022-05-02 RX ORDER — BACLOFEN 10 MG/1
10 TABLET ORAL 2 TIMES DAILY
Status: DISCONTINUED | OUTPATIENT
Start: 2022-05-02 | End: 2022-05-17 | Stop reason: HOSPADM

## 2022-05-02 RX ORDER — HYDROXYZINE HYDROCHLORIDE 25 MG/1
25 TABLET, FILM COATED ORAL EVERY 6 HOURS PRN
Status: DISCONTINUED | OUTPATIENT
Start: 2022-05-02 | End: 2022-05-17 | Stop reason: HOSPADM

## 2022-05-02 RX ORDER — ACETAMINOPHEN 325 MG/1
975 TABLET ORAL ONCE
Status: COMPLETED | OUTPATIENT
Start: 2022-05-02 | End: 2022-05-02

## 2022-05-02 RX ORDER — ONDANSETRON 4 MG/1
4 TABLET, ORALLY DISINTEGRATING ORAL EVERY 6 HOURS PRN
Status: DISCONTINUED | OUTPATIENT
Start: 2022-05-02 | End: 2022-05-17 | Stop reason: HOSPADM

## 2022-05-02 RX ADMIN — CYCLOBENZAPRINE 10 MG: 10 TABLET, FILM COATED ORAL at 11:18

## 2022-05-02 RX ADMIN — HYDROMORPHONE HYDROCHLORIDE 0.4 MG: 0.2 INJECTION, SOLUTION INTRAMUSCULAR; INTRAVENOUS; SUBCUTANEOUS at 21:53

## 2022-05-02 RX ADMIN — ACETAMINOPHEN 975 MG: 325 TABLET ORAL at 11:19

## 2022-05-02 RX ADMIN — MORPHINE SULFATE 4 MG: 4 INJECTION, SOLUTION INTRAMUSCULAR; INTRAVENOUS at 20:12

## 2022-05-02 RX ADMIN — LORAZEPAM 1 MG: 2 INJECTION INTRAMUSCULAR; INTRAVENOUS at 16:11

## 2022-05-02 RX ADMIN — MORPHINE SULFATE 4 MG: 4 INJECTION, SOLUTION INTRAMUSCULAR; INTRAVENOUS at 14:14

## 2022-05-02 RX ADMIN — IBUPROFEN 600 MG: 600 TABLET ORAL at 11:18

## 2022-05-02 RX ADMIN — HALOPERIDOL LACTATE 2 MG: 5 INJECTION, SOLUTION INTRAMUSCULAR at 17:30

## 2022-05-02 RX ADMIN — Medication 1 TABLET: at 23:00

## 2022-05-02 RX ADMIN — BACLOFEN 10 MG: 10 TABLET ORAL at 23:00

## 2022-05-02 RX ADMIN — SIMVASTATIN 40 MG: 40 TABLET, FILM COATED ORAL at 23:00

## 2022-05-02 ASSESSMENT — ACTIVITIES OF DAILY LIVING (ADL)
ADLS_ACUITY_SCORE: 12
NUMBER_OF_TIMES_PATIENT_HAS_FALLEN_WITHIN_LAST_SIX_MONTHS: 1
ADLS_ACUITY_SCORE: 12
ADLS_ACUITY_SCORE: 8
WALKING_OR_CLIMBING_STAIRS_DIFFICULTY: YES
FALL_HISTORY_WITHIN_LAST_SIX_MONTHS: YES
CHANGE_IN_FUNCTIONAL_STATUS_SINCE_ONSET_OF_CURRENT_ILLNESS/INJURY: YES
ADLS_ACUITY_SCORE: 12
ADLS_ACUITY_SCORE: 12
DOING_ERRANDS_INDEPENDENTLY_DIFFICULTY: YES
EQUIPMENT_CURRENTLY_USED_AT_HOME: WALKER, STANDARD
TRANSFERRING: 1-->ASSISTANCE (EQUIPMENT/PERSON) NEEDED
ADLS_ACUITY_SCORE: 12
WALKING_OR_CLIMBING_STAIRS: AMBULATION DIFFICULTY, ASSISTANCE 1 PERSON
ADLS_ACUITY_SCORE: 12
ADLS_ACUITY_SCORE: 10
ADLS_ACUITY_SCORE: 12
TRANSFERRING: 0-->ASSISTANCE NEEDED (DEVELOPMETNALLY APPROPRIATE)
DRESSING/BATHING_DIFFICULTY: NO
TOILETING_ISSUES: NO
ADLS_ACUITY_SCORE: 12

## 2022-05-02 ASSESSMENT — ENCOUNTER SYMPTOMS: BACK PAIN: 0

## 2022-05-02 NOTE — PHARMACY-ADMISSION MEDICATION HISTORY
Pharmacy Medication History  Admission medication history interview status for the 5/2/2022  admission is complete. See EPIC admission navigator for prior to admission medications     Location of Interview: Phone  Medication history sources: med list from [facility] and called to confirm last doses    Medication reconciliation completed by provider prior to medication history? No    Time spent in this activity: 10 minutes    Prior to Admission medications    Medication Sig Last Dose Taking? Auth Provider   alendronate (FOSAMAX) 70 MG tablet TAKE 1 TABLET BY MOUTH ONCE WEEKLY. TAKE 30 MINUTES BEFORE FIRST MEAL OF THE DAY WITH WATER AND REMAIN UPRIGHT FOR 30 MINUTES 4/27/2022 Yes Crystal Matt MD   baclofen (LIORESAL) 10 MG tablet Take 1 tablet (10 mg) by mouth 2 times daily 5/2/2022 at am Yes Efraín Ravi MD   Calcium-Vitamin D 600-200 MG-UNIT TABS Take 1 tablet by mouth 2 times daily 5/2/2022 at am Yes Alberto Carlos Jr., MD   multivitamin, therapeutic (THERA-VIT) TABS tablet Take 1 tablet by mouth daily 5/2/2022 at Unknown time Yes Unknown, Entered By History   omeprazole (PRILOSEC) 20 MG DR capsule Take 20 mg by mouth See Admin Instructions Every Sun, Mon, Wed, Fri 5/2/2022 at Unknown time Yes Unknown, Entered By History   PARoxetine (PAXIL) 40 MG tablet TAKE 1.5 TABLETS BY MOUTH EVERY MORNING  Patient taking differently: TAKE 1.5 TABLETS BY MOUTH EVERY MORNING. Dose = 60 mg 5/2/2022 at Unknown time Yes Alberto Carlos Jr., MD   senna-docusate (SENOKOT-S/PERICOLACE) 8.6-50 MG tablet Take 1 tablet by mouth daily as needed for constipation prn med Yes Star Vega, DO   simvastatin (ZOCOR) 80 MG tablet Take 1 tablet (80 mg) by mouth At Bedtime  Patient taking differently: Take 40 mg by mouth At Bedtime 5/1/2022 at Unknown time Yes Star Vega, DO   Vitamin D, Cholecalciferol, 25 MCG (1000 UT) TABS Take 1 tablet (1,000 Units) by mouth daily 5/2/2022 at Unknown time Yes Terrance  Alberto COY Jr., MD   aspirin (ASA) 325 MG EC tablet Take 1 tablet (325 mg) by mouth daily   Alberto Carlos Jr., MD   order for DME Equipment being ordered: leg brace - Custom Hinged AFO with a lift   Katia Merrill PA-C       The information provided in this note is only as accurate as the sources available at the time of update(s)   Ijeoma AgueroD

## 2022-05-02 NOTE — ED TRIAGE NOTES
Pt BIBA from assisted living facility [facility]- lives here due to TBI. Since Friday or Saturday has had right buttock pain that radiates down right leg. Denies injury or falls. Pain 7/10. Patient unable to walk due to dania. Baseline is SBA with walker.

## 2022-05-02 NOTE — ED NOTES
[facility]  ED Nurse Handoff Report    ED Chief complaint: Leg Pain      ED Diagnosis:   Final diagnoses:   Closed displaced fracture of greater trochanter of right femur, initial encounter (H)       Code Status: hospitalist to address        Allergies:   Allergies   Allergen Reactions     Insect Extract      red ants       Patient Story: Arrived via EMS from assisted living facility [facility]. EMS reports since Friday or Saturday has had right buttock pain that radiates down right leg. Denies injury or falls. Pain 7/10. Patient unable to walk due to pain. Unable to get self out of bed this morning.      Focused Assessment:  A&Ox2, baseline. Has short term memory loss secondary to TBI. Yellow bruise right anterior lateral upper leg. Right leg pain with movement. 20 gauge saline lock right antecubital site. Signs of extrapyramidal symptoms; involuntary muscle movements, inability to still. Mother and father have guardianship. FALL RISK. NEEDS SITTER. Impulsive in getting out of bed. Not redirectable    Treatments and/or interventions provided: labs, xrays, ct scan head, acetaminophen, motrin, iv morphine. Iv ativan  Patient's response to treatments and/or interventions: stable    To be done/followed up on inpatient unit:  admission  Orders, surgical consult    Does this patient have any cognitive concerns?: Hx Head Trauma    Activity level - Baseline/Home:  Cane  Activity Level - Current:   bedrest    Patient's Preferred language: English   Needed?: No    Isolation: None  Infection: Not Applicable  Patient tested for COVID 19 prior to admission: YES  Bariatric?: No    Vital Signs:   Vitals:    05/02/22 1055 05/02/22 1057 05/02/22 1310   BP: 106/71  119/75   Pulse: 70     Resp: 15  16   Temp:  98.8  F (37.1  C)    TempSrc: Oral Oral    SpO2: 97%  96%       Cardiac Rhythm:     Was the PSS-3 completed:   Yes  What interventions are required if any?               Family  Comments: mother at bedside  OBS brochure/video discussed/provided to patient/family: N/A              Name of person given brochure if not patient: na              Relationship to patient: na    For the majority of the shift this patient's behavior was Green.   Behavioral interventions performed were none.    ED NURSE PHONE NUMBER:*[id number]

## 2022-05-02 NOTE — CONSULTS
[facility]    Orthopedic Consultation    Yunior Angel MRN# [id number]   Age: 56 year old YOB: 1965     Date of Admission: 5/2/2022    Reason for consult:  Right hip periprosthetic fracture of the right greater trochanter       Requesting provider: Call from ED        Level of consult: Consult, follow and place orders           Assessment and Plan:   Assessment:   Right hip periprosthetic fracture of the right greater trochanter      Plan:   Imaging was reviewed with Dr. Avila.  He is recommendeding proceeding with a trial of nonoperative management.    Patient is able to weight-bear as tolerated with a walker at all times.  The patient's mother will bring in the brace he wears on his right lower extremity for ambulation.  Avoid active abduction.   Progress with physical therapy   Follow-up with Dr. GAY Zabala at [facility] in 2 weeks for recheck.               Chief Complaint:   Right hip pain          History of Present Illness:   This patient is a 56 year old male who presents with the following condition requiring a hospital admission:  Right hip periprosthetic fracture of the right greater trochanter    Patient unable to recall injuring his right hip as he has a history of a TBI and short term memory loss.  He began complaining of right hip pain at his place of residence.  X-rays did reveal a greater trochanteric fracture.  Patient had a DANIELLE by Dr GAY Zabala 03/14/2018.  Per the patient's mother, he does ambulate independently however does have issues with balance.  They recently purchased a walker for him to use.  She also reports that he does walk with a brace on his leg.            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/2005             Past Surgical History:     Past Surgical History:   Procedure Laterality Date     ARTHROPLASTY HIP Right 3/14/2018    Procedure: ARTHROPLASTY HIP;  Right total hip arthroplasty;  Surgeon: Parmjit Zabala MD;  Location: [facility] OR     COLONOSCOPY N/A 12/31/2014    Procedure: COLONOSCOPY;  Surgeon: Inna Gonzalez MD;  Location:  GI     ESOPHAGOSCOPY, GASTROSCOPY, DUODENOSCOPY (EGD), COMBINED N/A 12/31/2014    Procedure: COMBINED ESOPHAGOSCOPY, GASTROSCOPY, DUODENOSCOPY (EGD), BIOPSY SINGLE OR MULTIPLE;  Surgeon: Inna Gonzalez MD;  Location:  GI     SURGICAL HISTORY OF -       foot surgery     SURGICAL HISTORY OF -   2006    1.  Avulsion of the right great toenail.  2.  Excision of bone cyst distal phalanx, right great toe.              Social History:     Social History     Tobacco Use     Smoking status: Never Smoker     Smokeless tobacco: Never Used   Substance Use Topics     Alcohol use: No     Alcohol/week: 0.0 standard drinks             Family History:     Family History   Problem Relation Age of Onset     Migraines Father      Family History Negative Mother      Cancer - colorectal No family hx of      Colon Cancer No family hx of              Immunizations:     VACCINE/DOSE   Diptheria   DPT   DTAP   HBIG   Hepatitis A   Hepatitis B   HIB   Influenza   Measles   Meningococcal   MMR   Mumps   Pneumococcal   Polio   Rubella   Small Pox   TDAP   Varicella   Zoster             Allergies:     Allergies   Allergen Reactions     Insect Extract      red ants             Medications:     Current Facility-Administered Medications   Medication     morphine (PF) injection 4 mg     Current Outpatient Medications   Medication Sig     alendronate (FOSAMAX) 70 MG tablet TAKE 1 TABLET BY MOUTH ONCE WEEKLY. TAKE 30 MINUTES BEFORE FIRST MEAL OF THE DAY WITH WATER AND REMAIN UPRIGHT FOR 30 MINUTES     baclofen (LIORESAL) 10 MG tablet Take 1 tablet (10 mg) by mouth 2 times daily      Calcium-Vitamin D 600-200 MG-UNIT TABS Take 1 tablet by mouth 2 times daily     multivitamin, therapeutic (THERA-VIT) TABS tablet Take 1 tablet by mouth daily     omeprazole (PRILOSEC) 20 MG DR capsule Take 20 mg by mouth See Admin Instructions Every Sun, Mon, Wed, Fri     PARoxetine (PAXIL) 40 MG tablet TAKE 1.5 TABLETS BY MOUTH EVERY MORNING (Patient taking differently: TAKE 1.5 TABLETS BY MOUTH EVERY MORNING. Dose = 60 mg)     senna-docusate (SENOKOT-S/PERICOLACE) 8.6-50 MG tablet Take 1 tablet by mouth daily as needed for constipation     simvastatin (ZOCOR) 80 MG tablet Take 1 tablet (80 mg) by mouth At Bedtime (Patient taking differently: Take 40 mg by mouth At Bedtime)     Vitamin D, Cholecalciferol, 25 MCG (1000 UT) TABS Take 1 tablet (1,000 Units) by mouth daily     aspirin (ASA) 325 MG EC tablet Take 1 tablet (325 mg) by mouth daily     order for DME Equipment being ordered: leg brace - Custom Hinged AFO with a lift             Review of Systems:   ROS:  10 point ROS neg other than the symptoms noted above in the HPI.            Physical Exam:   All vitals have been reviewed  Patient Vitals for the past 24 hrs:   BP Temp Temp src Pulse Resp SpO2   05/02/22 1430 117/64 -- -- 80 16 98 %   05/02/22 1310 119/75 -- -- -- 16 96 %   05/02/22 1057 -- 98.8  F (37.1  C) Oral -- -- --   05/02/22 1055 106/71 -- Oral 70 15 97 %     No intake or output data in the 24 hours ending 05/02/22 1529      Physical Exam   Temp: 98.8  F (37.1  C) Temp src: Oral BP: 117/64 Pulse: 80   Resp: 16 SpO2: 98 % O2 Device: None (Room air)    Vital Signs with Ranges  Temp:  [98.8  F (37.1  C)] 98.8  F (37.1  C)  Pulse:  [70-80] 80  Resp:  [15-16] 16  BP: (106-119)/(64-75) 117/64  SpO2:  [96 %-98 %] 98 %  0 lbs 0 oz    Constitutional: Anxious, short term memory loss   HEENT: Pupils reactive   Respiratory: Unlabored breathing no audible wheeze  Cardiovascular: Regular rate and rhythm per pulses  GI: Abdomen  non-distended.  Lymph/Hematologic: No lymphadenopathy in areas examined  Genitourinary:  No parra  Skin: No rashes, no cyanosis, no edema.  Musculoskeletal: On physical exam of the right lower extremity, he is tender to palpation of the right greater trochanter.  There is no skin disruption at this site.  Mild pain with passive internal external rotation of the hip.  He does have appearance of foot drop on the right and unable to actively dorsiflex.  This is quite rigid when attempting passive dorsiflexion.  The patient states this is from his TBI however his family is uncertain.  Pulses and sensation are intact.  Neurologic: normal without focal findings, mental status, speech normal, alert and oriented x iii  Neuropsychiatric: TBI             Data:   All laboratory data reviewed  Results for orders placed or performed during the hospital encounter of 05/02/22   XR Knee Right 3 Views     Status: None    Narrative    RIGHT KNEE THREE VIEWS   5/2/2022 12:18 PM     HISTORY: Hip and knee pain.    COMPARISON: 6/26/2017 x-ray.      Impression    IMPRESSION: Normal joint spacing. No significant change. No fracture  or joint effusion. Mild patellar marginal osteophyte formation.    BRYANT VAUGHN MD         SYSTEM ID:  [id number]   US Lower Extremity Venous Duplex Right     Status: None    Narrative    US LOWER EXTREMITY VENOUS DUPLEX RIGHT 5/2/2022 12:05 PM    CLINICAL HISTORY/INDICATION: Hip and knee pain. Right lower extremity  pain.    COMPARISON: None relevant    TECHNIQUE:   Grayscale, color-flow, and spectral waveform analysis were performed  of the deep veins of the right lower extremity    FINDINGS:   The right common external iliac, femoral vein, femoral vein and  popliteal vein demonstrate normal compressibility, spectral waveform,  color flow and augmentation.    The right posterior tibial vein, peroneal vein, and greater saphenous  vein are compressible.    The contralateral left common femoral vein demonstrates  normal  compressibility, spectral waveform, color flow and augmentation.      Impression    IMPRESSION:   No evidence of deep venous thrombosis in the right lower extremity.     SOL REVELES DO         SYSTEM ID:  [id number]   XR Pelvis w Hip Right 1 View     Status: None    Narrative    PELVIS AND RIGHT HIP, ONE VIEW   5/2/2022 12:17 PM     HISTORY: Hip and knee pain.    COMPARISON: None.      Impression    IMPRESSION: Right total hip arthroplasty in place. There is some  motion artifact on the AP view. There appears to be a lucency through  the base of the greater trochanter which likely represents a mildly  displaced fracture. A straight AP view of the right hip would be  helpful without motion. No periprosthetic lucency elsewhere. No other  fracture identified.    BRYANT VAUGHN MD         SYSTEM ID:  [id number]   XR Hip Right 1 View     Status: None (Preliminary result)    Narrative    HIP RIGHT ONE VIEW   5/2/2022 1:28 PM     HISTORY: Hip pain.      COMPARISON: X-ray from earlier today.      Impression    IMPRESSION: Single AP view of the right hip does demonstrate a mildly  displaced fracture through the midportion of the greater trochanter.  Small chronic ossicle adjacent to the medial proximal femur. Total hip  arthroplasty in place.   XR Chest Port 1 View     Status: None    Narrative    CHEST ONE VIEW PORTABLE   5/2/2022 2:39 PM     HISTORY:  Preop.    COMPARISON: None.      Impression    IMPRESSION: Normal single view of the chest.    MATIAS BETH MD         SYSTEM ID:  [id number]   CT Head w/o Contrast     Status: None    Narrative    CT SCAN OF THE HEAD WITHOUT CONTRAST   5/2/2022 2:52 PM     HISTORY: Mental status change, unknown cause.    TECHNIQUE:  Axial images of the head and coronal reformations without  IV contrast material. Radiation dose for this scan was reduced using  automated exposure control, adjustment of the mA and/or kV according  to patient size, or iterative reconstruction  technique.    COMPARISON: Head MRI 6/28/2004    FINDINGS:  Moderate volume loss is present. Chronic encephalomalacia involving  the bilateral temporal lobe and bilateral frontal lobes. No evidence  of acute ischemia or hemorrhage. White matter hypoattenuation likely  represents chronic small vessel ischemic change. Changes of prior left  frontal craniotomy. Trace paranasal sinus mucosal thickening. Trace  opacification of the right mastoid tip. Presumed cerumen within the  external auditory canals.      Impression    IMPRESSION:     1. No acute intracranial abnormality.  2. Chronic bilateral frontotemporal encephalomalacia.  3. Changes of prior left frontal craniotomy.    JANEL TRERY MD         SYSTEM ID:  [id number]   INR     Status: Normal   Result Value Ref Range    INR 1.08 0.85 - 1.15   Partial thromboplastin time     Status: Normal   Result Value Ref Range    aPTT 33 22 - 38 Seconds   Basic metabolic panel     Status: Abnormal   Result Value Ref Range    Sodium 139 133 - 144 mmol/L    Potassium 3.5 3.4 - 5.3 mmol/L    Chloride 107 94 - 109 mmol/L    Carbon Dioxide (CO2) 27 20 - 32 mmol/L    Anion Gap 5 3 - 14 mmol/L    Urea Nitrogen 20 7 - 30 mg/dL    Creatinine 1.13 0.66 - 1.25 mg/dL    Calcium 9.3 8.5 - 10.1 mg/dL    Glucose 103 (H) 70 - 99 mg/dL    GFR Estimate 76 >60 mL/min/1.73m2   Asymptomatic COVID-19 Virus (Coronavirus) by PCR Nasopharyngeal     Status: Normal    Specimen: Nasopharyngeal; Swab   Result Value Ref Range    SARS CoV2 PCR Negative Negative    Narrative    Testing was performed using the Xpert Xpress SARS-CoV-2 Assay on the   Cepheid Gene-Xpert Instrument Systems. Additional information about   this Emergency Use Authorization (EUA) assay can be found via the Lab   Guide. This test should be ordered for the detection of SARS-CoV-2 in   individuals who meet SARS-CoV-2 clinical and/or epidemiological   criteria. Test performance is unknown in asymptomatic patients. This   test is for in  vitro diagnostic use under the FDA EUA for   laboratories certified under CLIA to perform high complexity testing.   This test has not been FDA cleared or approved. A negative result   does not rule out the presence of PCR inhibitors in the specimen or   target RNA in concentration below the limit of detection for the   assay. The possibility of a false negative should be considered if   the patient's recent exposure or clinical presentation suggests   COVID-19. This test was validated by the [facility]. This laboratory is certified under the Clinical Laboratory Improvement Amendments of 1988 (CLIA-88) as qualified to perform high complexity laboratory testing.     CBC with platelets and differential     Status: Abnormal   Result Value Ref Range    WBC Count 11.0 4.0 - 11.0 10e3/uL    RBC Count 5.51 4.40 - 5.90 10e6/uL    Hemoglobin 12.7 (L) 13.3 - 17.7 g/dL    Hematocrit 40.3 40.0 - 53.0 %    MCV 73 (L) 78 - 100 fL    MCH 23.0 (L) 26.5 - 33.0 pg    MCHC 31.5 31.5 - 36.5 g/dL    RDW 14.8 10.0 - 15.0 %    Platelet Count 367 150 - 450 10e3/uL    % Neutrophils 55 %    % Lymphocytes 32 %    % Monocytes 9 %    % Eosinophils 3 %    % Basophils 1 %    % Immature Granulocytes 0 %    NRBCs per 100 WBC 0 <1 /100    Absolute Neutrophils 6.0 1.6 - 8.3 10e3/uL    Absolute Lymphocytes 3.5 0.8 - 5.3 10e3/uL    Absolute Monocytes 0.9 0.0 - 1.3 10e3/uL    Absolute Eosinophils 0.3 0.0 - 0.7 10e3/uL    Absolute Basophils 0.2 0.0 - 0.2 10e3/uL    Absolute Immature Granulocytes 0.0 <=0.4 10e3/uL    Absolute NRBCs 0.0 10e3/uL   Adult Type and Screen     Status: None   Result Value Ref Range    ABO/RH(D) A POS     Antibody Screen Negative Negative    SPECIMEN EXPIRATION DATE [id number]    CBC with platelets differential     Status: Abnormal    Narrative    The following orders were created for panel order CBC with platelets differential.  Procedure                               Abnormality          Status                     ---------                               -----------         ------                     CBC with platelets and d...[[id number]]  Abnormal            Final result                 Please view results for these tests on the individual orders.   ABO/Rh type and screen     Status: None    Narrative    The following orders were created for panel order ABO/Rh type and screen.  Procedure                               Abnormality         Status                     ---------                               -----------         ------                     Adult Type and Screen[[id number]]                            Final result                 Please view results for these tests on the individual orders.          Attestation:  I have reviewed today's vital signs, notes, medications, labs and imaging with Dr. Avila.  Amount of time performed on this consult: 30 minutes.    Shahana Apodaca PA-C

## 2022-05-02 NOTE — ED TRIAGE NOTES
Triage Assessment     Row Name 05/02/22 1056       Triage Assessment (Adult)    Airway WDL WDL       Respiratory WDL    Respiratory WDL WDL       Skin Circulation/Temperature WDL    Skin Circulation/Temperature WDL WDL       Cardiac WDL    Cardiac WDL WDL       Peripheral/Neurovascular WDL    Peripheral Neurovascular WDL WDL       Cognitive/Neuro/Behavioral WDL    Cognitive/Neuro/Behavioral WDL X  has TBI, speech rapid

## 2022-05-02 NOTE — H&P
[facility]    History and Physical - Hospitalist Service       Date of Admission:  5/2/2022    Assessment & Plan      Yunior Angel is a 56 year old male admitted on 5/2/2022. 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  Hip x-ray shows mildly displaced right periprosthetic hip fracture.    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  I tried to reach his mother at both the home number and cell phone which are listed in the chart, no answer at either number.  Also tried to call his father, Jhony, at the home number listed in chart, no answer    Continue Paxil    Gastroesophageal reflux disease with esophagitis    Continue PPI    Age-related osteoporosis with current pathological fracture with routine healing, subsequent encounter    Resume Fosamax as outpatient    Resume calcium and vitamin D    Hyperlipidemia LDL goal <160    Continue statin        Diet: NPO for Medical/Clinical Reasons Except for: Meds    DVT Prophylaxis: Pneumatic Compression Devices  Garcia Catheter: Not present  Central Lines: None  Cardiac Monitoring: None  Code Status:  POLST that accompanies patient is dated 2/2022 and indicates he is DNR/DNI.  As above, I tried the 3 phone numbers listed in the chart for his mother and father, who are his guardians, no answer at any of the phone numbers    Clinically Significant Risk Factors Present on Admission                # Platelet Defect: home medication list includes an  "antiplatelet medication       Disposition Plan   Expected Discharge: 05/05/2022   Anticipated discharge location:  Awaiting care coordination huddle  Delays:     pending repair of hip fracture, pain control, safe disposition available        The patient's care was discussed with the Patient and ED MD, Dr. Heard.    Ann Lee MD  Hospitalist Service  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    Yunior says he has difficulty with his memory because of his TBI, he cannot say how long he has been having hip pain.  He is unable to say if he has fallen.  Pain is worse with movement, begins in the hip and extends down to the knee.    Work-up in the ED included x-ray of the right hip which shows mildly displaced periprosthetic fracture, extending to the mid portion of the greater trochanter x-ray of the knee is negative for fracture, lower extremity Doppler venous ultrasound is negative for deep vein thrombosis.  He is admitted for further evaluation and treatment.    Review of Systems    The 10 point Review of Systems is negative other than noted in the HPI or here.     Past Medical History    I have reviewed this patient's medical history and updated it with pertinent information if needed.   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 "       Past Surgical History   I have reviewed this patient's surgical history and updated it with pertinent information if needed.  Past Surgical History:   Procedure Laterality Date     ARTHROPLASTY HIP Right 3/14/2018    Procedure: ARTHROPLASTY HIP;  Right total hip arthroplasty;  Surgeon: Parmjit Zabala MD;  Location: [facility] OR     COLONOSCOPY N/A 12/31/2014    Procedure: COLONOSCOPY;  Surgeon: Inna Gonzalez MD;  Location:  GI     ESOPHAGOSCOPY, GASTROSCOPY, DUODENOSCOPY (EGD), COMBINED N/A 12/31/2014    Procedure: COMBINED ESOPHAGOSCOPY, GASTROSCOPY, DUODENOSCOPY (EGD), BIOPSY SINGLE OR MULTIPLE;  Surgeon: Inna Gonzalez MD;  Location:  GI     SURGICAL HISTORY OF -       foot surgery     SURGICAL HISTORY OF -   2006    1.  Avulsion of the right great toenail.  2.  Excision of bone cyst distal phalanx, right great toe.        Social History   I have reviewed this patient's social history and updated it with pertinent information if needed.  Social History     Tobacco Use     Smoking status: Never Smoker     Smokeless tobacco: Never Used   Substance Use Topics     Alcohol use: No     Alcohol/week: 0.0 standard drinks     Drug use: No       Family History   I have reviewed this patient's family history and updated it with pertinent information if needed.  Family History   Problem Relation Age of Onset     Migraines Father      Family History Negative Mother      Cancer - colorectal No family hx of      Colon Cancer No family hx of        Prior to Admission Medications   Prior to Admission Medications   Prescriptions Last Dose Informant Patient Reported? Taking?   Calcium-Vitamin D 600-200 MG-UNIT TABS 5/2/2022 at am  No Yes   Sig: Take 1 tablet by mouth 2 times daily   PARoxetine (PAXIL) 40 MG tablet 5/2/2022 at Unknown time  No Yes   Sig: TAKE 1.5 TABLETS BY MOUTH EVERY MORNING   Patient taking differently: TAKE 1.5 TABLETS BY MOUTH EVERY MORNING. Dose = 60 mg   Vitamin D,  Cholecalciferol, 25 MCG (1000 UT) TABS 5/2/2022 at Unknown time  No Yes   Sig: Take 1 tablet (1,000 Units) by mouth daily   alendronate (FOSAMAX) 70 MG tablet 4/27/2022  No Yes   Sig: TAKE 1 TABLET BY MOUTH ONCE WEEKLY. TAKE 30 MINUTES BEFORE FIRST MEAL OF THE DAY WITH WATER AND REMAIN UPRIGHT FOR 30 MINUTES   aspirin (ASA) 325 MG EC tablet   No No   Sig: Take 1 tablet (325 mg) by mouth daily   baclofen (LIORESAL) 10 MG tablet 5/2/2022 at am  No Yes   Sig: Take 1 tablet (10 mg) by mouth 2 times daily   multivitamin, therapeutic (THERA-VIT) TABS tablet 5/2/2022 at Unknown time Mother Yes Yes   Sig: Take 1 tablet by mouth daily   omeprazole (PRILOSEC) 20 MG DR capsule 5/2/2022 at Unknown time  Yes Yes   Sig: Take 20 mg by mouth See Admin Instructions Every Sun, Mon, Wed, Fri   order for DME   No No   Sig: Equipment being ordered: leg brace - Custom Hinged AFO with a lift   senna-docusate (SENOKOT-S/PERICOLACE) 8.6-50 MG tablet prn med  Yes Yes   Sig: Take 1 tablet by mouth daily as needed for constipation   simvastatin (ZOCOR) 80 MG tablet 5/1/2022 at Unknown time  No Yes   Sig: Take 1 tablet (80 mg) by mouth At Bedtime   Patient taking differently: Take 40 mg by mouth At Bedtime      Facility-Administered Medications: None     Allergies   Allergies   Allergen Reactions     Insect Extract      red ants       Physical Exam   Vital Signs: Temp: 98.8  F (37.1  C) Temp src: Oral BP: 119/75 Pulse: 70   Resp: 16 SpO2: 96 % O2 Device: None (Room air)    Weight: 0 lbs 0 oz    Constitutional: Awake, alert  Eyes: Conjunctiva and pupils examined and normal.  HEENT: Moist mucous membranes, normal dentition.  Respiratory: Clear to auscultation bilaterally, no crackles or wheezing.  Cardiovascular: Regular rate and rhythm, normal S1 and S2, and no murmur noted.  GI: Soft, non-distended, non-tender, normal bowel sounds.  Lymph/Hematologic: No anterior cervical or supraclavicular adenopathy.  Skin: No rash on exposed  skin  Musculoskeletal: No joint swelling, erythema.  Scar overlies the right lateral thigh consistent with prior total hip arthroplasty.    Neurologic: He has some extra movements of both arms suggesting tardive dyskinesia.  He moves the left leg with ease, winced with pain with any movement of the right leg.  Psychiatric: mood is outgoing, loquacious       Data   Data reviewed today: I reviewed all medications, new labs and imaging results over the last 24 hours. I personally reviewed the Hip x-ray image(s) showing Mildly displaced prosthetic right hip fracture..  I also reviewed the x-ray of the knee, which is negative for fracture and lower extremity venous ultrasound, negative for DVT.    Recent Labs   Lab 05/02/22  1407   WBC 11.0   HGB 12.7*   MCV 73*         POTASSIUM 3.5   CHLORIDE 107     Recent Results (from the past 24 hour(s))   US Lower Extremity Venous Duplex Right    Narrative    US LOWER EXTREMITY VENOUS DUPLEX RIGHT 5/2/2022 12:05 PM    CLINICAL HISTORY/INDICATION: Hip and knee pain. Right lower extremity  pain.    COMPARISON: None relevant    TECHNIQUE:   Grayscale, color-flow, and spectral waveform analysis were performed  of the deep veins of the right lower extremity    FINDINGS:   The right common external iliac, femoral vein, femoral vein and  popliteal vein demonstrate normal compressibility, spectral waveform,  color flow and augmentation.    The right posterior tibial vein, peroneal vein, and greater saphenous  vein are compressible.    The contralateral left common femoral vein demonstrates normal  compressibility, spectral waveform, color flow and augmentation.      Impression    IMPRESSION:   No evidence of deep venous thrombosis in the right lower extremity.     SOL REVELES DO         SYSTEM ID:  [id number]   XR Pelvis w Hip Right 1 View    Narrative    PELVIS AND RIGHT HIP, ONE VIEW   5/2/2022 12:17 PM     HISTORY: Hip and knee pain.    COMPARISON: None.       Impression    IMPRESSION: Right total hip arthroplasty in place. There is some  motion artifact on the AP view. There appears to be a lucency through  the base of the greater trochanter which likely represents a mildly  displaced fracture. A straight AP view of the right hip would be  helpful without motion. No periprosthetic lucency elsewhere. No other  fracture identified.    BRYANT VAUGHN MD         SYSTEM ID:  [id number]   XR Knee Right 3 Views    Narrative    RIGHT KNEE THREE VIEWS   5/2/2022 12:18 PM     HISTORY: Hip and knee pain.    COMPARISON: 6/26/2017 x-ray.      Impression    IMPRESSION: Normal joint spacing. No significant change. No fracture  or joint effusion. Mild patellar marginal osteophyte formation.    BRYANT VAUGHN MD         SYSTEM ID:  [id number]   XR Hip Right 1 View    Narrative    HIP RIGHT ONE VIEW   5/2/2022 1:28 PM     HISTORY: Hip pain.      COMPARISON: X-ray from earlier today.      Impression    IMPRESSION: Single AP view of the right hip does demonstrate a mildly  displaced fracture through the midportion of the greater trochanter.  Small chronic ossicle adjacent to the medial proximal femur. Total hip  arthroplasty in place.

## 2022-05-02 NOTE — ED PROVIDER NOTES
History   Chief Complaint:  Leg Pain    History limited by: History of brain injury.      Yunior Angel is a 56 year old male who presents with right leg pain. The patient reports leg pain that started today, he says he has periodic leg pain that spiked today. The pain extends from the hip to the knee. He denies any recent fall and back pain. He reports short-term memory issues.  Upon his legal guardians arriving they state that his behavior is much more erratic than normal and his speech sounds forced.  On further questioning the patient denies any drug ingestion, alcohol use, change in medications.    Review of Systems   Reason unable to perform ROS: Chronic brain injury.   Musculoskeletal: Negative for back pain.        Leg pain, hip pain   All other systems reviewed and are negative.    Allergies:  The patient has no known allergies.     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  He currently lives in assisted living.    Physical Exam     Patient Vitals for the past 24 hrs:   BP Temp Temp src Pulse Resp SpO2   05/02/22 1430 117/64 -- -- 80 16 98 %   05/02/22 1310 119/75 -- -- -- 16 96 %   05/02/22 1057 -- 98.8  F (37.1  C)  Oral -- -- --   05/02/22 1055 106/71 -- Oral 70 15 97 %     Physical Exam  General: Alert, interactive in mild distress  Head:  Scalp is atraumatic  Eyes:  The pupils are equal, round, and reactive to light    EOM's intact    No scleral icterus  ENT:      Nose:  The external nose is normal  Ears:  External ears are normal  Mouth/Throat: The oropharynx is normal    Mucus membranes are moist      Neck:  Normal range of motion.      There is no rigidity.    Trachea is in the midline         CV:  Regular rate and rhythm    No murmur   Resp:  Breath sounds are clear bilaterally    Non-labored, no retractions or accessory muscle use      GI:  Abdomen is soft, no distension, no tenderness.       MS:  Normal strength in all 4 extremities, tenderness to palpation over the right lateral hip, full range of motion of the hips and knees  Skin:  Warm and dry, No rash or lesions noted.  Neuro: Strength 5/5 x4.  Sensation intact  In all 4 extremities.      Spasmodic movements of the left upper extremity    GCS: 15  Psych:  Awake. Alert.  Anxious affect.  For speech    Appropriate interactions.      Emergency Department Course   ECG  ECG obtained at 1350, ECG read at 1356  Sinus rhythm with premature atrial complexes  T wave abnomrality, consider inferior ischemia  Abnormal ECG   No significant changes as compared to prior, dated 3/13/18.  Rate 78 bpm. VT interval 128 ms. QRS duration 94 ms. QT/QTc 404/460 ms. P-R-T axes 70 61 60.     Imaging:  CT Head w/o Contrast   Final Result   IMPRESSION:       1. No acute intracranial abnormality.   2. Chronic bilateral frontotemporal encephalomalacia.   3. Changes of prior left frontal craniotomy.      JANEL TERRY MD            SYSTEM ID:  [id number]      XR Chest Port 1 View   Final Result   IMPRESSION: Normal single view of the chest.      MATIAS BETH MD            SYSTEM ID:  [id number]      XR Hip Right 1 View   Final Result   IMPRESSION: Single AP view of the right hip does  demonstrate a mildly   displaced fracture through the midportion of the greater trochanter.   Small chronic ossicle adjacent to the medial proximal femur. Total hip   arthroplasty in place.      BRYANT VAUGHN MD            SYSTEM ID:  [id number]      XR Knee Right 3 Views   Final Result   IMPRESSION: Normal joint spacing. No significant change. No fracture   or joint effusion. Mild patellar marginal osteophyte formation.      BRYANT VAUGHN MD            SYSTEM ID:  [id number]      XR Pelvis w Hip Right 1 View   Final Result   IMPRESSION: Right total hip arthroplasty in place. There is some   motion artifact on the AP view. There appears to be a lucency through   the base of the greater trochanter which likely represents a mildly   displaced fracture. A straight AP view of the right hip would be   helpful without motion. No periprosthetic lucency elsewhere. No other   fracture identified.      BRYANT VAUGHN MD            SYSTEM ID:  [id number]      US Lower Extremity Venous Duplex Right   Final Result   IMPRESSION:    No evidence of deep venous thrombosis in the right lower extremity.       SOL REVELES DO            SYSTEM ID:  [id number]        Report per radiology    Laboratory:  Labs Ordered and Resulted from Time of ED Arrival to Time of ED Departure   BASIC METABOLIC PANEL - Abnormal       Result Value    Sodium 139      Potassium 3.5      Chloride 107      Carbon Dioxide (CO2) 27      Anion Gap 5      Urea Nitrogen 20      Creatinine 1.13      Calcium 9.3      Glucose 103 (*)     GFR Estimate 76     CBC WITH PLATELETS AND DIFFERENTIAL - Abnormal    WBC Count 11.0      RBC Count 5.51      Hemoglobin 12.7 (*)     Hematocrit 40.3      MCV 73 (*)     MCH 23.0 (*)     MCHC 31.5      RDW 14.8      Platelet Count 367      % Neutrophils 55      % Lymphocytes 32      % Monocytes 9      % Eosinophils 3      % Basophils 1      % Immature Granulocytes 0      NRBCs per 100 WBC 0      Absolute Neutrophils 6.0      Absolute  Lymphocytes 3.5      Absolute Monocytes 0.9      Absolute Eosinophils 0.3      Absolute Basophils 0.2      Absolute Immature Granulocytes 0.0      Absolute NRBCs 0.0     HEPATIC FUNCTION PANEL - Abnormal    Bilirubin Total 1.4 (*)     Bilirubin Direct 0.3 (*)     Protein Total 6.6 (*)     Albumin 3.6      Alkaline Phosphatase 49      AST 42      ALT 30     INR - Normal    INR 1.08     PARTIAL THROMBOPLASTIN TIME - Normal    aPTT 33     COVID-19 VIRUS (CORONAVIRUS) BY PCR - Normal    SARS CoV2 PCR Negative     ETHYL ALCOHOL LEVEL - Normal    Alcohol ethyl <0.01     DRUGS OF ABUSE SCREEN URINE POCT   TYPE AND SCREEN, ADULT    ABO/RH(D) A POS      Antibody Screen Negative      SPECIMEN EXPIRATION DATE [id number]     ABO/RH TYPE AND SCREEN        Emergency Department Course:  1104 Called both legal guardians      Reviewed:  I reviewed nursing notes, vitals and past medical history    Assessments:  1058 I obtained history and examined the patient as noted above.   1345 I rechecked the patient and explained findings.   1538 I rechecked the patient and explained findings.    Consults:  1353 I consulted with Dr. Lee   1414 I consulted with Dr. Harkins of Orthopedics.    Interventions:  1118 Ibuprofen 600 mg PO  1118 Cyclobenzaprine 10 mg PO  1119 Acetaminophen 975 mg PO  1414 Morphine 4 mg IV  1611 Lorazepam 1 mg IV  Haloperidol, 2 mg IV x1    Disposition:  The patient was admitted to the hospital under the care of Dr. Lee.     Impression & Plan     Medical Decision Making:  Yunior Angel is a 56 year old male who presents for evaluation of right hip pain pain.  It is unclear if there was a fall.  CMS is intact distally in the extremity.  Xrays reveal a fracture of the hip that will need orthopedic consultation.  The patients head to toe trauma exam is otherwise normal at this time and no further trauma workup is needed as I believe there is no signs of serious head, neck, chest, spinal, extremity or  abdominal injuries.   Will admit to medicine for further cares and ortho consultation.  Pain control achieved.      Per legal guardians patient has demonstrated an abnormal behavior over the last several days including for speech and very spasmodic movements.  From review I do not see any medications that should cause dystonic movements.  They deny any alcohol or drug use.  He had so much psychomotor agitation that he was attempting to get out of bed and subsequently received anxiolytics as noted above.  I believe he would likely benefit from a medication holiday.      Diagnosis:    ICD-10-CM    1. Closed displaced fracture of greater trochanter of right femur, initial encounter (H)  S72.111A        Discharge Medications:  New Prescriptions    No medications on file       Scribe Disclosure:  I, Gilson Moses, am serving as a scribe at 10:58 AM on 5/2/2022 to document services personally performed by Efra Heard, based on my observations and the provider's statements to me.              Efra Heard MD  05/02/22 [id number]

## 2022-05-03 LAB
ANION GAP SERPL CALCULATED.3IONS-SCNC: 5 MMOL/L (ref 3–14)
ATRIAL RATE - MUSE: 78 BPM
BUN SERPL-MCNC: 21 MG/DL (ref 7–30)
CALCIUM SERPL-MCNC: 9.3 MG/DL (ref 8.5–10.1)
CHLORIDE BLD-SCNC: 107 MMOL/L (ref 94–109)
CO2 SERPL-SCNC: 25 MMOL/L (ref 20–32)
CREAT SERPL-MCNC: 0.98 MG/DL (ref 0.66–1.25)
DIASTOLIC BLOOD PRESSURE - MUSE: NORMAL MMHG
ERYTHROCYTE [DISTWIDTH] IN BLOOD BY AUTOMATED COUNT: 15.3 % (ref 10–15)
GFR SERPL CREATININE-BSD FRML MDRD: 90 ML/MIN/1.73M2
GLUCOSE BLD-MCNC: 117 MG/DL (ref 70–99)
HCT VFR BLD AUTO: 43.4 % (ref 40–53)
HGB BLD-MCNC: 13.5 G/DL (ref 13.3–17.7)
INTERPRETATION ECG - MUSE: NORMAL
MCH RBC QN AUTO: 23.1 PG (ref 26.5–33)
MCHC RBC AUTO-ENTMCNC: 31.1 G/DL (ref 31.5–36.5)
MCV RBC AUTO: 74 FL (ref 78–100)
P AXIS - MUSE: 70 DEGREES
PLATELET # BLD AUTO: 370 10E3/UL (ref 150–450)
POTASSIUM BLD-SCNC: 3.8 MMOL/L (ref 3.4–5.3)
PR INTERVAL - MUSE: 128 MS
QRS DURATION - MUSE: 94 MS
QT - MUSE: 404 MS
QTC - MUSE: 460 MS
R AXIS - MUSE: 61 DEGREES
RBC # BLD AUTO: 5.84 10E6/UL (ref 4.4–5.9)
SODIUM SERPL-SCNC: 137 MMOL/L (ref 133–144)
SYSTOLIC BLOOD PRESSURE - MUSE: NORMAL MMHG
T AXIS - MUSE: 60 DEGREES
T4 FREE SERPL-MCNC: 0.98 NG/DL (ref 0.76–1.46)
TROPONIN I SERPL HS-MCNC: 13 NG/L
TSH SERPL DL<=0.005 MIU/L-ACNC: 0.42 MU/L (ref 0.4–4)
VENTRICULAR RATE- MUSE: 78 BPM
WBC # BLD AUTO: 10.4 10E3/UL (ref 4–11)

## 2022-05-03 PROCEDURE — 80048 BASIC METABOLIC PNL TOTAL CA: CPT | Performed by: INTERNAL MEDICINE

## 2022-05-03 PROCEDURE — 250N000013 HC RX MED GY IP 250 OP 250 PS 637: Performed by: INTERNAL MEDICINE

## 2022-05-03 PROCEDURE — 99233 SBSQ HOSP IP/OBS HIGH 50: CPT | Performed by: HOSPITALIST

## 2022-05-03 PROCEDURE — 120N000001 HC R&B MED SURG/OB

## 2022-05-03 PROCEDURE — 250N000013 HC RX MED GY IP 250 OP 250 PS 637: Performed by: HOSPITALIST

## 2022-05-03 PROCEDURE — 85027 COMPLETE CBC AUTOMATED: CPT | Performed by: INTERNAL MEDICINE

## 2022-05-03 PROCEDURE — 84443 ASSAY THYROID STIM HORMONE: CPT | Performed by: HOSPITALIST

## 2022-05-03 PROCEDURE — 36415 COLL VENOUS BLD VENIPUNCTURE: CPT | Performed by: INTERNAL MEDICINE

## 2022-05-03 PROCEDURE — 99221 1ST HOSP IP/OBS SF/LOW 40: CPT | Performed by: PSYCHIATRY & NEUROLOGY

## 2022-05-03 PROCEDURE — 84439 ASSAY OF FREE THYROXINE: CPT | Performed by: HOSPITALIST

## 2022-05-03 PROCEDURE — 84484 ASSAY OF TROPONIN QUANT: CPT | Performed by: HOSPITALIST

## 2022-05-03 RX ORDER — PAROXETINE 20 MG/1
40 TABLET, FILM COATED ORAL DAILY
Status: DISCONTINUED | OUTPATIENT
Start: 2022-05-04 | End: 2022-05-17 | Stop reason: HOSPADM

## 2022-05-03 RX ORDER — LORAZEPAM 0.5 MG/1
0.5 TABLET ORAL EVERY 4 HOURS PRN
Status: DISCONTINUED | OUTPATIENT
Start: 2022-05-03 | End: 2022-05-08

## 2022-05-03 RX ORDER — ACETAMINOPHEN 325 MG/1
650 TABLET ORAL 4 TIMES DAILY
Status: DISCONTINUED | OUTPATIENT
Start: 2022-05-03 | End: 2022-05-17 | Stop reason: HOSPADM

## 2022-05-03 RX ADMIN — LORAZEPAM 0.5 MG: 0.5 TABLET ORAL at 14:33

## 2022-05-03 RX ADMIN — HYDROXYZINE HYDROCHLORIDE 25 MG: 25 TABLET, FILM COATED ORAL at 09:46

## 2022-05-03 RX ADMIN — LORAZEPAM 0.5 MG: 0.5 TABLET ORAL at 18:32

## 2022-05-03 RX ADMIN — OXYCODONE HYDROCHLORIDE 10 MG: 5 TABLET ORAL at 20:08

## 2022-05-03 RX ADMIN — BACLOFEN 10 MG: 10 TABLET ORAL at 20:08

## 2022-05-03 RX ADMIN — Medication 1 TABLET: at 08:46

## 2022-05-03 RX ADMIN — OXYCODONE HYDROCHLORIDE 5 MG: 5 TABLET ORAL at 14:01

## 2022-05-03 RX ADMIN — ACETAMINOPHEN 650 MG: 325 TABLET ORAL at 10:54

## 2022-05-03 RX ADMIN — Medication 1 TABLET: at 20:08

## 2022-05-03 RX ADMIN — BACLOFEN 10 MG: 10 TABLET ORAL at 08:46

## 2022-05-03 RX ADMIN — Medication 1000 UNITS: at 08:46

## 2022-05-03 RX ADMIN — ACETAMINOPHEN 650 MG: 325 TABLET ORAL at 18:32

## 2022-05-03 RX ADMIN — PAROXETINE HYDROCHLORIDE 60 MG: 30 TABLET, FILM COATED ORAL at 08:46

## 2022-05-03 ASSESSMENT — ACTIVITIES OF DAILY LIVING (ADL)
ADLS_ACUITY_SCORE: 10
ADLS_ACUITY_SCORE: 8
ADLS_ACUITY_SCORE: 8
ADLS_ACUITY_SCORE: 10
ADLS_ACUITY_SCORE: 10
ADLS_ACUITY_SCORE: 8
ADLS_ACUITY_SCORE: 10
ADLS_ACUITY_SCORE: 8
ADLS_ACUITY_SCORE: 10
ADLS_ACUITY_SCORE: 8
ADLS_ACUITY_SCORE: 10
ADLS_ACUITY_SCORE: 8
ADLS_ACUITY_SCORE: 10
ADLS_ACUITY_SCORE: 8
ADLS_ACUITY_SCORE: 8
ADLS_ACUITY_SCORE: 10

## 2022-05-03 NOTE — PLAN OF CARE
Goal Outcome Evaluation:      Patient is disoriented to time/situation. Bedrest. Pulses and sensation intact. Redness present to coccyx and R foot. Micro turns completed q2. Patient moved around frequently in bed. IV dilaudid given x1 for pain, has denies any pain since. Tremors present in all extremities.

## 2022-05-03 NOTE — PLAN OF CARE
Here for right hip fx. A&O only to self and restless. Emotional and frustrated at times and disoriented to environment. Generalized tremor present w frequent extra movements in all extremities. Pt is WBAT, but unable to follow commands to use walker while ambulating. Unable to tolerate gait belt. Bed alarms on. Given tylenol, oxycodone, hydroxyzine PRN. PO ativan given for anxiety. VSS on RA. Voiding in urinal. Poor appetite on regular diet. Complaining of dry mouth- taking in a lot of fluid. Takes pills whole. Seen by psychiatry this morning. Neurology consult pending.     At 1745 pt had an assisted fall. RRT was called. Pt denied pain. Vitals were stable. Able to be assisted back to bed. Guardian Patsy called and updated.

## 2022-05-03 NOTE — PROGRESS NOTES
"[facility]    Hospitalist Progress Note      Assessment & Plan   Yunior Angel is a 56 year old male admitted on 5/2/2022. 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  Hip x-ray shows mildly displaced right periprosthetic hip fracture.    Closed displaced fracture of greater trochanter of right femur, initial encounter (H)    S/P total hip arthroplasty  ? Ortho consult: Recommending trial of nonsurgical management.  WBAT.  ? PT/OT.  ? Acetaminophen and/or opioids as needed for pain  ? Chest x-ray NEG; admission EKG inverted T waves.  We will check trops, repeat EKG in AM  ? Review of records indicates minimal use of narcotics, oxycodone p.o. x1, MS DC'd by Psychiatry.  We will schedule acetaminophen and attempts to decrease need for narcotics especially given behavioral changes, see below.  Ortho to continue to follow regarding continued abduction and clarify weightbearing status.  Upon clarification PT as able.      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 Yunior's mother and father are his co-guardians.   Apparently brother is also a coguardian.  According to mother who was present during encounter both she and father have noted significant behavioral changes recently with \"hyperactivity\" both motor and in thought processes, verbalization.  No history of same.  Patient carries a diagnosis of depression on paroxetine 60 mg, TBI.  It does not appear that he has been on any other psychotropic medications, no history of tardive dyskinesia.  No history of bipolar disorder.  TFTs WNL.  Head CT on admission negative.  -Psych consult.  Discussed with Dr. Basilio who recommends decreasing dose of paroxetine to 40 mg.  He advised use of as needed benzo diazepam for any agitation/aggression.  He feels that presentation most consistent with " hyperstimulation in current environment and baseline depression given his history of TBI currently in AL with limited by patient's perception of prognosis.  He did not feel that current presentation consistent with movement disorder/TD, advised Neurology consult.  As above TFTs WNL.  Head CT on admission negative.  No history EtOH or drug use.  We will continue to monitor.      Gastroesophageal reflux disease with esophagitis  ? Continue PPI    Age-related osteoporosis with current pathological fracture with routine healing, subsequent encounter  ? Resume Fosamax as outpatient  ? Resume calcium and vitamin D    Hyperlipidemia LDL goal <160  ? Continue statin      DVT Prophylaxis: Pneumatic Compression Devices and Ambulate every shift  Code Status: No CPR- Do NOT Intubate  Expected Discharge:  > 2 days pending clinical improvement both related to periprosthetic fracture likely to TCU and acute behavioral/motor activity evaluation.    Christian Bae MD  Text Page (7am - 6pm, M-F)    Total unit/floor time 45 minutes:  time consisted of the following assuming Patient care in complex Patient with fracture, behavioral and motor abnormalities, examination of patient, review of records including labs, imaging results, medications, interdisciplinary notes and completing documentation; > 50% Counseling/discussion with Patient and Mother regarding current condition including behavioral and motor changes and Coordination of Care time with Nursing  and Specialists, Psychiatry, Dr Basilio regarding behaviroal and motor finding and Ortho regard periprosthetic fracture care plan, management and surveillance.     Interval History   Patient denies significant pain, denies known trauma or injury.  Mother present during encounter and both describe recent changes with regards to involuntary motor activity and hyperactivity.  No history of same.  Both both deny significant change regarding depression, anxiety.  Patient reported  compliant to medications, lives in [place] who manages medications.  No history of EtOH or other drug use.  As above TFTs WNL.  No suicidal ideation.    SH: No tobacco.  Parents involved in care; Patient lives in AL with TBI    ROS: Complete ROS negative except as above.     -Data reviewed today: I reviewed all new labs and imaging results over the last 24 hours.    Physical Exam   Temp: 98  F (36.7  C) Temp src: Oral BP: 126/71 Pulse: 91   Resp: 18 SpO2: 95 % O2 Device: None (Room air) Oxygen Delivery: 2 LPM  There were no vitals filed for this visit.  Vital Signs with Ranges  Temp:  [98  F (36.7  C)-98.3  F (36.8  C)] 98  F (36.7  C)  Pulse:  [76-97] 91  Resp:  [12-20] 18  BP: (114-136)/(57-78) 126/71  SpO2:  [92 %-98 %] 95 %  I/O last 3 completed shifts:  In: 480 [P.O.:480]  Out: 200 [Urine:200]    General/Constitutional:  NAD, hyperactive in stream of though and verbalization; involuntary movements all extremities  HEENT/Head Exam:  atraumatic  Eyes:  PERRL, no conjunctivits  Mouth/Oral Pharynx:  Buccal mucosa WNL  Chest/Respiratory:  Air exchange bilateral lung fields; no rales or wheeze. Respiration nonlabored.  Cardiovascular:  no murmur appreciated.  LE edema none  Gastrointestinal/Abdomen:  soft, nontender, no rebound, guarding or other peritoneal signs.  Musculoskeletal:  extremities warm, dry, noncyanotic; no acitve synovitis.  Neuro.  As above episodic movement/bleeding of extremities without purpose, gross motor tested, nonfocal, sensory intact  Psych oriented, affect hyperactive and stream of thought and verbalization    Medications       acetaminophen  650 mg Oral 4x Daily     baclofen  10 mg Oral BID     calcium carbonate 600 mg-vitamin D 400 units  1 tablet Oral BID     [START ON 5/4/2022] pantoprazole  40 mg Oral Once per day on Sun Mon Wed Fri     [START ON 5/4/2022] PARoxetine  40 mg Oral Daily     simvastatin  40 mg Oral At Bedtime     sodium chloride (PF)  3 mL Intracatheter Q8H     Vitamin D3   1,000 Units Oral Daily       Data   Recent Labs   Lab 05/03/22  0532 05/02/22  2223 05/02/22  1407   WBC 10.4  --  11.0   HGB 13.5  --  12.7*   MCV 74*  --  73*     --  367   INR  --  1.11 1.08     --  139   POTASSIUM 3.8  --  3.5   CHLORIDE 107  --  107   CO2 25  --  27   BUN 21  --  20   CR 0.98  --  1.13   ANIONGAP 5  --  5   JUSTA 9.3  --  9.3   *  --  103*   ALBUMIN  --   --  3.6   PROTTOTAL  --   --  6.6*   BILITOTAL  --   --  1.4*   ALKPHOS  --   --  49   ALT  --   --  30   AST  --   --  42       Recent Results (from the past 24 hour(s))   CT Head w/o Contrast    Narrative    CT SCAN OF THE HEAD WITHOUT CONTRAST   5/2/2022 2:52 PM     HISTORY: Mental status change, unknown cause.    TECHNIQUE:  Axial images of the head and coronal reformations without  IV contrast material. Radiation dose for this scan was reduced using  automated exposure control, adjustment of the mA and/or kV according  to patient size, or iterative reconstruction technique.    COMPARISON: Head MRI 6/28/2004    FINDINGS:  Moderate volume loss is present. Chronic encephalomalacia involving  the bilateral temporal lobe and bilateral frontal lobes. No evidence  of acute ischemia or hemorrhage. White matter hypoattenuation likely  represents chronic small vessel ischemic change. Changes of prior left  frontal craniotomy. Trace paranasal sinus mucosal thickening. Trace  opacification of the right mastoid tip. Presumed cerumen within the  external auditory canals.      Impression    IMPRESSION:     1. No acute intracranial abnormality.  2. Chronic bilateral frontotemporal encephalomalacia.  3. Changes of prior left frontal craniotomy.    JANEL TERRY MD         SYSTEM ID:  [id number]

## 2022-05-03 NOTE — PLAN OF CARE
RECEIVING UNIT ED HANDOFF REVIEW    ED Nurse Handoff Report was reviewed by: Sudha Mckeon RN on May 2, 2022 at 9:07 PM

## 2022-05-03 NOTE — PROVIDER NOTIFICATION
MD Notification    Notified Person: PA    Notified Person Name: Dziedzic    Notification Date/Time: 5/3 1015    Notification Interaction: left message on phone and called again at 1330    Purpose of Notification: regarding pt mobility- right foot color turns deep purple while standing, returns to red once elevated. Can he continue WBAT per ortho rec?      Orders Received: awaiting return call     Discussed via telephone. Ok to continue WBAT. Mother brought in brace which is at bedside- to be evaluated by ortho.

## 2022-05-03 NOTE — CONSULTS
Consult Date: 05/03/2022    PSYCHIATRY CONSULTATION    REFERRING PHYSICIAN:  Ann Lee MD    REASON FOR REFERRAL:  History of traumatic brain injury, agitation.    HISTORY OF PRESENT ILLNESS:  Mr. Yunior Angel is a pleasant 56-year-old single gentleman residing in an assisted living setting.  He has a history of a substantial traumatic brain injury at age 26 with chronic neurological sequelae, residing in a group home for much of his adult life.  He has a history of atypical migraine, hyperlipidemia.  He presented to Emergency Department initially with right hip pain.  He did have a mildly displaced right periprosthetic hip fracture.  He is currently convalescing on station 33.  Psychiatry is asked to assess in the setting of history of traumatic brain injury and agitation.    The patient reports he is not entirely sure why he is here.  He does have ongoing confusion.  He believes that the year is 2023, does not recall the month.  He does acknowledge he has had cognitive problems since suffering a traumatic brain injury.  He is unable to give a very significant history.  He does acknowledge that he does not like his assisted living facility.  He acknowledges he has had poor appetite there and does not like the food there and so forth.  Denies problems sleeping.  Denies any suicidal ideation at this time, has had some passive thoughts historically, but does not feel like she would act on them.  No thoughts of hurting others.  States he is not a violent person.  Denies any history of bipolar or psychotic symptomatology.    Did speak to the patient's mother who is his guardian who noted the patient had acute decompensation over the past week where he was more agitated and demonstrated abnormal movements.  Describes some jerking movements and so forth.  Her  and her were quite alarmed by it.  She is exasperated as he has also declining from a mental standpoint in the setting of moving into assisted living  in the past year.  They were hopeful that he would actually stabilize in that setting, but unfortunately he decompensated as he has poor insight and really dislikes that he is living with older people.  He has been isolating, spending a lot of time in bed and minimally getting out and about, including for meals and so forth.  They have rotation of family members that come visit him on certain days of the week, but it is disheartening just how poorly he has been doing.  Mother does believe that he has had any medication adjustments.  He does take Paxil 60 mg a day and mother states that he has been on this for many years.  He does not have a psychiatrist.  She does not recall if he has ever been psychiatrically hospitalized.  He has had periods where he has been agitated, verbally aggressive at times, even a bit physically intimidating, although it sounds like no overt violent behaviors.    PAST MEDICAL HISTORY:  Atypical migraine, closed head injury, long-term cognitive impairments, mixed hyperlipidemia, organic brain disorder.    PAST SURGICAL HISTORY:  Reviewed from the EMR.    SOCIAL HISTORY:  The patient resides in a group home.  He has combined guardianship of his parents and his brother Ra.  He is a never smoker, no alcohol, no drug use.  The patient apparently was quite intelligent prior to his injury.  He had a 4.0 GPA.  He was engaged to get , actually celebrating the engagement was the night he had a fall downstairs and suffered his brain injury.  He has mostly been living in and out of different group homes as an adult, although in the past year, had to move out of a more independent apartment setting because he was becoming too difficult to manage for his family, but has really struggled to adjust to living in assisted living.    FAMILY HISTORY:  Reviewed from the EMR.    PRIOR TO ADMISSION MEDICATIONS:  Calcium, paroxetine 60 mg daily, vitamin D, alendronate, aspirin, multivitamin,  simvastatin, senna docusate.    ALLERGIES:  NO MEDICATION ALLERGIES.    PHYSICAL EXAMINATION:    VITAL SIGNS:  Temperature 98.8, blood pressure 119/75, pulse 70, respiratory rate 16, SpO2 96% on room air.    MENTAL STATUS EXAMINATION:  He is dressed in a hospital gown, lying down in bed.  He has fair eye contact.  Sometimes he is having some jerking movements of the left upper extremity, it moves up and about.  He states that he does not why it does that essentially, not certain that there was a hemiballismus quality to it.  No tremors apparent.  He does have somewhat jerking movements of the lower extremities, mostly the toes and feet that seems suggestive of somewhat chronic traumatic brain injury.  He does feel subjectively agitated.  He was restless, anxious and depressed.  His affect are congruent.  Speech is nonpressured, slightly unique prosody consistent with a history of traumatic brain injury.  He was slightly disheveled appearing.  Muscle tone appears normal in the upper extremity, no rigidity, no hyperreflexia in the upper extremities.  He is alert and oriented, although he is off on the year (2023) and the month, able to recite the days of week in reverse.  Unable to recall who the president is.  He states he has poor memory generally. Insight and judgment appear fair.  He does acknowledge psychiatric symptoms and the need for medical care at this time.  Judgment has been poor in the outpatient setting as he has been struggling with function at assisted living.  No formal thought disorder apparent.  He is not responding to internal stimuli.  Denies perceptual disturbances.  Denies suicidal ideation other than occasional passive thoughts of death.  No homicidal ideation.    IMPRESSION:     1.  Major depressive disorder, severe, without psychotic features.  2.  Traumatic brain injury with chronic cognitive and neurologic sequelae.    FORMULATION:  This is a pleasant gentleman, unfortunately with history  of serious traumatic brain injuries with neurological and cognitive sequelae since age 26, presenting with hip pain, found to have a fracture.  He is currently convalescing on station 33.  He did have some agitation initially that was treated.  Currently he is essentially just more anxious and restless.  He does have some abnormal movement, hard to clarify what type of movements they are.  There are somewhat of a jerking, jittery component.  The patient's mother and father indicate that this is quite alarming to them and new for him in the past week.  He does have some serious depression that has been going on after he moved into assisted living, has been functioning very poorly, not getting up and doing much of anything there, has been harder for his family to manage.    PLAN:     1.  Recommend reducing paroxetine to 40 mg daily given that 60 mg does not seem to be that effective for his depression anyway, and it is possible the higher dosing might be over activating.  2.  Recommend Neurology consultation to rule out neurological causes of his motor movements and agitation.  It is possible he could have a functional neurological disorder or agitation secondary to his depression, but this would be a diagnosis of exclusion with further medical and neurological workup and rule outs. He did have a CT scan of the brain yesterday and his primary team is also going to check a thyroid panel.  One wonders if he would benefit from further neurological workup, possibly brain MRI and/or EEG, but will defer to Neurology's expertise in that regard.  3.  Reconsult Psychiatry as needed.  He might benefit from inpatient Psychiatry stabilization once he is medically and neurologically cleared, though likely would have placement challenges.  Otherwise, consideration of relocation to a group home setting might also be ideal.  Unit  should try to reach out to the patient's  and continue contact with the  patient's guardians.  I did attempt to reach his brother, Ra, on 2 occasions, but did not get an answer.      Gurdeep Basilio MD        D: 2022   T: 2022   MT: CANDELARIA    Name:     CASIE CORLEY  MRN:      [id number]        Account:      [id number]   :      1965           Consult Date: 2022     Document: [id number]

## 2022-05-03 NOTE — PROGRESS NOTES
[facility]    Fall Note  5/3/2022   Time Called: 1740    Date of Admission:  5/2/2022  Code Status: DNR / DNI      Summary:  I was called to evaluate Yunior Angel, a 56 year old male following an assisted fall. The patient is admitted for evaluation of abnormal movements and fall w/ right hip fracture. PMH includes TBI with neurological sequelae, dyslipidemia.  Patient denies preceding symptoms including chest pain, dizziness, lightheadedness.     Assessment:  On my arrival, the patient is lying supine on the ground.  There are multiple staff members around him.  Nursing staff stated they came in the room and he was struggling at the side of the bed, she assisted him down to the ground.  She states he did not hit his head.  Vitals were within normal limits, BP 130s/70s, heart rate 90s, oxygen 97% on room air.  The patient is in mild emotional distress, stating he feels embarrassed, but does not seem to be in intractable pain.  He was quite antsy to get off the floor, denied any neck pain, no concern for cervical injury given the assisted nature of the fall.  I was able to examine major joints which all had active ROM without limitation or pain.  The patient was assisted to a standing position and back into bed.  His right hip is shortened and somewhat internally rotated, which is not new.  There are some areas of erythema, on the top of his foot and shin, neither of which is new.  The patient was reassured in bed, 3 siderails up.    Temp: 97.8  F (36.6  C) Temp src: Oral BP: 114/58 Pulse: 83   Resp: 20 SpO2: 93 % O2 Device: None (Room air) Oxygen Delivery: 2 LPM     Plan:  --orthostatic vitals next time he stands     Physical Exam  Vitals and nursing note reviewed.   Constitutional:       General: He is not in acute distress.     Appearance: Normal appearance. He is not ill-appearing or toxic-appearing.   HENT:      Head: Atraumatic.      Mouth/Throat:      Mouth: Mucous membranes  are dry.   Eyes:      Extraocular Movements: Extraocular movements intact.      Pupils: Pupils are equal, round, and reactive to light.   Cardiovascular:      Rate and Rhythm: Normal rate and regular rhythm.   Pulmonary:      Effort: Pulmonary effort is normal.   Abdominal:      General: Abdomen is flat.      Palpations: Abdomen is soft.      Tenderness: There is no abdominal tenderness.   Musculoskeletal:      Right shoulder: Normal.      Left shoulder: Normal.      Right elbow: Normal.      Left elbow: Normal.      Right wrist: Normal.      Left wrist: Normal.      Right hand: Normal.      Left hand: Normal.      Cervical back: Normal range of motion and neck supple. No rigidity or tenderness.      Right hip: Deformity and tenderness present. Decreased range of motion. Decreased strength.      Right knee: Normal.      Left knee: Normal.      Right ankle: Normal.      Left ankle: Normal.      Right foot: Normal.      Left foot: Normal.   Skin:     General: Skin is warm and dry.   Neurological:      Mental Status: He is alert.   Psychiatric:         Judgment: Judgment is impulsive.        Not all injuries from a fall are obvious on initial exam, please to not hesitate to call for reassessment by House provider should the patient's clinical status change, report new pain or symptoms.         JOSE A Aguilar [facility]  Hospitalist Service  House Officer  Pager: [phone] (1o - 6p)

## 2022-05-03 NOTE — PROVIDER NOTIFICATION
"MD Notification    Notified Person: MD    Notified Person Name: Kathia     Notification Date/Time: 5/3 1325    Notification Interaction: web based paging and [employer] messaging    Purpose of Notification: Pt is very agitated and restless. Is there something we could give him PRN?    Orders Received: waiting response     Comments:  Second page to MD [id number]- Pt needs something for aggression/agitation STAT.  Pt becoming increasingly overwhelmed with environment despite attempted distraction. Yelling at staff \"I cant take it anymore\". Disoriented to situation, place, time.      MD called [phone] and discussed condition. Ordered benzodiazepine. Will give now.     "

## 2022-05-03 NOTE — UTILIZATION REVIEW
Admission Status; Secondary Review Determination       Under the authority of the Utilization Management Committee, the utilization review process indicated a secondary review on the above patient. The review outcome is based on review of the medical records, discussions with staff, and applying clinical experience noted on the date of the review.     (x) Inpatient Status Appropriate - This patient's medical care is consistent with medical management for inpatient care and reasonable inpatient medical practice.     RATIONALE FOR DETERMINATION   56 year old male admitted on 5/2/2022. 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  Hip x-ray shows mildly displaced right periprosthetic hip fracture.  Patient requires inpatient admission versus short stay observation or outpatient treatment for the following reasons: Patient complex history, his traumatic brain injury, risk for clinical deterioration and anticipated care for more than 2 midnights of the plan for admission.  The expected length of stay at the time of admission was more than 2 nights because of the severity of illness, intensity of service provided, and risk for adverse outcome. Inpatient admission is appropriate.         This document was produced using voice recognition software       The information on this document is developed by the utilization review team in order for the business office to ensure compliance. This only denotes the appropriateness of proper admission status and does not reflect the quality of care rendered.   The definitions of Inpatient Status and Observation Status used in making the determination above are those provided in the CMS Coverage Manual, Chapter 1 and Chapter 6, section 70.4.   Sincerely,   DEVORAH ZIMMERMAN MD   System Medical Director   Utilization Management   [facility]  Services.

## 2022-05-04 ENCOUNTER — HOSPITAL ENCOUNTER (INPATIENT)
Dept: NEUROLOGY | Facility: CLINIC | Age: 57
Discharge: HOME | DRG: 543 | End: 2022-05-04
Attending: PSYCHIATRY & NEUROLOGY
Payer: COMMERCIAL

## 2022-05-04 PROCEDURE — 93010 ELECTROCARDIOGRAM REPORT: CPT | Performed by: INTERNAL MEDICINE

## 2022-05-04 PROCEDURE — 250N000013 HC RX MED GY IP 250 OP 250 PS 637: Performed by: HOSPITALIST

## 2022-05-04 PROCEDURE — 95816 EEG AWAKE AND DROWSY: CPT

## 2022-05-04 PROCEDURE — 250N000013 HC RX MED GY IP 250 OP 250 PS 637: Performed by: INTERNAL MEDICINE

## 2022-05-04 PROCEDURE — 93005 ELECTROCARDIOGRAM TRACING: CPT

## 2022-05-04 PROCEDURE — 99233 SBSQ HOSP IP/OBS HIGH 50: CPT | Performed by: HOSPITALIST

## 2022-05-04 PROCEDURE — 120N000001 HC R&B MED SURG/OB

## 2022-05-04 RX ADMIN — BACLOFEN 10 MG: 10 TABLET ORAL at 21:55

## 2022-05-04 RX ADMIN — OXYCODONE HYDROCHLORIDE 5 MG: 5 TABLET ORAL at 08:40

## 2022-05-04 RX ADMIN — OXYCODONE HYDROCHLORIDE 5 MG: 5 TABLET ORAL at 04:51

## 2022-05-04 RX ADMIN — PANTOPRAZOLE SODIUM 40 MG: 40 TABLET, DELAYED RELEASE ORAL at 08:59

## 2022-05-04 RX ADMIN — Medication 1000 UNITS: at 08:43

## 2022-05-04 RX ADMIN — HYDROXYZINE HYDROCHLORIDE 25 MG: 25 TABLET, FILM COATED ORAL at 08:59

## 2022-05-04 RX ADMIN — Medication 1 TABLET: at 21:55

## 2022-05-04 RX ADMIN — BACLOFEN 10 MG: 10 TABLET ORAL at 08:43

## 2022-05-04 RX ADMIN — ACETAMINOPHEN 650 MG: 325 TABLET ORAL at 08:39

## 2022-05-04 RX ADMIN — PAROXETINE HYDROCHLORIDE 40 MG: 20 TABLET, FILM COATED ORAL at 08:44

## 2022-05-04 RX ADMIN — Medication 1 TABLET: at 08:43

## 2022-05-04 RX ADMIN — SIMVASTATIN 40 MG: 40 TABLET, FILM COATED ORAL at 21:55

## 2022-05-04 RX ADMIN — ACETAMINOPHEN 650 MG: 325 TABLET ORAL at 21:54

## 2022-05-04 ASSESSMENT — ACTIVITIES OF DAILY LIVING (ADL)
ADLS_ACUITY_SCORE: 10
ADLS_ACUITY_SCORE: 12
ADLS_ACUITY_SCORE: 10
ADLS_ACUITY_SCORE: 12
ADLS_ACUITY_SCORE: 12
ADLS_ACUITY_SCORE: 10
ADLS_ACUITY_SCORE: 12

## 2022-05-04 NOTE — PLAN OF CARE
1900h-0700h: Pt Alert but disoriented to place & situation at times. O2Sat 94% on RA. Clear bilateral breath sound. Voiding to urinal. Last BM 05/02. Ambulates w/ FWW & WBAT. On Regular diet, poor appetite. Verbalized frustration, he feels like most of the time he doesn't have the freedom -that everything should be done in specific time such as eating his meal, using the bathroom & sitting in the chair. Emotional support provided. Constant movement of extremities w/ mild to moderate tremors. R foot red, pulses palpable. Reported leg pain of 10/10, pt requested for pain control and would like to sleep tonight. IV SL on L AC. Oxycodone 10 mg given. Neuro, PT & Ortho consult still pending.

## 2022-05-04 NOTE — CONSULTS
DATE OF SERVICE : 5/4/2022    DATE OF ADMISSION: 5/2/2022    NEUROLOGICAL CONSULTATION    REQUESTED BY Dr. Lee, Ann Arreaga MD    SOURCE OF INFORMATION:Patient and EHR    REASON FOR CONSULTATION:   Altered mental status     HISTORY OF PRESENT ILLNESS:     He is a 56 years old man with a history of traumatic brain injury,long standing cognitive deficit presented to the emergency room for evaluation regarding right lower extremity pain that was worsening prompted for further evaluation .  Patient has history of brain injury his behaviors are much more erratic than normal and his speech sounded forceful, spasmodic movements.  He had psychomotor agitation attempting to get out of the bed and has received anxiolytics in the emergency room lorazepam.  Further interventions in the emergency room being Flexeril morphine haloperidol.    X-ray of the right hip showed right hip total arthroplasty, lucency through the base of greater trochanter likely represents mildly displaced fracture./2009.    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       PSHx:   Past Surgical History:   Procedure Laterality Date     ARTHROPLASTY HIP Right 3/14/2018    Procedure: ARTHROPLASTY HIP;  Right total hip arthroplasty;  Surgeon: Parmjit Zabala MD;  Location:  OR     COLONOSCOPY N/A 12/31/2014    Procedure: COLONOSCOPY;  Surgeon: Inna Gonzalez MD;  Location:  GI     ESOPHAGOSCOPY, GASTROSCOPY, DUODENOSCOPY (EGD), COMBINED N/A 12/31/2014    Procedure: COMBINED ESOPHAGOSCOPY, GASTROSCOPY, DUODENOSCOPY (EGD), BIOPSY SINGLE OR MULTIPLE;  Surgeon: Inna Gonzalez MD;  Location:  GI     SURGICAL HISTORY OF -       foot surgery     SURGICAL HISTORY OF -   2006    1.  Avulsion of the right  great toenail.  2.  Excision of bone cyst distal phalanx, right great toe.        Current Facility-Administered Medications   Medication Dose Route Frequency     acetaminophen  650 mg Oral 4x Daily     baclofen  10 mg Oral BID     calcium carbonate 600 mg-vitamin D 400 units  1 tablet Oral BID     pantoprazole  40 mg Oral Once per day on Sun Mon Wed Fri     PARoxetine  40 mg Oral Daily     simvastatin  40 mg Oral At Bedtime     sodium chloride (PF)  3 mL Intracatheter Q8H     Vitamin D3  1,000 Units Oral Daily     Medications Prior to Admission   Medication Sig Dispense Refill Last Dose     alendronate (FOSAMAX) 70 MG tablet TAKE 1 TABLET BY MOUTH ONCE WEEKLY. TAKE 30 MINUTES BEFORE FIRST MEAL OF THE DAY WITH WATER AND REMAIN UPRIGHT FOR 30 MINUTES 12 tablet 3 4/27/2022     baclofen (LIORESAL) 10 MG tablet Take 1 tablet (10 mg) by mouth 2 times daily 180 tablet 0 5/2/2022 at am     Calcium-Vitamin D 600-200 MG-UNIT TABS Take 1 tablet by mouth 2 times daily 90 tablet 0 5/2/2022 at am     multivitamin, therapeutic (THERA-VIT) TABS tablet Take 1 tablet by mouth daily   5/2/2022 at Unknown time     omeprazole (PRILOSEC) 20 MG DR capsule Take 20 mg by mouth See Admin Instructions Every Sun, Mon, Wed, Fri 5/2/2022 at Unknown time     PARoxetine (PAXIL) 40 MG tablet TAKE 1.5 TABLETS BY MOUTH EVERY MORNING (Patient taking differently: TAKE 1.5 TABLETS BY MOUTH EVERY MORNING. Dose = 60 mg) 135 tablet 1 5/2/2022 at Unknown time     senna-docusate (SENOKOT-S/PERICOLACE) 8.6-50 MG tablet Take 1 tablet by mouth daily as needed for constipation   prn med     simvastatin (ZOCOR) 80 MG tablet Take 1 tablet (80 mg) by mouth At Bedtime (Patient taking differently: Take 40 mg by mouth At Bedtime) 90 tablet 3 5/1/2022 at Unknown time     Vitamin D, Cholecalciferol, 25 MCG (1000 UT) TABS Take 1 tablet (1,000 Units) by mouth daily   5/2/2022 at Unknown time     aspirin (ASA) 325 MG EC tablet Take 1 tablet (325 mg) by mouth daily 70  tablet 0      order for DME Equipment being ordered: leg brace - Custom Hinged AFO with a lift 1 Units 0           Allergies   Allergen Reactions     Insect Extract      red ants         SocHx:  reports that he has never smoked. He has never used smokeless tobacco. He reports that he does not drink alcohol and does not use drugs.    Family History   Problem Relation Age of Onset     Migraines Father      Family History Negative Mother      Cancer - colorectal No family hx of      Colon Cancer No family hx of          ROS:   Refer to H&P    PHYSICAL EXAM    /69 (BP Location: Right arm)   Pulse 80   Temp 97.9  F (36.6  C) (Oral)   Resp 16   SpO2 97%     Exam noted to have no acute distress patient has pressured and tangential speech.  No labored breathing  Right lower extremity is stiff, right lower extremity is shorter in comparison to the left.  Right foot is turned inwards.  At baseline patient uses AFO bracing.  Alert and oriented to self able to follow simple commands  Pupils equal and round visual fields are full extraocular movements were intact face is symmetric hearing normal to conversation tongue movements are normal  Bilateral upper extremity strength is intact  Right lower extremity strength testing is limited, right lower extremity is in an extended position with right foot is turned in, inverted position, unable to flex at right knee  Intermittently left foot and hands moving not rhythmic ( tardive dyskinetic)    Left lower extremity strength is intact  Reflexes 2+ in the right upper extremity 2 over the left upper extremity  3+ at the right knee, 3 left knee, 3 left ankle  Sustained clonus at the right ankle plantar is upgoing      Lab and X-ray:   Recent Labs   Lab Test 05/03/22  0532 05/02/22  2223 05/02/22  1407 11/23/21  1213   WBC 10.4  --    < > 6.9   HGB 13.5  --    < > 13.8     --    < > 388   INR  --  1.11   < >  --    POTASSIUM 3.8  --    < > 4.5   LDL  --   --   --  96    <  > = values in this interval not displayed.     Recent Labs   Lab Test 05/03/22  0532 05/02/22  1407   POTASSIUM 3.8 3.5   CHLORIDE 107 107   BUN 21 20     Recent Labs   Lab Test 05/03/22  0532 05/02/22  2223 05/02/22  1407   WBC 10.4  --  11.0   HGB 13.5  --  12.7*   MCV 74*  --  73*     --  367   INR  --  1.11 1.08     Recent Labs   Lab Test 05/02/22  1407 09/29/20  1036   AST 42 15   ALT 30 28   ALKPHOS 49 49     Recent Labs   Lab Test 11/23/21  1213 09/29/20  1036   HDL 40 36*   LDL 96 100*     No lab results found.    Laboratory results were personally interpreted and reviewed in detail.  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)     Hyperlipidemia LDL goal <160     Overweight (BMI 25.0-29.9)     Gout     Migraine without aura and without status migrainosus, not intractable     Benign neoplasm of sigmoid colon     Gastroesophageal reflux disease with esophagitis     Closed head injury, sequela     Irritable bowel syndrome with diarrhea     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  Patient had residual deficits secondary to TBI using right foot AFO bracing.  Family members noted patient had change in behaviors in the past week, questionable unwitnessed fall.  Given the patient's history of TBI he is increased  risk for seizures.  Plan to obtain EEG to assess for intrinsic seizure risk.  If he continues to have stiffness in the right lower extremity consider Baclofen. Cautioned seizure risk associated with Baclofen use.  Avoid use of Haldol      Thank you for the opportunity to provide consultation on Yunior Angel

## 2022-05-04 NOTE — PROVIDER NOTIFICATION
"Notified MD at 1045 AM regarding patient's ongoing hyperactivity, rambling speech content, cooperative with mild to moderate anxiety responsive to Vistaril dose given.  Pain improved with oxycodone and tylenol. Now able to move RLE. RLE with extension/clonus hyperrigidity increased.  Unable to plantar flexion. Ice pack to hip. Mother at bedside and very calming with patient.     Observed  extrapyrimidal movements/dyscoordinate UE and Facial. Improving mildly per mother in comparison to arrival to ER on 5/2.      Patient noted to be able to attend concert, have \"normal Conversation\" two weeks ago PTA.         Spoke with: Dr. Bae    Orders were not obtained.    Comments: Neuro consult today. Psych following. Dr. Bae at bedside to evaluate patient.         "

## 2022-05-04 NOTE — PROGRESS NOTES
"[facility]    Hospitalist Progress Note      Assessment & Plan   Yunior Angel is a 56 year old male admitted on 5/2/2022. 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  Hip x-ray shows mildly displaced right periprosthetic hip fracture.    Closed displaced fracture of greater trochanter of right femur, initial encounter (H)    S/P total hip arthroplasty  ? Ortho consult: Recommending trial of nonsurgical management.  WBAT.  ? PT/OT.  ? Acetaminophen and/or opioids as needed for pain.  Review of records indicates minimal use of narcotics, to oxycodone/24 hours, no use of IV hydromorphone, will DC.  Given mentation change, attempt minimal use of narcotics as able.  Patient responding well to regimen including hydroxyzine.  Scheduled acetaminophen and attempts to decrease use of narcotics.  ? Chest x-ray NEG; admission EKG inverted T waves.  Troponin negative.  Repeat EKG this morning no further inverted T's.  ? PT as able, per orthopedics weightbearing as tolerated.  Ortho following.  Advised patient to call for assistance with any mobility needs, history of fall yesterday.  Patient cannot recall events, see HARLEY note.  No new or changed complaints today, no apparent residual from fall, monitor.      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 Yunior's mother and father are his co-guardians.   Apparently brother is also a coguardian.  According to mother who was present during encounter both she and father have noted significant behavioral changes recently with \"hyperactivity\" both motor and in thought processes, verbalization.  No history of same.  Patient carries a diagnosis of depression on paroxetine 60 mg, TBI.  It does not appear that he has been on any other psychotropic medications, no history of tardive dyskinesia.  No history of " bipolar disorder.  TFTs WNL.  Head CT on admission negative.  No history of alcohol or drug use.  -Psych consult.  Discussed with Dr. Basilio 5/3/2022 who recommends decreasing dose of paroxetine to 40 mg.  He advised use of as needed benzo diazepam for any agitation/aggression.  He feels that presentation most consistent with hyperstimulation in current environment and baseline depression given his history of TBI currently in [place] with limited by patient's perception of prognosis.  He did not feel that current presentation consistent with movement disorder/TD, advised Neurology consult.  As above TFTs WNL.  Head CT on admission negative.  No history EtOH or drug use.  We will continue to monitor.  -See above, neurology consult pending.  No new or changed in mentation/behavior.  Monitor.      Gastroesophageal reflux disease with esophagitis  ? Continue PPI    Age-related osteoporosis with current pathological fracture with routine healing, subsequent encounter  ? Resume Fosamax as outpatient  ? Resume calcium and vitamin D    Hyperlipidemia LDL goal <160  ? Continue statin      DVT Prophylaxis: Pneumatic Compression Devices and Ambulate every shift  Code Status: No CPR- Do NOT Intubate  Expected Discharge:  > 2 days pending clinical improvement both related to periprosthetic fracture likely to TCU and acute behavioral/motor activity evaluation.    Christian Bae MD  Text Page (7am - 6pm, M-F)    Total unit/floor time 35 minutes:  time consisted of the following, examination of patient, review of records including labs, imaging results, medications, interdisciplinary notes and completing documentation; > 50% Counseling/discussion with Patient and Mother regarding current condition including changes in behavior, mentation, hyperactivity including plans for Neurology consult and Coordination of Care time with Nursing  and Specialists, Psychiatry  regarding mentation/behavior care plan, management and  surveillance.      Interval History   P patient denies significant pain.  Generally nonambulatory, per Ortho weightbearing as tolerated.  Patient cannot recall the events yesterday of fall, no pain or residual reported.  Advised patient to use call button for all assistance with mobility, he acknowledged.  Mother present during encounter, she remains concerned of recent changes in behavior, notes no new changes, acknowledges current plans for Neurology consult.      SH: No tobacco.  Parents involved in care; Patient lives in [place] with TBI    ROS: Complete ROS negative except as above.     -Data reviewed today: I reviewed all new labs and imaging results over the last 24 hours.    Physical Exam   Temp: 97.7  F (36.5  C) Temp src: Oral BP: 117/62 Pulse: 85   Resp: 20 SpO2: 97 % O2 Device: None (Room air)    There were no vitals filed for this visit.  Vital Signs with Ranges  Temp:  [97.7  F (36.5  C)-99.9  F (37.7  C)] 97.7  F (36.5  C)  Pulse:  [80-87] 85  Resp:  [16-20] 20  BP: (114-131)/(58-72) 117/62  SpO2:  [92 %-97 %] 97 %  I/O last 3 completed shifts:  In: 1280 [P.O.:1280]  Out: 725 [Urine:725]    General/Constitutional: NAD, hyperactive regarding Stream of thought and verbalization however somewhat improved from yesterday, less involuntary movement of extremities.  HEENT/Head Exam:  atraumatic  Eyes:  PERRL, no conjunctivits  Mouth/Oral Pharynx:  Buccal mucosa WNL  Chest/Respiratory:  Air exchange bilateral lung fields; no rales or wheeze. Respiration nonlabored.  Cardiovascular:  no murmur appreciated.  LE edema none  Gastrointestinal/Abdomen:  soft, nontender, no rebound, guarding or other peritoneal signs.  Musculoskeletal:  extremities warm, dry, noncyanotic; no acitve synovitis.  Right LE times AB ducted, extremities remain warm to touch, 4/5 motor strength, ROM Not tested.  Neuro.  As above episodic movement/bleeding of extremities without purpose although decreased from yesterday., gross motor tested,  nonfocal, sensory intact  Psych oriented, affect hyperactive and stream of thought and verbalization decreased from yesterday.    Medications       acetaminophen  650 mg Oral 4x Daily     baclofen  10 mg Oral BID     calcium carbonate 600 mg-vitamin D 400 units  1 tablet Oral BID     pantoprazole  40 mg Oral Once per day on Sun Mon Wed Fri     PARoxetine  40 mg Oral Daily     simvastatin  40 mg Oral At Bedtime     sodium chloride (PF)  3 mL Intracatheter Q8H     Vitamin D3  1,000 Units Oral Daily       Data   Recent Labs   Lab 05/03/22  0532 05/02/22  2223 05/02/22  1407   WBC 10.4  --  11.0   HGB 13.5  --  12.7*   MCV 74*  --  73*     --  367   INR  --  1.11 1.08     --  139   POTASSIUM 3.8  --  3.5   CHLORIDE 107  --  107   CO2 25  --  27   BUN 21  --  20   CR 0.98  --  1.13   ANIONGAP 5  --  5   JUSTA 9.3  --  9.3   *  --  103*   ALBUMIN  --   --  3.6   PROTTOTAL  --   --  6.6*   BILITOTAL  --   --  1.4*   ALKPHOS  --   --  49   ALT  --   --  30   AST  --   --  42

## 2022-05-05 ENCOUNTER — APPOINTMENT (OUTPATIENT)
Dept: PHYSICAL THERAPY | Facility: CLINIC | Age: 57
DRG: 543 | End: 2022-05-05
Attending: HOSPITALIST
Payer: COMMERCIAL

## 2022-05-05 PROCEDURE — 99233 SBSQ HOSP IP/OBS HIGH 50: CPT | Performed by: HOSPITALIST

## 2022-05-05 PROCEDURE — 97162 PT EVAL MOD COMPLEX 30 MIN: CPT | Mod: GP | Performed by: PHYSICAL THERAPIST

## 2022-05-05 PROCEDURE — 97116 GAIT TRAINING THERAPY: CPT | Mod: GP | Performed by: PHYSICAL THERAPIST

## 2022-05-05 PROCEDURE — 250N000013 HC RX MED GY IP 250 OP 250 PS 637: Performed by: HOSPITALIST

## 2022-05-05 PROCEDURE — 120N000001 HC R&B MED SURG/OB

## 2022-05-05 PROCEDURE — 250N000013 HC RX MED GY IP 250 OP 250 PS 637: Performed by: INTERNAL MEDICINE

## 2022-05-05 PROCEDURE — 97530 THERAPEUTIC ACTIVITIES: CPT | Mod: GP | Performed by: PHYSICAL THERAPIST

## 2022-05-05 RX ORDER — TAMSULOSIN HYDROCHLORIDE 0.4 MG/1
0.4 CAPSULE ORAL DAILY
Status: DISCONTINUED | OUTPATIENT
Start: 2022-05-05 | End: 2022-05-17 | Stop reason: HOSPADM

## 2022-05-05 RX ADMIN — BACLOFEN 10 MG: 10 TABLET ORAL at 20:19

## 2022-05-05 RX ADMIN — ACETAMINOPHEN 650 MG: 325 TABLET ORAL at 08:03

## 2022-05-05 RX ADMIN — Medication 1 TABLET: at 08:03

## 2022-05-05 RX ADMIN — PAROXETINE HYDROCHLORIDE 40 MG: 20 TABLET, FILM COATED ORAL at 08:03

## 2022-05-05 RX ADMIN — ACETAMINOPHEN 650 MG: 325 TABLET ORAL at 20:19

## 2022-05-05 RX ADMIN — BACLOFEN 10 MG: 10 TABLET ORAL at 08:03

## 2022-05-05 RX ADMIN — LORAZEPAM 0.5 MG: 0.5 TABLET ORAL at 12:38

## 2022-05-05 RX ADMIN — ACETAMINOPHEN 650 MG: 325 TABLET ORAL at 18:46

## 2022-05-05 RX ADMIN — Medication 1 TABLET: at 20:19

## 2022-05-05 RX ADMIN — Medication 1000 UNITS: at 08:03

## 2022-05-05 RX ADMIN — TAMSULOSIN HYDROCHLORIDE 0.4 MG: 0.4 CAPSULE ORAL at 09:10

## 2022-05-05 RX ADMIN — SIMVASTATIN 40 MG: 40 TABLET, FILM COATED ORAL at 20:19

## 2022-05-05 RX ADMIN — ACETAMINOPHEN 650 MG: 325 TABLET ORAL at 12:38

## 2022-05-05 ASSESSMENT — ACTIVITIES OF DAILY LIVING (ADL)
ADLS_ACUITY_SCORE: 10
ADLS_ACUITY_SCORE: 12
ADLS_ACUITY_SCORE: 11
ADLS_ACUITY_SCORE: 12
ADLS_ACUITY_SCORE: 11
ADLS_ACUITY_SCORE: 10
ADLS_ACUITY_SCORE: 11
ADLS_ACUITY_SCORE: 10
ADLS_ACUITY_SCORE: 11
ADLS_ACUITY_SCORE: 11
ADLS_ACUITY_SCORE: 10
ADLS_ACUITY_SCORE: 12
ADLS_ACUITY_SCORE: 10
ADLS_ACUITY_SCORE: 11
ADLS_ACUITY_SCORE: 10

## 2022-05-05 NOTE — PROGRESS NOTES
[id number]    Neuro: Pt was resting and had one episode of acute agitation and confusion. Pt just needed to use the restroom and after staff were able to get him to safely use the restroom, pt was easily redirectable and much more calm and cooperative. Speech can be rambled at times.     Cardiac: VSS, no heart rate or blood pressures issues.     Resp: RA, no issues     GI/: Regular diet and tolerating it well. Pt voiding in the urinal/toilet. No BM this evening.     Skin: No issues.     His mother, guardian, was updated over the phone this evening and stated to call her anytime and she would be here right away if she was needed.

## 2022-05-05 NOTE — PLAN OF CARE
[id number]    Pt disoriented to time, somewhat situation, rambling and jumbled thoughts. Anxious at times, very restless. VSS on RA. Able to stand and pivot to chair for dinner, unable to move RLE but denies pain. Pulses intact, denies numbness/tingling. Fair appetite. Using urinal. One BM this shift on commode. EEG completed today, results pending. Discharge pending neuro further workup, ortho recommendations for rehab. Continue to monitor.

## 2022-05-05 NOTE — PLAN OF CARE
Goal Outcome Evaluation:    Plan of Care Reviewed With: patient     Overall Patient Progress: improving        2300 - 0730  Reason for admission: Closed displaced Rt femur fracture     Pt is alert but confused. Pt is hyperactive and impulsive at times. Ax1 and can pivot to the BSC and has an unsteady gait. VSS on RA. LS: Clear. Pt denies chest pain, SOB, nausea or vomiting. Uses a urinal at bedside. BS +. New PIV placed and SL. CMS: intact. Tolerates regular diet. Behavior/aggression traffic light: green for most part of the shift. Scheduled for PT today. Neuro and ortho are following.

## 2022-05-05 NOTE — PROVIDER NOTIFICATION
MD Notification    Notified Person: MD    Notified Person Name: Kathia    Notification Date/Time: 5/5 0810    Notification Interaction: web based paging    Purpose of Notification: Can you order PTA flomax? Pt is having hesitancy     Orders Received: awaiting orders- ordered

## 2022-05-05 NOTE — PLAN OF CARE
Here for right hip fx. A&O x2 at baseline. Is impulsive with unsteady gait. Worked with physical therapy today in halls using GB, walker. PO Ativan given for agitation. VSS on RA. Scheduled tylenol for pain. Voiding. Tolerating regular diet. Takes pills whole. Alarms on. Care coordinator on consult for discharge plan once medically stable.

## 2022-05-05 NOTE — PROGRESS NOTES
"[facility]    Hospitalist Progress Note      Assessment & Plan   Yunior Angel is a 56 year old male admitted on 5/2/2022. 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  Hip x-ray shows mildly displaced right periprosthetic hip fracture.    Closed displaced fracture of greater trochanter of right femur, initial encounter (H)    S/P total hip arthroplasty  ? Ortho consult: Recommending trial of nonsurgical management.  WBAT.  ? PT/OT.  ? Acetaminophen and/or opioids as needed for pain.  Review of records indicates minimal use of narcotics, 2/ oxycodone/24 hours, no use of IV hydromorphone, will DC.  Given mentation change, attempt minimal use of narcotics as able.  Patient responding well to regimen including hydroxyzine.  Scheduled acetaminophen and attempts to decrease use of narcotics.  ? Chest x-ray NEG; admission EKG inverted T waves.  Troponin negative.  Repeat EKG this morning no further inverted T's.  ? PT as able, per orthopedics weightbearing as tolerated.  Ortho following.  Advised patient to call for assistance with any mobility needs, history of fall yesterday.  Patient cannot recall events, see HARLEY note.  No new or changed complaints today, no apparent residual from fall, monitor.      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 Yunior's mother and father are his co-guardians.   Apparently brother is also a coguardian.  According to mother who was present during encounter both she and father have noted significant behavioral changes recently with \"hyperactivity\" both motor and in thought processes, verbalization.  No history of same.  Patient carries a diagnosis of depression on paroxetine 60 mg, TBI.  It does not appear that he has been on any other psychotropic medications, no history of tardive dyskinesia.  No history of " "bipolar disorder.  TFTs WNL.  Head CT on admission negative.  No history of alcohol or drug use.  -Psych consult.  Discussed with Dr. Basilio 5/3/2022 who recommends decreasing dose of paroxetine to 40 mg.  He advised use of as needed benzo diazepam for any agitation/aggression.  He feels that presentation most consistent with hyperstimulation in current environment and baseline depression given his history of TBI currently in AL with limited by patient's perception of prognosis.  He did not feel that current presentation consistent with movement disorder/TD, advised Neurology consult.  As above TFTs WNL.  Head CT on admission negative.  No history EtOH or drug use.  We will continue to monitor.  -See above, neurology consult.  EEG, pending.  -5/5/2022 no new or changed in mentation/behavior, actually more calm today, decrease hyperactivity regarding thought process and verbalization.  Discussed with patient and mother at length today.  Apparently patient family depressed regarding his current living situation AL, most of his current residence are elderly, \"nothing in common.\".  Recommend Therapist, psychology on discharge, attempts at TBI support group, possibly group home or alternative living situation.  Discussed with Care Coordinator to assist patient and mother with resources.      Gastroesophageal reflux disease with esophagitis  ? Continue PPI    Age-related osteoporosis with current pathological fracture with routine healing, subsequent encounter  ? Resume Fosamax as outpatient  ? Resume calcium and vitamin D    Hyperlipidemia LDL goal <160  ? Continue statin      DVT Prophylaxis: Pneumatic Compression Devices and Ambulate every shift  Code Status: No CPR- Do NOT Intubate  Expected Discharge:  > 2 days pending clinical improvement both related to periprosthetic fracture likely to TCU and acute behavioral/motor activity evaluation.    Christian Bae MD  Text Page (7am - 6pm, M-F)    Total unit/floor time " 35 minutes:  time consisted of the following, examination of patient, review of records including labs, imaging results, medications, interdisciplinary notes and completing documentation; > 50% Counseling/discussion with Patient and Mother regarding current condition including emotions/behaviors and Coordination of Care time with Nursing  and Specialists, neurology regarding evaluation including EEG, care plan, management and surveillance.    IInterval History   Patient more calm today, less hyperactivity regarding Stream of thought and verbalization, no significant involuntary movement.  Review of records indicates only 2 doses of oxycodone/24 hours, 1 dose hydroxyzine and 1 dose.  Ativan.  No excess sedation.  Reports no pain related to hip fracture.    SH: No tobacco.  Parents involved in care; Patient lives in AL with TBI    ROS: Complete ROS negative except as above.     -Data reviewed today: I reviewed all new labs and imaging results over the last 24 hours.    Physical Exam   Temp: 97.6  F (36.4  C) Temp src: Oral BP: 133/83 Pulse: 97   Resp: 18 SpO2: 95 % O2 Device: None (Room air)    There were no vitals filed for this visit.  Vital Signs with Ranges  Temp:  [97.6  F (36.4  C)-99.2  F (37.3  C)] 97.6  F (36.4  C)  Pulse:  [69-97] 97  Resp:  [18] 18  BP: (111-138)/(61-83) 133/83  SpO2:  [95 %-98 %] 95 %  I/O last 3 completed shifts:  In: 1440 [P.O.:1440]  Out: 700 [Urine:700]    General/Constitutional:  NAD, more calm; cooperative.  HEENT/Head Exam:  atraumatic  Eyes:  PERRL, no conjunctivits  Mouth/Oral Pharynx:  Buccal mucosa WNL  Chest/Respiratory:  Air exchange bilateral lung fields; no rales or wheeze. Respiration nonlabored.  Cardiovascular:  no murmur appreciated.  LE edema none  Gastrointestinal/Abdomen:  soft, nontender, no rebound, guarding or other peritoneal signs.  Musculoskeletal:  extremities warm, dry, noncyanotic; no acitve synovitis.  Right LE times ABducted, extremities remain warm to  touch, 4/5 motor strength, ROM Not tested.  Neuro. less involuntary movement actually minimal to none.    Psych oriented, more calm, less hyperactivity regarding Stream of thought and verbalization.    Medications       acetaminophen  650 mg Oral 4x Daily     baclofen  10 mg Oral BID     calcium carbonate 600 mg-vitamin D 400 units  1 tablet Oral BID     pantoprazole  40 mg Oral Once per day on Sun Mon Wed Fri     PARoxetine  40 mg Oral Daily     simvastatin  40 mg Oral At Bedtime     sodium chloride (PF)  3 mL Intracatheter Q8H     tamsulosin  0.4 mg Oral Daily     Vitamin D3  1,000 Units Oral Daily       Data   Recent Labs   Lab 05/03/22  0532 05/02/22  2223 05/02/22  1407   WBC 10.4  --  11.0   HGB 13.5  --  12.7*   MCV 74*  --  73*     --  367   INR  --  1.11 1.08     --  139   POTASSIUM 3.8  --  3.5   CHLORIDE 107  --  107   CO2 25  --  27   BUN 21  --  20   CR 0.98  --  1.13   ANIONGAP 5  --  5   JUSTA 9.3  --  9.3   *  --  103*   ALBUMIN  --   --  3.6   PROTTOTAL  --   --  6.6*   BILITOTAL  --   --  1.4*   ALKPHOS  --   --  49   ALT  --   --  30   AST  --   --  42

## 2022-05-05 NOTE — PROGRESS NOTES
ASSESSMENT/PLAN    Yunior Angel presented with history of traumatic brain injury presenting     1. Right lower extremity pain secondary to Right hip periprosthetic fracture of the right greater trochanter    2. Residual deficits secondary to TBI using right foot AFO bracing.  Family members noted patient had change in behaviors in the past week, questionable unwitnessed fall- Less likely related to seizure - EEG showed no epileptogenic activity.   3. Abnormal involuntary movements related to Tardive dyskinesia- Resolved    - Avoid use of Haldol    Continued therapies as tolerated Behavioral management per psychiatry  Call us with any worsening or new changes   Renea Gomez MD  [facility] Neurology  Office Phone [phone]      CLINICAL PROBLEMS:    1. Right hip periprosthetic fracture of the right greater trochanter 2. Involuntary movements 3. Traumatic brain injury 4. Depression       HPI    He is a 56 years old man with a history of traumatic brain injury,long standing cognitive deficit presented to the emergency room for evaluation regarding right lower extremity pain that was worsening prompted for further evaluation .  Patient has history of brain injury his behaviors are much more erratic than normal and his speech sounded forceful, spasmodic movements.  He had psychomotor agitation attempting to get out of the bed and has received anxiolytics in the emergency room lorazepam.  Further interventions in the emergency room being Flexeril morphine haloperidol.    X-ray of the right hip showed right hip total arthroplasty, lucency through the base of greater trochanter likely represents mildly displaced fracture./2009.    24 HOUR EVENTS:        Improved stiffness in right leg  Noted improved involuntary movements in extremities       EXAMINATION:   Past medical history, family history, social history and review of systems are unchanged except as noted below.    VITAL SIGNS:     /69 (BP Location: Right  arm)   Pulse 94   Temp 97.7  F (36.5  C) (Oral)   Resp 18   SpO2 97%         Alert and oriented to self, appeared calmer than yesterday    Follows simple commands .No labored breathing  Right lower extremity able to bend at knee today, right lower extremity is shorter in comparison to the left.  Right foot is turned inwards.  At baseline patient uses AFO bracing.  Alert and oriented to self able to follow simple commands  Pupils equal and round visual fields are full extraocular movements were intact face is symmetric hearing normal to conversation tongue movements are normal  Able to move rosaura UE and LLE against gravity, Right LE -limited  No involuntary movements     PERTINENT DATA:  Lab and X-ray: Recent Labs   Lab Test 05/03/22  0532 05/02/22 2223 05/02/22 1407 11/23/21  1213   WBC 10.4  --    < > 6.9   HGB 13.5  --    < > 13.8     --    < > 388   INR  --  1.11   < >  --    POTASSIUM 3.8  --    < > 4.5   LDL  --   --   --  96    < > = values in this interval not displayed.     [unfilled]  Recent Labs   Lab Test 05/03/22  0532 05/02/22  1407   POTASSIUM 3.8 3.5   CHLORIDE 107 107   BUN 21 20     Recent Labs   Lab Test 05/03/22 0532 05/02/22 2223 05/02/22  1407   WBC 10.4  --  11.0   HGB 13.5  --  12.7*   MCV 74*  --  73*     --  367   INR  --  1.11 1.08     Recent Labs   Lab Test 05/02/22  1407 09/29/20  1036   AST 42 15   ALT 30 28   ALKPHOS 49 49     Recent Labs   Lab Test 11/23/21  1213 09/29/20  1036   HDL 40 36*   LDL 96 100*     No lab results found.    Laboratory results were personally interpreted and reviewed in detail.    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)     Hyperlipidemia LDL goal <160     Overweight (BMI 25.0-29.9)     Gout      Migraine without aura and without status migrainosus, not intractable     Benign neoplasm of sigmoid colon     Gastroesophageal reflux disease with esophagitis     Closed head injury, sequela     Irritable bowel syndrome with diarrhea     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

## 2022-05-06 ENCOUNTER — APPOINTMENT (OUTPATIENT)
Dept: PHYSICAL THERAPY | Facility: CLINIC | Age: 57
DRG: 543 | End: 2022-05-06
Attending: HOSPITALIST
Payer: COMMERCIAL

## 2022-05-06 PROCEDURE — 99232 SBSQ HOSP IP/OBS MODERATE 35: CPT | Performed by: NURSE PRACTITIONER

## 2022-05-06 PROCEDURE — 97116 GAIT TRAINING THERAPY: CPT | Mod: GP

## 2022-05-06 PROCEDURE — 97530 THERAPEUTIC ACTIVITIES: CPT | Mod: GP

## 2022-05-06 PROCEDURE — 250N000013 HC RX MED GY IP 250 OP 250 PS 637: Performed by: HOSPITALIST

## 2022-05-06 PROCEDURE — 250N000013 HC RX MED GY IP 250 OP 250 PS 637: Performed by: INTERNAL MEDICINE

## 2022-05-06 PROCEDURE — 120N000001 HC R&B MED SURG/OB

## 2022-05-06 PROCEDURE — 99233 SBSQ HOSP IP/OBS HIGH 50: CPT | Performed by: HOSPITALIST

## 2022-05-06 RX ADMIN — BACLOFEN 10 MG: 10 TABLET ORAL at 10:27

## 2022-05-06 RX ADMIN — ACETAMINOPHEN 650 MG: 325 TABLET ORAL at 21:36

## 2022-05-06 RX ADMIN — BACLOFEN 10 MG: 10 TABLET ORAL at 21:36

## 2022-05-06 RX ADMIN — Medication 1000 UNITS: at 10:26

## 2022-05-06 RX ADMIN — SIMVASTATIN 40 MG: 40 TABLET, FILM COATED ORAL at 21:36

## 2022-05-06 RX ADMIN — ACETAMINOPHEN 650 MG: 325 TABLET ORAL at 16:04

## 2022-05-06 RX ADMIN — Medication 1 TABLET: at 21:36

## 2022-05-06 RX ADMIN — Medication 1 TABLET: at 10:26

## 2022-05-06 RX ADMIN — ACETAMINOPHEN 650 MG: 325 TABLET ORAL at 10:25

## 2022-05-06 RX ADMIN — PAROXETINE HYDROCHLORIDE 40 MG: 20 TABLET, FILM COATED ORAL at 10:26

## 2022-05-06 RX ADMIN — TAMSULOSIN HYDROCHLORIDE 0.4 MG: 0.4 CAPSULE ORAL at 10:26

## 2022-05-06 RX ADMIN — PANTOPRAZOLE SODIUM 40 MG: 40 TABLET, DELAYED RELEASE ORAL at 06:52

## 2022-05-06 ASSESSMENT — ACTIVITIES OF DAILY LIVING (ADL)
ADLS_ACUITY_SCORE: 10
ADLS_ACUITY_SCORE: 10
ADLS_ACUITY_SCORE: 12
ADLS_ACUITY_SCORE: 10
ADLS_ACUITY_SCORE: 12
ADLS_ACUITY_SCORE: 12
ADLS_ACUITY_SCORE: 10
ADLS_ACUITY_SCORE: 10
ADLS_ACUITY_SCORE: 12
ADLS_ACUITY_SCORE: 10
ADLS_ACUITY_SCORE: 10
ADLS_ACUITY_SCORE: 12
ADLS_ACUITY_SCORE: 10
ADLS_ACUITY_SCORE: 12
ADLS_ACUITY_SCORE: 10

## 2022-05-06 NOTE — PROGRESS NOTES
05/05/22 1030   Quick Adds   Type of Visit Initial PT Evaluation   Living Environment   People in Home alone   Current Living Arrangements assisted living   Home Accessibility no concerns   Living Environment Comments [facility]   Self-Care   Usual Activity Tolerance good   Current Activity Tolerance moderate   Equipment Currently Used at Home cane, straight   Fall history within last six months yes   Number of times patient has fallen within last six months 1   Activity/Exercise/Self-Care Comment indep w/ ADLS, med assist at [facility], SEC for mobility, has WW   General Information   Onset of Illness/Injury or Date of Surgery 05/04/22   Referring Physician Christian Bae MD   Pertinent History of Current Problem (include personal factors and/or comorbidities that impact the POC) 56 year old male admitted on 5/2/2022. 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  Hip x-ray shows mildly displaced right periprosthetic hip fracture.   Existing Precautions/Restrictions weight bearing   Weight-Bearing Status - RLE weight-bearing as tolerated   General Observations mother present for eval   Cognition   Affect/Mental Status (Cognition) agitated;anxious   Orientation Status (Cognition) oriented to;person;place   Follows Commands (Cognition) follows one-step commands;50-74% accuracy;repetition of directions required;refused to attempt   Behavioral Issues overwhelmed easily   Safety Deficit (Cognition) impulsivity;judgment;safety precautions awareness;safety precautions follow-through/compliance   Cognitive Status Comments PMH TBI   Pain Assessment   Patient Currently in Pain Yes, see Vital Sign flowsheet   Posture    Posture Forward head position   Range of Motion (ROM)   ROM Comment R hip/knee ROM d/t pain: R ankle ROM dec baseline   Strength (Manual Muscle Testing)   Strength  Comments RLE strenght limited by pain   Bed Mobility   Comment, (Bed Mobility) mod indep   Transfers   Comment, (Transfers) min A w WW   Gait/Stairs (Locomotion)   Comment, (Gait/Stairs) mod A w WW, dec step length antalgic and ataxic pattern, R foot drop, intermittent [place] may be baseline   Balance   Balance Comments impaired in standing and walker, pt challenged following cues for WBAT   Clinical Impression   Criteria for Skilled Therapeutic Intervention Yes, treatment indicated   PT Diagnosis (PT) R hip  fracture   Influenced by the following impairments impaired gait/balance, dec indep transfers, cognition   Functional limitations due to impairments impaired mobility   Clinical Presentation (PT Evaluation Complexity) Evolving/Changing   Clinical Presentation Rationale clinical assessment   Clinical Decision Making (Complexity) moderate complexity   Planned Therapy Interventions (PT) balance training;gait training;strengthening;ROM (range of motion);transfer training   Risk & Benefits of therapy have been explained evaluation/treatment results reviewed;care plan/treatment goals reviewed;risks/benefits reviewed   PT Discharge Planning   PT Discharge Recommendation (DC Rec) Transitional Care Facility   PT Rationale for DC Rec Pt below baseline for mobility, requiring A x 1 with R WBAT and WW, pt easily agitated and difficult to redirect, frustrated by limitations of hospitalization, will benefit from PT during stay and cont PT in rehab setting.   Total Evaluation Time   Total Evaluation Time (Minutes) 10

## 2022-05-06 NOTE — CONSULTS
Care Management Initial Consult    General Information  Assessment completed with: Parents, Patient,    Type of CM/SW Visit: Initial Assessment    Primary Care Provider verified and updated as needed: Yes   Readmission within the last 30 days: no previous admission in last 30 days      Reason for Consult: discharge planning  Advance Care Planning: Advance Care Planning Reviewed: present on chart          Communication Assessment  Patient's communication style: spoken language (English or Bilingual)    Hearing Difficulty or Deaf: no   Wear Glasses or Blind: no    Cognitive  Cognitive/Neuro/Behavioral: .WDL except, orientation  Level of Consciousness: alert, confused  Arousal Level: opens eyes spontaneously  Orientation: disoriented to, time  Mood/Behavior: cooperative  Best Language: 0 - No aphasia  Speech: rambling    Living Environment:   People in home: facility resident     Current living Arrangements: assisted living      Able to return to prior arrangements: yes       Family/Social Support:  Care provided by: self, homecare agency  Provides care for: no one, unable/limited ability to care for self  Marital Status: Single  Parent(s)          Description of Support System: Involved    Support Assessment: Adequate family and caregiver support    Current Resources:   Patient receiving home care services: Yes  Skilled Home Care Services: Physicial Therapy, Skilled Nursing  Community Resources: County Worker  Equipment currently used at home: cane, straight  Supplies currently used at home:      Employment/Financial:  Employment Status: disabled        Financial Concerns:             Lifestyle & Psychosocial Needs:  Social Determinants of Health     Tobacco Use: Low Risk      Smoking Tobacco Use: Never Smoker     Smokeless Tobacco Use: Never Used   Alcohol Use: Not on file   Financial Resource Strain: Not on file   Food Insecurity: Not on file   Transportation Needs: Not on file   Physical Activity: Not on file    Stress: Not on file   Social Connections: Not on file   Intimate Partner Violence: Not on file   Depression: Not at risk     PHQ-2 Score: 0   Housing Stability: Not on file       Functional Status:  Prior to admission patient needed assistance:   Dependent ADLs:: Ambulation-walker  Dependent IADLs:: Medication Management, Meal Preparation, Shopping, Laundry, Cooking       Mental Health Status:  Mental Health Status: Current Concern  Mental Health Management: Psychiatrist    Chemical Dependency Status:  Chemical Dependency Status: No Current Concerns             Values/Beliefs:  Spiritual, Cultural Beliefs, Rastafarian Practices, Values that affect care:                 Additional Information:  Met with patient and his mother Patsy who is patient legal guardian,in room, introduced self and role in discharge planning.    Patsy stated patient is from [facility]. Patient was independent at baseline and receives medication management,meals and house cleaning services.  Per therapy report noted patient is way below baseline and will benefit from TCU. Patients mother prefers referrals sent to [facility] or [facility]. SW will work on disposition.    Skip Dean RN  Inpatient Care Coordinator  [facility]  # [phone]

## 2022-05-06 NOTE — PLAN OF CARE
Goal Outcome Evaluation:        5-6-22, [id number]    Pt disoriented to place, time, situation, rambling and jumbled thoughts. Anxious at times, very restless. VSS on RA. A x 1 gb/walker, a bit unstable gait. RLE redness. Denies pain, nausea and SOB. Pulses intact, denies numbness/tingling.  Voiding in BR. No BM this shift. Regular diet. Ortho following. Discharge pending placement, goal to get a SW consult. Continue to monitor.

## 2022-05-06 NOTE — PLAN OF CARE
A/O to self. VSS on RA. Denies pain, scheduled tylenol. Up Ax1 GB/W. Unsteady on feet. Pt get agitated when bed alarm goes off. Tolerating regular diet. Discharge pending.

## 2022-05-06 NOTE — CONSULTS
Psychiatry Consultation; Follow up              Reason for Consult, requesting source:    Acute agitation  Requesting source: Ann Lee            Interim history:    Mr. Yunior Angel is a pleasant 56-year-old single gentleman residing in an assisted living setting.  He has a history of a substantial traumatic brain injury at age 26 with chronic neurological sequelae, residing in a group home for much of his adult life.  He has a history of atypical migraine, hyperlipidemia.  He presented to Emergency Department initially with right hip pain.  He did have a mildly displaced right periprosthetic hip fracture.  He is currently convalescing on station 33.  Psychiatry is asked to assess in the setting of history of traumatic brain injury and agitation.    Pt initially seen by Dr. Basilio on 5/3/22. That documentation was reviewed. Thus far, paroxetine was decreased from 60 mg to 40 mg. Lorazepam has been utilized at 0.5 mg prn for acute anxiety.    Patient seen with his mother at bedside today. He appears to be much improved from time of admission. Mother had questions regarding patient's presentation prior to hospitalization. Describing spastic movements of extremities. Apparently, his apartment was in disarray and there was a distinct behavior change. Discussed that this may be related to an episode of delirium but we do not have a good answer for his jerky movements. Neurological workup has been negative. Discussed continuing to utilize prn lorazepam at his care facility for episodes of anger or agitation.         Current Medications:       acetaminophen  650 mg Oral 4x Daily     baclofen  10 mg Oral BID     calcium carbonate 600 mg-vitamin D 400 units  1 tablet Oral BID     pantoprazole  40 mg Oral Once per day on Sun Mon Wed Fri     PARoxetine  40 mg Oral Daily     simvastatin  40 mg Oral At Bedtime     sodium chloride (PF)  3 mL Intracatheter Q8H     tamsulosin  0.4 mg Oral Daily     Vitamin D3  1,000  "Units Oral Daily              MSE:   Appearance: awake, alert, adequately groomed, dressed in hospital scrubs and appeared as age stated  Attitude:  cooperative  Eye Contact:  good  Mood:  better  Affect:  mood congruent, intensity is normal, full range and reactive  Speech:  clear, coherent and normal prosody  Psychomotor Behavior:  no evidence of tardive dyskinesia, dystonia, or tics  Thought Process:  logical and linear  Associations:  no loose associations  Thought Content:  no evidence of suicidal ideation or homicidal ideation, no evidence of psychotic thought, no auditory hallucinations present and no visual hallucinations present  Insight:  fair  Judgement:  fair  Oriented to: alert and oriented to self  Attention Span and Concentration:  fair  Recent and Remote Memory:  fair    Vital signs:  Temp: 97.4  F (36.3  C) Temp src: Oral BP: 105/61 Pulse: 68   Resp: 19 SpO2: 94 % O2 Device: None (Room air) Oxygen Delivery: 2 LPM      Estimated body mass index is 26.47 kg/m  as calculated from the following:    Height as of 7/2/21: 1.803 m (5' 11\").    Weight as of 9/3/21: 86.1 kg (189 lb 12.8 oz).              DSM-5 Diagnosis:   1.  Major depressive disorder, severe, without psychotic features.  2.  Traumatic brain injury with chronic cognitive and neurologic sequelae.          Assessment:   Patient seen for reevaluation today with mother at bedside. He has improved quite a bit from time of admission. He is much more calm appearing, more clear cognitively. Apparently had been experiencing confusion which coincided with the beginning of the jerky movements. It's possible he may have had some delirium but unclear why he would be having the jerking movements. Dr. Basilio thought potentially related to a functional disorder. Neuro workup was negative. The jerking movements have stopped and patient appears to be doing much better. Mood and anxiety are currently stable.           Summary of Recommendations:   1) Continue " reduced paroxetine dose - 40 mg daily  2) Recommend to prescribe short supply of lorazepam 0.5 mg 1-2 times daily PRN upon discharge  3) Agree with increasing supports for patient - consider TBI support group, psychotherapy, moving to a supportive living environment such as group home.  4) Reconsult psychiatry as needed, thank you      Maximo Sage, DILLON-BC, APRN, CNP  Consult/Liaison Psychiatry  [facility]  Provider can be paged via [facility] Paging/Directory  If I am unavailable, please contact [facility] at [phone] to reach the on-call provider.

## 2022-05-06 NOTE — PROGRESS NOTES
Patient is alert but confused, up with A1 GB and walker, came from assisted living facility  C/o right hip pain, found to have mildly displaced right hip fracture, h/o TBI, psych following, up with A1 GB and walker to the chair, voiding adequately in the bathroom, waiting placement to TCU, vss, a-febrile, denies pain.

## 2022-05-06 NOTE — PROGRESS NOTES
"[facility]    Hospitalist Progress Note      Assessment & Plan   Yunior Angel is a 56 year old male admitted on 5/2/2022. 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  Hip x-ray shows mildly displaced right periprosthetic hip fracture.    Closed displaced fracture of greater trochanter of right femur, initial encounter (H)    S/P total hip arthroplasty  ? Ortho consult: Recommending trial of nonsurgical management.  WBAT.  ? PT/OT.  ? Acetaminophen and/or opioids as needed for pain.  Review of records indicates minimal use of narcotics, 2/ oxycodone/24 hours, no use of IV hydromorphone, will DC.  Given mentation change, attempt minimal use of narcotics as able.  Patient responding well to regimen including hydroxyzine.  Scheduled acetaminophen and attempts to decrease use of narcotics.  ? Chest x-ray NEG; admission EKG inverted T waves.  Troponin negative.  Repeat EKG this morning no further inverted T's.  ? PT as able, per orthopedics weightbearing as tolerated.  Ortho following.  Advised patient to call for assistance with any mobility needs, history of fall 5/4/2022 patient cannot recall events, see HARLEY note.  No new or changed complaints today, no apparent residual from fall, monitor.  ? PT recommends TCU.  Patient and mother receptive to this plan.      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 Yunior's mother and father are his co-guardians.   Apparently brother is also a coguardian.  According to mother who was present during encounter both she and father have noted significant behavioral changes recently with \"hyperactivity\" both motor and in thought processes, verbalization.  No history of same.  Patient carries a diagnosis of depression on paroxetine 60 mg, TBI.  It does not appear that he has been on any other psychotropic " "medications, no history of tardive dyskinesia.  No history of bipolar disorder.  TFTs WNL.  Head CT on admission negative.  No history of alcohol or drug use.  -Psych consult.  Discussed with Dr. Basilio 5/3/2022 who recommends decreasing dose of paroxetine to 40 mg.  He advised use of as needed benzo diazepam for any agitation/aggression.  He feels that presentation most consistent with hyperstimulation in current environment and baseline depression given his history of TBI currently in [place] with limited by patient's perception of prognosis.  He did not feel that current presentation consistent with movement disorder/TD, advised Neurology consult.  As above TFTs WNL.  Head CT on admission negative.  No history EtOH or drug use.  We will continue to monitor.  -See above, neurology consult.  EEG, negative for seizure.  -5/5/2022 no new or changed in mentation/behavior, actually more calm today, decrease hyperactivity regarding thought process and verbalization.  Discussed with patient and mother at length today.  Apparently patient family depressed regarding his current living situation [place], most of his current residence are elderly, \"nothing in common.\".  Recommend Therapist, psychology on discharge, attempts at TBI support group, possibly group home or alternative living situation.  Discussed with Care Coordinator to assist patient and mother with resources.  -Discussed with neurology, Dr Gomez feels condition consistent with TD noting patient received Haldol in ED however discussed symptoms predated ED visit and no known culprit psychotropic medications, nonetheless movement disorder appears resolved and patient more calm likely reflecting sedation or acclimation to hospital setting.  On discussion today patient much more verbal that he dislikes his current living situation at [facility] although is accepting return to facility after TCU with establishment of resources such as TBI support group, " psychologist/therapist.      Gastroesophageal reflux disease with esophagitis  ? Continue PPI    Age-related osteoporosis with current pathological fracture with routine healing, subsequent encounter  ? Resume Fosamax as outpatient  ? Resume calcium and vitamin D    Hyperlipidemia LDL goal <160  ? Continue statin      DVT Prophylaxis: Pneumatic Compression Devices and Ambulate every shift  Code Status: No CPR- Do NOT Intubate  Expected Discharge:  > 2 days pending clinical improvement both related to periprosthetic fracture  to TCU and acute behavioral/motor activity evaluation.    Christian Bae MD  Text Page (7am - 6pm, M-F)    Total unit/floor time 35 minutes:  time consisted of the following, examination of patient, review of records including labs, imaging results, medications, interdisciplinary notes and completing documentation; > 50% Counseling/long discussion with Patient and Motherbregarding current condition including behavioral changes, movement disorders, living situation and Coordination of Care time with Nursing  and Specialists, Neurology regarding care plan, management and surveillance.    IInterval History   Records indicate patient had acute agitation, throwing things in room yesterday, needing sedation..  This morning on encounters patient, likely reflecting sedation.  Mother is concerned regarding sedation however understands need for patient safety especially with periprosthetic fracture.  Patient much more verbal today regarding his dislike of [facility] living situation and that he will changes on presentation may be related to acting out regarding this.  Mother remains concerned and would like second Psychiatry evaluation regarding episodic agitation and if presentation felt reactive/functional.  Movement disorder appears to be resolved.    SH: No tobacco.  Parents involved in care; Patient lives in [place] with TBI    ROS: Complete ROS negative except as above.     -Data reviewed today: I reviewed  all new labs and imaging results over the last 24 hours.    Physical Exam   Temp: 97.4  F (36.3  C) Temp src: Oral BP: 105/61 Pulse: 68   Resp: 19 SpO2: 94 % O2 Device: None (Room air)    There were no vitals filed for this visit.  Vital Signs with Ranges  Temp:  [97.3  F (36.3  C)-98  F (36.7  C)] 97.4  F (36.3  C)  Pulse:  [68-99] 68  Resp:  [18-20] 19  BP: (105-122)/(61-69) 105/61  SpO2:  [94 %-98 %] 94 %  I/O last 3 completed shifts:  In: 200 [P.O.:200]  Out: 150 [Urine:150]    General/Constitutional:   NAD, alert, more calm, cooperative    HEENT/Head Exam:  atraumatic  Eyes:  PERRL, no conjunctivits  Mouth/Oral Pharynx:  Buccal mucosa WNL  Chest/Respiratory:  Air exchange bilateral lung fields; no rales or wheeze. Respiration nonlabored.  Cardiovascular:  no murmur appreciated.  LE edema none  Gastrointestinal/Abdomen:  soft, nontender, no rebound, guarding or other peritoneal signs.  Musculoskeletal:  extremities warm, dry, noncyanotic; no acitve synovitis.  Right LE times ABducted, extremities remain warm to touch, 4/5 motor strength, ROM Not tested.  Neuro. Lays still less involuntary movement actually minimal to none.    Psych oriented, more calm, less hyperactivity regarding Stream of thought and verbalization.    Medications       acetaminophen  650 mg Oral 4x Daily     baclofen  10 mg Oral BID     calcium carbonate 600 mg-vitamin D 400 units  1 tablet Oral BID     pantoprazole  40 mg Oral Once per day on Sun Mon Wed Fri     PARoxetine  40 mg Oral Daily     simvastatin  40 mg Oral At Bedtime     sodium chloride (PF)  3 mL Intracatheter Q8H     tamsulosin  0.4 mg Oral Daily     Vitamin D3  1,000 Units Oral Daily       Data   Recent Labs   Lab 05/03/22  0532 05/02/22  2223 05/02/22  1407   WBC 10.4  --  11.0   HGB 13.5  --  12.7*   MCV 74*  --  73*     --  367   INR  --  1.11 1.08     --  139   POTASSIUM 3.8  --  3.5   CHLORIDE 107  --  107   CO2 25  --  27   BUN 21  --  20   CR 0.98  --  1.13    ANIONGAP 5  --  5   JUSTA 9.3  --  9.3   *  --  103*   ALBUMIN  --   --  3.6   PROTTOTAL  --   --  6.6*   BILITOTAL  --   --  1.4*   ALKPHOS  --   --  49   ALT  --   --  30   AST  --   --  42

## 2022-05-07 PROCEDURE — 250N000013 HC RX MED GY IP 250 OP 250 PS 637: Performed by: INTERNAL MEDICINE

## 2022-05-07 PROCEDURE — 250N000013 HC RX MED GY IP 250 OP 250 PS 637: Performed by: HOSPITALIST

## 2022-05-07 PROCEDURE — 120N000001 HC R&B MED SURG/OB

## 2022-05-07 PROCEDURE — 99233 SBSQ HOSP IP/OBS HIGH 50: CPT | Performed by: HOSPITALIST

## 2022-05-07 RX ORDER — CLONIDINE HYDROCHLORIDE 0.1 MG/1
0.1 TABLET ORAL 2 TIMES DAILY
Status: DISCONTINUED | OUTPATIENT
Start: 2022-05-07 | End: 2022-05-08

## 2022-05-07 RX ADMIN — LORAZEPAM 0.5 MG: 0.5 TABLET ORAL at 21:36

## 2022-05-07 RX ADMIN — BACLOFEN 10 MG: 10 TABLET ORAL at 21:33

## 2022-05-07 RX ADMIN — BACLOFEN 10 MG: 10 TABLET ORAL at 08:54

## 2022-05-07 RX ADMIN — ACETAMINOPHEN 650 MG: 325 TABLET ORAL at 17:19

## 2022-05-07 RX ADMIN — CLONIDINE HYDROCHLORIDE 0.1 MG: 0.1 TABLET ORAL at 21:33

## 2022-05-07 RX ADMIN — ACETAMINOPHEN 650 MG: 325 TABLET ORAL at 12:20

## 2022-05-07 RX ADMIN — LORAZEPAM 0.5 MG: 0.5 TABLET ORAL at 12:24

## 2022-05-07 RX ADMIN — Medication 1 MG: at 22:51

## 2022-05-07 RX ADMIN — Medication 1 TABLET: at 08:53

## 2022-05-07 RX ADMIN — SIMVASTATIN 40 MG: 40 TABLET, FILM COATED ORAL at 21:33

## 2022-05-07 RX ADMIN — TAMSULOSIN HYDROCHLORIDE 0.4 MG: 0.4 CAPSULE ORAL at 08:54

## 2022-05-07 RX ADMIN — Medication 1 TABLET: at 21:33

## 2022-05-07 RX ADMIN — PAROXETINE HYDROCHLORIDE 40 MG: 20 TABLET, FILM COATED ORAL at 08:53

## 2022-05-07 RX ADMIN — Medication 1000 UNITS: at 08:54

## 2022-05-07 RX ADMIN — ACETAMINOPHEN 650 MG: 325 TABLET ORAL at 21:34

## 2022-05-07 RX ADMIN — CLONIDINE HYDROCHLORIDE 0.1 MG: 0.1 TABLET ORAL at 14:38

## 2022-05-07 RX ADMIN — ACETAMINOPHEN 650 MG: 325 TABLET ORAL at 08:53

## 2022-05-07 ASSESSMENT — ACTIVITIES OF DAILY LIVING (ADL)
ADLS_ACUITY_SCORE: 13
ADLS_ACUITY_SCORE: 12
ADLS_ACUITY_SCORE: 13
ADLS_ACUITY_SCORE: 12
ADLS_ACUITY_SCORE: 13
ADLS_ACUITY_SCORE: 13
ADLS_ACUITY_SCORE: 12
ADLS_ACUITY_SCORE: 12
ADLS_ACUITY_SCORE: 13
ADLS_ACUITY_SCORE: 12
ADLS_ACUITY_SCORE: 13
ADLS_ACUITY_SCORE: 12
ADLS_ACUITY_SCORE: 12

## 2022-05-07 NOTE — PLAN OF CARE
Diagnosis:Right hip fx, non op  Mental Status:A&O x2-3, hx TBI  Activity/dangle up with 1-2, wbat  Diet: regular  Pain:scheduled tylenol  Garcia/Voiding: urinal  Tele/Restraints/Iso: NA  02/LDA: SL  D/C Date: ? Pending TCU/[facility]   Other Info: gets frustrated/agitated at times

## 2022-05-07 NOTE — PROGRESS NOTES
Patient transferred to room 2305 at 2145 in a wheel chair with an NA. Report called to STELLA Gomez earlier this evening.   Patient's personal belongings, wheel chair, and chart sent with the patient.

## 2022-05-07 NOTE — PROGRESS NOTES
"[facility]    Hospitalist Progress Note      Assessment & Plan   Yunior Angel is a 56 year old male admitted on 5/2/2022. 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  Hip x-ray shows mildly displaced right periprosthetic hip fracture.    Closed displaced fracture of greater trochanter of right femur, initial encounter (H)    S/P total hip arthroplasty  ? Ortho consult: Recommending trial of nonsurgical management.  WBAT.  ? PT/OT.  ? Acetaminophen and/or opioids as needed for pain.  Review of records indicates minimal use of narcotics, 2/ oxycodone/24 hours, no use of IV hydromorphone, will DC.  Given mentation change, attempt minimal use of narcotics as able.  Patient responding well to regimen including hydroxyzine.  Scheduled acetaminophen and attempts to decrease use of narcotics.  ? Chest x-ray NEG; admission EKG inverted T waves.  Troponin negative.  Repeat EKG this morning no further inverted T's.  ? PT as able, per orthopedics weightbearing as tolerated.  Ortho following.  Advised patient to call for assistance with any mobility needs, history of fall 5/4/2022 patient cannot recall events, see HARLEY note.  No new or changed complaints today, no apparent residual from fall, monitor.  ? PT recommends TCU.  Patient and mother receptive to this plan.      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 Yunior's mother and father are his co-guardians.   Apparently brother is also a coguardian.  According to mother who was present during encounter both she and father have noted significant behavioral changes recently with \"hyperactivity\" both motor and in thought processes, verbalization.  No history of same.  Patient carries a diagnosis of depression on paroxetine 60 mg, TBI.  It does not appear that he has been on any other psychotropic " "medications, no history of tardive dyskinesia.  No history of bipolar disorder.  TFTs WNL.  Head CT on admission negative.  No history of alcohol or drug use.  -Psych consult.  Discussed with Dr. Basilio 5/3/2022 who recommends decreasing dose of paroxetine to 40 mg.  He advised use of as needed benzo diazepam for any agitation/aggression.  He feels that presentation most consistent with hyperstimulation in current environment and baseline depression given his history of TBI currently in [place] with limited by patient's perception of prognosis.  He did not feel that current presentation consistent with movement disorder/TD, advised Neurology consult.  As above TFTs WNL.  Head CT on admission negative.  No history EtOH or drug use.  We will continue to monitor.  -See above, neurology consult.  EEG, negative for seizure.  -5/5/2022 no new or changed in mentation/behavior, actually more calm today, decrease hyperactivity regarding thought process and verbalization.  Discussed with patient and mother at length today.  Apparently patient family depressed regarding his current living situation [place], most of his current residence are elderly, \"nothing in common.\".  Recommend Therapist, psychology on discharge, attempts at TBI support group, possibly group home or alternative living situation.  Discussed with Care Coordinator to assist patient and mother with resources.  -Discussed with neurology, Dr Gomez feels condition consistent with TD noting patient received Haldol in ED however discussed symptoms predated ED visit and no known culprit psychotropic medications, nonetheless movement disorder appears resolved and patient more calm likely reflecting sedation or acclimation to hospital setting.  On discussion 5/6/2022 patient much more verbal that he dislikes his current living situation at [facility] although is accepting return to facility after TCU with establishment of resources such as TBI support group, " psychologist/therapist.  -5/7/2022.  Hyperactivity returns both in stream of thought and verbalization.  For the first time patient displayed significant impatience and anger.  Perseverates on need for shower.  Father present.  Yesterday patient was much more calm after had received as needed Ativan for agitation.  Although he was more sedated he was much more calm regarding stream of thought, verbalization and displayed no anger/agitation.  Discussed with father will trial scheduled sedation, historically Ativan however somewhat reluctant for likely ongoing use.  Patient with known history TBI, therefore we will trial schedule clonidine, BP permissive, have as needed Ativan or possibly Seroquel. Redirection/reassurance.  Will reassess tomorrow regarding frequency of need for appearance sedative and likely start scheduled.      Gastroesophageal reflux disease with esophagitis  ? Continue PPI    Age-related osteoporosis with current pathological fracture with routine healing, subsequent encounter  ? Resume Fosamax as outpatient  ? Resume calcium and vitamin D    Hyperlipidemia LDL goal <160  ? Continue statin      DVT Prophylaxis: Pneumatic Compression Devices and Ambulate every shift  Code Status: No CPR- Do NOT Intubate  Expected Discharge:  > 2 days pending clinical improvement both related to periprosthetic fracture  to TCU and acute behavioral/motor activity evaluation.    Christian Bae MD  Text Page (7am - 6pm, M-F)    Total unit/floor time 35 minutes:  time consisted of the following, examination of patient, review of records including labs, imaging results, medications, interdisciplinary notes and completing documentation; > 50% Counseling/discussion with Patient and Father regarding current condition including hyperactivity/perseveration; discussed with father plans on discharge after TCU to consider reconnection with yue Narvaez regarding TBI support group and/or provider regarding increased  behaviors post TBI.  Father receptive to this plan; and Coordination of Care time with Nursing regarding agitation, perseveration care plan, management and surveillance.      IInterval History   Patient displaying significant recurrence of hyperactivity in both Stream of thought and verbalization on encounter, significantly increased perseveration in this case regarding wanting to shower, with first time during this hospital stay also displayed anger/frustration.  Father present.  Yesterday was much more calm after given as needed Ativan.  Unclear if present behavior due to father present as prior to today mother has always been present.  Nonetheless significant agitation/hyperactivity, see above regarding medication changes.     SH: No tobacco.  Parents involved in care; Patient lives in [place] with TBI    ROS: Complete ROS negative except as above.     -Data reviewed today: I reviewed all new labs and imaging results over the last 24 hours.    Physical Exam   Temp: 97.6  F (36.4  C) Temp src: Oral BP: 123/79 Pulse: 96   Resp: 16 SpO2: 98 % O2 Device: None (Room air)    There were no vitals filed for this visit.  Vital Signs with Ranges  Temp:  [97.4  F (36.3  C)-97.9  F (36.6  C)] 97.6  F (36.4  C)  Pulse:  [75-96] 96  Resp:  [16-17] 16  BP: (116-123)/(61-80) 123/79  SpO2:  [95 %-98 %] 98 %  I/O last 3 completed shifts:  In: 200 [P.O.:200]  Out: 500 [Urine:500]    General/Constitutional:   NAD, alert, recurrence of hyperactivity and stream of thought and verbalization.  Displayed significant frustration/anger.  HEENT/Head Exam:  atraumatic  Eyes:  PERRL, no conjunctivits  Mouth/Oral Pharynx:  Buccal mucosa WNL  Chest/Respiratory:  Air exchange bilateral lung fields; no rales or wheeze. Respiration nonlabored.  Cardiovascular:  no murmur appreciated.  LE edema none  Gastrointestinal/Abdomen:  soft, nontender, no rebound, guarding or other peritoneal signs.  Musculoskeletal:  extremities warm, dry, noncyanotic; no  acitve synovitis.  Right LE times ABducted, extremities remain warm to touch, 4/5 motor strength, ROM Not tested.  Neuro. Lays still less involuntary movement actually minimal to none.    Psych oriented, affect: hyperactivity and stream of thought and verbalization.  Displayed significant frustration/anger.    Medications       acetaminophen  650 mg Oral 4x Daily     baclofen  10 mg Oral BID     calcium carbonate 600 mg-vitamin D 400 units  1 tablet Oral BID     cloNIDine  0.1 mg Oral BID     pantoprazole  40 mg Oral Once per day on Sun Mon Wed Fri     PARoxetine  40 mg Oral Daily     simvastatin  40 mg Oral At Bedtime     sodium chloride (PF)  3 mL Intracatheter Q8H     tamsulosin  0.4 mg Oral Daily     Vitamin D3  1,000 Units Oral Daily       Data   Recent Labs   Lab 05/03/22  0532 05/02/22  2223 05/02/22  1407   WBC 10.4  --  11.0   HGB 13.5  --  12.7*   MCV 74*  --  73*     --  367   INR  --  1.11 1.08     --  139   POTASSIUM 3.8  --  3.5   CHLORIDE 107  --  107   CO2 25  --  27   BUN 21  --  20   CR 0.98  --  1.13   ANIONGAP 5  --  5   JUSTA 9.3  --  9.3   *  --  103*   ALBUMIN  --   --  3.6   PROTTOTAL  --   --  6.6*   BILITOTAL  --   --  1.4*   ALKPHOS  --   --  49   ALT  --   --  30   AST  --   --  42

## 2022-05-07 NOTE — PROGRESS NOTES
Care Management Follow Up    Length of Stay (days): 5    Expected Discharge Date: 05/09/2022     Concerns to be Addressed:       Patient plan of care discussed at interdisciplinary rounds: Yes    Anticipated Discharge Disposition: Skilled Nursing Facility     Anticipated Discharge Services: None  Anticipated Discharge DME:      Patient/family educated on Medicare website which has current facility and service quality ratings:    Education Provided on the Discharge Plan:    Patient/Family in Agreement with the Plan: yes    Referrals Placed by CM/SW: External Care Coordination  Private pay costs discussed: Not applicable    Additional Information:  Call to [facility] regarding bed-voicemail indicating that this facility has no admissions over the weekend. Message left with request to call back regarding referral.      DIAMOND Ge

## 2022-05-07 NOTE — PLAN OF CARE
Goal Outcome Evaluation:      [id number]  Pt A&Ox4, forgetful. VSS-RA. Up 1 GB walker. Iv saline locked. Denies pain. CMS intact. Reg diet. Waiting TCU placement. Continue to monitor.

## 2022-05-08 PROCEDURE — 250N000013 HC RX MED GY IP 250 OP 250 PS 637: Performed by: HOSPITALIST

## 2022-05-08 PROCEDURE — 99233 SBSQ HOSP IP/OBS HIGH 50: CPT | Performed by: HOSPITALIST

## 2022-05-08 PROCEDURE — 250N000013 HC RX MED GY IP 250 OP 250 PS 637: Performed by: INTERNAL MEDICINE

## 2022-05-08 PROCEDURE — 120N000001 HC R&B MED SURG/OB

## 2022-05-08 RX ORDER — LORAZEPAM 0.5 MG/1
0.5 TABLET ORAL EVERY 6 HOURS PRN
Status: DISCONTINUED | OUTPATIENT
Start: 2022-05-08 | End: 2022-05-17 | Stop reason: HOSPADM

## 2022-05-08 RX ORDER — CLONIDINE HYDROCHLORIDE 0.1 MG/1
0.2 TABLET ORAL 2 TIMES DAILY
Status: DISCONTINUED | OUTPATIENT
Start: 2022-05-08 | End: 2022-05-17 | Stop reason: HOSPADM

## 2022-05-08 RX ADMIN — Medication 1 TABLET: at 20:27

## 2022-05-08 RX ADMIN — SIMVASTATIN 40 MG: 40 TABLET, FILM COATED ORAL at 20:27

## 2022-05-08 RX ADMIN — BACLOFEN 10 MG: 10 TABLET ORAL at 11:04

## 2022-05-08 RX ADMIN — BACLOFEN 10 MG: 10 TABLET ORAL at 20:27

## 2022-05-08 RX ADMIN — ACETAMINOPHEN 650 MG: 325 TABLET ORAL at 18:16

## 2022-05-08 RX ADMIN — PANTOPRAZOLE SODIUM 40 MG: 40 TABLET, DELAYED RELEASE ORAL at 06:52

## 2022-05-08 RX ADMIN — HYDROXYZINE HYDROCHLORIDE 25 MG: 25 TABLET, FILM COATED ORAL at 14:09

## 2022-05-08 RX ADMIN — LORAZEPAM 0.5 MG: 0.5 TABLET ORAL at 20:27

## 2022-05-08 RX ADMIN — CLONIDINE HYDROCHLORIDE 0.1 MG: 0.1 TABLET ORAL at 11:05

## 2022-05-08 RX ADMIN — TAMSULOSIN HYDROCHLORIDE 0.4 MG: 0.4 CAPSULE ORAL at 11:04

## 2022-05-08 RX ADMIN — PAROXETINE HYDROCHLORIDE 40 MG: 20 TABLET, FILM COATED ORAL at 11:04

## 2022-05-08 RX ADMIN — LORAZEPAM 0.5 MG: 0.5 TABLET ORAL at 11:05

## 2022-05-08 RX ADMIN — ACETAMINOPHEN 650 MG: 325 TABLET ORAL at 11:04

## 2022-05-08 RX ADMIN — Medication 1 TABLET: at 11:05

## 2022-05-08 RX ADMIN — CLONIDINE HYDROCHLORIDE 0.2 MG: 0.1 TABLET ORAL at 20:27

## 2022-05-08 RX ADMIN — ACETAMINOPHEN 650 MG: 325 TABLET ORAL at 20:27

## 2022-05-08 RX ADMIN — Medication 1000 UNITS: at 11:05

## 2022-05-08 ASSESSMENT — ACTIVITIES OF DAILY LIVING (ADL)
ADLS_ACUITY_SCORE: 13

## 2022-05-08 NOTE — PROGRESS NOTES
PAS-RR    D: Per [employer] regulation, SW completed and submitted PAS-RR to [place] [employer] via the Senior [employer] Line.  PAS-RR confirmation # is : [id number]    I: SW spoke with family and they are aware a PAS-RR has been submitted.  SW reviewed with family that they may be contacted for a follow up appointment within 10 days of hospital discharge if their SNF stay is < 30 days.  Contact information for Senior [employer] Line was also provided.    A: family verbalized understanding.    P: Further questions may be directed to [facility] LinkAge Line at #[phone], option #4 for PAS-RR staff    DIAMOND Mckenzie

## 2022-05-08 NOTE — PROGRESS NOTES
"[facility]    Hospitalist Progress Note      Assessment & Plan   Yunior Angel is a 56 year old male admitted on 5/2/2022. 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  Hip x-ray shows mildly displaced right periprosthetic hip fracture.    Closed displaced fracture of greater trochanter of right femur, initial encounter (H)    S/P total hip arthroplasty  ? Ortho consult: Recommending trial of nonsurgical management.  WBAT.  ? PT/OT.  ? Acetaminophen and/or opioids as needed for pain.  Review of records indicates minimal use of narcotics, 2/ oxycodone/24 hours, no use of IV hydromorphone, will DC.  Given mentation change, attempt minimal use of narcotics as able.  Patient responding well to regimen including hydroxyzine.  Scheduled acetaminophen and attempts to decrease use of narcotics.  ? Chest x-ray NEG; admission EKG inverted T waves.  Troponin negative.  Repeat EKG this morning no further inverted T's.  ? PT as able, per orthopedics weightbearing as tolerated.  Ortho following.  Advised patient to call for assistance with any mobility needs, history of fall 5/4/2022 patient cannot recall events, see HARLEY note.  No new or changed complaints; no apparent residual from fall, monitor.  ? PT recommends TCU.  Patient and mother receptive to this plan.      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 Yunior's mother and father are his co-guardians.   Apparently brother is also a coguardian.  According to mother who was present during encounter both she and father have noted significant behavioral changes recently with \"hyperactivity\" both motor and in thought processes, verbalization.  No history of same.  Patient carries a diagnosis of depression on paroxetine 60 mg, TBI.  It does " not appear that he has been on any other psychotropic medications, no history of tardive dyskinesia.  No history of bipolar disorder.  TFTs WNL.  Head CT on admission negative.  No history of alcohol or drug use.  -Psych consult.  Discussed with Dr. Basilio 5/3/2022 who recommends decreasing dose of paroxetine to 40 mg.  He advised use of as needed benzo diazepam for any agitation/aggression.  He feels that presentation most consistent with hyperstimulation in current environment and baseline depression given his history of TBI currently in [place] with limited by patient's perception of prognosis.  He did not feel that current presentation consistent with movement disorder/TD, advised Neurology consult.  As above TFTs WNL.  Head CT on admission negative.  No history EtOH or drug use.   - neurology consult.  EEG, negative for seizure.Discussed with neurology, Dr Gomez feels condition consistent with TD noting patient received Haldol in ED however discussed symptoms predated ED visit and no known culprit psychotropic medications, nonetheless movement disorder appears resolved  5/6/2022 patient much more verbal that he dislikes his current living situation at [facility] although is accepting return to facility after TCU with establishment of resources such as TBI support group, psychologist/therapist.  -5/7/2022.  Hyperactivity returns both in stream of thought and verbalization.  For the first time patient displayed significant impatience and anger.  Perseverates,  Father present.  The day prior patient was much more calm after had received as needed Ativan for agitation.  Although he was more sedated he was much more calm regarding stream of thought, verbalization and displayed no anger/agitation.  Discussed with father will trial scheduled sedation, historically Ativan however somewhat reluctant for likely ongoing use.  Patient with known history TBI, therefore we will trial schedule clonidine, BP permissive, have as  needed Ativan or possibly Seroquel. -  -5/8/2022 more calm, less hyperactive regarding stream of thought and verbalization on scheduled clonidine.  However when patient becomes frustrated hyperactivity returns with agitation.  Therefore trial increase clonidine with hx TBI, BP permitting, continued as needed Ativan.  Continue plans for TCU discharge regarding right hip periprosthetic fracture.  Upon discharge from TCU attempt to arrange reconnection with yue Narvaez regarding TBI support group for which both patient and parents state would benefit, also arrange Psychologist/therapist.      Gastroesophageal reflux disease with esophagitis  ? Continue PPI    Age-related osteoporosis with current pathological fracture with routine healing, subsequent encounter  ? Resume Fosamax as outpatient  ? Resume calcium and vitamin D    Hyperlipidemia LDL goal <160  ? Continue statin      DVT Prophylaxis: Pneumatic Compression Devices and Ambulate every shift  Code Status: No CPR- Do NOT Intubate  Expected Discharge: 2+  days pending clinical improvement both related to periprosthetic fracture  to TCU and acute behavioral changes.     Christian Bae MD  Text Page (7am - 6pm, M-F)    Total unit/floor time 35 minutes:  time consisted of the following, examination of patient, review of records including labs, imaging results, medications, interdisciplinary notes and completing documentation; > 50% Counseling/discussion with Patient and Mother regarding current condition including behavioral changes/hyperactivity/agitation, and Coordination of Care time with Nursing regarding behavioral changes and Specialists, Orthopedics regarding right hip periprosthetic fracture care plan, management and surveillance.    IInterval History   Patient much more calm in both Stream of thought and verbalization on start of scheduled clonidine.  However when patient becomes frustrated hyperactivity is noted with agitation.  Concern regarding  hyperactivity and right hip periprosthetic fracture.  Discussed with Patient and Mother trial uptitrate clonidine to behaviors, continue as needed Ativan, TBI support group and psychologist/therapist post TCU discharge.       SH: No tobacco.  Parents involved in care; Patient lives in [place] with TBI    ROS: Complete ROS negative except as above.     -Data reviewed today: I reviewed all new labs and imaging results over the last 24 hours.    Physical Exam   Temp: 97.9  F (36.6  C) Temp src: Oral BP: 112/69 Pulse: 77   Resp: 16 SpO2: 99 % O2 Device: None (Room air)    There were no vitals filed for this visit.  Vital Signs with Ranges  Temp:  [97.5  F (36.4  C)-97.9  F (36.6  C)] 97.9  F (36.6  C)  Pulse:  [77-96] 77  Resp:  [16-20] 16  BP: (112-130)/(69-79) 112/69  SpO2:  [98 %-99 %] 99 %  I/O last 3 completed shifts:  In: 400 [P.O.:400]  Out: 800 [Urine:800]    General/Constitutional:    NAD, alert, more calm although episodic hyperactivity with Stream of thought and verbalization, cooperative ;  HEENT/Head Exam:  atraumatic  Eyes:  PERRL, no conjunctivits  Mouth/Oral Pharynx:  Buccal mucosa WNL  Chest/Respiratory:  Air exchange bilateral lung fields; no rales or wheeze. Respiration nonlabored.  Cardiovascular:  no murmur appreciated.  LE edema none  Gastrointestinal/Abdomen:  soft, nontender, no rebound, guarding or other peritoneal signs.  Musculoskeletal:  extremities warm, dry, noncyanotic; no acitve synovitis.  Right LE  ABducted, extremities remain warm to touch, 4/5 motor strength, ROM Not tested.  Neuro. moves all 4 extremities,, sensory intact  Psych oriented, affect more calm, episodic hyperactivity of verbalization and stream of thought.    Medications       acetaminophen  650 mg Oral 4x Daily     baclofen  10 mg Oral BID     calcium carbonate 600 mg-vitamin D 400 units  1 tablet Oral BID     cloNIDine  0.2 mg Oral BID     pantoprazole  40 mg Oral Once per day on Sun Mon Wed Fri     PARoxetine  40 mg Oral  Daily     simvastatin  40 mg Oral At Bedtime     sodium chloride (PF)  3 mL Intracatheter Q8H     tamsulosin  0.4 mg Oral Daily     Vitamin D3  1,000 Units Oral Daily       Data   Recent Labs   Lab 05/03/22  0532 05/02/22  2223 05/02/22  1407   WBC 10.4  --  11.0   HGB 13.5  --  12.7*   MCV 74*  --  73*     --  367   INR  --  1.11 1.08     --  139   POTASSIUM 3.8  --  3.5   CHLORIDE 107  --  107   CO2 25  --  27   BUN 21  --  20   CR 0.98  --  1.13   ANIONGAP 5  --  5   JUSTA 9.3  --  9.3   *  --  103*   ALBUMIN  --   --  3.6   PROTTOTAL  --   --  6.6*   BILITOTAL  --   --  1.4*   ALKPHOS  --   --  49   ALT  --   --  30   AST  --   --  42

## 2022-05-08 NOTE — PROGRESS NOTES
Alert to self & situation. Up w/ A1 GB & walker to BR. WBAT. Hx of TBI. Denies pain. Weak dorsi & plantarflexion. IV SL. Awaiting TCU placement.

## 2022-05-09 PROCEDURE — 250N000013 HC RX MED GY IP 250 OP 250 PS 637: Performed by: HOSPITALIST

## 2022-05-09 PROCEDURE — 99232 SBSQ HOSP IP/OBS MODERATE 35: CPT | Performed by: INTERNAL MEDICINE

## 2022-05-09 PROCEDURE — 250N000013 HC RX MED GY IP 250 OP 250 PS 637: Performed by: INTERNAL MEDICINE

## 2022-05-09 PROCEDURE — 120N000001 HC R&B MED SURG/OB

## 2022-05-09 RX ADMIN — ACETAMINOPHEN 650 MG: 325 TABLET ORAL at 20:10

## 2022-05-09 RX ADMIN — CLONIDINE HYDROCHLORIDE 0.2 MG: 0.1 TABLET ORAL at 20:09

## 2022-05-09 RX ADMIN — BACLOFEN 10 MG: 10 TABLET ORAL at 08:02

## 2022-05-09 RX ADMIN — CLONIDINE HYDROCHLORIDE 0.2 MG: 0.1 TABLET ORAL at 08:02

## 2022-05-09 RX ADMIN — Medication 1000 UNITS: at 08:02

## 2022-05-09 RX ADMIN — Medication 1 TABLET: at 20:09

## 2022-05-09 RX ADMIN — Medication 1 TABLET: at 08:02

## 2022-05-09 RX ADMIN — SIMVASTATIN 40 MG: 40 TABLET, FILM COATED ORAL at 20:10

## 2022-05-09 RX ADMIN — PANTOPRAZOLE SODIUM 40 MG: 40 TABLET, DELAYED RELEASE ORAL at 06:28

## 2022-05-09 RX ADMIN — BACLOFEN 10 MG: 10 TABLET ORAL at 20:10

## 2022-05-09 RX ADMIN — PAROXETINE HYDROCHLORIDE 40 MG: 20 TABLET, FILM COATED ORAL at 08:02

## 2022-05-09 RX ADMIN — TAMSULOSIN HYDROCHLORIDE 0.4 MG: 0.4 CAPSULE ORAL at 08:02

## 2022-05-09 RX ADMIN — ACETAMINOPHEN 650 MG: 325 TABLET ORAL at 08:02

## 2022-05-09 ASSESSMENT — ACTIVITIES OF DAILY LIVING (ADL)
ADLS_ACUITY_SCORE: 32
ADLS_ACUITY_SCORE: 13
ADLS_ACUITY_SCORE: 13
ADLS_ACUITY_SCORE: 32
ADLS_ACUITY_SCORE: 13
ADLS_ACUITY_SCORE: 32
ADLS_ACUITY_SCORE: 13
ADLS_ACUITY_SCORE: 32
ADLS_ACUITY_SCORE: 13
ADLS_ACUITY_SCORE: 29
ADLS_ACUITY_SCORE: 32
ADLS_ACUITY_SCORE: 13
ADLS_ACUITY_SCORE: 32
ADLS_ACUITY_SCORE: 14
ADLS_ACUITY_SCORE: 13
ADLS_ACUITY_SCORE: 29
ADLS_ACUITY_SCORE: 32
ADLS_ACUITY_SCORE: 29
ADLS_ACUITY_SCORE: 13
ADLS_ACUITY_SCORE: 32
ADLS_ACUITY_SCORE: 32

## 2022-05-09 NOTE — PROGRESS NOTES
Care Management Follow Up    Length of Stay (days): 7    Expected Discharge Date: 05/10/2022     Concerns to be Addressed:       Patient plan of care discussed at interdisciplinary rounds: Yes    Anticipated Discharge Disposition: Skilled Nursing Facility     Anticipated Discharge Services: None  Anticipated Discharge DME:      Patient/family educated on Medicare website which has current facility and service quality ratings:    Education Provided on the Discharge Plan:    Patient/Family in Agreement with the Plan: yes    Referrals Placed by CM/SW: External Care Coordination  Private pay costs discussed: Not applicable    Additional Information:  Call placed to [facility] and message left regarding referral. Call placed to [facility] to see if patient would be re-evaluated for available beds. Writer spoke to patient's mother in room and requested additional choices. She stated that they are open to facilities in [facility] if possible or [place]. Referrals sent to [facility] and [facility] via DOD.       DIAMOND Ge

## 2022-05-09 NOTE — PLAN OF CARE
Date/Time: 5/8/22 0611-6162    Trauma/Ortho/Medical (Choose one): Trauma    Diagnosis: Right hip Fx  POD#: N/A  Mental Status: A&O with intermittent confusion, very forgetful   Activity/dangle: Assist of one with GB and walker   Diet: Regular with no caffeine   Pain: Scheduled Tylenol PRN oxy available   Garcia/Voiding: Voiding   Tele/Restraints/Iso:  02/LDA: No IV access  D/C Date: Pending TCU placement  Other Info: Disoriented at times, impulsive at times, PRN ativan for anxiety, pt has legal guardians

## 2022-05-09 NOTE — PLAN OF CARE
A&OX3, history of TBI.  Patient's mom at bedside today until 1500.  She stated he is feeling tired today and just wants to sleep.  Patient did eat half a sandwich for lunch with some fruit and ice cream, this was encouraged by his mother.  Patient refused dinner this evening, tray was ordered so we can offer again when the food arrives.  He denies pain.  Refused to get out of bed today, stating he  just wanted to rest.  He did stand at bedside with walker to void using urinal.  Denied scheduled tylenol.  Discharge is pending TCU placement.

## 2022-05-09 NOTE — PLAN OF CARE
Patient up with assist of 1 and walker.  Patient denies pain; refuses ice to hip.  VSS on RA.  Disoriented to time and situation; pleasant and cooperative.  RLE nuria, but CMS intact.  Voiding adequately per urinal.  Patient refuses breakfast this morning, saying he is not hungry.  Plan to discharge to TCU pending placement.

## 2022-05-09 NOTE — PLAN OF CARE
One assist, alert and oriented needing initial reminders while sleeping with rare use of the call light.  CMS unchanged this shift.  Lung sounds clear, encouraged respiratory exercises.  Bowel sounds present, passing flatus, tolerating diet.  Voiding with adequate urine output.  No complaints of pain.  Continue to monitor.

## 2022-05-09 NOTE — PROGRESS NOTES
"[facility]    Medicine Progress Note - Hospitalist Service    Date of Admission:  5/2/2022    Assessment & Plan        Yunior Angel is a 56 year old male admitted on 5/2/2022. 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  Hip x-ray shows mildly displaced right periprosthetic hip fracture.     05/09: No change.  A/W TCU    Closed displaced fracture of greater trochanter of right femur, initial encounter (H)    S/P total hip arthroplasty  ? Ortho consult: Recommending trial of nonsurgical management.  WBAT.  ? PT/OT.  ? Acetaminophen and/or opioids as needed for pain.  Review of records indicates minimal use of narcotics, 2/ oxycodone/24 hours, no use of IV hydromorphone, will DC.  Given mentation change, attempt minimal use of narcotics as able.  Patient responding well to regimen including hydroxyzine.  Scheduled acetaminophen and attempts to decrease use of narcotics.  ? Chest x-ray NEG; admission EKG inverted T waves.  Troponin negative.  Repeat EKG this morning no further inverted T's.  ? PT as able, per orthopedics weightbearing as tolerated.  Ortho following.  Advised patient to call for assistance with any mobility needs, history of fall 5/4/2022 patient cannot recall events, see HARLEY note.  No new or changed complaints; no apparent residual from fall, monitor.  ? PT recommends TCU.  Patient and mother receptive to this plan.       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 Yunior's mother and father are his co-guardians.   Apparently brother is also a coguardian.  According to mother who was present during encounter both she and father have noted significant behavioral changes recently with \"hyperactivity\" both motor and in thought processes, verbalization.  No history " of same.  Patient carries a diagnosis of depression on paroxetine 60 mg, TBI.  It does not appear that he has been on any other psychotropic medications, no history of tardive dyskinesia.  No history of bipolar disorder.  TFTs WNL.  Head CT on admission negative.  No history of alcohol or drug use.  -Psych consult.  Discussed with Dr. Basilio 5/3/2022 who recommends decreasing dose of paroxetine to 40 mg.  He advised use of as needed benzo diazepam for any agitation/aggression.  He feels that presentation most consistent with hyperstimulation in current environment and baseline depression given his history of TBI currently in [place] with limited by patient's perception of prognosis.  He did not feel that current presentation consistent with movement disorder/TD, advised Neurology consult.  As above TFTs WNL.  Head CT on admission negative.  No history EtOH or drug use.   - neurology consult.  EEG, negative for seizure.Discussed with neurology, Dr Gomez feels condition consistent with TD noting patient received Haldol in ED however discussed symptoms predated ED visit and no known culprit psychotropic medications, nonetheless movement disorder appears resolved  5/6/2022 patient much more verbal that he dislikes his current living situation at [facility] although is accepting return to facility after TCU with establishment of resources such as TBI support group, psychologist/therapist.  -5/7/2022.  Hyperactivity returns both in stream of thought and verbalization.  For the first time patient displayed significant impatience and anger.  Perseverates,  Father present.  The day prior patient was much more calm after had received as needed Ativan for agitation.  Although he was more sedated he was much more calm regarding stream of thought, verbalization and displayed no anger/agitation.  Discussed with father will trial scheduled sedation, historically Ativan however somewhat reluctant for likely ongoing use.  Patient with  "known history TBI, therefore we will trial schedule clonidine, BP permissive, have as needed Ativan or possibly Seroquel. -  -5/8/2022 more calm, less hyperactive regarding stream of thought and verbalization on scheduled clonidine.  However when patient becomes frustrated hyperactivity returns with agitation.  Therefore trial increase clonidine with hx TBI, BP permitting, continued as needed Ativan.  Continue plans for TCU discharge regarding right hip periprosthetic fracture.  Upon discharge from TCU attempt to arrange reconnection with yue Narvaez regarding TBI support group for which both patient and parents state would benefit, also arrange Psychologist/therapist.       Gastroesophageal reflux disease with esophagitis  ? Continue PPI    Age-related osteoporosis with current pathological fracture with routine healing, subsequent encounter  ? Resume Fosamax as outpatient  ? Resume calcium and vitamin D    Hyperlipidemia LDL goal <160  ? Continue statin        Diet: Combination Diet Regular Diet Adult; No Caffeine Diet    DVT Prophylaxis: Pneumatic Compression Devices  Garcia Catheter: Not present  Central Lines: None  Cardiac Monitoring: None  Code Status: No CPR- Do NOT Intubate      Disposition Plan   Expected Discharge: 05/10/2022     Anticipated discharge location: inpatient rehabilitation facility    Delays:     Placement - TCU            The patient's care was discussed with the Bedside Nurse, Patient and Patient's Family.    Damion Mcfarland MD, MD  Hospitalist Service  [facility]  Securely message with the Vocera Web Console (learn more here)  Text page via [employer] Paging/Directory     \"This dictation was performed with voice recognition software and may contain errors,  omissions and inadvertent word substitution.\"     Clinically Significant Risk Factors Present on Admission                  ______________________________________________________________________    Interval History  "   First interaction with the patient today    Denies any specific complaints.  Denies any pain/shortness of breath/fever/chills.  Low blood pressure was documented last night but patient denies any complaint and no specific concerns as per bedside RN.    Looks calmer today as per his mother     4 point ROS done and negative unless mentioned     Data reviewed today: I reviewed all medications, new labs and imaging results over the last 24 hours. I personally reviewed no images or EKG's today.    Physical Exam   /61 (BP Location: Right arm)   Pulse 86   Temp 97  F (36.1  C) (Oral)   Resp 16   SpO2 94%   Gen- pleasant lying in bed  HEENT- NAD, LARA  Neck- supple, no JVD elevation, no thyromegaly  CVS- I+II+ no m/r/g  RS- CTAB  Abdo- soft, no tenderness . No g/r/r  Ext- no edema   CNS- h/o TBI    Data    BMPRecent Labs   Lab 05/03/22  0532 05/02/22  1407    139   POTASSIUM 3.8 3.5   CHLORIDE 107 107   JUSTA 9.3 9.3   CO2 25 27   BUN 21 20   CR 0.98 1.13   * 103*     CBC  Recent Labs   Lab 05/03/22  0532 05/02/22  1407   WBC 10.4 11.0   RBC 5.84 5.51   HGB 13.5 12.7*   HCT 43.4 40.3   MCV 74* 73*   MCH 23.1* 23.0*   MCHC 31.1* 31.5   RDW 15.3* 14.8    367     INR  Recent Labs   Lab 05/02/22  2223 05/02/22  1407   INR 1.11 1.08     LFTs  Recent Labs   Lab 05/02/22  1407   ALKPHOS 49   AST 42   ALT 30   BILITOTAL 1.4*   PROTTOTAL 6.6*   ALBUMIN 3.6      PANCNo lab results found in last 7 days.    No results found for this or any previous visit (from the past 24 hour(s)).

## 2022-05-10 ENCOUNTER — APPOINTMENT (OUTPATIENT)
Dept: PHYSICAL THERAPY | Facility: CLINIC | Age: 57
DRG: 543 | End: 2022-05-10
Attending: HOSPITALIST
Payer: COMMERCIAL

## 2022-05-10 LAB — SARS-COV-2 RNA RESP QL NAA+PROBE: NEGATIVE

## 2022-05-10 PROCEDURE — 97530 THERAPEUTIC ACTIVITIES: CPT | Mod: GP | Performed by: PHYSICAL THERAPIST

## 2022-05-10 PROCEDURE — 250N000013 HC RX MED GY IP 250 OP 250 PS 637: Performed by: HOSPITALIST

## 2022-05-10 PROCEDURE — 250N000013 HC RX MED GY IP 250 OP 250 PS 637: Performed by: INTERNAL MEDICINE

## 2022-05-10 PROCEDURE — 99231 SBSQ HOSP IP/OBS SF/LOW 25: CPT | Performed by: INTERNAL MEDICINE

## 2022-05-10 PROCEDURE — U0005 INFEC AGEN DETEC AMPLI PROBE: HCPCS | Performed by: INTERNAL MEDICINE

## 2022-05-10 PROCEDURE — 120N000001 HC R&B MED SURG/OB

## 2022-05-10 PROCEDURE — 97116 GAIT TRAINING THERAPY: CPT | Mod: GP | Performed by: PHYSICAL THERAPIST

## 2022-05-10 RX ADMIN — BACLOFEN 10 MG: 10 TABLET ORAL at 09:53

## 2022-05-10 RX ADMIN — Medication 1000 UNITS: at 09:53

## 2022-05-10 RX ADMIN — Medication 1 TABLET: at 09:53

## 2022-05-10 RX ADMIN — BACLOFEN 10 MG: 10 TABLET ORAL at 20:13

## 2022-05-10 RX ADMIN — TAMSULOSIN HYDROCHLORIDE 0.4 MG: 0.4 CAPSULE ORAL at 09:53

## 2022-05-10 RX ADMIN — PAROXETINE HYDROCHLORIDE 40 MG: 20 TABLET, FILM COATED ORAL at 09:53

## 2022-05-10 RX ADMIN — ACETAMINOPHEN 650 MG: 325 TABLET ORAL at 20:12

## 2022-05-10 RX ADMIN — ACETAMINOPHEN 650 MG: 325 TABLET ORAL at 09:53

## 2022-05-10 RX ADMIN — ACETAMINOPHEN 650 MG: 325 TABLET ORAL at 17:27

## 2022-05-10 RX ADMIN — CLONIDINE HYDROCHLORIDE 0.2 MG: 0.1 TABLET ORAL at 09:53

## 2022-05-10 RX ADMIN — SIMVASTATIN 40 MG: 40 TABLET, FILM COATED ORAL at 20:13

## 2022-05-10 RX ADMIN — Medication 1 TABLET: at 20:13

## 2022-05-10 ASSESSMENT — ACTIVITIES OF DAILY LIVING (ADL)
ADLS_ACUITY_SCORE: 29
ADLS_ACUITY_SCORE: 28
ADLS_ACUITY_SCORE: 28
ADLS_ACUITY_SCORE: 29
ADLS_ACUITY_SCORE: 28
ADLS_ACUITY_SCORE: 31
ADLS_ACUITY_SCORE: 28
ADLS_ACUITY_SCORE: 29
ADLS_ACUITY_SCORE: 28
ADLS_ACUITY_SCORE: 29
ADLS_ACUITY_SCORE: 29
ADLS_ACUITY_SCORE: 28
ADLS_ACUITY_SCORE: 31
ADLS_ACUITY_SCORE: 28
ADLS_ACUITY_SCORE: 31

## 2022-05-10 NOTE — PLAN OF CARE
A&OX3, disoriented to time and situation at times, history of TBI.  Up with 1 and walker voiding in the bathroom.  Denies pain.  Taking scheduled tylenol.  Discharge is pending TCU placement.

## 2022-05-10 NOTE — PROGRESS NOTES
"[facility]    Medicine Progress Note - Hospitalist Service    Date of Admission:  5/2/2022    Assessment & Plan        Yunior Angel is a 56 year old male admitted on 5/2/2022. 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  Hip x-ray shows mildly displaced right periprosthetic hip fracture.     05/10: No change.  A/W TCU    Closed displaced fracture of greater trochanter of right femur, initial encounter (H)    S/P total hip arthroplasty  ? Ortho consult: Recommending trial of nonsurgical management.  WBAT.  ? PT/OT.  ? Acetaminophen and/or opioids as needed for pain.  Review of records indicates minimal use of narcotics, 2/ oxycodone/24 hours, no use of IV hydromorphone, will DC.  Given mentation change, attempt minimal use of narcotics as able.  Patient responding well to regimen including hydroxyzine.  Scheduled acetaminophen and attempts to decrease use of narcotics.  ? Chest x-ray NEG; admission EKG inverted T waves.  Troponin negative.  Repeat EKG this morning no further inverted T's.  ? PT as able, per orthopedics weightbearing as tolerated.  Ortho following.  Advised patient to call for assistance with any mobility needs, history of fall 5/4/2022 patient cannot recall events, see HARLEY note.  No new or changed complaints; no apparent residual from fall, monitor.  ? PT recommends TCU.  Patient and mother receptive to this plan.       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 Yunior's mother and father are his co-guardians.   Apparently brother is also a coguardian.  According to mother who was present during encounter both she and father have noted significant behavioral changes recently with \"hyperactivity\" both motor and in thought processes, verbalization.  No history " of same.  Patient carries a diagnosis of depression on paroxetine 60 mg, TBI.  It does not appear that he has been on any other psychotropic medications, no history of tardive dyskinesia.  No history of bipolar disorder.  TFTs WNL.  Head CT on admission negative.  No history of alcohol or drug use.  -Psych consult.  Discussed with Dr. Basilio 5/3/2022 who recommends decreasing dose of paroxetine to 40 mg.  He advised use of as needed benzo diazepam for any agitation/aggression.  He feels that presentation most consistent with hyperstimulation in current environment and baseline depression given his history of TBI currently in [place] with limited by patient's perception of prognosis.  He did not feel that current presentation consistent with movement disorder/TD, advised Neurology consult.  As above TFTs WNL.  Head CT on admission negative.  No history EtOH or drug use.   - neurology consult.  EEG, negative for seizure.Discussed with neurology, Dr Gomez feels condition consistent with TD noting patient received Haldol in ED however discussed symptoms predated ED visit and no known culprit psychotropic medications, nonetheless movement disorder appears resolved  5/6/2022 patient much more verbal that he dislikes his current living situation at [facility] although is accepting return to facility after TCU with establishment of resources such as TBI support group, psychologist/therapist.  -5/7/2022.  Hyperactivity returns both in stream of thought and verbalization.  For the first time patient displayed significant impatience and anger.  Perseverates,  Father present.  The day prior patient was much more calm after had received as needed Ativan for agitation.  Although he was more sedated he was much more calm regarding stream of thought, verbalization and displayed no anger/agitation.  Discussed with father will trial scheduled sedation, historically Ativan however somewhat reluctant for likely ongoing use.  Patient with  "known history TBI, therefore we will trial schedule clonidine, BP permissive, have as needed Ativan or possibly Seroquel. -  -5/8/2022 more calm, less hyperactive regarding stream of thought and verbalization on scheduled clonidine.  However when patient becomes frustrated hyperactivity returns with agitation.  Therefore trial increase clonidine with hx TBI, BP permitting, continued as needed Ativan.  Continue plans for TCU discharge regarding right hip periprosthetic fracture.  Upon discharge from TCU attempt to arrange reconnection with yue Narvaez regarding TBI support group for which both patient and parents state would benefit, also arrange Psychologist/therapist.       Gastroesophageal reflux disease with esophagitis  ? Continue PPI    Age-related osteoporosis with current pathological fracture with routine healing, subsequent encounter  ? Resume Fosamax as outpatient  ? Resume calcium and vitamin D    Hyperlipidemia LDL goal <160  ? Continue statin        Diet: Combination Diet Regular Diet Adult; No Caffeine Diet    DVT Prophylaxis: Pneumatic Compression Devices  Garcia Catheter: Not present  Central Lines: None  Cardiac Monitoring: None  Code Status: No CPR- Do NOT Intubate      Disposition Plan   Expected Discharge: 05/10/2022     Anticipated discharge location: inpatient rehabilitation facility    Delays:     Placement - TCU            The patient's care was discussed with the Bedside Nurse, Patient and Patient's Family.    Damion Mcfarland MD, MD  Hospitalist Service  [facility]  Securely message with the Vocera Web Console (learn more here)  Text page via [employer] Paging/Directory     \"This dictation was performed with voice recognition software and may contain errors,  omissions and inadvertent word substitution.\"     Clinically Significant Risk Factors Present on Admission                  ______________________________________________________________________    Interval History  "     Denies any specific complaints.  Denies any pain/shortness of breath/fever/chills.  calm     4 point ROS done and negative unless mentioned     Data reviewed today: I reviewed all medications, new labs and imaging results over the last 24 hours. I personally reviewed no images or EKG's today.    Physical Exam   BP 92/49 (BP Location: Right arm)   Pulse 57   Temp 97.3  F (36.3  C) (Oral)   Resp 16   SpO2 94%   Gen- pleasant lying in bed  HEENT- NAD, LARA  Neck- supple, no JVD elevation, no thyromegaly  CVS- I+II+ no m/r/g  RS- CTAB  Abdo- soft, no tenderness . No g/r/r  Ext- no edema   CNS- h/o TBI    Data    BMPNo lab results found in last 7 days.  CBC  No lab results found in last 7 days.  INR  No lab results found in last 7 days.  LFTs  No lab results found in last 7 days.   PANCNo lab results found in last 7 days.    No results found for this or any previous visit (from the past 24 hour(s)).

## 2022-05-10 NOTE — PROGRESS NOTES
Care Management Follow Up    Length of Stay (days): 8    Expected Discharge Date: 05/10/2022     Concerns to be Addressed:       Patient plan of care discussed at interdisciplinary rounds: Yes    Anticipated Discharge Disposition: Skilled Nursing Facility     Anticipated Discharge Services: None  Anticipated Discharge DME:      Patient/family educated on Medicare website which has current facility and service quality ratings:    Education Provided on the Discharge Plan:    Patient/Family in Agreement with the Plan: yes    Referrals Placed by CM/SW: External Care Coordination  Private pay costs discussed: Not applicable    Additional Information:  SW left voicemail at [facility].  SW also sent referral to [facility].   SW received voicemail from [facility] [phone].  She would like to be updated with discharge plans when they are available.       Avelina Leos, OTTOSW

## 2022-05-10 NOTE — PROGRESS NOTES
Pt is alert and oriented x3, disoriented to time, has Hx of TBI. VSS, CMS intact Pt awake throughout night, appears to have days and nights mixed up. Pt denies pain. Standing at bedside using urinal to void. Discharge pending TCU placement. Continue POC.

## 2022-05-11 ENCOUNTER — APPOINTMENT (OUTPATIENT)
Dept: PHYSICAL THERAPY | Facility: CLINIC | Age: 57
DRG: 543 | End: 2022-05-11
Attending: HOSPITALIST
Payer: COMMERCIAL

## 2022-05-11 LAB
ATRIAL RATE - MUSE: 81 BPM
DIASTOLIC BLOOD PRESSURE - MUSE: NORMAL MMHG
INTERPRETATION ECG - MUSE: NORMAL
P AXIS - MUSE: 62 DEGREES
PR INTERVAL - MUSE: 146 MS
QRS DURATION - MUSE: 106 MS
QT - MUSE: 402 MS
QTC - MUSE: 466 MS
R AXIS - MUSE: 60 DEGREES
SYSTOLIC BLOOD PRESSURE - MUSE: NORMAL MMHG
T AXIS - MUSE: 45 DEGREES
VENTRICULAR RATE- MUSE: 81 BPM

## 2022-05-11 PROCEDURE — 97116 GAIT TRAINING THERAPY: CPT | Mod: GP

## 2022-05-11 PROCEDURE — 97530 THERAPEUTIC ACTIVITIES: CPT | Mod: GP

## 2022-05-11 PROCEDURE — 250N000013 HC RX MED GY IP 250 OP 250 PS 637: Performed by: INTERNAL MEDICINE

## 2022-05-11 PROCEDURE — 99231 SBSQ HOSP IP/OBS SF/LOW 25: CPT | Performed by: INTERNAL MEDICINE

## 2022-05-11 PROCEDURE — 120N000001 HC R&B MED SURG/OB

## 2022-05-11 PROCEDURE — 250N000013 HC RX MED GY IP 250 OP 250 PS 637: Performed by: HOSPITALIST

## 2022-05-11 RX ADMIN — CLONIDINE HYDROCHLORIDE 0.2 MG: 0.1 TABLET ORAL at 02:39

## 2022-05-11 RX ADMIN — TAMSULOSIN HYDROCHLORIDE 0.4 MG: 0.4 CAPSULE ORAL at 07:36

## 2022-05-11 RX ADMIN — SIMVASTATIN 40 MG: 40 TABLET, FILM COATED ORAL at 20:58

## 2022-05-11 RX ADMIN — Medication 1000 UNITS: at 07:37

## 2022-05-11 RX ADMIN — PAROXETINE HYDROCHLORIDE 40 MG: 20 TABLET, FILM COATED ORAL at 07:36

## 2022-05-11 RX ADMIN — Medication 1 TABLET: at 07:37

## 2022-05-11 RX ADMIN — Medication 1 TABLET: at 20:58

## 2022-05-11 RX ADMIN — ACETAMINOPHEN 650 MG: 325 TABLET ORAL at 20:57

## 2022-05-11 RX ADMIN — ACETAMINOPHEN 650 MG: 325 TABLET ORAL at 16:42

## 2022-05-11 RX ADMIN — PANTOPRAZOLE SODIUM 40 MG: 40 TABLET, DELAYED RELEASE ORAL at 07:36

## 2022-05-11 RX ADMIN — ACETAMINOPHEN 650 MG: 325 TABLET ORAL at 12:15

## 2022-05-11 RX ADMIN — CLONIDINE HYDROCHLORIDE 0.2 MG: 0.1 TABLET ORAL at 20:58

## 2022-05-11 RX ADMIN — ACETAMINOPHEN 650 MG: 325 TABLET ORAL at 07:37

## 2022-05-11 RX ADMIN — BACLOFEN 10 MG: 10 TABLET ORAL at 07:36

## 2022-05-11 RX ADMIN — BACLOFEN 10 MG: 10 TABLET ORAL at 20:57

## 2022-05-11 ASSESSMENT — ACTIVITIES OF DAILY LIVING (ADL)
ADLS_ACUITY_SCORE: 31

## 2022-05-11 NOTE — PROGRESS NOTES
Care Management Follow Up    Length of Stay (days): 9    Expected Discharge Date: 2022     Concerns to be Addressed:       Patient plan of care discussed at interdisciplinary rounds: Yes    Anticipated Discharge Disposition: Skilled Nursing Facility     Anticipated Discharge Services: None  Anticipated Discharge DME:      Patient/family educated on Medicare website which has current facility and service quality ratings:    Education Provided on the Discharge Plan:    Patient/Family in Agreement with the Plan: yes    Referrals Placed by CM/SW: External Care Coordination  Private pay costs discussed: Not applicable    Additional Information:  SW spoke to pt and his mom at length.  Pt expresses gratitude for the care he is getting at [facility], and hopes to feel better and return to his [facility] after TCU.  Mother says that her brother's  is this weekend, and she wants to go, but is nervous that pt may need something and she will not be nearby to assist.  SW reassured mother that [facility] or a TCU would be able to find items pt needed, as mom has packed his personal items and has them at [facility] for pt.  Pt reassured his mom that she should go to the  and he will be able to ask for any help he needs.    SW spoke to pt and mom about expanding geographic areas for TCU, as our search is very limited at this time and we have no leads on TCU beds currently.  [place], starting with [place]CU's that have average or higher Medicare ratings.  Pt and mother eagerly agree to this, and will let SW send out referrals and they are flexible and eager for pt to continue to get PT.    SHIVANI sent additional TCU referrals to [facility], [facility] (pt was there in the past), [facility], [facility], [facility], [facility], and [facility].    SW received voicemail from [facility] and they said they  might have a bed available this week on Friday, and they wanted more information about pt mood, coping, and presentation.  SW explained that pt has become anxious at [facility], and angry on one occasion and received support from psychiatry.  SW informed [facility] that SW met with pt and his mother today and pt was eager to share his thoughts, and was very hopeful and cooperative about TCU and returning to his intermediate.  Pt family is highly involved and highly supportive and available to support pt while at TCU.       Avelina Leos, OTTOSW

## 2022-05-11 NOTE — PROGRESS NOTES
Alert to self. VSS. Hx of TBI with cognitive deficits/Disorganized thinking/rambling. Pleasant and redirectable. No complaints of pain.  WBAT on R leg. Up with 1 GBW. Needs TCU placement. POA activated.

## 2022-05-11 NOTE — PLAN OF CARE
Alert  to self and place, VSS, denies pain, CMS intact, weak right dorsi/plantar flexion, Ax1 with GB and walker to bathroom.

## 2022-05-11 NOTE — PROGRESS NOTES
"[facility]    Medicine Progress Note - Hospitalist Service    Date of Admission:  5/2/2022    Assessment & Plan        Yunior Angel is a 56 year old male admitted on 5/2/2022. 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  Hip x-ray shows mildly displaced right periprosthetic hip fracture.     05/11: No change.  A/W TCU    Closed displaced fracture of greater trochanter of right femur, initial encounter (H)    S/P total hip arthroplasty  ? Ortho consult: Recommending trial of nonsurgical management.  WBAT.  ? PT/OT.  ? Acetaminophen and/or opioids as needed for pain.  Review of records indicates minimal use of narcotics, 2/ oxycodone/24 hours, no use of IV hydromorphone, will DC.  Given mentation change, attempt minimal use of narcotics as able.  Patient responding well to regimen including hydroxyzine.  Scheduled acetaminophen and attempts to decrease use of narcotics.  ? Chest x-ray NEG; admission EKG inverted T waves.  Troponin negative.  Repeat EKG this morning no further inverted T's.  ? PT as able, per orthopedics weightbearing as tolerated.  Ortho following.  Advised patient to call for assistance with any mobility needs, history of fall 5/4/2022 patient cannot recall events, see HARLEY note.  No new or changed complaints; no apparent residual from fall, monitor.  ? PT recommends TCU.  Patient and mother receptive to this plan.       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 Yunior's mother and father are his co-guardians.   Apparently brother is also a coguardian.  According to mother who was present during encounter both she and father have noted significant behavioral changes recently with \"hyperactivity\" both motor and in thought processes, verbalization.  No history " of same.  Patient carries a diagnosis of depression on paroxetine 60 mg, TBI.  It does not appear that he has been on any other psychotropic medications, no history of tardive dyskinesia.  No history of bipolar disorder.  TFTs WNL.  Head CT on admission negative.  No history of alcohol or drug use.  -Psych consult.  Discussed with Dr. Basilio 5/3/2022 who recommends decreasing dose of paroxetine to 40 mg.  He advised use of as needed benzo diazepam for any agitation/aggression.  He feels that presentation most consistent with hyperstimulation in current environment and baseline depression given his history of TBI currently in [place] with limited by patient's perception of prognosis.  He did not feel that current presentation consistent with movement disorder/TD, advised Neurology consult.  As above TFTs WNL.  Head CT on admission negative.  No history EtOH or drug use.   - neurology consult.  EEG, negative for seizure.Discussed with neurology, Dr Gomez feels condition consistent with TD noting patient received Haldol in ED however discussed symptoms predated ED visit and no known culprit psychotropic medications, nonetheless movement disorder appears resolved  5/6/2022 patient much more verbal that he dislikes his current living situation at [facility] although is accepting return to facility after TCU with establishment of resources such as TBI support group, psychologist/therapist.  -5/7/2022.  Hyperactivity returns both in stream of thought and verbalization.  For the first time patient displayed significant impatience and anger.  Perseverates,  Father present.  The day prior patient was much more calm after had received as needed Ativan for agitation.  Although he was more sedated he was much more calm regarding stream of thought, verbalization and displayed no anger/agitation.  Discussed with father will trial scheduled sedation, historically Ativan however somewhat reluctant for likely ongoing use.  Patient with  "known history TBI, therefore we will trial schedule clonidine, BP permissive, have as needed Ativan or possibly Seroquel. -  -5/8/2022 more calm, less hyperactive regarding stream of thought and verbalization on scheduled clonidine.  However when patient becomes frustrated hyperactivity returns with agitation.  Therefore trial increase clonidine with hx TBI, BP permitting, continued as needed Ativan.  Continue plans for TCU discharge regarding right hip periprosthetic fracture.  Upon discharge from TCU attempt to arrange reconnection with yue Narvaez regarding TBI support group for which both patient and parents state would benefit, also arrange Psychologist/therapist.       Gastroesophageal reflux disease with esophagitis  ? Continue PPI    Age-related osteoporosis with current pathological fracture with routine healing, subsequent encounter  ? Resume Fosamax as outpatient  ? Resume calcium and vitamin D    Hyperlipidemia LDL goal <160  ? Continue statin        Diet: Combination Diet Regular Diet Adult; No Caffeine Diet    DVT Prophylaxis: Pneumatic Compression Devices  Garcia Catheter: Not present  Central Lines: None  Cardiac Monitoring: None  Code Status: No CPR- Do NOT Intubate      Disposition Plan   Expected Discharge: 05/11/2022     Anticipated discharge location: inpatient rehabilitation facility    Delays:     Placement - TCU            The patient's care was discussed with the Bedside Nurse, Patient and Patient's Family.    Damion Mcfarland MD, MD  Hospitalist Service  [facility]  Securely message with the Vocera Web Console (learn more here)  Text page via [employer] Paging/Directory     \"This dictation was performed with voice recognition software and may contain errors,  omissions and inadvertent word substitution.\"     Clinically Significant Risk Factors Present on Admission                  ______________________________________________________________________    Interval History  "     Denies any specific complaints.  Denies any pain/shortness of breath/fever/chills.  calm     4 point ROS done and negative unless mentioned     Data reviewed today: I reviewed all medications, new labs and imaging results over the last 24 hours. I personally reviewed no images or EKG's today.    Physical Exam   BP 93/55 (BP Location: Right arm, Patient Position: Supine, Cuff Size: Adult Regular)   Pulse 63   Temp 97.2  F (36.2  C) (Oral)   Resp 16   SpO2 98%   Gen- pleasant lying in bed  HEENT- NAD, LARA  Neck- supple  CVS- I+II+ no m/r/g  RS- CTAB  Abdo- soft, no tenderness . No g/r/r  Ext- no edema   CNS- h/o TBI    Data    BMPNo lab results found in last 7 days.  CBC  No lab results found in last 7 days.  INR  No lab results found in last 7 days.  LFTs  No lab results found in last 7 days.   PANCNo lab results found in last 7 days.    No results found for this or any previous visit (from the past 24 hour(s)).

## 2022-05-12 ENCOUNTER — APPOINTMENT (OUTPATIENT)
Dept: GENERAL RADIOLOGY | Facility: CLINIC | Age: 57
DRG: 543 | End: 2022-05-12
Attending: PHYSICIAN ASSISTANT
Payer: COMMERCIAL

## 2022-05-12 PROCEDURE — 250N000013 HC RX MED GY IP 250 OP 250 PS 637: Performed by: HOSPITALIST

## 2022-05-12 PROCEDURE — 73552 X-RAY EXAM OF FEMUR 2/>: CPT | Mod: RT

## 2022-05-12 PROCEDURE — 120N000001 HC R&B MED SURG/OB

## 2022-05-12 PROCEDURE — 250N000013 HC RX MED GY IP 250 OP 250 PS 637: Performed by: INTERNAL MEDICINE

## 2022-05-12 PROCEDURE — 99232 SBSQ HOSP IP/OBS MODERATE 35: CPT | Performed by: STUDENT IN AN ORGANIZED HEALTH CARE EDUCATION/TRAINING PROGRAM

## 2022-05-12 RX ADMIN — SIMVASTATIN 40 MG: 40 TABLET, FILM COATED ORAL at 20:12

## 2022-05-12 RX ADMIN — ACETAMINOPHEN 650 MG: 325 TABLET ORAL at 08:20

## 2022-05-12 RX ADMIN — PAROXETINE HYDROCHLORIDE 40 MG: 20 TABLET, FILM COATED ORAL at 08:20

## 2022-05-12 RX ADMIN — LORAZEPAM 0.5 MG: 0.5 TABLET ORAL at 16:14

## 2022-05-12 RX ADMIN — Medication 1 TABLET: at 08:20

## 2022-05-12 RX ADMIN — Medication 1000 UNITS: at 08:20

## 2022-05-12 RX ADMIN — BACLOFEN 10 MG: 10 TABLET ORAL at 08:20

## 2022-05-12 RX ADMIN — TAMSULOSIN HYDROCHLORIDE 0.4 MG: 0.4 CAPSULE ORAL at 08:20

## 2022-05-12 RX ADMIN — Medication 1 TABLET: at 20:12

## 2022-05-12 RX ADMIN — ACETAMINOPHEN 650 MG: 325 TABLET ORAL at 20:12

## 2022-05-12 RX ADMIN — ACETAMINOPHEN 650 MG: 325 TABLET ORAL at 16:10

## 2022-05-12 RX ADMIN — BACLOFEN 10 MG: 10 TABLET ORAL at 20:12

## 2022-05-12 RX ADMIN — CLONIDINE HYDROCHLORIDE 0.2 MG: 0.1 TABLET ORAL at 20:12

## 2022-05-12 ASSESSMENT — ACTIVITIES OF DAILY LIVING (ADL)
ADLS_ACUITY_SCORE: 31
ADLS_ACUITY_SCORE: 32
ADLS_ACUITY_SCORE: 31
ADLS_ACUITY_SCORE: 32
ADLS_ACUITY_SCORE: 32
ADLS_ACUITY_SCORE: 31
ADLS_ACUITY_SCORE: 32
ADLS_ACUITY_SCORE: 31

## 2022-05-12 NOTE — PROGRESS NOTES
Patient alert, pleasantly confused and redirectable. Up with assist of 1/walker.   Voiding. Vitals stable, denies pain. Pending discharge to TCU.

## 2022-05-12 NOTE — PROGRESS NOTES
[facility]    Medicine Progress Note - Hospitalist Service    Date of Admission:  5/2/2022    Assessment & Plan        Yunior Angel is a 56 year old male admitted on 5/2/2022. 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  Hip x-ray shows mildly displaced right periprosthetic hip fracture.     Closed displaced fracture of greater trochanter of right femur, initial encounter S/P total hip arthroplasty  - Ortho recommended trial of nonsurgical management.  WBAT.  - Acetaminophen and/or opioids as needed for pain.  - PT recommends TCU.  Patient and mother receptive to this plan.     Neurobehavioral sequelae of traumatic brain injury   Acute behavioral and motor changes primarily hyperactivity  -Psych consult on admission and Dr. Basilio 5/3/2022  recommendseddecreasing dose of paroxetine to 40 mg.  He advised use of as needed benzo diazepam for any agitation/aggression.  He feels that presentation most consistent with hyperstimulation in current environment and baseline depression given his history of TBI currently in [place] with limited by patient's perception of prognosis.  He did not feel that current presentation consistent with movement disorder/TD, advised Neurology consult.  As above TFTs WNL.  Head CT on admission negative.  No history EtOH or drug use.   - neurology consult.  EEG, negative for seizure.Discussed with neurology, Dr Gomez feels condition consistent with TD noting patient received Haldol in ED however discussed symptoms predated ED visit and no known culprit psychotropic medications, nonetheless movement disorder appears resolved  - waxing and waning hyperactivity noted this admission. Medication adjustments on going  - Continue plans for TCU discharge regarding right hip periprosthetic fracture.  Upon discharge from TCU attempt to  arrange reconnection with yue Narvaez regarding TBI support group for which both patient and parents state would benefit, also arrange Psychologist/therapist.     Gastroesophageal reflux disease with esophagitis  - Continue PPI   Age-related osteoporosis with current pathological fracture with routine healing, subsequent encounter  - Resume Fosamax as outpatient  - Resume calcium and vitamin D    Hyperlipidemia LDL goal <160  - Continue statin        Diet: Combination Diet Regular Diet Adult; No Caffeine Diet    DVT Prophylaxis: Pneumatic Compression Devices  Garcia Catheter: Not present  Central Lines: None  Cardiac Monitoring: None  Code Status: No CPR- Do NOT Intubate      Disposition Plan   Expected Discharge: 05/13/2022     Anticipated discharge location: inpatient rehabilitation facility    Delays:     Placement - TCU            The patient's care was discussed with the Bedside Nurse, Patient and Patient's Family.    Jennie Cleary DO  Hospitalist Service  [facility]  Securely message with the Vocera Web Console (learn more here)  Text page via [employer] Paging/Directory                    ______________________________________________________________________    Interval History    Patient resting in bed. He is awake and pleasant. Denies any pain. Would like to sleep more. No GI complaints.NO issues with urination. He is updated on the plan.    Data reviewed today: I reviewed all medications, new labs and imaging results over the last 24 hours. I personally reviewed no images or EKG's today.    Physical Exam   BP 99/56 (BP Location: Left arm)   Pulse 58   Temp 97.8  F (36.6  C) (Oral)   Resp 16   SpO2 98%     Constitutional: Awake, alert, cooperative, no apparent distress  Respiratory: symmetrical chest wall expansion, no wheezing  Skin/Integumen: No rashes on exposed surface  Neuro: moving all extremities without focal deficits  Psych: calm and pleasant, no agitation and normal  speech    Data    BMPNo lab results found in last 7 days.  CBC  No lab results found in last 7 days.  INR  No lab results found in last 7 days.  LFTs  No lab results found in last 7 days.   PANCNo lab results found in last 7 days.    No results found for this or any previous visit (from the past 24 hour(s)).

## 2022-05-13 PROCEDURE — 99232 SBSQ HOSP IP/OBS MODERATE 35: CPT | Performed by: STUDENT IN AN ORGANIZED HEALTH CARE EDUCATION/TRAINING PROGRAM

## 2022-05-13 PROCEDURE — 120N000001 HC R&B MED SURG/OB

## 2022-05-13 PROCEDURE — 250N000013 HC RX MED GY IP 250 OP 250 PS 637: Performed by: HOSPITALIST

## 2022-05-13 PROCEDURE — 250N000013 HC RX MED GY IP 250 OP 250 PS 637: Performed by: INTERNAL MEDICINE

## 2022-05-13 RX ADMIN — PANTOPRAZOLE SODIUM 40 MG: 40 TABLET, DELAYED RELEASE ORAL at 08:07

## 2022-05-13 RX ADMIN — CLONIDINE HYDROCHLORIDE 0.2 MG: 0.1 TABLET ORAL at 20:38

## 2022-05-13 RX ADMIN — SIMVASTATIN 40 MG: 40 TABLET, FILM COATED ORAL at 20:39

## 2022-05-13 RX ADMIN — ACETAMINOPHEN 650 MG: 325 TABLET ORAL at 17:02

## 2022-05-13 RX ADMIN — Medication 1 TABLET: at 08:02

## 2022-05-13 RX ADMIN — TAMSULOSIN HYDROCHLORIDE 0.4 MG: 0.4 CAPSULE ORAL at 08:04

## 2022-05-13 RX ADMIN — Medication 1000 UNITS: at 08:01

## 2022-05-13 RX ADMIN — ACETAMINOPHEN 650 MG: 325 TABLET ORAL at 20:38

## 2022-05-13 RX ADMIN — Medication 1 TABLET: at 20:39

## 2022-05-13 RX ADMIN — ACETAMINOPHEN 650 MG: 325 TABLET ORAL at 08:01

## 2022-05-13 RX ADMIN — BACLOFEN 10 MG: 10 TABLET ORAL at 20:39

## 2022-05-13 RX ADMIN — PAROXETINE HYDROCHLORIDE 40 MG: 20 TABLET, FILM COATED ORAL at 08:02

## 2022-05-13 RX ADMIN — BACLOFEN 10 MG: 10 TABLET ORAL at 08:02

## 2022-05-13 RX ADMIN — LORAZEPAM 0.5 MG: 0.5 TABLET ORAL at 20:39

## 2022-05-13 ASSESSMENT — ACTIVITIES OF DAILY LIVING (ADL)
ADLS_ACUITY_SCORE: 32
ADLS_ACUITY_SCORE: 31
ADLS_ACUITY_SCORE: 32
ADLS_ACUITY_SCORE: 31
ADLS_ACUITY_SCORE: 32

## 2022-05-13 NOTE — PLAN OF CARE
Goal Outcome Evaluation:  Alert and oriented x 2. Rambling speech. VSS. Denies pain. CMS intact. Anxious at beginning of this shift, lorazepam PO given. Takes pills whole  Waiting for placement

## 2022-05-13 NOTE — PLAN OF CARE
Goal Outcome Evaluation:  Pt A&Ox2-3, disoriented to time and situation this morning. CMS intact, ex R foot unable to dorsi plantar flex and this is his baseline. VSS. Up w/ A1 To BR. Taking scheduled tylenol for pain. Voiding adequately in BR. Continue to monitor.

## 2022-05-13 NOTE — PROGRESS NOTES
Pt is alert and oriented x3, disoriented to time, has Hx of TBI. VSS, CMS intact. Pt sleeping between cares. Pt denies pain. Standing at bedside using urinal to void. Discharge pending TCU placement. Continue POC.

## 2022-05-13 NOTE — PROGRESS NOTES
[facility]    Medicine Progress Note - Hospitalist Service    Date of Admission:  5/2/2022    Assessment & Plan        Yunior Angel is a 56 year old male admitted on 5/2/2022. 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  Hip x-ray shows mildly displaced right periprosthetic hip fracture.     Closed displaced fracture of greater trochanter of right femur S/P total hip arthroplasty  - Ortho recommended trial of nonsurgical management.  WBAT.  - Acetaminophen and/or opioids as needed for pain.  - PT recommending TCU.  Patient and mother receptive to this plan and SW has sent out >15 referrals.   - discussed with nursing and SW today plan to get patient OOB and moving more. Will follow update on referrals sent and plan to have PT and OT re evaluate as some point over the weekend to see if he can progress enough to get home.     Neurobehavioral sequelae of traumatic brain injury   Acute behavioral and motor changes primarily hyperactivity  Patient fell down a flight of stairs at 26 years old just prior to getting  and suffered a TBI which resulted in his current presentation. No inpatient psych admissions and no major psych assessment in his past.  - psych consult on admission and Dr. Basilio 5/3/2022 recommended decreasing dose of paroxetine to 40 mg which had not changed in many years.  He advised use of as needed benzo diazepam for any agitation/aggression.  He feels that presentation most consistent with hyperstimulation in current environment and baseline depression given his history of TBI currently in AL with limited by patient's perception of prognosis.  He did not feel that current presentation consistent with movement disorder/TD, advised Neurology consult.   - recommendation made for getting patient into a group home setting which he  may enjoy more  - neurology work up complete EEG, negative for seizure.  Dr Gomez feels condition consistent with TD noting patient received Haldol in ED however discussed symptoms predated ED visit and no known culprit psychotropic medications, nonetheless movement disorder appears resolved  - waxing and waning hyperactivity noted this admission.      Gastroesophageal reflux disease with esophagitis  - Continue PPI     Age-related osteoporosis with current pathological fracture with routine healing, subsequent encounter  - Resume Fosamax as outpatient  - Resume calcium and vitamin D    Hyperlipidemia LDL goal <160  - Continue statin        Diet: Combination Diet Regular Diet Adult; No Caffeine Diet    DVT Prophylaxis: Pneumatic Compression Devices and ambulate  Garcia Catheter: Not present  Central Lines: None  Cardiac Monitoring: None  Code Status: No CPR- Do NOT Intubate      Disposition Plan   Expected Discharge: 05/14/2022     Anticipated discharge location: inpatient rehabilitation facility    Delays:     Placement - TCU            The patient's care was discussed with the Bedside Nurse, Patient and Patient's Family.    Jennie Cleary DO  Hospitalist Service  [facility]  Securely message with the Vocera Web Console (learn more here)  Text page via [employer] Paging/Directory                    ______________________________________________________________________    Interval History    Patient hyperactive today with pressured speech. He rambles but in the end seems frustrated that he is stuck in the hospital room and wants to get out of the room more. No events per nursing staff. Spoke with patient's nurse and SW about plan    Data reviewed today: I reviewed all medications, new labs and imaging results over the last 24 hours. I personally reviewed no images or EKG's today.    Physical Exam   BP 93/55   Pulse 61   Temp 98  F (36.7  C) (Oral)   Resp 18   SpO2 93%      Constitutional: Awake, alert, cooperative, hyperactive with frequent movement  Respiratory: symmetrical chest wall expansion, no wheezing and no crackles  Cards: no edema, no murmur  Skin/Integumen: No rashes on exposed surface  Neuro: moving all extremities without focal deficits  Psych: pressured illogical speech, no depression    Data    BMPNo lab results found in last 7 days.  CBC  No lab results found in last 7 days.  INR  No lab results found in last 7 days.  LFTs  No lab results found in last 7 days.   PANCNo lab results found in last 7 days.    Recent Results (from the past 24 hour(s))   XR Femur Right 2 Views    Narrative    EXAM: XR FEMUR RIGHT 2 VIEW  LOCATION: [facility]  DATE/TIME: 5/12/2022 5:03 PM    INDICATION: Two weeks follow-up periprosthetic greater trochanteric fracture.    COMPARISON: None.      Impression    IMPRESSION: No comparison is available. There is a fracture of the greater trochanter of the right hip which extends to the interface with the arthroplasty component, but does not likely result in overall loosening. There is an old fracture fragment or   focus of heterotopic ossification adjacent to the lesser trochanter. The postoperative changes of the right hip arthroplasty appear intact and well-seated. The visualized right hemipelvis is negative.

## 2022-05-14 PROCEDURE — 99232 SBSQ HOSP IP/OBS MODERATE 35: CPT | Performed by: STUDENT IN AN ORGANIZED HEALTH CARE EDUCATION/TRAINING PROGRAM

## 2022-05-14 PROCEDURE — 250N000013 HC RX MED GY IP 250 OP 250 PS 637: Performed by: INTERNAL MEDICINE

## 2022-05-14 PROCEDURE — 120N000001 HC R&B MED SURG/OB

## 2022-05-14 PROCEDURE — 250N000013 HC RX MED GY IP 250 OP 250 PS 637: Performed by: HOSPITALIST

## 2022-05-14 RX ADMIN — ACETAMINOPHEN 650 MG: 325 TABLET ORAL at 20:54

## 2022-05-14 RX ADMIN — ACETAMINOPHEN 650 MG: 325 TABLET ORAL at 09:05

## 2022-05-14 RX ADMIN — BACLOFEN 10 MG: 10 TABLET ORAL at 20:54

## 2022-05-14 RX ADMIN — Medication 1000 UNITS: at 09:06

## 2022-05-14 RX ADMIN — ACETAMINOPHEN 650 MG: 325 TABLET ORAL at 19:13

## 2022-05-14 RX ADMIN — Medication 1 TABLET: at 20:54

## 2022-05-14 RX ADMIN — CLONIDINE HYDROCHLORIDE 0.2 MG: 0.1 TABLET ORAL at 09:06

## 2022-05-14 RX ADMIN — TAMSULOSIN HYDROCHLORIDE 0.4 MG: 0.4 CAPSULE ORAL at 09:05

## 2022-05-14 RX ADMIN — BACLOFEN 10 MG: 10 TABLET ORAL at 09:06

## 2022-05-14 RX ADMIN — SIMVASTATIN 40 MG: 40 TABLET, FILM COATED ORAL at 20:54

## 2022-05-14 RX ADMIN — Medication 1 TABLET: at 09:05

## 2022-05-14 RX ADMIN — PAROXETINE HYDROCHLORIDE 40 MG: 20 TABLET, FILM COATED ORAL at 09:05

## 2022-05-14 RX ADMIN — ACETAMINOPHEN 650 MG: 325 TABLET ORAL at 13:28

## 2022-05-14 ASSESSMENT — ACTIVITIES OF DAILY LIVING (ADL)
ADLS_ACUITY_SCORE: 31
ADLS_ACUITY_SCORE: 28
ADLS_ACUITY_SCORE: 31
ADLS_ACUITY_SCORE: 28
ADLS_ACUITY_SCORE: 31

## 2022-05-14 NOTE — PROGRESS NOTES
Alert and oriented x3.VSS on RA. Has difficulty expressing himself. CMS intact. Voiding adequately in urinal. Up with assist of 1 GB/walker. Pending TCU placement.

## 2022-05-14 NOTE — PLAN OF CARE
Alert to self. VSS on RA. CMS intact. Denies pain. Up assist of 1 with GB and walker, WBAT. No IV access. Pending TCU placement.

## 2022-05-14 NOTE — PLAN OF CARE
Goal Outcome Evaluation:  [id number]  A&O x 2, VSS on RA, no C/O pain, drsg CDI, CMS intact per baseline, up with assist of  1 WGB, voiding adequately in urinal, IV SL tolerating regular diet, continue to monitor.

## 2022-05-14 NOTE — PROGRESS NOTES
[facility]    Medicine Progress Note - Hospitalist Service    Date of Admission:  5/2/2022    Assessment & Plan        Yunior Angel is a 56 year old male admitted on 5/2/2022. 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  Hip x-ray shows mildly displaced right periprosthetic hip fracture.     Closed displaced fracture of greater trochanter of right femur S/P total hip arthroplasty  - Ortho recommended trial of nonsurgical management.  WBAT.  - Acetaminophen and/or opioids as needed for pain.  - PT recommending TCU.  Patient and mother receptive to this plan and  has sent out >15 referrals.   - Will plan to have PT and OT re evaluate as some point over the weekend to see if he can progress enough to get home.     Neurobehavioral sequelae of traumatic brain injury   Acute behavioral and motor changes primarily hyperactivity  Patient fell down a flight of stairs at 26 years old just prior to getting  and suffered a TBI which resulted in his current presentation. No inpatient psych admissions and no major psych assessment in his past.  - psych consult on admission and Dr. Basilio 5/3/2022 recommended decreasing dose of paroxetine to 40 mg which had not changed in many years.  He advised use of as needed benzo diazepam for any agitation/aggression.  He feels that presentation most consistent with hyperstimulation in current environment and baseline depression given his history of TBI currently in AL with limited by patient's perception of prognosis.  He did not feel that current presentation consistent with movement disorder/TD, advised Neurology consult.   - recommendation made for getting patient into a group home setting which he may enjoy more  - neurology work up complete EEG, negative for seizure.  Dr Gomez feels condition consistent with  TD noting patient received Haldol in ED however discussed symptoms predated ED visit and no known culprit psychotropic medications, nonetheless movement disorder appears resolved  - waxing and waning hyperactivity noted this admission.      Gastroesophageal reflux disease with esophagitis  - Continue PPI     Age-related osteoporosis with current pathological fracture with routine healing, subsequent encounter  - Resume Fosamax as outpatient  - Resume calcium and vitamin D    Hyperlipidemia LDL goal <160  - Continue statin        Diet: Combination Diet Regular Diet Adult; No Caffeine Diet    DVT Prophylaxis: Pneumatic Compression Devices and ambulate  Garcia Catheter: Not present  Central Lines: None  Cardiac Monitoring: None  Code Status: No CPR- Do NOT Intubate      Disposition Plan   Expected Discharge: 05/14/2022     Anticipated discharge location: inpatient rehabilitation facility    Delays:     Placement - TCU            The patient's care was discussed with the patient and his nurse.    Jennie Cleary DO  Hospitalist Service  [facility]  Securely message with the Vocera Web Console (learn more here)  Text page via [employer] Paging/Directory                    ______________________________________________________________________    Interval History    Patient calm today and reports feeling tired. He just wants to sleep. He knows plan to stay in hospital over the weekend. No complaints.     Data reviewed today: I reviewed all medications, new labs and imaging results over the last 24 hours. I personally reviewed no images or EKG's today.    Physical Exam   /57 (BP Location: Right arm)   Pulse 62   Temp 97.4  F (36.3  C) (Oral)   Resp 17   SpO2 99%     Constitutional: Awake, alert, cooperative, NAD, calm  Respiratory: symmetrical chest wall expansion, no wheezing and no crackles  Cards: no edema, no murmur  Skin/Integumen: No rashes on exposed surface  Neuro: moving all  extremities without focal deficits  Psych: calm and cooperative    Data    BMPNo lab results found in last 7 days.  CBC  No lab results found in last 7 days.  INR  No lab results found in last 7 days.  LFTs  No lab results found in last 7 days.   PANCNo lab results found in last 7 days.    No results found for this or any previous visit (from the past 24 hour(s)).

## 2022-05-14 NOTE — PLAN OF CARE
Goal Outcome Evaluation:             Patient A&0x3. Has word finding issues.  Has a guardian. He denies Hip pain this shift. He is up w 1 GB and walker. He has drop foot on R side so foot drags when walking . CMS intact. DP pulses +2. Denies numbness and tingling.  He was agitated and angry at times but was able to settle down after a few minuets. Ativan given x1 for agitation/anxiety. Plan for TCU at discharge. Awaiting placement.

## 2022-05-15 PROCEDURE — 120N000001 HC R&B MED SURG/OB

## 2022-05-15 PROCEDURE — 250N000013 HC RX MED GY IP 250 OP 250 PS 637: Performed by: INTERNAL MEDICINE

## 2022-05-15 PROCEDURE — 99232 SBSQ HOSP IP/OBS MODERATE 35: CPT | Performed by: STUDENT IN AN ORGANIZED HEALTH CARE EDUCATION/TRAINING PROGRAM

## 2022-05-15 PROCEDURE — 250N000013 HC RX MED GY IP 250 OP 250 PS 637: Performed by: HOSPITALIST

## 2022-05-15 RX ADMIN — ACETAMINOPHEN 650 MG: 325 TABLET ORAL at 20:44

## 2022-05-15 RX ADMIN — SIMVASTATIN 40 MG: 40 TABLET, FILM COATED ORAL at 20:44

## 2022-05-15 RX ADMIN — PANTOPRAZOLE SODIUM 40 MG: 40 TABLET, DELAYED RELEASE ORAL at 08:00

## 2022-05-15 RX ADMIN — PAROXETINE HYDROCHLORIDE 40 MG: 20 TABLET, FILM COATED ORAL at 08:49

## 2022-05-15 RX ADMIN — TAMSULOSIN HYDROCHLORIDE 0.4 MG: 0.4 CAPSULE ORAL at 08:49

## 2022-05-15 RX ADMIN — ACETAMINOPHEN 650 MG: 325 TABLET ORAL at 08:49

## 2022-05-15 RX ADMIN — Medication 1000 UNITS: at 08:49

## 2022-05-15 RX ADMIN — BACLOFEN 10 MG: 10 TABLET ORAL at 08:49

## 2022-05-15 RX ADMIN — BACLOFEN 10 MG: 10 TABLET ORAL at 20:44

## 2022-05-15 RX ADMIN — ACETAMINOPHEN 650 MG: 325 TABLET ORAL at 18:19

## 2022-05-15 RX ADMIN — CLONIDINE HYDROCHLORIDE 0.2 MG: 0.1 TABLET ORAL at 20:44

## 2022-05-15 RX ADMIN — LORAZEPAM 0.5 MG: 0.5 TABLET ORAL at 20:44

## 2022-05-15 RX ADMIN — CLONIDINE HYDROCHLORIDE 0.2 MG: 0.1 TABLET ORAL at 08:49

## 2022-05-15 RX ADMIN — Medication 1 TABLET: at 08:49

## 2022-05-15 RX ADMIN — ACETAMINOPHEN 650 MG: 325 TABLET ORAL at 13:46

## 2022-05-15 RX ADMIN — HYDROXYZINE HYDROCHLORIDE 25 MG: 25 TABLET, FILM COATED ORAL at 16:10

## 2022-05-15 RX ADMIN — Medication 1 TABLET: at 20:44

## 2022-05-15 ASSESSMENT — ACTIVITIES OF DAILY LIVING (ADL)
ADLS_ACUITY_SCORE: 28
ADLS_ACUITY_SCORE: 31
ADLS_ACUITY_SCORE: 28
ADLS_ACUITY_SCORE: 31
ADLS_ACUITY_SCORE: 28
ADLS_ACUITY_SCORE: 31

## 2022-05-15 NOTE — PLAN OF CARE
Goal Outcome Evaluation:  A&O x 4, VSS on RA, pain controlled with tylenol and Attrack, drsg CDI, CMS intact, up with assist of 1 GBW, voiding adequately in urinal, awaiting placement, continue to monitor.

## 2022-05-15 NOTE — PLAN OF CARE
Trauma/Ortho/Medical    POD#: non-surgical r hip fracture  Mental Status:A&O x 2-3  Activity: A1 gb/walker.  Diet: reg(no caffeine)  Pain:denies.  Garcia/Voiding:voiding using urinal or bathroom  02/LDA: no IV access  D/C Date:discharge pending placement.

## 2022-05-15 NOTE — PROGRESS NOTES
[facility]    Medicine Progress Note - Hospitalist Service    Date of Admission:  5/2/2022    Assessment & Plan        Yunior Angel is a 56 year old male admitted on 5/2/2022. 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  Hip x-ray shows mildly displaced right periprosthetic hip fracture.     Closed displaced fracture of greater trochanter of right femur S/P total hip arthroplasty  - Ortho recommended trial of nonsurgical management.  WBAT.  - Acetaminophen and/or opioids as needed for pain.  - PT recommending TCU.  Patient and mother receptive to this plan and SW has sent out >15 referrals.   - Will plan to have PT and OT re evaluate as some point over the weekend to see if he can progress enough to get home. Main deficits is that patient now has foot drop to his R leg and drags his foot. May benefit from orthotic?     Neurobehavioral sequelae of traumatic brain injury   Acute behavioral and motor changes primarily hyperactivity  Patient fell down a flight of stairs at 26 years old just prior to getting  and suffered a TBI which resulted in his current presentation. No inpatient psych admissions and no major psych assessment in his past.  - psych consult on admission and Dr. Basilio 5/3/2022 recommended decreasing dose of paroxetine to 40 mg which had not changed in many years.  He advised use of as needed benzo diazepam for any agitation/aggression.  He feels that presentation most consistent with hyperstimulation in current environment and baseline depression given his history of TBI currently in AL with limited by patient's perception of prognosis.  He did not feel that current presentation consistent with movement disorder/TD, advised Neurology consult.   - recommendation made for getting patient into a group home setting which he may  enjoy more than assisted living.   - neurology work up complete EEG negative for seizures.  Dr Gomez feels condition consistent with TD noting patient received Haldol in ED however discussed symptoms predated ED visit and no known culprit psychotropic medications, nonetheless movement disorder appears resolved  - waxing and waning hyperactivity noted this admission.      Gastroesophageal reflux disease with esophagitis  - Continue PPI     Age-related osteoporosis with current pathological fracture with routine healing, subsequent encounter  - Resume Fosamax as outpatient  - Resume calcium and vitamin D    Hyperlipidemia LDL goal <160  - Continue statin        Diet: Combination Diet Regular Diet Adult; No Caffeine Diet    DVT Prophylaxis: Pneumatic Compression Devices and ambulate  Garcia Catheter: Not present  Central Lines: None  Cardiac Monitoring: None  Code Status: No CPR- Do NOT Intubate      Disposition Plan   Expected Discharge: 05/16/2022     Anticipated discharge location: inpatient rehabilitation facility    Delays:     Placement - TCU            The patient's care was discussed with the patient and his nurse.    Jennie Cleary DO  Hospitalist Service  [facility]  Securely message with the Vocera Web Console (learn more here)  Text page via [employer] Paging/Directory                    ______________________________________________________________________    Interval History    Patient is pleasant today. He would like to get cleaned up and dressed today. He is not hungry and not planning to order breakfast. He still has some minimal pain to his R hip.    Data reviewed today: I reviewed all medications, new labs and imaging results over the last 24 hours. I personally reviewed no images or EKG's today.    Physical Exam   /58 (BP Location: Right arm)   Pulse 69   Temp 97.4  F (36.3  C) (Oral)   Resp 16   SpO2 99%     Constitutional: Awake, alert, cooperative, NAD,  calm  Respiratory: symmetrical chest wall expansion, no wheezing and no crackles  Cards: no edema, no murmur  Skin/Integumen: No rashes on exposed surface  Neuro: moving all extremities without focal deficits  Psych: calm and cooperative    Data    BMPNo lab results found in last 7 days.  CBC  No lab results found in last 7 days.  INR  No lab results found in last 7 days.  LFTs  No lab results found in last 7 days.   PANCNo lab results found in last 7 days.    No results found for this or any previous visit (from the past 24 hour(s)).

## 2022-05-16 PROCEDURE — 250N000013 HC RX MED GY IP 250 OP 250 PS 637: Performed by: INTERNAL MEDICINE

## 2022-05-16 PROCEDURE — 99232 SBSQ HOSP IP/OBS MODERATE 35: CPT | Performed by: STUDENT IN AN ORGANIZED HEALTH CARE EDUCATION/TRAINING PROGRAM

## 2022-05-16 PROCEDURE — 250N000013 HC RX MED GY IP 250 OP 250 PS 637: Performed by: HOSPITALIST

## 2022-05-16 PROCEDURE — 120N000001 HC R&B MED SURG/OB

## 2022-05-16 RX ADMIN — CLONIDINE HYDROCHLORIDE 0.2 MG: 0.1 TABLET ORAL at 10:46

## 2022-05-16 RX ADMIN — PANTOPRAZOLE SODIUM 40 MG: 40 TABLET, DELAYED RELEASE ORAL at 10:47

## 2022-05-16 RX ADMIN — Medication 1000 UNITS: at 10:46

## 2022-05-16 RX ADMIN — TAMSULOSIN HYDROCHLORIDE 0.4 MG: 0.4 CAPSULE ORAL at 10:46

## 2022-05-16 RX ADMIN — PAROXETINE HYDROCHLORIDE 40 MG: 20 TABLET, FILM COATED ORAL at 10:47

## 2022-05-16 RX ADMIN — Medication 1 TABLET: at 10:46

## 2022-05-16 RX ADMIN — SIMVASTATIN 40 MG: 40 TABLET, FILM COATED ORAL at 20:01

## 2022-05-16 RX ADMIN — ACETAMINOPHEN 650 MG: 325 TABLET ORAL at 18:46

## 2022-05-16 RX ADMIN — Medication 1 TABLET: at 20:01

## 2022-05-16 RX ADMIN — BACLOFEN 10 MG: 10 TABLET ORAL at 20:01

## 2022-05-16 RX ADMIN — BACLOFEN 10 MG: 10 TABLET ORAL at 10:46

## 2022-05-16 RX ADMIN — ACETAMINOPHEN 650 MG: 325 TABLET ORAL at 10:46

## 2022-05-16 ASSESSMENT — ACTIVITIES OF DAILY LIVING (ADL)
ADLS_ACUITY_SCORE: 28
ADLS_ACUITY_SCORE: 29
ADLS_ACUITY_SCORE: 28

## 2022-05-16 NOTE — PLAN OF CARE
A&O x 2, vss on ra. Cms intact ex weak dorsi and plantar flex R foot. Assist of 1 gb/walker, voiding using urinal or bathroom. Denies pain. Ativan given x 1 for agitation. Discharge pending placement.

## 2022-05-16 NOTE — PLAN OF CARE
Date/Time 05/16/22 1400    Trauma    Diagnosis:R hip FX non surgical  POD#:N/A  Mental Status: A&O x 2  Activity/dangle up with 1 walker GB  Diet:reg  Pain: tylenol  Garcia/Voiding: voiding in urinal  Tele/Restraints/Iso:N/A  02/LDA: no IV   D/C Date: later today or tomorrow pending placement  Other Info: Pt was lethargic in AM had received PM doses of Ativan and hydroxazine.

## 2022-05-16 NOTE — PROGRESS NOTES
[facility]    Medicine Progress Note - Hospitalist Service    Date of Admission:  5/2/2022    Assessment & Plan        Yunior Angel is a 56 year old male admitted on 5/2/2022. 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  Hip x-ray shows mildly displaced right periprosthetic hip fracture.     Closed displaced fracture of greater trochanter of right femur S/P total hip arthroplasty  - Ortho consulted and performed trial of nonsurgical management.  WBAT.  - PT initially recommending TCU.  Patient and mother receptive to this plan and SW sent out >15 referrals.   - Patient now waiting on TCU placement and having periods of agitation due to still being in the hospital. Discussed with family and SW today about possible discharge tomorrow with home health. Family very receptive to this idea. Will ask therapy to see today and ask SW to follow up with facility if they can take patient back tomorrow on .     Neurobehavioral sequelae of traumatic brain injury   Acute behavioral and motor changes primarily hyperactivity  Patient fell down a flight of stairs at 26 years old just prior to getting  and suffered a TBI which resulted in his current presentation. No inpatient psych admissions and no major psych assessment in his past.  - psych consult on admission and Dr. Basilio 5/3/2022 recommended decreasing dose of paroxetine to 40 mg which had not changed in many years.  He advised use of as needed benzo diazepam for any agitation/aggression.  He feels that presentation most consistent with hyperstimulation in current environment and baseline depression given his history of TBI currently in AL with limited by patient's perception of prognosis.  He did not feel that current presentation consistent with movement disorder/TD, advised Neurology consult.  Recommendation by psych made for getting patient into a group home setting which he may enjoy more than assisted living.   - neurology work up complete EEG negative for seizures.  Dr Gomez felt his initial hyperativity may have been related to TD from haldol. Will avoid this in fututre  - waxing and waning hyperactivity noted this admission.   - PRN ativan works well for anxiety. Will need to discharge with this medication per his assisted living     Gastroesophageal reflux disease with esophagitis  - Continue PPI     Age-related osteoporosis with current pathological fracture with routine healing, subsequent encounter  - Resume Fosamax as outpatient  - Resume calcium and vitamin D    Hyperlipidemia LDL goal <160  - Continue statin        Diet: Combination Diet Regular Diet Adult; No Caffeine Diet    DVT Prophylaxis: Pneumatic Compression Devices and ambulate  Garcia Catheter: Not present  Central Lines: None  Cardiac Monitoring: None  Code Status: No CPR- Do NOT Intubate      Disposition Plan   Expected Discharge: 05/16/2022   Possibly tomorrow with home health if cleared by therapies and facility accepting  Anticipated discharge location: inpatient rehabilitation facility    Delays:     Placement - TCU            The patient's care was discussed with the patient his mother and SHIVANI Cleary DO  Hospitalist Service  [facility]  Securely message with the Vocera Web Console (learn more here)  Text page via [employer] Paging/Directory                    ______________________________________________________________________    Interval History    Patient complaining of dry mouth today and feeling tired. He is still in bed but would like to get up and eat lunch. Calm and pleasant. Updated on plan    Data reviewed today: I reviewed all medications, new labs and imaging results over the last 24 hours. I personally reviewed no images or EKG's today.    Physical Exam   BP 94/58 (BP Location:  Right arm)   Pulse 57   Temp 97.2  F (36.2  C) (Axillary)   Resp 16   SpO2 94%     Constitutional: Awake, alert, cooperative, NAD, calm  Respiratory: symmetrical chest wall expansion, no wheezing and no crackles  Cards: no edema, no murmur  Skin/Integumen: No rashes on exposed surface  Neuro: moving all extremities without focal deficits  Psych: calm and cooperative    Data    BMPNo lab results found in last 7 days.  CBC  No lab results found in last 7 days.  INR  No lab results found in last 7 days.  LFTs  No lab results found in last 7 days.   PANCNo lab results found in last 7 days.    No results found for this or any previous visit (from the past 24 hour(s)).

## 2022-05-17 ENCOUNTER — APPOINTMENT (OUTPATIENT)
Dept: PHYSICAL THERAPY | Facility: CLINIC | Age: 57
DRG: 543 | End: 2022-05-17
Attending: PHYSICIAN ASSISTANT
Payer: COMMERCIAL

## 2022-05-17 VITALS
SYSTOLIC BLOOD PRESSURE: 104 MMHG | OXYGEN SATURATION: 96 % | RESPIRATION RATE: 16 BRPM | DIASTOLIC BLOOD PRESSURE: 59 MMHG | TEMPERATURE: 97.7 F | HEART RATE: 49 BPM

## 2022-05-17 PROCEDURE — 97530 THERAPEUTIC ACTIVITIES: CPT | Mod: GP | Performed by: PHYSICAL THERAPY ASSISTANT

## 2022-05-17 PROCEDURE — 97116 GAIT TRAINING THERAPY: CPT | Mod: GP | Performed by: PHYSICAL THERAPY ASSISTANT

## 2022-05-17 PROCEDURE — 250N000013 HC RX MED GY IP 250 OP 250 PS 637: Performed by: HOSPITALIST

## 2022-05-17 PROCEDURE — 99238 HOSP IP/OBS DSCHRG MGMT 30/<: CPT | Performed by: HOSPITALIST

## 2022-05-17 PROCEDURE — 250N000013 HC RX MED GY IP 250 OP 250 PS 637: Performed by: INTERNAL MEDICINE

## 2022-05-17 RX ORDER — CLONIDINE HYDROCHLORIDE 0.2 MG/1
0.2 TABLET ORAL 2 TIMES DAILY
Qty: 60 TABLET | Refills: 0 | OUTPATIENT
Start: 2022-05-17 | End: 2022-05-20

## 2022-05-17 RX ORDER — TAMSULOSIN HYDROCHLORIDE 0.4 MG/1
0.4 CAPSULE ORAL DAILY
Qty: 30 CAPSULE | Refills: 0 | Status: SHIPPED | OUTPATIENT
Start: 2022-05-18 | End: 2022-05-20

## 2022-05-17 RX ORDER — ACETAMINOPHEN 325 MG/1
650 TABLET ORAL 4 TIMES DAILY
Qty: 90 TABLET | Refills: 0 | Status: SHIPPED | OUTPATIENT
Start: 2022-05-17 | End: 2023-09-06

## 2022-05-17 RX ORDER — PAROXETINE 40 MG/1
40 TABLET, FILM COATED ORAL EVERY MORNING
Refills: 0
Start: 2022-05-17

## 2022-05-17 RX ADMIN — PAROXETINE HYDROCHLORIDE 40 MG: 20 TABLET, FILM COATED ORAL at 08:59

## 2022-05-17 RX ADMIN — BACLOFEN 10 MG: 10 TABLET ORAL at 08:59

## 2022-05-17 RX ADMIN — ACETAMINOPHEN 650 MG: 325 TABLET ORAL at 08:59

## 2022-05-17 RX ADMIN — ACETAMINOPHEN 650 MG: 325 TABLET ORAL at 12:26

## 2022-05-17 RX ADMIN — CLONIDINE HYDROCHLORIDE 0.2 MG: 0.1 TABLET ORAL at 08:58

## 2022-05-17 RX ADMIN — Medication 1 TABLET: at 08:59

## 2022-05-17 RX ADMIN — Medication 1000 UNITS: at 08:59

## 2022-05-17 RX ADMIN — TAMSULOSIN HYDROCHLORIDE 0.4 MG: 0.4 CAPSULE ORAL at 08:59

## 2022-05-17 ASSESSMENT — ACTIVITIES OF DAILY LIVING (ADL)
ADLS_ACUITY_SCORE: 29
ADLS_ACUITY_SCORE: 30
ADLS_ACUITY_SCORE: 30
ADLS_ACUITY_SCORE: 29

## 2022-05-17 NOTE — DISCHARGE SUMMARY
[facility]  Hospitalist Discharge Summary      Date of Admission:  5/2/2022  Date of Discharge:  5/17/2022  Discharging Provider: Javier Rich MD  Discharge Service: Hospitalist Service    Discharge Diagnoses    1. Closed displaced fracture of greater trochanter of right femur status post total hip arthroplasty  2. Acute behavioral and motor changes primarily hyperactivity    Follow-ups Needed After Discharge   Follow-up Appointments     Follow-up and recommended labs and tests       Follow up with primary care provider, Alberto Cralos Jr, within 7 days   for hospital follow- up.  The following labs/tests are recommended: CMP   and CBC.  Follow up with orthopedist as scheduled.             Unresulted Labs Ordered in the Past 30 Days of this Admission     No orders found from 4/2/2022 to 5/3/2022.          Discharge Disposition   Discharged to home  Condition at discharge: Stable    Hospital Course    Yunior Angel is a 56 year old male admitted on 5/2/2022. 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  Hip x-ray shows mildly displaced right periprosthetic hip fracture.     Closed displaced fracture of greater trochanter of right femur S/P total hip arthroplasty    Ortho recommended trial of nonsurgical management    WBAT    PT recommending TCU     Neurobehavioral sequelae of traumatic brain injury   Acute behavioral and motor changes primarily hyperactivity  Patient fell down a flight of stairs at 26 years old just prior to getting  and suffered a TBI which resulted in his current presentation. No inpatient psych admissions and no major psych assessment in his past.  Psych consult on admission and Dr. Basilio 5/3/2022 recommended decreasing dose of paroxetine to 40 mg which had not changed in many years.  He advised use of as  needed benzo diazepam for any agitation/aggression.  He feels that presentation most consistent with hyperstimulation in current environment and baseline depression given his history of TBI currently in AL with limited by patient's perception of prognosis.  He did not feel that current presentation consistent with movement disorder/TD, advised Neurology consult.   Recommendation made for getting patient into a group home setting which he may enjoy more than assisted living.   Neurology work up complete EEG negative for seizures.  Dr Gomez feels condition consistent with TD noting patient received Haldol in ED however discussed symptoms predated ED visit and no known culprit psychotropic medications, nonetheless movement disorder appears resolved  Waxing and waning hyperactivity noted this admission.     Continue psychiatric medications as recommended by the psychiatrist     Gastroesophageal reflux disease with esophagitis    Continue PPI     Age-related osteoporosis with current pathological fracture with routine healing, subsequent encounter    Resume Fosamax as outpatient    Resume calcium and vitamin D     Hyperlipidemia LDL goal <160    Continue statin    Consultations This Hospital Stay   ORTHOPEDIC SURGERY IP CONSULT  PSYCHIATRY IP CONSULT  PHYSICAL THERAPY ADULT IP CONSULT  NEUROLOGY IP CONSULT  PSYCHIATRY IP CONSULT  CARE MANAGEMENT / SOCIAL WORK IP CONSULT    Code Status   No CPR- Do NOT Intubate    Time Spent on this Encounter   I, Javier Rich MD, personally saw the patient today and spent less than or equal to 30 minutes discharging this patient.       Javier Rich MD  [facility] ORTHOPEDICS  [address] [place] [id number]  Phone: [phone]  Fax: [phone]  ______________________________________________________________________    Physical Exam   Vital Signs: Temp: 97.7  F (36.5  C) Temp src: Oral BP: 104/59 Pulse: (!) 49   Resp: 16 SpO2: 96 % O2  Device: None (Room air)    Weight: 0 lbs 0 oz  See 5/17 progress note       Primary Care Physician   Alberto Carlos Jr    Discharge Orders      Home Care Referral      Reason for your hospital stay    Treatment for a broken leg     Follow-up and recommended labs and tests     Follow up with primary care provider, Alberto Carlos Jr, within 7 days for hospital follow- up.  The following labs/tests are recommended: CMP and CBC.  Follow up with orthopedist as scheduled.     Activity    Your activity upon discharge: activity as tolerated     Diet    Follow this diet upon discharge: Orders Placed This Encounter      Combination Diet Regular Diet Adult; No Caffeine Diet       Significant Results and Procedures   Most Recent 3 CBC's:Recent Labs   Lab Test 05/03/22  0532 05/02/22  1407 11/23/21  1213   WBC 10.4 11.0 6.9   HGB 13.5 12.7* 13.8   MCV 74* 73* 74*    367 388     Most Recent 3 BMP's:Recent Labs   Lab Test 05/03/22  0532 05/02/22  1407 11/23/21  1213    139 142   POTASSIUM 3.8 3.5 4.5   CHLORIDE 107 107 106   CO2 25 27 28   BUN 21 20 13   CR 0.98 1.13 0.89   ANIONGAP 5 5 8   JUSTA 9.3 9.3 10.0   * 103* 94   ,   Results for orders placed or performed during the hospital encounter of 05/02/22   XR Knee Right 3 Views    Narrative    RIGHT KNEE THREE VIEWS   5/2/2022 12:18 PM     HISTORY: Hip and knee pain.    COMPARISON: 6/26/2017 x-ray.      Impression    IMPRESSION: Normal joint spacing. No significant change. No fracture  or joint effusion. Mild patellar marginal osteophyte formation.    BRYANT VAUGHN MD         SYSTEM ID:  [id number]   US Lower Extremity Venous Duplex Right    Narrative    US LOWER EXTREMITY VENOUS DUPLEX RIGHT 5/2/2022 12:05 PM    CLINICAL HISTORY/INDICATION: Hip and knee pain. Right lower extremity  pain.    COMPARISON: None relevant    TECHNIQUE:   Grayscale, color-flow, and spectral waveform analysis were performed  of the deep veins of the right lower  extremity    FINDINGS:   The right common external iliac, femoral vein, femoral vein and  popliteal vein demonstrate normal compressibility, spectral waveform,  color flow and augmentation.    The right posterior tibial vein, peroneal vein, and greater saphenous  vein are compressible.    The contralateral left common femoral vein demonstrates normal  compressibility, spectral waveform, color flow and augmentation.      Impression    IMPRESSION:   No evidence of deep venous thrombosis in the right lower extremity.     SOL REVELES DO         SYSTEM ID:  [id number]   XR Pelvis w Hip Right 1 View    Narrative    PELVIS AND RIGHT HIP, ONE VIEW   5/2/2022 12:17 PM     HISTORY: Hip and knee pain.    COMPARISON: None.      Impression    IMPRESSION: Right total hip arthroplasty in place. There is some  motion artifact on the AP view. There appears to be a lucency through  the base of the greater trochanter which likely represents a mildly  displaced fracture. A straight AP view of the right hip would be  helpful without motion. No periprosthetic lucency elsewhere. No other  fracture identified.    BRYANT VAUGHN MD         SYSTEM ID:  [id number]   XR Hip Right 1 View    Narrative    HIP RIGHT ONE VIEW   5/2/2022 1:28 PM     HISTORY: Hip pain.      COMPARISON: X-ray from earlier today.      Impression    IMPRESSION: Single AP view of the right hip does demonstrate a mildly  displaced fracture through the midportion of the greater trochanter.  Small chronic ossicle adjacent to the medial proximal femur. Total hip  arthroplasty in place.    BRYANT VAUGHN MD         SYSTEM ID:  [id number]   XR Chest Port 1 View    Narrative    CHEST ONE VIEW PORTABLE   5/2/2022 2:39 PM     HISTORY:  Preop.    COMPARISON: None.      Impression    IMPRESSION: Normal single view of the chest.    MATIAS BETH MD         SYSTEM ID:  [id number]   CT Head w/o Contrast    Narrative    CT SCAN OF THE HEAD WITHOUT CONTRAST   5/2/2022 2:52 PM      HISTORY: Mental status change, unknown cause.    TECHNIQUE:  Axial images of the head and coronal reformations without  IV contrast material. Radiation dose for this scan was reduced using  automated exposure control, adjustment of the mA and/or kV according  to patient size, or iterative reconstruction technique.    COMPARISON: Head MRI 6/28/2004    FINDINGS:  Moderate volume loss is present. Chronic encephalomalacia involving  the bilateral temporal lobe and bilateral frontal lobes. No evidence  of acute ischemia or hemorrhage. White matter hypoattenuation likely  represents chronic small vessel ischemic change. Changes of prior left  frontal craniotomy. Trace paranasal sinus mucosal thickening. Trace  opacification of the right mastoid tip. Presumed cerumen within the  external auditory canals.      Impression    IMPRESSION:     1. No acute intracranial abnormality.  2. Chronic bilateral frontotemporal encephalomalacia.  3. Changes of prior left frontal craniotomy.    JANEL TERRY MD         SYSTEM ID:  [id number]   XR Femur Right 2 Views    Narrative    EXAM: XR FEMUR RIGHT 2 VIEW  LOCATION: [facility]  DATE/TIME: 5/12/2022 5:03 PM    INDICATION: Two weeks follow-up periprosthetic greater trochanteric fracture.    COMPARISON: None.      Impression    IMPRESSION: No comparison is available. There is a fracture of the greater trochanter of the right hip which extends to the interface with the arthroplasty component, but does not likely result in overall loosening. There is an old fracture fragment or   focus of heterotopic ossification adjacent to the lesser trochanter. The postoperative changes of the right hip arthroplasty appear intact and well-seated. The visualized right hemipelvis is negative.           Discharge Medications   Current Discharge Medication List      START taking these medications    Details   cloNIDine (CATAPRES) 0.2 MG tablet Take 1 tablet (0.2 mg) by mouth 2  times daily  Qty: 60 tablet, Refills: 0    Associated Diagnoses: Hypertension, unspecified type      tamsulosin (FLOMAX) 0.4 MG capsule Take 1 capsule (0.4 mg) by mouth daily  Qty: 30 capsule, Refills: 0    Associated Diagnoses: Urinary retention         CONTINUE these medications which have NOT CHANGED    Details   alendronate (FOSAMAX) 70 MG tablet TAKE 1 TABLET BY MOUTH ONCE WEEKLY. TAKE 30 MINUTES BEFORE FIRST MEAL OF THE DAY WITH WATER AND REMAIN UPRIGHT FOR 30 MINUTES  Qty: 12 tablet, Refills: 3    Associated Diagnoses: Age-related osteoporosis with current pathological fracture with routine healing, subsequent encounter      baclofen (LIORESAL) 10 MG tablet Take 1 tablet (10 mg) by mouth 2 times daily  Qty: 180 tablet, Refills: 0    Associated Diagnoses: Muscle spasticity      Calcium-Vitamin D 600-200 MG-UNIT TABS Take 1 tablet by mouth 2 times daily  Qty: 90 tablet, Refills: 0    Associated Diagnoses: Closed fracture of neck of right femur, initial encounter (H)      multivitamin, therapeutic (THERA-VIT) TABS tablet Take 1 tablet by mouth daily      omeprazole (PRILOSEC) 20 MG DR capsule Take 20 mg by mouth See Admin Instructions Every Sun, Mon, Wed, Fri      PARoxetine (PAXIL) 40 MG tablet TAKE 1.5 TABLETS BY MOUTH EVERY MORNING  Qty: 135 tablet, Refills: 1    Associated Diagnoses: 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)      senna-docusate (SENOKOT-S/PERICOLACE) 8.6-50 MG tablet Take 1 tablet by mouth daily as needed for constipation  Qty:        simvastatin (ZOCOR) 80 MG tablet Take 1 tablet (80 mg) by mouth At Bedtime  Qty: 90 tablet, Refills: 3    Associated Diagnoses: Hyperlipidemia LDL goal <160      Vitamin D, Cholecalciferol, 25 MCG (1000 UT) TABS Take 1 tablet (1,000 Units) by mouth daily    Associated Diagnoses: Closed fracture of neck of right femur, initial encounter (H)      aspirin (ASA) 325 MG EC tablet Take 1 tablet (325 mg) by mouth daily  Qty: 70 tablet, Refills: 0     Associated Diagnoses: Closed fracture of neck of right femur, initial encounter (H)      order for DME Equipment being ordered: leg brace - Custom Hinged AFO with a lift  Qty: 1 Units, Refills: 0    Associated Diagnoses: Altered gait; Closed fracture of neck of right femur with routine healing, subsequent encounter; 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

## 2022-05-17 NOTE — PLAN OF CARE
Date/Time 05/17/22 1342    Trauma    Diagnosis: R tib/fib fx  POD#:non surgical  Mental Status:A&Ox4 rambimg speech hx TBI  Activity/dangle 1 walker and GB  Diet:reg  Pain: tylenol  Garcia/Voiding:urinal  Tele/Restraints/Iso:NA  02/LDA: NA  D/C Date: to discharge today   Other Info:

## 2022-05-17 NOTE — PROGRESS NOTES
Patient a/o X2, vitals stable on room air. BP soft, 95/54 and 97/59, HS clonidine held. Denies pain. Up with assist of 1/walker, voiding in bathroom. Discharge pending.

## 2022-05-17 NOTE — PROGRESS NOTES
Care Management Follow Up    Length of Stay (days): 15    Expected Discharge Date: 05/18/2022     Concerns to be Addressed:       Patient plan of care discussed at interdisciplinary rounds: Yes    Anticipated Discharge Disposition: Skilled Nursing Facility     Anticipated Discharge Services: None  Anticipated Discharge DME:      Patient/family educated on Medicare website which has current facility and service quality ratings:    Education Provided on the Discharge Plan:    Patient/Family in Agreement with the Plan: yes    Referrals Placed by CM/SW: External Care Coordination  Private pay costs discussed: waiver for daily showers    Additional Information:  Discharge plans in progress.  Writer met with patient and his mother this morning, introduced self and role in discharge planning.  Patient anxious to get back to his [facility], [facility].  Patient has Waiver services and his mom is guardian and very involved in Yunior's care.  Yunior would like help in the shower every morning and will neeed home PT.  Writer confirmed [facility] can provide both of these services-cadi will cover the shower assistance.  [facility] works with [employer] for the home PT.  Writer spoke with Milagros the  for [facility] and she will arrange for IHS to begin again.    Writer spoke with Rachele at [facility], who would like patient to return before 4 PM today.  Writer will try to get Yunior back to them by 4 PM; mom is going to drive Yunior back to [facility].      Amparo Daley RN

## 2022-05-17 NOTE — PROGRESS NOTES
Care Management Discharge Note    Discharge Date: 05/17/2022       Discharge Disposition: Assisted Living    Discharge Services: County Worker (Daily bath aide and PT yessica MILLY)    Discharge DME: Walker    Discharge Transportation: health plan transportation    Private pay costs discussed: Not applicable    PAS Confirmation Code: [id number]  Patient/family educated on Medicare website which has current facility and service quality ratings:      Education Provided on the Discharge Plan:    Persons Notified of Discharge Plans: bedside Amparo DOUGLAS, charge RN, pt and his mom.  Patient/Family in Agreement with the Plan: yes    Handoff Referral Completed: No    Additional Information:  Patient ready for discharge back to [facility]. Pt's mom stated she brought his new walker to the hospital when he was on I[facility], writer tried to track it down and was not able to locate his walker.  PT/OT agree to send walker with patient. Mom will give ride once STELLA Christensen reviews discharge instructions with patient.    Writer called Rachele DOUGLAS at [facility] and  Let her know  Pt will return and likely leave the hospital within an  Hour.  Writer faxed the discharge order to [phone]        Amparo Daley RN

## 2022-05-17 NOTE — PROGRESS NOTES
[facility]    Medicine Progress Note - Hospitalist Service    Date of Admission:  5/2/2022    Assessment & Plan        Yunior Angel is a 56 year old male admitted on 5/2/2022. 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  Hip x-ray shows mildly displaced right periprosthetic hip fracture.     Closed displaced fracture of greater trochanter of right femur S/P total hip arthroplasty  - Ortho recommended trial of nonsurgical management.  WBAT.  - Acetaminophen and/or opioids as needed for pain.  - PT recommending TCU.  Patient and mother receptive to this plan and SW has sent out >15 referrals.   - Will plan to have PT and OT re evaluate as some point over the weekend to see if he can progress enough to get home. Main deficits is that patient now has foot drop to his R leg and drags his foot. May benefit from orthotic?     Neurobehavioral sequelae of traumatic brain injury   Acute behavioral and motor changes primarily hyperactivity  Patient fell down a flight of stairs at 26 years old just prior to getting  and suffered a TBI which resulted in his current presentation. No inpatient psych admissions and no major psych assessment in his past.  - psych consult on admission and Dr. Basilio 5/3/2022 recommended decreasing dose of paroxetine to 40 mg which had not changed in many years.  He advised use of as needed benzo diazepam for any agitation/aggression.  He feels that presentation most consistent with hyperstimulation in current environment and baseline depression given his history of TBI currently in AL with limited by patient's perception of prognosis.  He did not feel that current presentation consistent with movement disorder/TD, advised Neurology consult.   - recommendation made for getting patient into a group home setting which he may  enjoy more than assisted living.   - neurology work up complete EEG negative for seizures.  Dr Gomez feels condition consistent with TD noting patient received Haldol in ED however discussed symptoms predated ED visit and no known culprit psychotropic medications, nonetheless movement disorder appears resolved  - waxing and waning hyperactivity noted this admission.      Gastroesophageal reflux disease with esophagitis  - Continue PPI      Age-related osteoporosis with current pathological fracture with routine healing, subsequent encounter  - Resume Fosamax as outpatient  - Resume calcium and vitamin D     Hyperlipidemia LDL goal <160  - Continue statin    5/17- stable, continue present care.  Discharge to transitional care unit when a bed is available.     Diet: Combination Diet Regular Diet Adult; No Caffeine Diet    DVT Prophylaxis: Pneumatic Compression Devices  Garcia Catheter: Not present  Central Lines: None  Cardiac Monitoring: None  Code Status: No CPR- Do NOT Intubate      Disposition Plan   Expected Discharge: 05/18/2022     Anticipated discharge location: inpatient rehabilitation facility    Delays:     Placement - TCU          The patient's care was discussed with the Patient and Patient's Family.    Javier Rich MD  Hospitalist Service  [facility]  Securely message with the Vocera Web Console (learn more here)  Text page via [employer] Paging/Directory         Clinically Significant Risk Factors Present on Admission                    ______________________________________________________________________    Interval History   A little dizzy when he gets up.  No significant pain.     Data reviewed today: I reviewed all medications, new labs and imaging results over the last 24 hours. I personally reviewed no images or EKG's today.    Physical Exam   Vital Signs: Temp: 97.7  F (36.5  C) Temp src: Oral BP: 104/59 Pulse: (!) 49   Resp: 16 SpO2: 96 % O2 Device: None (Room  air)    Weight: 0 lbs 0 oz  Constitutional: awake, alert, cooperative, no apparent distress  He was able to stand and transfer from chair to bed with a walker.      Data   No lab results found in last 7 days.    No results found for this or any previous visit (from the past 24 hour(s)).  Medications       acetaminophen  650 mg Oral 4x Daily     baclofen  10 mg Oral BID     calcium carbonate 600 mg-vitamin D 400 units  1 tablet Oral BID     cloNIDine  0.2 mg Oral BID     pantoprazole  40 mg Oral Once per day on Sun Mon Wed Fri     PARoxetine  40 mg Oral Daily     simvastatin  40 mg Oral At Bedtime     tamsulosin  0.4 mg Oral Daily     Vitamin D3  1,000 Units Oral Daily

## 2022-05-17 NOTE — PLAN OF CARE
PT-  Pt discharged home to [facility] today.  TCU ultimately recommended but pt was unable to get placement into one and is now going home.  Plans to have Home PT.  PT goals not met.

## 2022-05-18 ENCOUNTER — PATIENT OUTREACH (OUTPATIENT)
Dept: CARE COORDINATION | Facility: CLINIC | Age: 57
End: 2022-05-18
Payer: COMMERCIAL

## 2022-05-18 DIAGNOSIS — Z71.89 OTHER SPECIFIED COUNSELING: ICD-10-CM

## 2022-05-18 NOTE — PROGRESS NOTES
Clinic Care Coordination Contact    Background: Care Coordination referral placed from [facility] discharge report for reason of patient meeting criteria for a TCM outreach call by [facility] team.    Assessment: Upon chart review, [facility] Team member will cancel/close the referral for TCM outreach due to reason below:    Patient lives at a [facility] with full services.    Plan: Care Coordination referral for TCM outreach canceled.    Marielena Manning MA  [facility] Care Resource [place], [facility]

## 2022-05-20 ENCOUNTER — HOSPITAL ENCOUNTER (EMERGENCY)
Facility: CLINIC | Age: 57
Discharge: HOME | End: 2022-05-20
Attending: EMERGENCY MEDICINE | Admitting: EMERGENCY MEDICINE
Payer: COMMERCIAL

## 2022-05-20 VITALS
RESPIRATION RATE: 13 BRPM | DIASTOLIC BLOOD PRESSURE: 83 MMHG | SYSTOLIC BLOOD PRESSURE: 103 MMHG | TEMPERATURE: 98.1 F | WEIGHT: 180 LBS | HEART RATE: 42 BPM | BODY MASS INDEX: 25.1 KG/M2 | OXYGEN SATURATION: 100 %

## 2022-05-20 DIAGNOSIS — R00.1 SINUS BRADYCARDIA: ICD-10-CM

## 2022-05-20 DIAGNOSIS — T50.905A ADVERSE DRUG EFFECT, INITIAL ENCOUNTER: ICD-10-CM

## 2022-05-20 LAB
ANION GAP SERPL CALCULATED.3IONS-SCNC: 5 MMOL/L (ref 3–14)
BASOPHILS # BLD AUTO: 0.1 10E3/UL (ref 0–0.2)
BASOPHILS NFR BLD AUTO: 2 %
BUN SERPL-MCNC: 15 MG/DL (ref 7–30)
CALCIUM SERPL-MCNC: 8.6 MG/DL (ref 8.5–10.1)
CHLORIDE BLD-SCNC: 108 MMOL/L (ref 94–109)
CO2 SERPL-SCNC: 28 MMOL/L (ref 20–32)
CREAT SERPL-MCNC: 0.85 MG/DL (ref 0.66–1.25)
EOSINOPHIL # BLD AUTO: 0.5 10E3/UL (ref 0–0.7)
EOSINOPHIL NFR BLD AUTO: 11 %
ERYTHROCYTE [DISTWIDTH] IN BLOOD BY AUTOMATED COUNT: 15.9 % (ref 10–15)
GFR SERPL CREATININE-BSD FRML MDRD: >90 ML/MIN/1.73M2
GLUCOSE BLD-MCNC: 100 MG/DL (ref 70–99)
HCT VFR BLD AUTO: 38.8 % (ref 40–53)
HGB BLD-MCNC: 11.9 G/DL (ref 13.3–17.7)
HOLD SPECIMEN: NORMAL
IMM GRANULOCYTES # BLD: 0 10E3/UL
IMM GRANULOCYTES NFR BLD: 0 %
LYMPHOCYTES # BLD AUTO: 2.1 10E3/UL (ref 0.8–5.3)
LYMPHOCYTES NFR BLD AUTO: 40 %
MAGNESIUM SERPL-MCNC: 2 MG/DL (ref 1.6–2.3)
MCH RBC QN AUTO: 23.2 PG (ref 26.5–33)
MCHC RBC AUTO-ENTMCNC: 30.7 G/DL (ref 31.5–36.5)
MCV RBC AUTO: 76 FL (ref 78–100)
MONOCYTES # BLD AUTO: 0.4 10E3/UL (ref 0–1.3)
MONOCYTES NFR BLD AUTO: 8 %
NEUTROPHILS # BLD AUTO: 2 10E3/UL (ref 1.6–8.3)
NEUTROPHILS NFR BLD AUTO: 39 %
NRBC # BLD AUTO: 0 10E3/UL
NRBC BLD AUTO-RTO: 0 /100
PLATELET # BLD AUTO: 465 10E3/UL (ref 150–450)
POTASSIUM BLD-SCNC: 4.3 MMOL/L (ref 3.4–5.3)
RBC # BLD AUTO: 5.13 10E6/UL (ref 4.4–5.9)
SODIUM SERPL-SCNC: 141 MMOL/L (ref 133–144)
WBC # BLD AUTO: 5.2 10E3/UL (ref 4–11)

## 2022-05-20 PROCEDURE — 99284 EMERGENCY DEPT VISIT MOD MDM: CPT

## 2022-05-20 PROCEDURE — 93005 ELECTROCARDIOGRAM TRACING: CPT

## 2022-05-20 PROCEDURE — 36415 COLL VENOUS BLD VENIPUNCTURE: CPT | Performed by: EMERGENCY MEDICINE

## 2022-05-20 PROCEDURE — 83735 ASSAY OF MAGNESIUM: CPT | Performed by: EMERGENCY MEDICINE

## 2022-05-20 PROCEDURE — 80048 BASIC METABOLIC PNL TOTAL CA: CPT | Performed by: EMERGENCY MEDICINE

## 2022-05-20 PROCEDURE — 85004 AUTOMATED DIFF WBC COUNT: CPT | Performed by: EMERGENCY MEDICINE

## 2022-05-20 NOTE — ED NOTES
Bed: ST03  Expected date:   Expected time:   Means of arrival:   Comments:  434  56 M jorge/pulse was 40, now 53 after atropine/93/58  1130

## 2022-05-20 NOTE — ED PROVIDER NOTES
History   Chief Complaint:  Bradycardia       The history is provided by the patient and the EMS personnel. History limited by: Cognitive deficits.      Yunior Angel is a 56 year old male with history of TBI, not on beta blockers, who presents via EMS with bradycardia. EMS reports that the patient lives in the memory care unit of an assisted living home, and on a routine check up he had a heart rate of 40 and he had slight pallor. EMS reports that they gave him 1 mg of atropine which brought his heart rate up to 54. EMS reports that the patient had a recent right hip surgery, 18 days ago, which makes ambulation difficult. They also report blood pressure about 90 systolic, and that the patient was calm on the ride over but now appears agitated upon ER arrival.    The patient reports not knowing any medicine changes due to the assisted living home making the changes. He also reports no chest pain, leg pain, or breathing issues. On May 17 the patient was discharged from the hospital with a last documented heart rate of 49. Several hospital notes show that patient's newly started clonidine had to be held due to low blood pressures during his hospitalization however this medication was continued on discharge. He was also started on flomax for unclear reasons per his parents. They deny he has ever had difficulty with urination.     Per patient's family, his problems started after Clonidine and Flomax were started after hospital discharge. Per chart review, his Clonidine had to be held several times due to blood pressure.     Review of Systems   Reason unable to perform ROS: Cognitive deficits.       Allergies:  Insect Extract    Medications:  alendronate   aspirin   baclofen   clonidine  omeprazole   Paxil  simvastatin  tamsulosin    Past Medical History:     Atypical migraine, not intractable  Closed head injury with long term cognitive deficit  Mixed hyperlipidemia   Organic brain disorder      Past Surgical  History:    Total hip arthroplasty right  Colonoscopy  Esophagoscopy, Gastroscopy, and Duodenoscopy combined   Avulsion of the right great toenail   Excision of bone cyst distal phalanx, great right toe    Family History:    Migraines   Cancer    Social History:  Came with EMS.  He lives in a memory care unit of an assisted living facility.       Physical Exam     Patient Vitals for the past 24 hrs:   BP Temp Temp src Pulse Resp SpO2 Weight   05/20/22 1421 105/53 -- -- (!) 44 -- -- --   05/20/22 1420 105/53 -- -- -- -- -- --   05/20/22 1415 (!) 110/101 -- -- -- -- -- --   05/20/22 1410 97/50 -- -- -- -- -- --   05/20/22 1401 (!) 149/138 -- -- 50 -- -- --   05/20/22 1400 (!) 75/48 -- -- 115 -- -- --   05/20/22 1330 (!) 89/62 -- -- (!) 49 13 100 % --   05/20/22 1315 92/59 -- -- (!) 49 16 97 % --   05/20/22 1300 (!) 91/35 -- -- 53 20 100 % --   05/20/22 1215 (!) 88/62 -- -- (!) 47 11 94 % --   05/20/22 1200 106/56 -- -- 50 14 98 % --   05/20/22 1138 113/63 98.1  F (36.7  C) Oral 55 14 100 % 81.6 kg (180 lb)       Physical Exam  General: Well appearing, nontoxic. Resting comfortably  Head:  Scalp, face, and head appear normal  Eyes:  Pupils are equal, round, reactive to light EOMI, no nystagmus    Conjunctivae non-injected and sclerae white  ENT:    The external nose is normal    Pinnae are normal  Neck:  Normal range of motion.     There is no rigidity noted    Trachea is in the midline  CV:  Bradycardic rate and regular rhythm     Normal S1/S2, no S3/S4    No murmur or rub. Radial pulses 2+ bilaterally.  Resp:  Lungs are clear and equal bilaterally  There is no tachypnea    No increased work of breathing    No rales, wheezing, or rhonchi  GI:  Abdomen is soft, no rigidity or guarding    No distension, or mass    No tenderness or rebound tenderness   MS:  Normal muscular tone.     Symmetric motor strength    No lower extremity edema  Skin:  No rash or acute skin lesions noted  Neuro:  Awake and alert  Speech is normal  and fluent  Moves all extremities spontaneously  Psych:  Angry agitated affect. Patient redirectable, appears to have impulse control difficulty.       Emergency Department Course   ECG  ECG taken at 1138, ECG read at 1138  Sinus bradycardia  Otherwise normal EDG   No significant change as compared to prior, dated 3/13/18.  Rate 55 bpm. MN interval 170 ms. QRS duration 100 ms. QT/QTc 450/430 ms. P-R-T axes 38 38 28.     Laboratory:  Labs Ordered and Resulted from Time of ED Arrival to Time of ED Departure   BASIC METABOLIC PANEL - Abnormal       Result Value    Sodium 141      Potassium 4.3      Chloride 108      Carbon Dioxide (CO2) 28      Anion Gap 5      Urea Nitrogen 15      Creatinine 0.85      Calcium 8.6      Glucose 100 (*)     GFR Estimate >90     CBC WITH PLATELETS AND DIFFERENTIAL - Abnormal    WBC Count 5.2      RBC Count 5.13      Hemoglobin 11.9 (*)     Hematocrit 38.8 (*)     MCV 76 (*)     MCH 23.2 (*)     MCHC 30.7 (*)     RDW 15.9 (*)     Platelet Count 465 (*)     % Neutrophils 39      % Lymphocytes 40      % Monocytes 8      % Eosinophils 11      % Basophils 2      % Immature Granulocytes 0      NRBCs per 100 WBC 0      Absolute Neutrophils 2.0      Absolute Lymphocytes 2.1      Absolute Monocytes 0.4      Absolute Eosinophils 0.5      Absolute Basophils 0.1      Absolute Immature Granulocytes 0.0      Absolute NRBCs 0.0     MAGNESIUM - Normal    Magnesium 2.0          Emergency Department Course:     ED Course as of 05/20/22 1850   Fri May 20, 2022   1130 I obtained history and examined the patient as noted above.   1135 EKG was obtained   1315 Patient rechecked and updated.        Disposition:  The patient was discharged to home.     Impression & Plan       Medical Decision Making:  Yunior Angel is a 56 year old male who presents with concern for bradycardia. On my evaluation the patient is well appearing, hemodynamically stable and afebrile.  She was apparently found to have low heart  rate at his current living facility.  On arrival to the emergency department the patient denies any significant symptoms although history is unreliable due to prior TBI.  Patient frequently gets agitated with minimal interaction.  This off and on intermittent agitation often makes obtaining accurate blood pressure readings difficult as simple things such as the blood pressure cuff going off and tightening on his arm will make him agitated.  He has no evidence of poor perfusion on examination.  His parents arrived at the patient's bedside and feel that patient's current problem started after beginning clonidine and Flomax while hospitalized recently for hip fracture.  In review of hospital documentation the clonidine had to be held several times due to episodes of low blood pressures and he had documented low heart rates while hospitalized with his discharge heart rate being documented at 49.  EKG reveals sinus bradycardia without evidence of heart block.  No ischemic changes.  No other dysrhythmia.  Patient remained stable while in the emergency department.  Laboratory studies are reassuring.  I feel the patient's lower blood pressures and possibly his bradycardia may be caused by the clonidine.  He also reports more dizziness and was discharged on Flomax.  The patient and the patient's parents report he has never had any problems with urinary dysfunction and are wondering why he is on this medication.  This medication may also cause side effects of dizziness and therefore I see no reason to continue it.  We will discontinue the patient's clonidine and Flomax.  Close outpatient follow-up will be required.  I discussed staying in the hospital overnight for observation however the patient adamantly does not want to do this.  His parents feel that by stopping his medications he will do much better.  Patient will be discharged back to his monitored living facility where he can be closely watched.  He should return  immediately to the emergency department for any worsening.  The patient and the patient's parents were understanding of this and agreeable with the plan of care.  Close return precautions were provided and the patient was discharged in stable condition.    Diagnosis:    ICD-10-CM    1. Adverse drug effect, initial encounter  T50.905A    2. Sinus bradycardia  R00.1      Scribe Disclosure:  I, Timmy Khan () and Parmjit Noonan (trainee), am serving as a scribe at 11:37 AM on 5/20/2022 to document services personally performed by Dr. Balwinder Jackson MD, based on my observations and the provider's statements to me.        Balwinder Jackson MD  05/20/22 5585

## 2022-08-05 ENCOUNTER — APPOINTMENT (OUTPATIENT)
Dept: GENERAL RADIOLOGY | Facility: CLINIC | Age: 57
End: 2022-08-05
Attending: EMERGENCY MEDICINE
Payer: COMMERCIAL

## 2022-08-05 ENCOUNTER — HOSPITAL ENCOUNTER (EMERGENCY)
Facility: CLINIC | Age: 57
Discharge: HOME | End: 2022-08-05
Attending: EMERGENCY MEDICINE | Admitting: EMERGENCY MEDICINE
Payer: COMMERCIAL

## 2022-08-05 VITALS
OXYGEN SATURATION: 97 % | RESPIRATION RATE: 16 BRPM | TEMPERATURE: 97.5 F | HEART RATE: 70 BPM | SYSTOLIC BLOOD PRESSURE: 128 MMHG | DIASTOLIC BLOOD PRESSURE: 53 MMHG

## 2022-08-05 DIAGNOSIS — M25.551 HIP PAIN, RIGHT: ICD-10-CM
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
## 2022-08-05 PROCEDURE — 99283 EMERGENCY DEPT VISIT LOW MDM: CPT

## 2022-08-05 PROCEDURE — 73502 X-RAY EXAM HIP UNI 2-3 VIEWS: CPT

## 2022-08-05 ASSESSMENT — ENCOUNTER SYMPTOMS: ARTHRALGIAS: 1

## 2022-08-05 NOTE — ED PROVIDER NOTES
History   Chief Complaint:  Fall       The history is provided by the patient.      Yunior Angel is a 56 year old male with history of hyperlipidemia who presents with fall. The patient had an unwitnessed fall onto his right hip at his assisted living home earlier today and called his mom and couldn't get up on his own. He took some Tylenol for the pain around 1300. He was at [facility] in May of 2022 for breaking his right hip, he was in the hospital for 19 days and didn't have surgery then. He denies any syncope or chest pain.      Review of Systems   Cardiovascular: Negative for chest pain.   Musculoskeletal: Positive for arthralgias (right hip).   Neurological: Negative for syncope.   All other systems reviewed and are negative.      Allergies:  Insect Extract    Medications:  Fosamax   Lioresal   Prilosec   Paxil   Senokot   Zocor   Cholecalciferol     Past Medical History:     Mental disorders   Beta thalassemia minor   Monoplegia of lower limb   Hyperlipidemia   Overweight   Gout  Migraine   Benign neoplasm of sigmoid colon   GERD  IBS  Neurobehavioral sequela of traumatic brain injury   Muscle spasticity   Fracture of femoral neck   Osteoporosis   Low testosterone in male     Past Surgical History:    Arthroplasty   Colonoscopy   EGD  Foot injury   Avulsion of right great toenail      Family History:    Migraines     Social History:  Patient came from assisted living home.  Patient is accompanied in the ED with mother.  PCP: Alberto Carlos Jr., MD- family medicine     Physical Exam     Patient Vitals for the past 24 hrs:   BP Temp Temp src Pulse Resp SpO2   08/05/22 1836 -- -- -- -- 16 --   08/05/22 1830 128/53 -- -- 70 -- 97 %   08/05/22 1705 102/84 97.5  F (36.4  C) Oral 80 18 98 %     Physical Exam  General: Alert, appears well-developed and well-nourished. Cooperative.     In no acute distress  HEENT:  Head:  Atraumatic  Ears:  External ears are normal  Mouth/Throat:  Oropharynx is without  erythema or exudate and mucous membranes are moist.   Eyes:   Conjunctivae normal and EOM are normal. No scleral icterus.    Pupils are equal, round, and reactive to light.   Neck:   Normal range of motion. Neck supple.  CV:  Normal rate, regular rhythm, normal heart sounds and radial pulses are 2+ and symmetric.  No murmur.  Resp:  Breath sounds are clear bilaterally    Non-labored, no retractions or accessory muscle use  GI:  Abdomen is soft, no distension, no tenderness. No rebound or guarding.  No CVA tenderness bilaterally  MS:  Normal range of motion. No edema.    Normal strength in all 4 extremities.     Back atraumatic.    No midline cervical, thoracic, or lumbar tenderness    Right Hip:    There is normal ROM of the hip    Flexion and Extension of the hip is normal    There is no evidence of clinical dislocation    There is no hematoma or obvious bursitis    The femur appears normal and without pain    There is no hematoma to the thigh    Sensory and Motor exam to the thigh and distal leg are normal    The knee, shin, ankle, and foot are without pain    Normal distal pulses are detected  Skin:  Warm and dry.  No rash or lesions noted.  Neuro:  Alert. Normal strength.  Sensation intact in all 4 extremities. GCS: 15  Psych:  Normal mood and affect.    Emergency Department Course     Imaging:  XR Pelvis and Hip Right 1 View   Final Result   IMPRESSION: No acute fracture or dislocation. Right total hip arthroplasty. 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 Small ossific fragment along the medial margin of the proximal femoral shaft.           Report per radiology      Emergency Department Course:       Reviewed:  I reviewed nursing notes, vitals and past medical history    Assessments:  1718 I obtained history and examined the patient as noted above.   1823 I rechecked the patient and explained findings.     Disposition:  The patient was discharged to home.     Impression & Plan      CMS Diagnoses: None    Medical Decision Making:  Patient is a 56-year-old male with a history of TBI and prior right hip arthroplasty who presents after a unwitnessed fall onto his right hip at assisted living earlier today.  He has some tenderness with walking to the right hip.  Thankfully x-ray negative for acute fracture and/or dislocation.  He is declining any Tylenol or ibuprofen at this point.  He has full range of motion of the right hip and good distal CMS to the right lower extremity.  Concern for potential hip contusion versus rib strain or sprain.  Close follow-up with orthopedic surgery recommended particularly given his history of arthroplasty and prior fracture to the greater trochanter sustained in early May.  If pain is not improving in the next 1 week he may need more advanced imaging such as CT or MRI of the hip.  Patient is safe for discharge back to his assisted living facility at this point.  Tylenol and/or ibuprofen for pain as needed.  Mother the patient was present who agreed to plan as described above.  After all questions answered and return precautions understood, discharged home.    Diagnosis:    ICD-10-CM    1. Fall, initial encounter  W19.XXXA    2. Hip pain, right  M25.551      Discharge Medications:  Discharge Medication List as of 8/5/2022  6:25 PM        Scribe Disclosure:  I, Harjinder Garcia, am serving as a scribe at 5:12 PM on 8/5/2022 to document services personally performed by Star Piedra MD based on my observations and the provider's statements to me.        Star Piedra MD  08/05/22 7042

## 2022-08-05 NOTE — ED TRIAGE NOTES
Fell and broke right hip in May 2022.  Was at [facility] for 19 days, did not have surgery then. Fell today onto right hip. Now unable to bear weight on right leg. Did not hit head, no LOC. H/o TBI. ABC's intact. VSS.

## 2022-10-04 ENCOUNTER — ANCILLARY PROCEDURE (OUTPATIENT)
Dept: BONE DENSITY | Facility: CLINIC | Age: 57
End: 2022-10-04
Attending: FAMILY MEDICINE
Payer: COMMERCIAL

## 2022-10-04 ENCOUNTER — ANCILLARY PROCEDURE (OUTPATIENT)
Dept: BONE DENSITY | Facility: CLINIC | Age: 57
End: 2022-10-04
Attending: NURSE PRACTITIONER
Payer: COMMERCIAL

## 2022-10-04 DIAGNOSIS — M81.0 OSTEOPOROSIS: ICD-10-CM

## 2022-10-04 DIAGNOSIS — M81.0 SENILE OSTEOPOROSIS: ICD-10-CM

## 2022-10-04 PROCEDURE — 77080 DXA BONE DENSITY AXIAL: CPT | Performed by: INTERNAL MEDICINE

## 2022-10-04 PROCEDURE — 77081 DXA BONE DENSITY APPENDICULR: CPT | Mod: XU | Performed by: INTERNAL MEDICINE

## 2022-10-11 NOTE — RESULT ENCOUNTER NOTE
Mr. Angel,    Dexa shows osteoporosis. Make sure taking calcium 1200mg daily and vitamin D 800 IU daily. Also continue your alendronate.  We can consider repeating your scan in 3 years.    If you have further questions about the interpretation of your labs, [email or web address] is a good website to check out for further information.    Please contact the clinic if you have additional questions.  Thank you.    Sincerely,    Alberto Carlos MD

## 2022-10-16 ENCOUNTER — HEALTH MAINTENANCE LETTER (OUTPATIENT)
Age: 57
End: 2022-10-16

## 2023-02-03 ENCOUNTER — LAB REQUISITION (OUTPATIENT)
Dept: LAB | Facility: CLINIC | Age: 58
End: 2023-02-03
Payer: COMMERCIAL

## 2023-02-03 DIAGNOSIS — Z79.899 OTHER LONG TERM (CURRENT) DRUG THERAPY: ICD-10-CM

## 2023-02-06 LAB
ANION GAP SERPL CALCULATED.3IONS-SCNC: 14 MMOL/L (ref 7–15)
BASOPHILS # BLD AUTO: 0.1 10E3/UL (ref 0–0.2)
BASOPHILS NFR BLD AUTO: 1 %
BUN SERPL-MCNC: 11.3 MG/DL (ref 6–20)
CALCIUM SERPL-MCNC: 9.7 MG/DL (ref 8.6–10)
CHLORIDE SERPL-SCNC: 106 MMOL/L (ref 98–107)
CREAT SERPL-MCNC: 0.88 MG/DL (ref 0.67–1.17)
DEPRECATED HCO3 PLAS-SCNC: 24 MMOL/L (ref 22–29)
EOSINOPHIL # BLD AUTO: 0.7 10E3/UL (ref 0–0.7)
EOSINOPHIL NFR BLD AUTO: 7 %
ERYTHROCYTE [DISTWIDTH] IN BLOOD BY AUTOMATED COUNT: 15.4 % (ref 10–15)
GFR SERPL CREATININE-BSD FRML MDRD: >90 ML/MIN/1.73M2
GLUCOSE SERPL-MCNC: 83 MG/DL (ref 70–99)
HCT VFR BLD AUTO: 46.8 % (ref 40–53)
HGB BLD-MCNC: 14.2 G/DL (ref 13.3–17.7)
IMM GRANULOCYTES # BLD: 0 10E3/UL
IMM GRANULOCYTES NFR BLD: 0 %
LYMPHOCYTES # BLD AUTO: 2 10E3/UL (ref 0.8–5.3)
LYMPHOCYTES NFR BLD AUTO: 21 %
MCH RBC QN AUTO: 23 PG (ref 26.5–33)
MCHC RBC AUTO-ENTMCNC: 30.3 G/DL (ref 31.5–36.5)
MCV RBC AUTO: 76 FL (ref 78–100)
MONOCYTES # BLD AUTO: 0.7 10E3/UL (ref 0–1.3)
MONOCYTES NFR BLD AUTO: 8 %
NEUTROPHILS # BLD AUTO: 6 10E3/UL (ref 1.6–8.3)
NEUTROPHILS NFR BLD AUTO: 63 %
NRBC # BLD AUTO: 0 10E3/UL
NRBC BLD AUTO-RTO: 0 /100
PLATELET # BLD AUTO: 368 10E3/UL (ref 150–450)
POTASSIUM SERPL-SCNC: 4.6 MMOL/L (ref 3.4–5.3)
RBC # BLD AUTO: 6.18 10E6/UL (ref 4.4–5.9)
SODIUM SERPL-SCNC: 144 MMOL/L (ref 136–145)
WBC # BLD AUTO: 9.5 10E3/UL (ref 4–11)

## 2023-02-06 PROCEDURE — 36415 COLL VENOUS BLD VENIPUNCTURE: CPT | Mod: ORL

## 2023-02-06 PROCEDURE — P9603 ONE-WAY ALLOW PRORATED MILES: HCPCS | Mod: ORL

## 2023-02-06 PROCEDURE — P9603 ONE-WAY ALLOW PRORATED MILES: HCPCS | Mod: ORL | Performed by: INTERNAL MEDICINE

## 2023-02-06 PROCEDURE — 82310 ASSAY OF CALCIUM: CPT | Mod: ORL | Performed by: INTERNAL MEDICINE

## 2023-02-06 PROCEDURE — 80048 BASIC METABOLIC PNL TOTAL CA: CPT | Mod: ORL

## 2023-02-06 PROCEDURE — 85025 COMPLETE CBC W/AUTO DIFF WBC: CPT | Mod: ORL | Performed by: INTERNAL MEDICINE

## 2023-02-06 PROCEDURE — 36415 COLL VENOUS BLD VENIPUNCTURE: CPT | Mod: ORL | Performed by: INTERNAL MEDICINE

## 2023-02-06 PROCEDURE — 85025 COMPLETE CBC W/AUTO DIFF WBC: CPT | Mod: ORL

## 2023-03-26 ENCOUNTER — HEALTH MAINTENANCE LETTER (OUTPATIENT)
Age: 58
End: 2023-03-26

## 2023-04-11 ENCOUNTER — VIRTUAL VISIT (OUTPATIENT)
Dept: ENDOCRINOLOGY | Facility: CLINIC | Age: 58
End: 2023-04-11
Payer: COMMERCIAL

## 2023-04-11 DIAGNOSIS — M80.00XD AGE-RELATED OSTEOPOROSIS WITH CURRENT PATHOLOGICAL FRACTURE WITH ROUTINE HEALING, SUBSEQUENT ENCOUNTER: Primary | ICD-10-CM

## 2023-04-11 PROCEDURE — 99443 PR PHYSICIAN TELEPHONE EVALUATION 21-30 MIN: CPT | Mod: 93 | Performed by: INTERNAL MEDICINE

## 2023-04-11 NOTE — PATIENT INSTRUCTIONS
-[facility]  Dr Matt, Endocrinology Department    [place] [address] [id number]  [place] [address]  Appointment [phone]  Fax: [phone]  [place]: Monday - Thursday      STOP Fosamax.  Follow up in person visit to discuss other options.    The pt was advised to  Maintain an adequate calcium and vitamin D intake and to supplement vitamin D if needed to maintain serum levels of 25 hydroxy D (25 OH D) between 30-60 ng/ml.  Limit alcohol intake to no more than 2 servings per day.  Limit caffeine intake.  Maintain an active lifestyle including weight-bearing exercises for at least 30 mins daily.  Take measures to reduce the risk of falling.

## 2023-04-11 NOTE — PROGRESS NOTES
"This is a telephone encounter.  Declined video.  4/11/2023  Spoke with mother who is his co-guardian.    Start time: 1:07  End time: 1:28    More than 10 min spent reviewing chart, labs, results, imaging studies and in patient communication.    In the setting of COVID-19 outbreak patients visits are transitioned to phone visits/ virtual visits as per system and leadership guidelines.  I have personally reviewed data including notes, lab tests. I have reviewed and interpreted images personally.  This encounter is potentially equivalent to established 4 level (62647) clinic visit.    The patient has been notified of following:      \"This telephone visit will be conducted via a call between you and your physician/provider. We have found that certain health care needs can be provided without the need for a physical exam.  This service lets us provide the care you need with a short phone conversation.  If a prescription is necessary we can send it directly to your pharmacy.  If lab work is needed we can place an order for that and you can then stop by our lab to have the test done at a later time.     We will bill your insurance company for this service.  Please check with your medical insurance if this type of visit is covered. You may be responsible for the cost of this type of visit if insurance coverage is denied.  The typical cost is $30 (10min), $59 (11-20min) and $85 (21-30min).  Most often these visits are shorter than 10 minutes. With new updates with corona virus patient might be billed as clinic visit.     If during the course of the call the physician/provider feels a telephone visit is not appropriate, you will not be charged for this service.\"      Past medical history, social history, family history, allergy and medications were reviewed and updated as appropriate.  Reviewed all pertinent labs, notes and imaging studies as appropriate.    Patient verbally consented to phone visit today: yes.    Disclaimer: " This note consists of symbols derived from keyboarding, dictation and/or voice recognition software. As a result, there may be errors in the script that have gone undetected. Please consider this when interpreting information found in this chart.        ROS neg expect noted in notes.  Patient feels well at this time and denies any tachycardia, palpitations, heat intolerance, tremor, insomnia, diarrhea, or unexplained weight loss.  Patient also denies  cold intolerance, constipation, or unexplained weight gain.     Name: Yunior Angel  For f/u of osteoporosis. Last visit 2020.  HPI:  Yunior Angel is a 57 year old male. Visit for f/u of osteoporosis.  Spoke with his mother- Patsy who is his co-guardian.  Yunior is living in assisted living.    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    H/o hip fracture in March 2018.  He presented to emergency department with acute right hip pain.  Questionable history about fall.  He did not recall a fall, but he has short-term memory deficit. Workup showed right femoral neck fracture.  He underwent ORIF.  Status post right hip arthroplasty.    H/o verbebral fracture: In May 2018.  He presented to emergency department after a fall.  Patient tripped and fell from a standing height.  Workup at that time showedCompression fraction of L1 of uncertain age.  This was followed by DEXA scan consistent with osteoporosis as noted below.  Normal thyroid function tests, parathyroid hormone, vitamin D and calcium level.  Testosterone noted to be low as discussed below      Also history of heterotropic ossification: Incidental on x-ray findings based on discharge summary March 2018. This was anterior to the right pubic inferior rami.  There was some surgical intervention.   Please refer to the orthopedics operative note for details.  This seems to be not uncommon in patients with previous neurologic injuries.  Radiation oncology was consulted and patient underwent radiation treatment ×1 per standard practice.    Started Fosamax 70 mg/ once a week (12/6/2018)  F/u DEXA 8/2020: : Severe osteoporosis on the basis of hip and vertebral fractures. No significant change in bone density of the lumbar spine or spine.  DEXA 2022: Severe osteoporosis on the basis of hip and vertebral fractures. There has been a trend toward  significant incrrease in bone density of the lumbar spine. There has been probably no significant change in bone density of the hip(s). (Comparisons from different scanners)  In 5/2022- he sustained Closed displaced fracture of greater trochanter of right femur status post total hip arthroplasty    Patsy ( his mother) reports that Yunior is doing OK.  Is using cane for walking. No major concerns.  Smoke:No  Family History:Yes: h/o osteoporosis  Fractures:as above  Kidney stones: No  GI Surgery:No  Bisphosphonate exposure:  no  Exercise:goes to [employer].  Prostate History: no  Diet:   Milk: 1   Yogurt/Cottage Cheese: 1   Ice Cream  Ca/Vitamin D: Calcium 600-1200 mg/day . 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     Past Surgical History:   Procedure Laterality Date     ARTHROPLASTY HIP Right 3/14/2018    Procedure: ARTHROPLASTY HIP;  Right total hip arthroplasty;  Surgeon: Parmjit Zabala MD;  Location:  OR     COLONOSCOPY N/A 12/31/2014    Procedure: COLONOSCOPY;  Surgeon: Inna Gonzalez MD;  Location:  GI     ESOPHAGOSCOPY, GASTROSCOPY,  DUODENOSCOPY (EGD), COMBINED N/A 12/31/2014    Procedure: COMBINED ESOPHAGOSCOPY, GASTROSCOPY, DUODENOSCOPY (EGD), BIOPSY SINGLE OR MULTIPLE;  Surgeon: Inna Gonzalez MD;  Location:  GI     SURGICAL HISTORY OF -       foot surgery     SURGICAL HISTORY OF -   2006    1.  Avulsion of the right great toenail.  2.  Excision of bone cyst distal phalanx, right great toe.      Family Hx:  Family History   Problem Relation Age of Onset     Migraines Father      Family History Negative Mother      Cancer - colorectal No family hx of      Colon Cancer No family hx of           Social Hx:  Social History     Socioeconomic History     Marital status: Single     Spouse name: Not on file     Number of children: Not on file     Years of education: Not on file     Highest education level: Not on file   Occupational History     Not on file   Tobacco Use     Smoking status: Never     Smokeless tobacco: Never   Vaping Use     Vaping status: Not on file   Substance and Sexual Activity     Alcohol use: No     Alcohol/week: 0.0 standard drinks of alcohol     Drug use: No     Sexual activity: Never   Other Topics Concern     Parent/sibling w/ CABG, MI or angioplasty before 65F 55M? Not Asked   Social History Narrative     Not on file     Social Determinants of Health     Financial Resource Strain: Not on file   Food Insecurity: Not on file   Transportation Needs: Not on file   Physical Activity: Not on file   Stress: Not on file   Social Connections: Not on file   Intimate Partner Violence: Not on file   Housing Stability: Not on file          MEDICATIONS:  has a current medication list which includes the following prescription(s): alendronate, baclofen, calcium-vitamin d, multivitamin, therapeutic, order for dme, paroxetine, senna-docusate, simvastatin, vitamin d (cholecalciferol), acetaminophen, aspirin, omeprazole, and [DISCONTINUED] clonidine.    ROS     ROS: 10 point ROS neg other than the symptoms noted above in the  HPI.    Physical Exam   VS: There were no vitals taken for this visit.      LABS:  Component      Latest Ref Rng & Units 11/28/2018   Testosterone Total      240 - 950 ng/dL 186 (L)   Sex Hormone Binding Globulin      11 - 80 nmol/L 36   Free Testosterone Calculated      4.7 - 24.4 ng/dL 3.33 (L)   Creatinine      0.66 - 1.25 mg/dL 0.85   GFR Estimate      >60 mL/min/1.7m2 >90   GFR Estimate If Black      >60 mL/min/1.7m2 >90   Parathyroid Hormone Intact      18 - 80 pg/mL 80   TSH      0.40 - 4.00 mU/L 0.79   Vitamin D Deficiency screening      20 - 75 ug/L 45   Calcium      8.5 - 10.1 mg/dL 9.5     BMP:  Creatinine   Date Value Ref Range Status   02/06/2023 0.88 0.67 - 1.17 mg/dL Final   09/29/2020 0.91 0.66 - 1.25 mg/dL Final       TFTs:  TSH   Date Value Ref Range Status   05/03/2022 0.42 0.40 - 4.00 mU/L Final   11/28/2018 0.79 0.40 - 4.00 mU/L Final     PTH:   END[facility] Latest Ref Rng & Units 11/28/2018   PARATHYROID HORMONE INTACT 18 - 80 pg/mL 80     Vitamin D:  Vitamin D Deficiency Screening Results:  Lab Results   Component Value Date    VITDT 44 11/23/2021    VITDT 45 11/28/2018    VITDT 43 06/25/2018       BoneTurnOverMarkers:     Testosterone:  [facility]UIT[facility] Latest Ref Rng & Units 12/14/2018 11/28/2018   TESTOSTERONE TOTAL 240 - 950 ng/dL 269 186 (L)     DEXA 2022:  IMPRESSION  Severe osteoporosis on the basis of hip and vertebral fractures.  Degenerative changes at the lumbar spine may falsely elevate results.      Comparisons from different scanners that have not been cross calibrated, are not necessarily valid. Such a comparison has been performed here; one should interpret with caution.   There has been a trend toward  significant incrrease in bone density of the lumbar spine. There has been probably no significant change in bone density of the hip(s). The forearms can not be compared.      All pertinent notes, labs, and images personally reviewed by me.     A/P  #1  Osteoporosis:  Risk factors for low bone density include personal history of fracture, family history, , low Ca intake.    History of compression vertebral fracture of L1  History of hip fracture status post hip arthroplasty  Also history of heterotropic ossification status post radiation treatment  DEXA 2018 consistent with severe osteoporosis  Normal thyroid function tests, parathyroid hormone, vitamin D and calcium level.  Started Fosamax 70 mg/ once a week (12/6/2018)  DEXA 2022 as noted above.  5/2022-Closed displaced fracture of greater trochanter of right femur status post total hip arthroplasty  Plan:  Discussed diagnosis, pathophysiology, management and treatment options of condition with pt.  In the setting of femur fracture (on Fosamax) in 5/2022 and completion of about 4-5 years on bisphosphonate recommend to stop fosamax.  Consider another medication. Follow up in clinic to discuss further.  The pt was advised to    Maintain an adequate calcium and vitamin D intake and to supplement vitamin D if needed to maintain serum levels of 25 hydroxy D (25 OH D) between 30-60 ng/ml.    Limit alcohol intake to no more than 2 servings per day.    Limit caffeine intake.    Maintain an active lifestyle including weight-bearing exercises for at least 30 mins daily.    Take measures to reduce the risk of falling.    #2.  Low testosterone/Male hypogonadism:  Testosterone noted to be low on workup of osteoporosis.  Low testosterone X1  Repeat in range.  Plan  Plan to continue to monitor.    Follow-up:  Next available.    Crystal Matt MD  Endocrinology  [facility]/[place]  CC: Alberto Carlos Jr.    All questions were answered.  The patients mother indicates understanding of the above issues and agrees with the plan set forth.      Addendum to above note and clinic visit:    Labs reviewed.    See result note/telephone encounter.

## 2023-04-11 NOTE — NURSING NOTE
Is the patient currently in the state of [place]? YES    Visit mode:TELEPHONE    If the visit is dropped, the patient can be reconnected by: TELEPHONE VISIT: Phone number: [phone]     Will anyone else be joining the visit? Yes, this appointment is with the patient's mother, the patient has a Traumatic brain injury.         How would you like to obtain your AVS? MyChart    Are changes needed to the allergy or medication list? NO    Reason for visit: follow up

## 2023-04-11 NOTE — LETTER
"    4/11/2023         RE: Yunior Angel  [address]  [facility] [address]        Dear Colleague,    Thank you for referring your patient, Yunior Angel, to the [facility]. Please see a copy of my visit note below.    This is a telephone encounter.  Declined video.  4/11/2023  Spoke with mother who is his co-guardian.    Start time: 1:07  End time: 1:28    More than 10 min spent reviewing chart, labs, results, imaging studies and in patient communication.    In the setting of COVID-19 outbreak patients visits are transitioned to phone visits/ virtual visits as per system and leadership guidelines.  I have personally reviewed data including notes, lab tests. I have reviewed and interpreted images personally.  This encounter is potentially equivalent to established 4 level (52274) clinic visit.    The patient has been notified of following:      \"This telephone visit will be conducted via a call between you and your physician/provider. We have found that certain health care needs can be provided without the need for a physical exam.  This service lets us provide the care you need with a short phone conversation.  If a prescription is necessary we can send it directly to your pharmacy.  If lab work is needed we can place an order for that and you can then stop by our lab to have the test done at a later time.     We will bill your insurance company for this service.  Please check with your medical insurance if this type of visit is covered. You may be responsible for the cost of this type of visit if insurance coverage is denied.  The typical cost is $30 (10min), $59 (11-20min) and $85 (21-30min).  Most often these visits are shorter than 10 minutes. With new updates with corona virus patient might be billed as clinic visit.     If during the course of the call the physician/provider feels a telephone visit is not appropriate, you will not be charged for this service.\"      Past " medical history, social history, family history, allergy and medications were reviewed and updated as appropriate.  Reviewed all pertinent labs, notes and imaging studies as appropriate.    Patient verbally consented to phone visit today: yes.    Disclaimer: This note consists of symbols derived from keyboarding, dictation and/or voice recognition software. As a result, there may be errors in the script that have gone undetected. Please consider this when interpreting information found in this chart.        ROS neg expect noted in notes.  Patient feels well at this time and denies any tachycardia, palpitations, heat intolerance, tremor, insomnia, diarrhea, or unexplained weight loss.  Patient also denies  cold intolerance, constipation, or unexplained weight gain.     Name: Yunior Angel  For f/u of osteoporosis. Last visit 2020.  HPI:  Yunior Angel is a 57 year old male. Visit for f/u of osteoporosis.  Spoke with his mother- Ptasy who is his co-guardian.  Yunior is living in assisted living.    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    H/o hip fracture in March 2018.  He presented to emergency department with acute right hip pain.  Questionable history about fall.  He did not recall a fall, but he has short-term memory deficit. Workup showed right femoral neck fracture.  He underwent ORIF.  Status post right hip arthroplasty.    H/o verbebral fracture: In May 2018.  He presented to emergency department after a fall.  Patient tripped and fell from a standing height.  Workup at that time showedCompression fraction of L1 of uncertain age.  This was followed by DEXA scan consistent with osteoporosis as noted below.  Normal thyroid function tests, parathyroid hormone, vitamin D and calcium  level.  Testosterone noted to be low as discussed below      Also history of heterotropic ossification: Incidental on x-ray findings based on discharge summary March 2018. This was anterior to the right pubic inferior rami.  There was some surgical intervention.  Please refer to the orthopedics operative note for details.  This seems to be not uncommon in patients with previous neurologic injuries.  Radiation oncology was consulted and patient underwent radiation treatment ×1 per standard practice.    Started Fosamax 70 mg/ once a week (12/6/2018)  F/u DEXA 8/2020: : Severe osteoporosis on the basis of hip and vertebral fractures. No significant change in bone density of the lumbar spine or spine.  DEXA 2022: Severe osteoporosis on the basis of hip and vertebral fractures. There has been a trend toward  significant incrrease in bone density of the lumbar spine. There has been probably no significant change in bone density of the hip(s). (Comparisons from different scanners)  In 5/2022- he sustained Closed displaced fracture of greater trochanter of right femur status post total hip arthroplasty    Patsy ( his mother) reports that Yunior is doing OK.  Is using cane for walking. No major concerns.  Smoke:No  Family History:Yes: h/o osteoporosis  Fractures:as above  Kidney stones: No  GI Surgery:No  Bisphosphonate exposure:  no  Exercise:goes to [employer].  Prostate History: no  Diet:   Milk: 1   Yogurt/Cottage Cheese: 1   Ice Cream  Ca/Vitamin D: Calcium 600-1200 mg/day . 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     Past Surgical History:   Procedure Laterality Date     ARTHROPLASTY HIP Right 3/14/2018     Procedure: ARTHROPLASTY HIP;  Right total hip arthroplasty;  Surgeon: Parmjit Zabala MD;  Location: [facility] OR     COLONOSCOPY N/A 12/31/2014    Procedure: COLONOSCOPY;  Surgeon: Inna Gonzalez MD;  Location:  GI     ESOPHAGOSCOPY, GASTROSCOPY, DUODENOSCOPY (EGD), COMBINED N/A 12/31/2014    Procedure: COMBINED ESOPHAGOSCOPY, GASTROSCOPY, DUODENOSCOPY (EGD), BIOPSY SINGLE OR MULTIPLE;  Surgeon: Inna Gonzalez MD;  Location:  GI     SURGICAL HISTORY OF -       foot surgery     SURGICAL HISTORY OF -   2006    1.  Avulsion of the right great toenail.  2.  Excision of bone cyst distal phalanx, right great toe.      Family Hx:  Family History   Problem Relation Age of Onset     Migraines Father      Family History Negative Mother      Cancer - colorectal No family hx of      Colon Cancer No family hx of           Social Hx:  Social History     Socioeconomic History     Marital status: Single     Spouse name: Not on file     Number of children: Not on file     Years of education: Not on file     Highest education level: Not on file   Occupational History     Not on file   Tobacco Use     Smoking status: Never     Smokeless tobacco: Never   Vaping Use     Vaping status: Not on file   Substance and Sexual Activity     Alcohol use: No     Alcohol/week: 0.0 standard drinks of alcohol     Drug use: No     Sexual activity: Never   Other Topics Concern     Parent/sibling w/ CABG, MI or angioplasty before 65F 55M? Not Asked   Social History Narrative     Not on file     Social Determinants of Health     Financial Resource Strain: Not on file   Food Insecurity: Not on file   Transportation Needs: Not on file   Physical Activity: Not on file   Stress: Not on file   Social Connections: Not on file   Intimate Partner Violence: Not on file   Housing Stability: Not on file          MEDICATIONS:  has a current medication list which includes the following prescription(s): alendronate, baclofen, calcium-vitamin  d, multivitamin, therapeutic, order for dme, paroxetine, senna-docusate, simvastatin, vitamin d (cholecalciferol), acetaminophen, aspirin, omeprazole, and [DISCONTINUED] clonidine.    ROS     ROS: 10 point ROS neg other than the symptoms noted above in the HPI.    Physical Exam   VS: There were no vitals taken for this visit.      LABS:  Component      Latest Ref Rng & Units 11/28/2018   Testosterone Total      240 - 950 ng/dL 186 (L)   Sex Hormone Binding Globulin      11 - 80 nmol/L 36   Free Testosterone Calculated      4.7 - 24.4 ng/dL 3.33 (L)   Creatinine      0.66 - 1.25 mg/dL 0.85   GFR Estimate      >60 mL/min/1.7m2 >90   GFR Estimate If Black      >60 mL/min/1.7m2 >90   Parathyroid Hormone Intact      18 - 80 pg/mL 80   TSH      0.40 - 4.00 mU/L 0.79   Vitamin D Deficiency screening      20 - 75 ug/L 45   Calcium      8.5 - 10.1 mg/dL 9.5     BMP:  Creatinine   Date Value Ref Range Status   02/06/2023 0.88 0.67 - 1.17 mg/dL Final   09/29/2020 0.91 0.66 - 1.25 mg/dL Final       TFTs:  TSH   Date Value Ref Range Status   05/03/2022 0.42 0.40 - 4.00 mU/L Final   11/28/2018 0.79 0.40 - 4.00 mU/L Final     PTH:   ENDO CALCIUM LABS-UMP Latest Ref Rng & Units 11/28/2018   PARATHYROID HORMONE INTACT 18 - 80 pg/mL 80     Vitamin D:  Vitamin D Deficiency Screening Results:  Lab Results   Component Value Date    VITDT 44 11/23/2021    VITDT 45 11/28/2018    VITDT 43 06/25/2018       BoneTurnOverMarkers:     Testosterone:  ENDO PITUITARY LABS-UMP Latest Ref Rng & Units 12/14/2018 11/28/2018   TESTOSTERONE TOTAL 240 - 950 ng/dL 269 186 (L)     DEXA 2022:  IMPRESSION  Severe osteoporosis on the basis of hip and vertebral fractures.  Degenerative changes at the lumbar spine may falsely elevate results.      Comparisons from different scanners that have not been cross calibrated, are not necessarily valid. Such a comparison has been performed here; one should interpret with caution.   There has been a trend toward   significant incrrease in bone density of the lumbar spine. There has been probably no significant change in bone density of the hip(s). The forearms can not be compared.      All pertinent notes, labs, and images personally reviewed by me.     A/P  #1 Osteoporosis:  Risk factors for low bone density include personal history of fracture, family history, , low Ca intake.    History of compression vertebral fracture of L1  History of hip fracture status post hip arthroplasty  Also history of heterotropic ossification status post radiation treatment  DEXA 2018 consistent with severe osteoporosis  Normal thyroid function tests, parathyroid hormone, vitamin D and calcium level.  Started Fosamax 70 mg/ once a week (12/6/2018)  DEXA 2022 as noted above.  5/2022-Closed displaced fracture of greater trochanter of right femur status post total hip arthroplasty  Plan:  Discussed diagnosis, pathophysiology, management and treatment options of condition with pt.  In the setting of femur fracture (on Fosamax) in 5/2022 and completion of about 4-5 years on bisphosphonate recommend to stop fosamax.  Consider another medication. Follow up in clinic to discuss further.  The pt was advised to    Maintain an adequate calcium and vitamin D intake and to supplement vitamin D if needed to maintain serum levels of 25 hydroxy D (25 OH D) between 30-60 ng/ml.    Limit alcohol intake to no more than 2 servings per day.    Limit caffeine intake.    Maintain an active lifestyle including weight-bearing exercises for at least 30 mins daily.    Take measures to reduce the risk of falling.    #2.  Low testosterone/Male hypogonadism:  Testosterone noted to be low on workup of osteoporosis.  Low testosterone X1  Repeat in range.  Plan  Plan to continue to monitor.    Follow-up:  Next available.    Crystal Matt MD  Endocrinology  [facility]  CC: Alberto Carlos Jr.    All questions were answered.  The patients  mother indicates understanding of the above issues and agrees with the plan set forth.      Addendum to above note and clinic visit:    Labs reviewed.    See result note/telephone encounter.              Again, thank you for allowing me to participate in the care of your patient.        Sincerely,        Crystal Matt MD

## 2023-04-12 ENCOUNTER — TELEPHONE (OUTPATIENT)
Dept: ENDOCRINOLOGY | Facility: CLINIC | Age: 58
End: 2023-04-12
Payer: COMMERCIAL

## 2023-04-13 NOTE — TELEPHONE ENCOUNTER
Last ov note faxed.    Maren Manuel [facility] [place]  [phone]

## 2023-09-06 ENCOUNTER — OFFICE VISIT (OUTPATIENT)
Dept: ENDOCRINOLOGY | Facility: CLINIC | Age: 58
End: 2023-09-06
Payer: COMMERCIAL

## 2023-09-06 VITALS
HEART RATE: 65 BPM | HEIGHT: 71 IN | TEMPERATURE: 97.9 F | DIASTOLIC BLOOD PRESSURE: 73 MMHG | OXYGEN SATURATION: 97 % | BODY MASS INDEX: 23.48 KG/M2 | WEIGHT: 167.7 LBS | SYSTOLIC BLOOD PRESSURE: 123 MMHG | RESPIRATION RATE: 16 BRPM

## 2023-09-06 DIAGNOSIS — Z92.29 HX DRUG THERAPY: ICD-10-CM

## 2023-09-06 DIAGNOSIS — M80.00XD AGE-RELATED OSTEOPOROSIS WITH CURRENT PATHOLOGICAL FRACTURE WITH ROUTINE HEALING, SUBSEQUENT ENCOUNTER: Primary | ICD-10-CM

## 2023-09-06 DIAGNOSIS — Z96.641 STATUS POST TOTAL REPLACEMENT OF RIGHT HIP: ICD-10-CM

## 2023-09-06 LAB
CALCIUM SERPL-MCNC: 9.9 MG/DL (ref 8.6–10)
CREAT SERPL-MCNC: 0.91 MG/DL (ref 0.67–1.17)
EGFRCR SERPLBLD CKD-EPI 2021: >90 ML/MIN/1.73M2
PTH-INTACT SERPL-MCNC: 51 PG/ML (ref 15–65)

## 2023-09-06 PROCEDURE — 82565 ASSAY OF CREATININE: CPT | Performed by: INTERNAL MEDICINE

## 2023-09-06 PROCEDURE — 36415 COLL VENOUS BLD VENIPUNCTURE: CPT | Performed by: INTERNAL MEDICINE

## 2023-09-06 PROCEDURE — 82310 ASSAY OF CALCIUM: CPT | Performed by: INTERNAL MEDICINE

## 2023-09-06 PROCEDURE — 99214 OFFICE O/P EST MOD 30 MIN: CPT | Performed by: INTERNAL MEDICINE

## 2023-09-06 PROCEDURE — 82306 VITAMIN D 25 HYDROXY: CPT | Performed by: INTERNAL MEDICINE

## 2023-09-06 PROCEDURE — 83970 ASSAY OF PARATHORMONE: CPT | Performed by: INTERNAL MEDICINE

## 2023-09-06 RX ORDER — CLONIDINE HYDROCHLORIDE 0.2 MG/1
0.2 TABLET ORAL 2 TIMES DAILY
Qty: 60 TABLET | Refills: 0 | COMMUNITY
End: 2023-09-11

## 2023-09-06 RX ORDER — ALENDRONATE SODIUM 70 MG/1
TABLET ORAL
COMMUNITY
Start: 2023-05-07 | End: 2023-09-06

## 2023-09-06 RX ORDER — ACETAMINOPHEN 325 MG/1
650 TABLET ORAL 4 TIMES DAILY
Qty: 90 TABLET | Refills: 0 | COMMUNITY

## 2023-09-06 RX ORDER — SIMVASTATIN 80 MG
40 TABLET ORAL AT BEDTIME
COMMUNITY

## 2023-09-06 NOTE — PROGRESS NOTES
Name: Yunior Angel  For f/u of osteoporosis.   HPI:  Yunior Angel is a 57 year old male. Visit for f/u of osteoporosis.  He is with his mother- Patsy who is his co-guardian.  Yunior is living in assisted living.    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    H/o hip fracture in March 2018.  He presented to emergency department with acute right hip pain.  Questionable history about fall.  He did not recall a fall, but he has short-term memory deficit. Workup showed right femoral neck fracture.  He underwent ORIF.  Status post right hip arthroplasty.    H/o verbebral fracture: In May 2018.  He presented to emergency department after a fall.  Patient tripped and fell from a standing height.  Workup at that time showedCompression fraction of L1 of uncertain age.  This was followed by DEXA scan consistent with osteoporosis as noted below.  Normal thyroid function tests, parathyroid hormone, vitamin D and calcium level.  Testosterone noted to be low as discussed below      Also history of heterotropic ossification: Incidental on x-ray findings based on discharge summary March 2018. This was anterior to the right pubic inferior rami.  There was some surgical intervention.  Please refer to the orthopedics operative note for details.  This seems to be not uncommon in patients with previous neurologic injuries.  Radiation oncology was consulted and patient underwent radiation treatment ×1 per standard practice.    Started Fosamax 70 mg/ once a week (12/6/2018)  F/u DEXA 8/2020: : Severe osteoporosis on the basis of hip and vertebral fractures. No significant change in bone density of the lumbar spine or spine.  DEXA 10/2022: Severe osteoporosis on the basis of hip and vertebral fractures. There has been a  trend toward  significant incrrease in bone density of the lumbar spine. There has been probably no significant change in bone density of the hip(s). (Comparisons from different scanners)  In 5/2022- he sustained Closed displaced fracture of greater trochanter of right femur status post total hip arthroplasty (while on Fosamax.  It is not reported as atypical fracture).  His mother's reports that he was not consistent taking medication as recommended.  She is living in assisted living.  Patsy ( his mother) reports that Yunior is doing OK.  Is using cane for walking. No major concerns.  Smoke:No  Family History:Yes: h/o osteoporosis  Fractures:as above  Kidney stones: No  GI Surgery:No  Bisphosphonate exposure:  no  Exercise:goes to [employer].  Prostate History: no  Diet:   Milk: 1   Yogurt/Cottage Cheese: 1   Ice Cream  Ca/Vitamin D: Calcium 600-1200 mg/day . 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  Avulsion of the right great toenail.  2.  Excision of bone cyst distal phalanx, right  "great toe.      Family Hx:  Family History   Problem Relation Age of Onset    Migraines Father     Family History Negative Mother     Cancer - colorectal No family hx of     Colon Cancer No family hx of           Social Hx:  Social History     Socioeconomic History    Marital status: Single     Spouse name: Not on file    Number of children: Not on file    Years of education: Not on file    Highest education level: Not on file   Occupational History    Not on file   Tobacco Use    Smoking status: Never    Smokeless tobacco: Never   Substance and Sexual Activity    Alcohol use: No     Alcohol/week: 0.0 standard drinks of alcohol    Drug use: No    Sexual activity: Never   Other Topics Concern    Parent/sibling w/ CABG, MI or angioplasty before 65F 55M? Not Asked   Social History Narrative    Not on file     Social Determinants of Health     Financial Resource Strain: Not on file   Food Insecurity: Not on file   Transportation Needs: Not on file   Physical Activity: Not on file   Stress: Not on file   Social Connections: Not on file   Intimate Partner Violence: Not on file   Housing Stability: Not on file          MEDICATIONS:  has a current medication list which includes the following prescription(s): acetaminophen, alendronate, baclofen, calcium-vitamin d, clonidine, multivitamin, therapeutic, omeprazole, order for dme, paroxetine, senna-docusate, simvastatin, and vitamin d (cholecalciferol).    ROS     ROS: 10 point ROS neg other than the symptoms noted above in the HPI.    Physical Exam   VS: /73 (BP Location: Left arm, Patient Position: Chair, Cuff Size: Adult Regular)   Pulse 65   Temp 97.9  F (36.6  C) (Tympanic)   Resp 16   Ht 1.803 m (5' 10.98\")   Wt 76.1 kg (167 lb 11.2 oz)   SpO2 97%   BMI 23.40 kg/m        LABS:  Component      Latest Ref Rng & Units 11/28/2018   Testosterone Total      240 - 950 ng/dL 186 (L)   Sex Hormone Binding Globulin      11 - 80 nmol/L 36   Free Testosterone " Calculated      4.7 - 24.4 ng/dL 3.33 (L)   Creatinine      0.66 - 1.25 mg/dL 0.85   GFR Estimate      >60 mL/min/1.7m2 >90   GFR Estimate If Black      >60 mL/min/1.7m2 >90   Parathyroid Hormone Intact      18 - 80 pg/mL 80   TSH      0.40 - 4.00 mU/L 0.79   Vitamin D Deficiency screening      20 - 75 ug/L 45   Calcium      8.5 - 10.1 mg/dL 9.5     BMP:  Creatinine   Date Value Ref Range Status   02/06/2023 0.88 0.67 - 1.17 mg/dL Final   09/29/2020 0.91 0.66 - 1.25 mg/dL Final       TFTs:  TSH   Date Value Ref Range Status   05/03/2022 0.42 0.40 - 4.00 mU/L Final   11/28/2018 0.79 0.40 - 4.00 mU/L Final     PTH:   ENDO CALCIUM LABS-UMP Latest Ref Rng & Units 11/28/2018   PARATHYROID HORMONE INTACT 18 - 80 pg/mL 80     Vitamin D:  Vitamin D Deficiency Screening Results:  Lab Results   Component Value Date    VITDT 44 11/23/2021    VITDT 45 11/28/2018    VITDT 43 06/25/2018       BoneTurnOverMarkers:     Testosterone:  ENDO PITUITARY LABS-UMP Latest Ref Rng & Units 12/14/2018 11/28/2018   TESTOSTERONE TOTAL 240 - 950 ng/dL 269 186 (L)     DEXA 2022:  IMPRESSION  Severe osteoporosis on the basis of hip and vertebral fractures.  Degenerative changes at the lumbar spine may falsely elevate results.   There has been a trend toward  significant incrrease in bone density of the lumbar spine. There has been probably no significant change in bone density of the hip(s). The forearms can not be compared.      All pertinent notes, labs, and images personally reviewed by me.     A/P  #1 Osteoporosis:  Risk factors for low bone density include personal history of fracture, family history, , low Ca intake.    History of compression vertebral fracture of L1  History of hip fracture status post hip arthroplasty  Also history of heterotropic ossification status post radiation treatment  DEXA 2018 consistent with severe osteoporosis  Normal thyroid function tests, parathyroid hormone, vitamin D and calcium level.  Started  Fosamax 70 mg/ once a week (12/6/2018)  DEXA 2022 as noted above.  5/2022-Closed displaced fracture of greater trochanter of right femur status post total hip arthroplasty  Plan:  Discussed diagnosis, pathophysiology, management and treatment options of condition with pt.  In the setting of femur fracture (on Fosamax) in 5/2022 and completion of about 4-5 years on bisphosphonate recommend to stop fosamax.  Labs today  Check if FORETO (teriparatide) is covered by insurance.  As it is a daily injection and she is wondering if he will be able to do it every day. Of note- h/o radiation as noted above.  Other option is to start PROLIA.  DEXA in 1-2 years. (10/2024 onwards at [place])  Follow up after that.    I have already discussed possible side effects related to Prolia as well as teriparatide.  Patients mother will check cost with insurance and will inform us so that medication can be ordered.  Plan: Vitamin D Deficiency, Calcium, Creatinine,         Parathyroid Hormone Intact          The pt was advised to  Maintain an adequate calcium and vitamin D intake and to supplement vitamin D if needed to maintain serum levels of 25 hydroxy D (25 OH D) between 30-60 ng/ml.  Limit alcohol intake to no more than 2 servings per day.  Limit caffeine intake.  Maintain an active lifestyle including weight-bearing exercises for at least 30 mins daily.  Take measures to reduce the risk of falling.    #2.  Low testosterone/Male hypogonadism:  Testosterone noted to be low on workup of osteoporosis.  Low testosterone X1  Repeat in range.  Plan  Plan to continue to monitor.    Follow-up:  As noted in AVS    Crystal Matt MD  Endocrinology  [facility]  CC: Alberto Carlos Jr.    All questions were answered.  The patients mother indicates understanding of the above issues and agrees with the plan set forth.      Addendum to above note and clinic visit:    Labs reviewed.    See result note/telephone  encounter.

## 2023-09-06 NOTE — LETTER
9/6/2023         RE: Yunior Angel  [address] [address]  [facility] [address]        Dear Colleague,    Thank you for referring your patient, Yunior Angel, to the [facility]. Please see a copy of my visit note below.    Name: Yunior Angel  For f/u of osteoporosis.   HPI:  Yunior Angel is a 57 year old male. Visit for f/u of osteoporosis.  Spoke with his mother- Patsy who is his co-guardian.  Yunior is living in assisted living.    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    H/o hip fracture in March 2018.  He presented to emergency department with acute right hip pain.  Questionable history about fall.  He did not recall a fall, but he has short-term memory deficit. Workup showed right femoral neck fracture.  He underwent ORIF.  Status post right hip arthroplasty.    H/o verbebral fracture: In May 2018.  He presented to emergency department after a fall.  Patient tripped and fell from a standing height.  Workup at that time showedCompression fraction of L1 of uncertain age.  This was followed by DEXA scan consistent with osteoporosis as noted below.  Normal thyroid function tests, parathyroid hormone, vitamin D and calcium level.  Testosterone noted to be low as discussed below      Also history of heterotropic ossification: Incidental on x-ray findings based on discharge summary March 2018. This was anterior to the right pubic inferior rami.  There was some surgical intervention.  Please refer to the orthopedics operative note for details.  This seems to be not uncommon in patients with previous neurologic injuries.  Radiation oncology was consulted and patient underwent radiation treatment ×1 per standard practice.    Started Fosamax 70 mg/ once a  week (12/6/2018)  F/u DEXA 8/2020: : Severe osteoporosis on the basis of hip and vertebral fractures. No significant change in bone density of the lumbar spine or spine.  DEXA 10/2022: Severe osteoporosis on the basis of hip and vertebral fractures. There has been a trend toward  significant incrrease in bone density of the lumbar spine. There has been probably no significant change in bone density of the hip(s). (Comparisons from different scanners)  In 5/2022- he sustained Closed displaced fracture of greater trochanter of right femur status post total hip arthroplasty (while on Fosamax.  It is not reported as atypical fracture).  His mother's reports that he was not consistent taking medication as recommended.  She is living in assisted living.  Patsy ( his mother) reports that Yunior is doing OK.  Is using cane for walking. No major concerns.  Smoke:No  Family History:Yes: h/o osteoporosis  Fractures:as above  Kidney stones: No  GI Surgery:No  Bisphosphonate exposure:  no  Exercise:goes to [employer].  Prostate History: no  Diet:   Milk: 1   Yogurt/Cottage Cheese: 1   Ice Cream  Ca/Vitamin D: Calcium 600-1200 mg/day . 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     Past Surgical History:   Procedure Laterality Date     ARTHROPLASTY HIP Right 3/14/2018    Procedure: ARTHROPLASTY HIP;  Right total hip arthroplasty;  Surgeon: Parmjit Zabala MD;  Location:  OR     COLONOSCOPY N/A 12/31/2014    Procedure: COLONOSCOPY;  Surgeon: Inna Gonzalez MD;  Location:  GI     ESOPHAGOSCOPY, GASTROSCOPY, DUODENOSCOPY (EGD), COMBINED N/A 12/31/2014    Procedure: COMBINED ESOPHAGOSCOPY, GASTROSCOPY,  DUODENOSCOPY (EGD), BIOPSY SINGLE OR MULTIPLE;  Surgeon: Inna Gonzalez MD;  Location:  GI     SURGICAL HISTORY OF -       foot surgery     SURGICAL HISTORY OF -   2006    1.  Avulsion of the right great toenail.  2.  Excision of bone cyst distal phalanx, right great toe.      Family Hx:  Family History   Problem Relation Age of Onset     Migraines Father      Family History Negative Mother      Cancer - colorectal No family hx of      Colon Cancer No family hx of           Social Hx:  Social History     Socioeconomic History     Marital status: Single     Spouse name: Not on file     Number of children: Not on file     Years of education: Not on file     Highest education level: Not on file   Occupational History     Not on file   Tobacco Use     Smoking status: Never     Smokeless tobacco: Never   Substance and Sexual Activity     Alcohol use: No     Alcohol/week: 0.0 standard drinks of alcohol     Drug use: No     Sexual activity: Never   Other Topics Concern     Parent/sibling w/ CABG, MI or angioplasty before 65F 55M? Not Asked   Social History Narrative     Not on file     Social Determinants of Health     Financial Resource Strain: Not on file   Food Insecurity: Not on file   Transportation Needs: Not on file   Physical Activity: Not on file   Stress: Not on file   Social Connections: Not on file   Intimate Partner Violence: Not on file   Housing Stability: Not on file          MEDICATIONS:  has a current medication list which includes the following prescription(s): acetaminophen, alendronate, baclofen, calcium-vitamin d, clonidine, multivitamin, therapeutic, omeprazole, order for dme, paroxetine, senna-docusate, simvastatin, and vitamin d (cholecalciferol).    ROS     ROS: 10 point ROS neg other than the symptoms noted above in the HPI.    Physical Exam   VS: /73 (BP Location: Left arm, Patient Position: Chair, Cuff Size: Adult Regular)   Pulse 65   Temp 97.9  F (36.6  C) (Tympanic)    "Resp 16   Ht 1.803 m (5' 10.98\")   Wt 76.1 kg (167 lb 11.2 oz)   SpO2 97%   BMI 23.40 kg/m        LABS:  Component      Latest Ref Rng & Units 11/28/2018   Testosterone Total      240 - 950 ng/dL 186 (L)   Sex Hormone Binding Globulin      11 - 80 nmol/L 36   Free Testosterone Calculated      4.7 - 24.4 ng/dL 3.33 (L)   Creatinine      0.66 - 1.25 mg/dL 0.85   GFR Estimate      >60 mL/min/1.7m2 >90   GFR Estimate If Black      >60 mL/min/1.7m2 >90   Parathyroid Hormone Intact      18 - 80 pg/mL 80   TSH      0.40 - 4.00 mU/L 0.79   Vitamin D Deficiency screening      20 - 75 ug/L 45   Calcium      8.5 - 10.1 mg/dL 9.5     BMP:  Creatinine   Date Value Ref Range Status   02/06/2023 0.88 0.67 - 1.17 mg/dL Final   09/29/2020 0.91 0.66 - 1.25 mg/dL Final       TFTs:  TSH   Date Value Ref Range Status   05/03/2022 0.42 0.40 - 4.00 mU/L Final   11/28/2018 0.79 0.40 - 4.00 mU/L Final     PTH:   ENDO CALCIUM LABS-UMP Latest Ref Rng & Units 11/28/2018   PARATHYROID HORMONE INTACT 18 - 80 pg/mL 80     Vitamin D:  Vitamin D Deficiency Screening Results:  Lab Results   Component Value Date    VITDT 44 11/23/2021    VITDT 45 11/28/2018    VITDT 43 06/25/2018       BoneTurnOverMarkers:     Testosterone:  ENDO PITUITARY LABS-UMP Latest Ref Rng & Units 12/14/2018 11/28/2018   TESTOSTERONE TOTAL 240 - 950 ng/dL 269 186 (L)     DEXA 2022:  IMPRESSION  Severe osteoporosis on the basis of hip and vertebral fractures.  Degenerative changes at the lumbar spine may falsely elevate results.   There has been a trend toward  significant incrrease in bone density of the lumbar spine. There has been probably no significant change in bone density of the hip(s). The forearms can not be compared.      All pertinent notes, labs, and images personally reviewed by me.     A/P  #1 Osteoporosis:  Risk factors for low bone density include personal history of fracture, family history, , low Ca intake.    History of compression vertebral " fracture of L1  History of hip fracture status post hip arthroplasty  Also history of heterotropic ossification status post radiation treatment  DEXA 2018 consistent with severe osteoporosis  Normal thyroid function tests, parathyroid hormone, vitamin D and calcium level.  Started Fosamax 70 mg/ once a week (12/6/2018)  DEXA 2022 as noted above.  5/2022-Closed displaced fracture of greater trochanter of right femur status post total hip arthroplasty  Plan:  Discussed diagnosis, pathophysiology, management and treatment options of condition with pt.  In the setting of femur fracture (on Fosamax) in 5/2022 and completion of about 4-5 years on bisphosphonate recommend to stop fosamax.  Labs today  Check if FORETO (teriparatide) is covered by insurance.  As it is a daily injection and she is wondering if he will be able to do it every day. Of note- h/o radiation as noted above.  Other option is to start PROLIA.  DEXA in 1-2 years. (10/2024 onwards at [place])  Follow up after that.    I have already discussed possible side effects related to Prolia as well as teriparatide.  Patients mother will check cost with insurance and will inform us so that medication can be ordered.  Plan: Vitamin D Deficiency, Calcium, Creatinine,         Parathyroid Hormone Intact          The pt was advised to  Maintain an adequate calcium and vitamin D intake and to supplement vitamin D if needed to maintain serum levels of 25 hydroxy D (25 OH D) between 30-60 ng/ml.  Limit alcohol intake to no more than 2 servings per day.  Limit caffeine intake.  Maintain an active lifestyle including weight-bearing exercises for at least 30 mins daily.  Take measures to reduce the risk of falling.    #2.  Low testosterone/Male hypogonadism:  Testosterone noted to be low on workup of osteoporosis.  Low testosterone X1  Repeat in range.  Plan  Plan to continue to monitor.    Follow-up:  As noted in AVS    Crystal Matt MD  Endocrinology  [place]  [facility]  CC: Alberto Carlos Jr.    All questions were answered.  The patients mother indicates understanding of the above issues and agrees with the plan set forth.      Addendum to above note and clinic visit:    Labs reviewed.    See result note/telephone encounter.          Again, thank you for allowing me to participate in the care of your patient.        Sincerely,        Crystal Matt MD

## 2023-09-06 NOTE — LETTER
September 11, 2023      Yunior Angel  [address]   [place] [address]        Dear ,    We are writing to inform you of your test results.    Labs/ Imaging studies done with endocrinology are attached.   Labs within normal limits.   Please check if teriparatide or Prolia is covered by insurance.   Prolia will be better for ease of administration.   Please inform us once you know more information     Resulted Orders   Vitamin D Deficiency   Result Value Ref Range    Vitamin D, Total (25-Hydroxy) 45 20 - 75 ug/L    Narrative    Season, race, dietary intake, and treatment affect the concentration of 25-hydroxy-Vitamin D. Values may decrease during winter months and increase during summer months. Values 20-29 ug/L may indicate Vitamin D insufficiency and values <20 ug/L may indicate Vitamin D deficiency.    Vitamin D determination is routinely performed by an immunoassay specific for 25 hydroxyvitamin D3.  If an individual is on vitamin D2(ergocalciferol) supplementation, please specify 25 OH vitamin D2 and D3 level determination by LCMSMS test VITD23.     Calcium   Result Value Ref Range    Calcium 9.9 8.6 - 10.0 mg/dL   Creatinine   Result Value Ref Range    Creatinine 0.91 0.67 - 1.17 mg/dL    GFR Estimate >90 >60 mL/min/1.73m2   Parathyroid Hormone Intact   Result Value Ref Range    Parathyroid Hormone Intact 51 15 - 65 pg/mL    Narrative    This result was obtained with the Roche Elecsys PTH STAT assay.   This reference range differs from PTH assays used in other [facility] laboratories.       If you have any questions or concerns, please call the clinic at the number listed above.       Sincerely,      Crystal Matt MD

## 2023-09-06 NOTE — PATIENT INSTRUCTIONS
[facility]  Dr Matt, Endocrinology Department    [facility]   [address]  [place] [address]  Appointment [phone]  Fax: [phone]  [place]: Monday - Thursday      Please check the cost coverage and copay with insurance before recommended tests, services and medications (especially if new medications are prescribed).     If ordered, please get blood work done 1 week prior to your next appointment so they will be available to Dr. Matt at your visit.    Labs today  Check if FORETO (teriparatide) is covered by insurance.  If covered plan for  (teriparatide)   If not-- plan to start PROLIA (please inform us so that it can be ordered)  DEXA in 1-2 years. (10/2024 onwards at [place])  Follow up after that.    PROLIA Scheduling information:  It will be done in clinic.   You need to make nurse only appointment. (call [place]: [phone] or [place]:[phone] and schedule Nurse only appointment)  PROLIA is every 6 months injection- so please schedule accordingly.  It is recommended NOT to miss or delay PROLIA injections.    Possible major side effects of Denosumab (PROLIA) include risk for atypical femur fractures, hypersensitivity, low calcium levels, osteonecrosis of jaw (which can manifest as jaw pain, osteomyelitis, osteitis, bone erosion, tooth/periodontal infection, toothache, gingival ulceration/erosion). Other s/e include but not limited to Hypertension, Headache and peripheral edema.   Always let your dentist lnow about the medication if plan for major dental procedure.    Indication: Prolia  (denosumab) is a prescription medicine used to treat osteoporosis in patients who:   Are at high risk for fracture, meaning patients who have had a fracture related to osteoporosis, or who have multiple risk factors for fracture   Cannot use another osteoporosis medicine or other osteoporosis medicines did not work well   The timeline for  early/late injections would be 4 weeks early and any time after the 6 month arcelia. If a patient receives their injection late, then the subsequent injection would be 6 months from the date that they actually received the injection    Teriparatide-- Possible side effect include hypercalcemia--high calcium levels  Orthostatic hypotension--low blood pressure  Dizziness, insomnia, anxiety, vertigo, nausea, gastritis, herpes zoster, muscle pain, joint pains, rhinitis are other possible side effects.    The pt was advised to  Maintain an adequate calcium and vitamin D intake and to supplement vitamin D if needed to maintain serum levels of 25 hydroxy D (25 OH D) between 30-60 ng/ml.  Limit alcohol intake to no more than 2 servings per day.  Limit caffeine intake.  Maintain an active lifestyle including weight-bearing exercises for at least 30 mins daily.  Take measures to reduce the risk of falling.     You should get 1000- 1200 mg/day calcium in divided doses of no more than 500 mg/dose.  This INCLUDES what is in your food as well as what is in any supplements you may be taking.    Vit D about 800-1000 IU/day ( unless you have vit D deficiency- in that case higher dose)  Dietary sources of calcium:: These also contain vitamin D  Milk                            8 oz            300 mg calcium  Yogurt                          1 cup           400 mg calcium   Hard cheese                     1.5 oz          300 mg  Cottage cheese                  2 cup           300 mg  Orange juice with Calcium       8 oz            300 mg  Low fat dairy sources are recommended        You should get 30 minutes of moderate weight bearing exercise on most days of the week .  Weight bearing exercise includes such things as walking, jogging, hiking, dancing.  You should also get Strength training 2 or more times/week in addition to other weight -being exercise. Strength training uses weight or resistance beyond that seen in everyday activities  -(pilates, weight training with free weights, weight machines or resistance bands)     Living with Osteoporosis: Preventing Fractures  If you have osteoporosis, you can do a lot to reduce its effect on your life. Knowing how to prevent fractures and spinal curvature can help you live more comfortably and safely with this disease.  Reducing your risk for fractures  The most common fracture sites in people with osteoporosis are the wrist, spine, and hip. These fractures are often caused by accidents and falls. All fractures are painful and may limit what you can do. But hip fractures are very serious. They often need surgery, and it can take months to recover. To reduce your risk for fractures:  Get regular exercise. Try walking, swimming, or weight training.  Eat foods that are rich in calcium, or take calcium supplements.  Make your home safe to help avoid accidents.  Take extra precautions not to fall in risky areas, such as icy sidewalks.  Understanding spinal fractures  Your spine is made up of many bones called vertebrae. Osteoporosis can cause the vertebrae in your spine to collapse. As a result, your upper back may arch forward, creating a curvature. Spine fractures may also result from back strain and bad posture. You will also lose height. Your lower spine must then adjust to keep your body balanced. This can cause back pain. To prevent or lessen these spinal changes:  Practice good posture.  Use proper techniques if you need to lift heavy objects.  Do back exercises to help your posture.  Lie on your back when you have pain.  Ask your healthcare provider about these and other ways to help your spine.  [employer] last reviewed this educational content on 5/1/2018 2000-2021 [employer]. All rights reserved. This information is not intended as a substitute for professional medical care. Always follow your healthcare professional's instructions.          Living with Osteoporosis: Regular  Exercise  If you have osteoporosis, exercise is vital for your health. It can prevent bone fractures and spine changes. It will slow bone loss. Exercise will strengthen your body. It can also be fun. A variety of exercises is best. See below for exercises that can help you. But before you start, talk with your healthcare provider to be sure these exercises are right for you.   Resistance exercises. These build muscle strength and maintain bone mass. They also make you less prone to injury. Exercises include lifting small weights, doing push-ups and sit-ups, using elastic exercise bands, and using weight machines.   Weight-bearing activities. These help your whole body. They also help you maintain bone mass. Activities include walking, dancing, and housework.   Non-weight-bearing exercises. These help prevent back strain and pain. They do this by building the trunk and leg muscles. Exercises that help with flexibility can prevent falls. Examples include swimming, water exercise, and stretching.   Staying safe  Here are tips to stay safe:   Always check with your healthcare provider before starting any new exercise program.  Use weights only as instructed.  Stop any exercise that causes pain.  [employer] last reviewed this educational content on 5/1/2018 2000-2021 [employer]. All rights reserved. This information is not intended as a substitute for professional medical care. Always follow your healthcare professional's instructions.          Preventing Osteoporosis: Avoiding Bone Loss  Certain factors can speed up bone loss or decrease bone growth. For example, alcohol, cigarettes, and certain medicines reduce bone mass. Some foods make it hard for your body to absorb calcium.  Things to avoid  Here are things to avoid to help prevent osteoporosis:  Alcohol. This is toxic to bones. It is a major cause of bone loss. Heavy drinking can cause osteoporosis even if you have no other risk factors.  Smoking.  This reduces bone mass. Smoking may also interfere with estrogen levels and cause early menopause.  Inactivity. Not being active makes your bones lose strength and become thinner. Over time, thin bones may break. Women who aren't active are at a high risk for osteoporosis.  Certain medicines. Some medicines, such as cortisone, increase bone loss. They also decrease bone growth. Ask your healthcare provider about any side effects of your medicines, and how to prevent them.  Protein-rich or salty foods. Eaten in large amounts, these foods may deplete calcium.  Caffeine. This increases calcium loss. People who drink a lot of coffee, tea, or soda lose more calcium than those who don't.  [employer] last reviewed this educational content on 5/1/2018 2000-2021 [employer]. All rights reserved. This information is not intended as a substitute for professional medical care. Always follow your healthcare professional's instructions.          Preventing Osteoporosis: Meeting Your Calcium Needs  Your body needs calcium to build and repair bones. But it can't make calcium on its own. That's why it's important to eat calcium-rich foods. Some foods are naturally rich in calcium. Others have calcium added (fortified). It's best to get calcium from the foods you eat. But if you can't get enough, you may want to take calcium supplements. To meet your daily calcium needs, try the foods listed below.                          Dairy Fish & beans Other sources   Source   Calcium (mg) per serving   Source   Calcium (mg) per serving   Source   Calcium (mg) per serving   Low-fat yogurt, plain   415 mg/8 oz.   Sardines, Atlantic, canned, with bones   351 mg/3 oz.   Oatmeal, instant, fortified   215 mg/1 cup   Nonfat milk   302 mg/1 cup   Earlimart, sockeye, canned, with bones   239 mg/3 oz.   Tofu made with calcium sulfate   204 mg/3 oz.   Low-fat milk   297 mg/1 cup   Soybeans, fresh, boiled   131 mg/1/2 cup   Collards   179  mg/1/2 cup   Swiss cheese   272 mg/1 oz.   White beans, cooked   81 mg/1/2 cup   English muffin, whole wheat   175 mg/1 muffin   Cheddar cheese   205 mg/1 oz.   Navy beans, cooked   79 mg/1/2 cup   Kale   90 mg/1/2 cup   Ice cream strawberry   79 mg/1/2 cup           Orange, navel   56 mg/1 medium   Note: Calcium levels may vary depending on brand and size.  Daily calcium needs  14 to 18 years old: 1,300 mg  19 to 30 years old: 1,000 mg  31 to 50 years old: 1,000 mg  51 to 70 years old, women: 1,200 mg  51 to 70 years old, men: 1,000 mg  Pregnant or nursin to 18 years old: 1,300 mg, 19 to 50 years old: 1,000 mg  Older than 70 (women and men): 1,200 mg   [employer] last reviewed this educational content on 2018-2021 [employer]. All rights reserved. This information is not intended as a substitute for professional medical care. Always follow your healthcare professional's instructions.

## 2023-09-07 LAB — DEPRECATED CALCIDIOL+CALCIFEROL SERPL-MC: 45 UG/L (ref 20–75)

## 2023-09-10 NOTE — RESULT ENCOUNTER NOTE
Yunior    Labs/ Imaging studies done with endocrinology are attached.  Labs within normal limits.  Please check if teriparatide or Prolia is covered by insurance.  Prolia will be better for ease of administration.  Please inform us once you know more information  Please call endocrinology clinic if questions.    Please send a letter with above lab/test results and above comments.    Thank you.    Crystal Matt MD

## 2023-09-11 ENCOUNTER — TELEPHONE (OUTPATIENT)
Dept: ENDOCRINOLOGY | Facility: CLINIC | Age: 58
End: 2023-09-11
Payer: COMMERCIAL

## 2023-09-11 DIAGNOSIS — M80.00XD AGE-RELATED OSTEOPOROSIS WITH CURRENT PATHOLOGICAL FRACTURE WITH ROUTINE HEALING, SUBSEQUENT ENCOUNTER: Primary | ICD-10-CM

## 2023-09-11 DIAGNOSIS — M81.8 IDIOPATHIC OSTEOPOROSIS: ICD-10-CM

## 2023-09-11 DIAGNOSIS — Z92.29 HX DRUG THERAPY: ICD-10-CM

## 2023-09-11 RX ORDER — LORAZEPAM 0.5 MG/1
0.5 TABLET ORAL DAILY
COMMUNITY
Start: 2023-09-10

## 2023-09-11 RX ORDER — BUSPIRONE HYDROCHLORIDE 10 MG/1
10 TABLET ORAL 2 TIMES DAILY
COMMUNITY
Start: 2023-08-17

## 2023-09-11 RX ORDER — MENTHOL/CAMPHOR 0.5 %-0.5%
LOTION (ML) TOPICAL EVERY 6 HOURS PRN
COMMUNITY

## 2023-09-11 NOTE — TELEPHONE ENCOUNTER
[facility] Call Center    Phone Message    May a detailed message be left on voicemail: yes     Reason for Call: Other: .     Per Patients MotherPatsy states they had received a call about Prolia injections every 6 months. Patsy states they are wanting to go through with the injections and wanting to get a call back to further discuss. Please advise.     Action Taken: Message routed to:  [facility] ([facility] Endo    Travel Screening: Not Applicable

## 2023-09-11 NOTE — TELEPHONE ENCOUNTER
Also, medication list updated from group home fax.    Maren Manuel [facility] Endocrinology [place]  [phone]     fair minus

## 2023-09-11 NOTE — TELEPHONE ENCOUNTER
Spoke to pt's mother ghassan- she did not have any questions she was wanting to move forward with scheduling Prolia. Will inform  of their choice. Order will need to be placed and then sent to schedule in 2 weeks.     Informed ghassan that it would have to be 2-3 weeks out as the medication does have to be approved by insurance first. She verbalized understanding.

## 2023-09-12 NOTE — TELEPHONE ENCOUNTER
Spoke with Patsy, provided her the [facility] # [phone] to call and schedule appointment.

## 2023-09-14 NOTE — TELEPHONE ENCOUNTER
Per Patients Mother, Patsy is calling to schedule patients Prolia Injection. Per Protocol, writer is unable to schedule the appt. Patsy is wanting to get a call back to get the appt scheduled. Patsy states it is needing to be 2 weeks out. Please advise.

## 2023-09-14 NOTE — TELEPHONE ENCOUNTER
Left message for patient to call back Please schedule on the REGISTERED NURSE schedule for his prolia two weeks out.

## 2023-09-29 ENCOUNTER — ALLIED HEALTH/NURSE VISIT (OUTPATIENT)
Dept: NURSING | Facility: CLINIC | Age: 58
End: 2023-09-29
Payer: COMMERCIAL

## 2023-09-29 DIAGNOSIS — M81.0 OSTEOPOROSIS: Primary | ICD-10-CM

## 2023-09-29 PROCEDURE — 96372 THER/PROPH/DIAG INJ SC/IM: CPT | Performed by: INTERNAL MEDICINE

## 2023-09-29 PROCEDURE — 99207 PR NO CHARGE NURSE ONLY: CPT

## 2023-09-29 NOTE — PROGRESS NOTES
Clinic Administered Medication Documentation      Prolia Documentation    Indication: Prolia  (denosumab) is a prescription medicine used to treat osteoporosis in patients who:   Are at high risk for fracture, meaning patients who have had a fracture related to osteoporosis, or who have multiple risk factors for fracture.  Cannot use another osteoporosis medicine or other osteoporosis medicines did not work well.  The timeline for early/late injections would be 4 weeks early and any time after the 6 month arcelia. If a patient receives their injection late, then the subsequent injection would be 6 months from the date that they actually received the injection.    When was the last injection?  na  Was the last injection at least 6 months ago? Yes  Has the prior authorization been completed?  Yes  Is there an active order (written within the past 365 days, with administrations remaining, not ) in the chart?  Yes  Patient denies any dental work involving the bone (e.g. tooth extraction or dental implants) in the past 4 weeks?  Yes  Patient denies plans for any dental work involving the bone (e.g. tooth extraction or dental implants) in the next 4 weeks? Yes    The following steps were completed to comply with the REMS program for Prolia:  Reviewed information in the Medication Guide and Patient Counseling Chart, including the serious risks of Prolia  and the symptoms of each risk.  Advised patient to seek prompt medical attention if they have signs or symptoms of any of the serious risks.  Provided each patient a copy of the Medication Guide and Patient Brochure.    Prior to injection, verified patient identity using patient's name and date of birth. Medication was administered. Please see MAR and medication order for additional information. Patient instructed to remain in clinic for 15 minutes and report any adverse reaction to staff immediately.    Vial/Syringe: Syringe  Was this medication supplied by the  patient? No  Verified that the patient has refills remaining in their prescription.

## 2024-03-14 NOTE — PROGRESS NOTES
"  Assessment & Plan   (S91.209A) Avulsion of toenail, initial encounter  (primary encounter diagnosis)  (M89.8X9) Bony prominence  Comment: Offered podiatry consult.  Plan: Orthopedic  Referral          (M62.808) Muscle spasticity  Comment: Refilled medication.   Plan: baclofen (LIORESAL) 10 MG tablet           BMI:   Estimated body mass index is 26.22 kg/m  as calculated from the following:    Height as of this encounter: 1.803 m (5' 11\").    Weight as of this encounter: 85.3 kg (188 lb).       Patient Instructions   Someone should be contacting you to try to get you in soon to see a foot specialist (podiatrist).   It looks like you have an injury to the base of the big toenail, and a prominent bony deformity on the outer side of the right foot.     See your regular doctor for your annual physical exam in October 2021.       Return in about 3 months (around 10/2/2021) for Physical Exam.    Efraín Ravi MD,   [facility] CESAR Mojica is a 55 year old who presents for the following health issues  accompanied by his mother, Patsy: pain in right big toe, lump on lateral aspect of right foot. Denies injury and states it has been present for 6 weeks.    HPI   The patient notes a lump on the lateral aspect of his right fifth toe at the midfoot, without recalling any specific injury.  No pain with this.    He also has some tenderness at the base of the toenail, again not recalling a split specific injury.    He would like a refill of baclofen which she has taken for longstanding muscle spasticity.    Past medical, family and social histories as well as medications reviewed and updated as needed.    Review of Systems   REVIEW OF SYSTEMS: The following systems have been completely reviewed and are negative except as noted above:   Constitutional, musculoskeletal, dermatologic systems.        Objective    /72   Pulse 95   Temp 97.6  F (36.4  C) (Oral)   Resp 16   Ht 1.803 " "m (5' 11\")   Wt 85.3 kg (188 lb)   SpO2 97%   BMI 26.22 kg/m    Body mass index is 26.22 kg/m .  Physical Exam   GENERAL: healthy, alert and no distress  RESP: lungs clear to auscultation - no rales, rhonchi or wheezes  CV: regular rate and rhythm, normal S1 S2, no S3 or S4, no murmur, click or rub, no peripheral edema and peripheral pulses strong    MS: bony deformity of lateral 5th midfoot bilaterally, right more than left. Some callous formation on the overlying skin on the right.   Avulsion noted at the base of the great toenail.           " Right hand surgery

## 2024-03-18 ENCOUNTER — TELEPHONE (OUTPATIENT)
Dept: ENDOCRINOLOGY | Facility: CLINIC | Age: 59
End: 2024-03-18
Payer: COMMERCIAL

## 2024-03-29 ENCOUNTER — ALLIED HEALTH/NURSE VISIT (OUTPATIENT)
Dept: NURSING | Facility: CLINIC | Age: 59
End: 2024-03-29
Payer: COMMERCIAL

## 2024-03-29 DIAGNOSIS — Z53.9 DIAGNOSIS FOR ++++ WALK IN CLINIC ++++: Primary | ICD-10-CM

## 2024-03-29 NOTE — PROGRESS NOTES
Patient here for Prolia injection.  Per mother (guardian), patient had the first appointment on 3/27/24 for a crown.  Unsure if bone involved.    Attempted to contact Endocrin nurse but no one answered.  Dr. Matt not in clinic on Fridays.    Huddled with Dr. Conde.  Recommended waiting 4 wks for the Prolia injection.    Mother and patient informed and will re-schedule Prolia injection appointment to be done in 4 wks.

## 2024-04-30 ENCOUNTER — ALLIED HEALTH/NURSE VISIT (OUTPATIENT)
Dept: NURSING | Facility: CLINIC | Age: 59
End: 2024-04-30
Payer: COMMERCIAL

## 2024-04-30 DIAGNOSIS — M81.0 OSTEOPOROSIS: Primary | ICD-10-CM

## 2024-04-30 PROCEDURE — 99207 PR NO CHARGE NURSE ONLY: CPT

## 2024-04-30 PROCEDURE — 96372 THER/PROPH/DIAG INJ SC/IM: CPT | Performed by: INTERNAL MEDICINE

## 2024-04-30 NOTE — PROGRESS NOTES
Clinic Administered Medication Documentation      Prolia Documentation    Indication: Prolia  (denosumab) is a prescription medicine used to treat osteoporosis in patients who:   Are at high risk for fracture, meaning patients who have had a fracture related to osteoporosis, or who have multiple risk factors for fracture.  Cannot use another osteoporosis medicine or other osteoporosis medicines did not work well.  The timeline for early/late injections would be 4 weeks early and any time after the 6 month arcelia. If a patient receives their injection late, then the subsequent injection would be 6 months from the date that they actually received the injection.    When was the last injection?  23  Was the last injection at least 6 months ago? Yes  Has the prior authorization been completed?  Yes  Is there an active order (written within the past 365 days, with administrations remaining, not ) in the chart?  Yes  Patient denies any dental work involving the bone (e.g. tooth extraction or dental implants) in the past 4 weeks?  Yes  Patient denies plans for any dental work involving the bone (e.g. tooth extraction or dental implants) in the next 4 weeks? Yes    The following steps were completed to comply with the REMS program for Prolia:  Reviewed information in the Medication Guide and Patient Counseling Chart, including the serious risks of Prolia  and the symptoms of each risk.  Advised patient to seek prompt medical attention if they have signs or symptoms of any of the serious risks.  Provided each patient a copy of the Medication Guide and Patient Brochure.    Prior to injection, verified patient identity using patient's name and date of birth. Medication was administered. Please see MAR and medication order for additional information. Patient instructed to remain in clinic for 15 minutes and report any adverse reaction to staff immediately.    Vial/Syringe: Syringe  Was this medication supplied by the  patient? No  Patient has no refills remaining.

## 2024-06-01 ENCOUNTER — HEALTH MAINTENANCE LETTER (OUTPATIENT)
Age: 59
End: 2024-06-01

## 2024-07-31 ENCOUNTER — LAB REQUISITION (OUTPATIENT)
Dept: LAB | Facility: CLINIC | Age: 59
End: 2024-07-31
Payer: COMMERCIAL

## 2024-07-31 DIAGNOSIS — Z87.820 PERSONAL HISTORY OF TRAUMATIC BRAIN INJURY: ICD-10-CM

## 2024-08-12 LAB
ANION GAP SERPL CALCULATED.3IONS-SCNC: 10 MMOL/L (ref 7–15)
BUN SERPL-MCNC: 9.1 MG/DL (ref 8–23)
CALCIUM SERPL-MCNC: 8.4 MG/DL (ref 8.8–10.4)
CHLORIDE SERPL-SCNC: 109 MMOL/L (ref 98–107)
CREAT SERPL-MCNC: 0.77 MG/DL (ref 0.67–1.17)
EGFRCR SERPLBLD CKD-EPI 2021: >90 ML/MIN/1.73M2
ERYTHROCYTE [DISTWIDTH] IN BLOOD BY AUTOMATED COUNT: 15.3 % (ref 10–15)
GLUCOSE SERPL-MCNC: 103 MG/DL (ref 70–99)
HCO3 SERPL-SCNC: 23 MMOL/L (ref 22–29)
HCT VFR BLD AUTO: 40.7 % (ref 40–53)
HGB BLD-MCNC: 12.2 G/DL (ref 13.3–17.7)
MCH RBC QN AUTO: 22.9 PG (ref 26.5–33)
MCHC RBC AUTO-ENTMCNC: 30 G/DL (ref 31.5–36.5)
MCV RBC AUTO: 77 FL (ref 78–100)
PLATELET # BLD AUTO: 359 10E3/UL (ref 150–450)
POTASSIUM SERPL-SCNC: 3.9 MMOL/L (ref 3.4–5.3)
RBC # BLD AUTO: 5.32 10E6/UL (ref 4.4–5.9)
SODIUM SERPL-SCNC: 142 MMOL/L (ref 135–145)
TSH SERPL DL<=0.005 MIU/L-ACNC: 0.88 UIU/ML (ref 0.3–4.2)
WBC # BLD AUTO: 6 10E3/UL (ref 4–11)

## 2024-08-12 PROCEDURE — 80048 BASIC METABOLIC PNL TOTAL CA: CPT | Mod: ORL | Performed by: NURSE PRACTITIONER

## 2024-08-12 PROCEDURE — P9603 ONE-WAY ALLOW PRORATED MILES: HCPCS | Mod: ORL | Performed by: NURSE PRACTITIONER

## 2024-08-12 PROCEDURE — 84443 ASSAY THYROID STIM HORMONE: CPT | Mod: ORL | Performed by: NURSE PRACTITIONER

## 2024-08-12 PROCEDURE — 36415 COLL VENOUS BLD VENIPUNCTURE: CPT | Mod: ORL | Performed by: NURSE PRACTITIONER

## 2024-08-12 PROCEDURE — 85027 COMPLETE CBC AUTOMATED: CPT | Mod: ORL | Performed by: NURSE PRACTITIONER

## 2024-10-10 ENCOUNTER — TELEPHONE (OUTPATIENT)
Dept: ENDOCRINOLOGY | Facility: CLINIC | Age: 59
End: 2024-10-10
Payer: COMMERCIAL

## 2024-10-10 NOTE — TELEPHONE ENCOUNTER
Patient has a Prolia injection scheduled 24.  Per chart, insurance verification is closed ( 24?).    There is no current order for Prolia.  A separate encounter routed to Dr. Matt to place order.

## 2024-10-10 NOTE — TELEPHONE ENCOUNTER
Patient is scheduled for a Prolia injection 24.    Prolia order .  Need current order, and labs not up to date.      Prolia smartset pended.      Please sign if appropriate.  Or does patient need appointment first?    Last office visit 23    Please advise, thanks.

## 2024-10-14 NOTE — TELEPHONE ENCOUNTER
I left a message for the patient to return our call.     Offer video visit for 10/15 at 10:30.    Maren Manuel [facility]  [phone]

## 2024-10-15 NOTE — TELEPHONE ENCOUNTER
I left a message for the patient to return our call.     Maren Manuel [facility]  [phone]

## 2024-10-15 NOTE — TELEPHONE ENCOUNTER
Patient didn't return my call and hold was taken off.    Maren Manuel CMA  [facility] [place]  [phone]

## 2024-10-21 ENCOUNTER — TELEPHONE (OUTPATIENT)
Dept: ENDOCRINOLOGY | Facility: CLINIC | Age: 59
End: 2024-10-21
Payer: COMMERCIAL

## 2024-10-21 DIAGNOSIS — Z92.29 PERSONAL HISTORY OF OTHER DRUG THERAPY: ICD-10-CM

## 2024-10-21 DIAGNOSIS — M81.0 OSTEOPOROSIS: ICD-10-CM

## 2024-10-21 DIAGNOSIS — M80.00XD AGE-RELATED OSTEOPOROSIS WITH CURRENT PATHOLOGICAL FRACTURE WITH ROUTINE HEALING, SUBSEQUENT ENCOUNTER: Primary | ICD-10-CM

## 2024-10-21 DIAGNOSIS — Z92.29 HX DRUG THERAPY: ICD-10-CM

## 2024-10-21 NOTE — TELEPHONE ENCOUNTER
Health Call Center    Phone Message    May a detailed message be left on voicemail: yes     Reason for Call: Other: Patient missed last appt and says July of next year is too far out any ДМИТРИЙ's, please call.  His prolia is schedule for 11/4/2024 and they are under the impression they need an appt prior to proilia injection. Please call Mom.          Action Taken: Message routed to:  [facility] Endo    Travel Screening: Not Applicable     Date of Service:

## 2024-10-23 NOTE — TELEPHONE ENCOUNTER
Earlier they said that they cannot do video appointment so he was added to the schedule.  Currently there are no sooner appointments available.  Please check if cancellation.  Can he do video visit on 10/25/2024?     Please check with mother and inform us.  If not then can make an exception about Prolia  And can place orders.  Last visit 10/2023.

## 2024-10-23 NOTE — TELEPHONE ENCOUNTER
[facility] Call Center    Phone Message    May a detailed message be left on voicemail: yes     Reason for Call: Other: Per mom , pt cannot make it to the appt 10/24/24 and would like if the appt can be change to a diff date . Please call mom back. Thank you.      Action Taken: Message routed to:  [facility] ENDO    Travel Screening: Not Applicable     Date of Service:

## 2024-10-23 NOTE — TELEPHONE ENCOUNTER
Earlier they said that they cannot do video appointment so he was added to the schedule.  Currently there are no sooner appointments available.  Please check if cancellation.  Can he do video visit on 12/25/2024?    Please check with mother and inform us.  If not then can make an exception about Prolia  And can place orders.  Last visit 10/2023.

## 2024-10-23 NOTE — TELEPHONE ENCOUNTER
Spoke with Mom, she is wondering if the patient need to be seen/have an appt with provider before his Prolia injection ?     Appointment scheduled in a ДМИТРИЙ spot for .    Mom said that they can do an appointment tomorrow morning, early. She has a  at 11am. Otherwise she can try to do a video appointment, she's never done one before. She would be available all afternoon on Monday. She is available anytime on the .  Please advise.

## 2024-11-20 ENCOUNTER — OFFICE VISIT (OUTPATIENT)
Dept: ENDOCRINOLOGY | Facility: CLINIC | Age: 59
End: 2024-11-20
Payer: COMMERCIAL

## 2024-11-20 VITALS
HEART RATE: 57 BPM | WEIGHT: 183.3 LBS | OXYGEN SATURATION: 97 % | RESPIRATION RATE: 16 BRPM | TEMPERATURE: 97 F | HEIGHT: 71 IN | SYSTOLIC BLOOD PRESSURE: 111 MMHG | DIASTOLIC BLOOD PRESSURE: 54 MMHG | BODY MASS INDEX: 25.66 KG/M2

## 2024-11-20 DIAGNOSIS — M89.9 BONE DISEASE: ICD-10-CM

## 2024-11-20 DIAGNOSIS — Z92.29 PERSONAL HISTORY OF OTHER DRUG THERAPY: ICD-10-CM

## 2024-11-20 DIAGNOSIS — M81.0 AGE-RELATED OSTEOPOROSIS WITHOUT CURRENT PATHOLOGICAL FRACTURE: Primary | ICD-10-CM

## 2024-11-20 DIAGNOSIS — Z87.81 HISTORY OF VERTEBRAL FRACTURE: ICD-10-CM

## 2024-11-20 DIAGNOSIS — Z92.29 HISTORY OF BISPHOSPHONATE THERAPY: ICD-10-CM

## 2024-11-20 DIAGNOSIS — Z87.311 PERSONAL HISTORY OF (HEALED) OTHER PATHOLOGICAL FRACTURE: ICD-10-CM

## 2024-11-20 PROCEDURE — G2211 COMPLEX E/M VISIT ADD ON: HCPCS | Performed by: INTERNAL MEDICINE

## 2024-11-20 PROCEDURE — 36415 COLL VENOUS BLD VENIPUNCTURE: CPT | Performed by: INTERNAL MEDICINE

## 2024-11-20 PROCEDURE — 99214 OFFICE O/P EST MOD 30 MIN: CPT | Performed by: INTERNAL MEDICINE

## 2024-11-20 RX ORDER — KETOTIFEN FUMARATE 0.35 MG/ML
SOLUTION/ DROPS OPHTHALMIC
COMMUNITY
Start: 2024-03-08

## 2024-11-20 RX ORDER — SIMVASTATIN 40 MG
TABLET ORAL
COMMUNITY
Start: 2024-11-07

## 2024-11-20 RX ORDER — ASPIRIN 81 MG
TABLET, DELAYED RELEASE (ENTERIC COATED) ORAL
COMMUNITY
Start: 2024-11-07

## 2024-11-20 NOTE — PATIENT INSTRUCTIONS
[facility]  Dr Matt, Endocrinology Department    [facility]   [address] [facility]  [place] [address]  Appointment [phone]  Fax: [phone]  [place]: Monday - Thursday      Please check the cost coverage and copay with insurance before recommended tests, services and medications (especially if new medications are prescribed).     If ordered, please get blood work done 1 week prior to your next appointment so they will be available to Dr. Matt at your visit.    Labs today.  Plan to continue to PROLIA.  Next PROLIA 2024 or 2024 and 6 months after that.  DEXA at [place]  Follow up in 1 year.    PROLIA Scheduling information:  It will be done in clinic.   You need to make nurse only appointment. (call [place]: [phone] or [place]:[phone] and schedule Nurse only appointment)  You will need labs few days prior to PROLIA (calcium labs)- please schedule lab appointment.  PROLIA is every 6 months injection- so please schedule accordingly.  It is recommended NOT to miss or delay PROLIA injections.    Possible major side effects of Denosumab (PROLIA) include risk for atypical femur fractures, hypersensitivity, low calcium levels, osteonecrosis of jaw (which can manifest as jaw pain, osteomyelitis, osteitis, bone erosion, tooth/periodontal infection, toothache, gingival ulceration/erosion). Other s/e include but not limited to Hypertension, Headache and peripheral edema.   Always let your dentist lnow about the medication if plan for major dental procedure.    Indication: Prolia  (denosumab) is a prescription medicine used to treat osteoporosis in patients who:   Are at high risk for fracture, meaning patients who have had a fracture related to osteoporosis, or who have multiple risk factors for fracture   Cannot use another osteoporosis medicine or other osteoporosis medicines did not work well   The timeline for early/late injections  would be 4 weeks early and any time after the 6 month arcelia. If a patient receives their injection late, then the subsequent injection would be 6 months from the date that they actually received the injection      The pt was advised to  Maintain an adequate calcium and vitamin D intake and to supplement vitamin D if needed to maintain serum levels of 25 hydroxy D (25 OH D) between 30-60 ng/ml.  Limit alcohol intake to no more than 2 servings per day.  Limit caffeine intake.  Maintain an active lifestyle including weight-bearing exercises for at least 30 mins daily.  Take measures to reduce the risk of falling.     You should get 1000- 1200 mg/day calcium in divided doses of no more than 500 mg/dose.  This INCLUDES what is in your food as well as what is in any supplements you may be taking.    Vit D about 800-1000 IU/day ( unless you have vit D deficiency- in that case higher dose)  Dietary sources of calcium:: These also contain vitamin D  Milk                            8 oz            300 mg calcium  Yogurt                          1 cup           400 mg calcium   Hard cheese                     1.5 oz          300 mg  Cottage cheese                  2 cup           300 mg  Orange juice with Calcium       8 oz            300 mg  Low fat dairy sources are recommended        You should get 30 minutes of moderate weight bearing exercise on most days of the week .  Weight bearing exercise includes such things as walking, jogging, hiking, dancing.  You should also get Strength training 2 or more times/week in addition to other weight -being exercise. Strength training uses weight or resistance beyond that seen in everyday activities -(pilates, weight training with free weights, weight machines or resistance bands)     Living with Osteoporosis: Preventing Fractures  If you have osteoporosis, you can do a lot to reduce its effect on your life. Knowing how to prevent fractures and spinal curvature can help you live more  comfortably and safely with this disease.  Reducing your risk for fractures  The most common fracture sites in people with osteoporosis are the wrist, spine, and hip. These fractures are often caused by accidents and falls. All fractures are painful and may limit what you can do. But hip fractures are very serious. They often need surgery, and it can take months to recover. To reduce your risk for fractures:  Get regular exercise. Try walking, swimming, or weight training.  Eat foods that are rich in calcium, or take calcium supplements.  Make your home safe to help avoid accidents.  Take extra precautions not to fall in risky areas, such as icy sidewalks.  Understanding spinal fractures  Your spine is made up of many bones called vertebrae. Osteoporosis can cause the vertebrae in your spine to collapse. As a result, your upper back may arch forward, creating a curvature. Spine fractures may also result from back strain and bad posture. You will also lose height. Your lower spine must then adjust to keep your body balanced. This can cause back pain. To prevent or lessen these spinal changes:  Practice good posture.  Use proper techniques if you need to lift heavy objects.  Do back exercises to help your posture.  Lie on your back when you have pain.  Ask your healthcare provider about these and other ways to help your spine.  [employer] last reviewed this educational content on 5/1/2018 2000-2021 [employer]. All rights reserved. This information is not intended as a substitute for professional medical care. Always follow your healthcare professional's instructions.          Living with Osteoporosis: Regular Exercise  If you have osteoporosis, exercise is vital for your health. It can prevent bone fractures and spine changes. It will slow bone loss. Exercise will strengthen your body. It can also be fun. A variety of exercises is best. See below for exercises that can help you. But before you start,  talk with your healthcare provider to be sure these exercises are right for you.   Resistance exercises. These build muscle strength and maintain bone mass. They also make you less prone to injury. Exercises include lifting small weights, doing push-ups and sit-ups, using elastic exercise bands, and using weight machines.   Weight-bearing activities. These help your whole body. They also help you maintain bone mass. Activities include walking, dancing, and housework.   Non-weight-bearing exercises. These help prevent back strain and pain. They do this by building the trunk and leg muscles. Exercises that help with flexibility can prevent falls. Examples include swimming, water exercise, and stretching.   Staying safe  Here are tips to stay safe:   Always check with your healthcare provider before starting any new exercise program.  Use weights only as instructed.  Stop any exercise that causes pain.  [employer] last reviewed this educational content on 5/1/2018 2000-2021 [employer]. All rights reserved. This information is not intended as a substitute for professional medical care. Always follow your healthcare professional's instructions.          Preventing Osteoporosis: Avoiding Bone Loss  Certain factors can speed up bone loss or decrease bone growth. For example, alcohol, cigarettes, and certain medicines reduce bone mass. Some foods make it hard for your body to absorb calcium.  Things to avoid  Here are things to avoid to help prevent osteoporosis:  Alcohol. This is toxic to bones. It is a major cause of bone loss. Heavy drinking can cause osteoporosis even if you have no other risk factors.  Smoking. This reduces bone mass. Smoking may also interfere with estrogen levels and cause early menopause.  Inactivity. Not being active makes your bones lose strength and become thinner. Over time, thin bones may break. Women who aren't active are at a high risk for osteoporosis.  Certain medicines.  Some medicines, such as cortisone, increase bone loss. They also decrease bone growth. Ask your healthcare provider about any side effects of your medicines, and how to prevent them.  Protein-rich or salty foods. Eaten in large amounts, these foods may deplete calcium.  Caffeine. This increases calcium loss. People who drink a lot of coffee, tea, or soda lose more calcium than those who don't.  [employer] last reviewed this educational content on 5/1/2018 2000-2021 [employer]. All rights reserved. This information is not intended as a substitute for professional medical care. Always follow your healthcare professional's instructions.          Preventing Osteoporosis: Meeting Your Calcium Needs  Your body needs calcium to build and repair bones. But it can't make calcium on its own. That's why it's important to eat calcium-rich foods. Some foods are naturally rich in calcium. Others have calcium added (fortified). It's best to get calcium from the foods you eat. But if you can't get enough, you may want to take calcium supplements. To meet your daily calcium needs, try the foods listed below.                          Dairy Fish & beans Other sources   Source   Calcium (mg) per serving   Source   Calcium (mg) per serving   Source   Calcium (mg) per serving   Low-fat yogurt, plain   415 mg/8 oz.   Sardines, [place], canned, with bones   351 mg/3 oz.   Oatmeal, instant, fortified   215 mg/1 cup   Nonfat milk   302 mg/1 cup   [place], sockeye, canned, with bones   239 mg/3 oz.   Tofu made with calcium sulfate   204 mg/3 oz.   Low-fat milk   297 mg/1 cup   Soybeans, fresh, boiled   131 mg/1/2 cup   Collards   179 mg/1/2 cup   Swiss cheese   272 mg/1 oz.   White beans, cooked   81 mg/1/2 cup   English muffin, whole wheat   175 mg/1 muffin   Cheddar cheese   205 mg/1 oz.   Navy beans, cooked   79 mg/1/2 cup   Kale   90 mg/1/2 cup   Ice cream strawberry   79 mg/1/2 cup           Orange, navel   56 mg/1 medium    Note: Calcium levels may vary depending on brand and size.  Daily calcium needs  14 to 18 years old: 1,300 mg  19 to 30 years old: 1,000 mg  31 to 50 years old: 1,000 mg  51 to 70 years old, women: 1,200 mg  51 to 70 years old, men: 1,000 mg  Pregnant or nursin to 18 years old: 1,300 mg, 19 to 50 years old: 1,000 mg  Older than 70 (women and men): 1,200 mg   [employer] last reviewed this educational content on 2018-2021 [employer]. All rights reserved. This information is not intended as a substitute for professional medical care. Always follow your healthcare professional's instructions.

## 2024-11-20 NOTE — LETTER
11/20/2024      Yunior Angel  [id number] [address] [address] [facility] [address]  [place] [address]      Dear Colleague,    Thank you for referring your patient, Yunior Angel, to the [facility]. Please see a copy of my visit note below.    Name: Yunior Angel  For f/u of osteoporosis.   HPI:  Yunior Angel is a 59 year old male. Visit for f/u of osteoporosis.  He is with his mother- Patsy who is his co-guardian.  Yunior is living in assisted living.    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    H/o hip fracture in March 2018.  He presented to emergency department with acute right hip pain.  Questionable history about fall.  He did not recall a fall, but he has short-term memory deficit. Workup showed right femoral neck fracture.  He underwent ORIF.  Status post right hip arthroplasty.    H/o verbebral fracture: In May 2018.  He presented to emergency department after a fall.  Patient tripped and fell from a standing height.  Workup at that time showedCompression fraction of L1 of uncertain age.  This was followed by DEXA scan consistent with osteoporosis as noted below.  Normal thyroid function tests, parathyroid hormone, vitamin D and calcium level.  Testosterone noted to be low as discussed below      Also history of heterotropic ossification: Incidental on x-ray findings based on discharge summary March 2018. This was anterior to the right pubic inferior rami.  There was some surgical intervention.  Please refer to the orthopedics operative note for details.  This seems to be not uncommon in patients with previous neurologic injuries.  Radiation oncology was consulted and patient underwent radiation treatment ×1 per standard practice.    F/u DEXA 8/2020: : Severe osteoporosis on  the basis of hip and vertebral fractures. No significant change in bone density of the lumbar spine or spine.  DEXA 10/2022: Severe osteoporosis on the basis of hip and vertebral fractures. There has been a trend toward  significant incrrease in bone density of the lumbar spine. There has been probably no significant change in bone density of the hip(s). (Comparisons from different scanners)  In 5/2022- he sustained Closed displaced fracture of greater trochanter of right femur status post total hip arthroplasty (while on Fosamax.  It is not reported as atypical fracture).  His mother's reports that he was not consistent taking medication as recommended.  Was on Fosamax 70 mg/ once a week (12/6/2018--2023)  Switched to PROLIA in 2023.    PROLIA dates: (gets at [facility])  04/30/24 09/29/23    She is living in assisted living.  Patsy ( his mother) reports that Yunior is doing OK.  Is using cane for walking. No major concerns.  Smoke:No  Family History:Yes: h/o osteoporosisin mother.  Fractures:as above  Kidney stones: No  GI Surgery:No  Bisphosphonate exposure:  no  Exercise:goes to [employer].  Prostate History: no  Diet:   Milk: 1   Yogurt/Cottage Cheese: 1   Ice Cream  Ca/Vitamin D: Calcium 600-1200 mg/day . 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     Past Surgical History:   Procedure Laterality Date     ARTHROPLASTY HIP Right 3/14/2018    Procedure: ARTHROPLASTY HIP;  Right total hip arthroplasty;  Surgeon: Parmjit Zabala MD;  Location:  OR     COLONOSCOPY N/A 12/31/2014    Procedure: COLONOSCOPY;  Surgeon: Inna Gonzalez MD;  Location:  GI     ESOPHAGOSCOPY, GASTROSCOPY,  DUODENOSCOPY (EGD), COMBINED N/A 12/31/2014    Procedure: COMBINED ESOPHAGOSCOPY, GASTROSCOPY, DUODENOSCOPY (EGD), BIOPSY SINGLE OR MULTIPLE;  Surgeon: Inna Gonzalez MD;  Location:  GI     SURGICAL HISTORY OF -       foot surgery     SURGICAL HISTORY OF -   2006    1.  Avulsion of the right great toenail.  2.  Excision of bone cyst distal phalanx, right great toe.      Family Hx:  Family History   Problem Relation Age of Onset     Migraines Father      Family History Negative Mother      Cancer - colorectal No family hx of      Colon Cancer No family hx of           Social Hx:  Social History     Socioeconomic History     Marital status: Single     Spouse name: Not on file     Number of children: Not on file     Years of education: Not on file     Highest education level: Not on file   Occupational History     Not on file   Tobacco Use     Smoking status: Never     Smokeless tobacco: Never   Substance and Sexual Activity     Alcohol use: No     Alcohol/week: 0.0 standard drinks of alcohol     Drug use: No     Sexual activity: Never   Other Topics Concern     Parent/sibling w/ CABG, MI or angioplasty before 65F 55M? Not Asked   Social History Narrative     Not on file     Social Drivers of Health     Financial Resource Strain: Not on file   Food Insecurity: Not on file   Transportation Needs: Not on file   Physical Activity: Not on file   Stress: Not on file   Social Connections: Not on file   Interpersonal Safety: Not on file   Housing Stability: Not on file          MEDICATIONS:  has a current medication list which includes the following prescription(s): acetaminophen, baclofen, buspirone, calcium carb-cholecalciferol, sarna, ketotifen fumarate 0.035%, lorazepam, multivitamin, therapeutic, omeprazole, order for dme, paroxetine, senna-docusate, simvastatin, and vitamin d (cholecalciferol), and the following Facility-Administered Medications: denosumab.    ROS     ROS: 10 point ROS neg other than the  "symptoms noted above in the HPI.    Physical Exam   VS: /54 (BP Location: Left arm, Patient Position: Chair, Cuff Size: Adult Regular)   Pulse 57   Temp 97  F (36.1  C) (Tympanic)   Resp 16   Ht 1.803 m (5' 10.98\")   Wt 83.1 kg (183 lb 4.8 oz)   SpO2 97%   BMI 25.58 kg/m    GENERAL: healthy, alert and no distress  EYES: Eyes grossly normal to inspection, conjunctivae and sclerae normal  ENT: no nose swelling, nasal discharge.  Thyroid: no apparent thyroid nodules.  Thyroid appears normal in size and nontender.  CV: RRR, no rubs, gallops, no murmurs  RESP: CTAB, no wheezes, rales, or ronchi  ABDO: +BS  EXTREMITIES: no hand tremors.  NEURO: Cranial nerves grossly intact, mentation intact and speech normal  SKIN: No apparent skin lesions, rash or edema seen   PSYCH: mentation appears normal, affect normal/bright, judgement and insight intact, normal speech and appearance well-groomed      LABS:  Component      Latest Ref Rng & Units 11/28/2018   Testosterone Total      240 - 950 ng/dL 186 (L)   Sex Hormone Binding Globulin      11 - 80 nmol/L 36   Free Testosterone Calculated      4.7 - 24.4 ng/dL 3.33 (L)   Creatinine      0.66 - 1.25 mg/dL 0.85   GFR Estimate      >60 mL/min/1.7m2 >90   GFR Estimate If Black      >60 mL/min/1.7m2 >90   Parathyroid Hormone Intact      18 - 80 pg/mL 80   TSH      0.40 - 4.00 mU/L 0.79   Vitamin D Deficiency screening      20 - 75 ug/L 45   Calcium      8.5 - 10.1 mg/dL 9.5     BMP:  Creatinine   Date Value Ref Range Status   08/12/2024 0.77 0.67 - 1.17 mg/dL Final   09/29/2020 0.91 0.66 - 1.25 mg/dL Final       TFTs:  TSH   Date Value Ref Range Status   08/12/2024 0.88 0.30 - 4.20 uIU/mL Final   05/03/2022 0.42 0.40 - 4.00 mU/L Final   11/28/2018 0.79 0.40 - 4.00 mU/L Final     PTH:   ENDO CALCIUM LABS-[facility] Latest Ref Rng & Units 11/28/2018   PARATHYROID HORMONE INTACT 18 - 80 pg/mL 80     Vitamin D:  Vitamin D Deficiency Screening Results:  Lab Results   Component Value " Date    VITDT 45 09/06/2023    VITDT 44 11/23/2021    VITDT 45 11/28/2018    VITDT 43 06/25/2018       BoneTurnOverMarkers:     Testosterone:  ENDO [facility]UIT[facility] LABS[facility] Latest Ref Rng & Units 12/14/2018 11/28/2018   TESTOSTERONE TOTAL 240 - 950 ng/dL 269 186 (L)     DEXA 2022:  IMPRESSION  Severe osteoporosis on the basis of hip and vertebral fractures.  Degenerative changes at the lumbar spine may falsely elevate results.   There has been a trend toward  significant incrrease in bone density of the lumbar spine. There has been probably no significant change in bone density of the hip(s). The forearms can not be compared.      All pertinent notes, labs, and images personally reviewed by me.     A/P  #1 Osteoporosis:  Risk factors for low bone density include personal history of fracture, family history, , low Ca intake.    History of compression vertebral fracture of L1  History of hip fracture status post hip arthroplasty  Also history of heterotropic ossification status post radiation treatment  DEXA 2018 consistent with severe osteoporosis  Normal thyroid function tests, parathyroid hormone, vitamin D and calcium level.  Was on Fosamax 70 mg/ once a week (12/6/2018--2023)  On PROLIA since 2023.  DEXA 2022 as noted above.  Plan:  Discussed diagnosis, pathophysiology, management and treatment options of condition with pt.  In the setting of femur fracture (on Fosamax) in 5/2022 and completion of about 4-5 years on bisphosphonate recommeednd to stop fosamax.  Earlier discussed medication like Forteo/tymolos but they wanted to avoid daily injections.  On prolia since 2023 and tolerating ok.  Labs today. (Ca check)  Plan to continue to PROLIA.  Next PROLIA 11/2024 or 12/2024 and 6 months after that.  DEXA at [place]  Follow up in 1 year.    Plan: Calcium, denosumab (PROLIA) injection 60 mg,         Creatinine, Calcium, Vitamin D Deficiency, DX         Bone Density       #2.  Low testosterone/Male  hypogonadism:  Testosterone noted to be low on workup of osteoporosis.  Low testosterone X1  Repeat in range.  Plan  Plan to continue to monitor.    The pt was advised to  Maintain an adequate calcium and vitamin D intake and to supplement vitamin D if needed to maintain serum levels of 25 hydroxy D (25 OH D) between 30-60 ng/ml.  Limit alcohol intake to no more than 2 servings per day.  Limit caffeine intake.  Maintain an active lifestyle including weight-bearing exercises for at least 30 mins daily.  Take measures to reduce the risk of falling.    Possible major side effects of Denosumab (PROLIA) include risk for atypical femur fractures, hypersensitivity, low calcium levels, osteonecrosis of jaw (which can manifest as jaw pain, osteomyelitis, osteitis, bone erosion, tooth/periodontal infection, toothache, gingival ulceration/erosion). Other s/e include but not limited to Hypertension, Headache and peripheral edema.   Always let your dentist lnow about the medication if plan for major dental procedure.    Indication: Prolia  (denosumab) is a prescription medicine used to treat osteoporosis in patients who:   Are at high risk for fracture, meaning patients who have had a fracture related to osteoporosis, or who have multiple risk factors for fracture   Cannot use another osteoporosis medicine or other osteoporosis medicines did not work well   The timeline for early/late injections would be 4 weeks early and any time after the 6 month arcelia. If a patient receives their injection late, then the subsequent injection would be 6 months from the date that they actually received the injection      Discussed indications, risks and benefits of all medications prescribed, and answered questions to patient's satisfaction.  The longitudinal plan of care for the diagnosis(es)/condition(s) as documented were addressed during this visit. Due to the added complexity in care, I will continue to support Yunior in the subsequent  management and with ongoing continuity of care.  All questions were answered.  The patient indicates understanding of the above issues and agrees with the plan set forth.      Follow-up:  As noted in AVS    Crystal Matt MD  Endocrinology  [facility]  CC: Alberto Carlos Jr.    All questions were answered.  The patients mother indicates understanding of the above issues and agrees with the plan set forth.      Addendum to above note and clinic visit:    Labs reviewed.    See result note/telephone encounter.      The following steps were completed to comply with the REMS program for Prolia:  Reviewed the serious risks of Prolia  and the symptoms of each risk.  Advised patient to seek prompt medical attention if they have signs or symptoms of any of the serious risks.  Patient will be provided a copy of the Medication Guide and Patient Brochure prior to first injection.    Crystal Matt MD      Again, thank you for allowing me to participate in the care of your patient.        Sincerely,        Crystal Mtat MD

## 2024-11-20 NOTE — PROGRESS NOTES
Name: Yunior Angel  For f/u of osteoporosis.   HPI:  Yunior Angel is a 59 year old male. Visit for f/u of osteoporosis.  He is with his mother- Patsy who is his co-guardian.  Yunior is living in assisted living.    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    H/o hip fracture in March 2018.  He presented to emergency department with acute right hip pain.  Questionable history about fall.  He did not recall a fall, but he has short-term memory deficit. Workup showed right femoral neck fracture.  He underwent ORIF.  Status post right hip arthroplasty.    H/o verbebral fracture: In May 2018.  He presented to emergency department after a fall.  Patient tripped and fell from a standing height.  Workup at that time showedCompression fraction of L1 of uncertain age.  This was followed by DEXA scan consistent with osteoporosis as noted below.  Normal thyroid function tests, parathyroid hormone, vitamin D and calcium level.  Testosterone noted to be low as discussed below      Also history of heterotropic ossification: Incidental on x-ray findings based on discharge summary March 2018. This was anterior to the right pubic inferior rami.  There was some surgical intervention.  Please refer to the orthopedics operative note for details.  This seems to be not uncommon in patients with previous neurologic injuries.  Radiation oncology was consulted and patient underwent radiation treatment ×1 per standard practice.    F/u DEXA 8/2020: : Severe osteoporosis on the basis of hip and vertebral fractures. No significant change in bone density of the lumbar spine or spine.  DEXA 10/2022: Severe osteoporosis on the basis of hip and vertebral fractures. There has been a trend toward  significant incrrease in bone  density of the lumbar spine. There has been probably no significant change in bone density of the hip(s). (Comparisons from different scanners)  In 5/2022- he sustained Closed displaced fracture of greater trochanter of right femur status post total hip arthroplasty (while on Fosamax.  It is not reported as atypical fracture).  His mother's reports that he was not consistent taking medication as recommended.  Was on Fosamax 70 mg/ once a week (12/6/2018--2023)  Switched to PROLIA in 2023.    PROLIA dates: (gets at [facility])  04/30/24 09/29/23    She is living in assisted living.  Patsy ( his mother) reports that Yunior is doing OK.  Is using cane for walking. No major concerns.  Smoke:No  Family History:Yes: h/o osteoporosisin mother.  Fractures:as above  Kidney stones: No  GI Surgery:No  Bisphosphonate exposure:  no  Exercise:goes to [employer].  Prostate History: no  Diet:   Milk: 1   Yogurt/Cottage Cheese: 1   Ice Cream  Ca/Vitamin D: Calcium 600-1200 mg/day . 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  Avulsion of the right great toenail.  2.  Excision of bone cyst distal phalanx, right great toe.      Family Hx:  Family History   Problem Relation Age of Onset    Migraines Father     Family History Negative Mother     Cancer - colorectal No family hx of     Colon Cancer No family hx of           Social Hx:  Social History     Socioeconomic History    Marital status: Single     Spouse name: Not on file    Number of children: Not on file    Years of education: Not on file    Highest education level: Not on file   Occupational History    Not on file   Tobacco Use    Smoking status: Never    Smokeless tobacco: Never   Substance and Sexual Activity    Alcohol use: No     Alcohol/week: 0.0 standard drinks of alcohol    Drug use: No    Sexual activity: Never   Other Topics Concern    Parent/sibling w/ CABG, MI or angioplasty before 65F 55M? Not Asked   Social History Narrative    Not on file     Social Drivers of Health     Financial Resource Strain: Not on file   Food Insecurity: Not on file   Transportation Needs: Not on file   Physical Activity: Not on file   Stress: Not on file   Social Connections: Not on file   Interpersonal Safety: Not on file   Housing Stability: Not on file          MEDICATIONS:  has a current medication list which includes the following prescription(s): acetaminophen, baclofen, buspirone, calcium carb-cholecalciferol, sarna, ketotifen fumarate 0.035%, lorazepam, multivitamin, therapeutic, omeprazole, order for dme, paroxetine, senna-docusate, simvastatin, and vitamin d (cholecalciferol), and the following Facility-Administered Medications: denosumab.    ROS     ROS: 10 point ROS neg other than the symptoms noted above in the HPI.    Physical Exam   VS: /54 (BP Location: Left arm, Patient Position: Chair, Cuff Size: Adult Regular)   Pulse 57   Temp 97  F (36.1  C) (Tympanic)   Resp 16   Ht 1.803 m (5' 10.98\")   Wt 83.1 kg (183 lb 4.8 oz)   SpO2 97%   BMI 25.58 kg/m  "   GENERAL: healthy, alert and no distress  EYES: Eyes grossly normal to inspection, conjunctivae and sclerae normal  ENT: no nose swelling, nasal discharge.  Thyroid: no apparent thyroid nodules.  Thyroid appears normal in size and nontender.  CV: RRR, no rubs, gallops, no murmurs  RESP: CTAB, no wheezes, rales, or ronchi  ABDO: +BS  EXTREMITIES: no hand tremors.  NEURO: Cranial nerves grossly intact, mentation intact and speech normal  SKIN: No apparent skin lesions, rash or edema seen   PSYCH: mentation appears normal, affect normal/bright, judgement and insight intact, normal speech and appearance well-groomed      LABS:  Component      Latest Ref Rng & Units 11/28/2018   Testosterone Total      240 - 950 ng/dL 186 (L)   Sex Hormone Binding Globulin      11 - 80 nmol/L 36   Free Testosterone Calculated      4.7 - 24.4 ng/dL 3.33 (L)   Creatinine      0.66 - 1.25 mg/dL 0.85   GFR Estimate      >60 mL/min/1.7m2 >90   GFR Estimate If Black      >60 mL/min/1.7m2 >90   Parathyroid Hormone Intact      18 - 80 pg/mL 80   TSH      0.40 - 4.00 mU/L 0.79   Vitamin D Deficiency screening      20 - 75 ug/L 45   Calcium      8.5 - 10.1 mg/dL 9.5     BMP:  Creatinine   Date Value Ref Range Status   08/12/2024 0.77 0.67 - 1.17 mg/dL Final   09/29/2020 0.91 0.66 - 1.25 mg/dL Final       TFTs:  TSH   Date Value Ref Range Status   08/12/2024 0.88 0.30 - 4.20 uIU/mL Final   05/03/2022 0.42 0.40 - 4.00 mU/L Final   11/28/2018 0.79 0.40 - 4.00 mU/L Final     PTH:   ENDO CALCIUM LABS-[facility] Latest Ref Rng & Units 11/28/2018   PARATHYROID HORMONE INTACT 18 - 80 pg/mL 80     Vitamin D:  Vitamin D Deficiency Screening Results:  Lab Results   Component Value Date    VITDT 45 09/06/2023    VITDT 44 11/23/2021    VITDT 45 11/28/2018    VITDT 43 06/25/2018       BoneTurnOverMarkers:     Testosterone:  ENDO PITUITARY LABS-[facility] Latest Ref Rng & Units 12/14/2018 11/28/2018   TESTOSTERONE TOTAL 240 - 950 ng/dL 269 186 (L)     DEXA  2022:  IMPRESSION  Severe osteoporosis on the basis of hip and vertebral fractures.  Degenerative changes at the lumbar spine may falsely elevate results.   There has been a trend toward  significant incrrease in bone density of the lumbar spine. There has been probably no significant change in bone density of the hip(s). The forearms can not be compared.      All pertinent notes, labs, and images personally reviewed by me.     A/P  #1 Osteoporosis:  Risk factors for low bone density include personal history of fracture, family history, , low Ca intake.    History of compression vertebral fracture of L1  History of hip fracture status post hip arthroplasty  Also history of heterotropic ossification status post radiation treatment  DEXA 2018 consistent with severe osteoporosis  Normal thyroid function tests, parathyroid hormone, vitamin D and calcium level.  Was on Fosamax 70 mg/ once a week (12/6/2018--2023)  On PROLIA since 2023.  DEXA 2022 as noted above.  Plan:  Discussed diagnosis, pathophysiology, management and treatment options of condition with pt.  In the setting of femur fracture (on Fosamax) in 5/2022 and completion of about 4-5 years on bisphosphonate recommeednd to stop fosamax.  Earlier discussed medication like Forteo/tymolos but they wanted to avoid daily injections.  On prolia since 2023 and tolerating ok.  Labs today. (Ca check)  Plan to continue to PROLIA.  Next PROLIA 11/2024 or 12/2024 and 6 months after that.  DEXA at [place]  Follow up in 1 year.    Plan: Calcium, denosumab (PROLIA) injection 60 mg,         Creatinine, Calcium, Vitamin D Deficiency, DX         Bone Density       #2.  Low testosterone/Male hypogonadism:  Testosterone noted to be low on workup of osteoporosis.  Low testosterone X1  Repeat in range.  Plan  Plan to continue to monitor.    The pt was advised to  Maintain an adequate calcium and vitamin D intake and to supplement vitamin D if needed to maintain serum levels  of 25 hydroxy D (25 OH D) between 30-60 ng/ml.  Limit alcohol intake to no more than 2 servings per day.  Limit caffeine intake.  Maintain an active lifestyle including weight-bearing exercises for at least 30 mins daily.  Take measures to reduce the risk of falling.    Possible major side effects of Denosumab (PROLIA) include risk for atypical femur fractures, hypersensitivity, low calcium levels, osteonecrosis of jaw (which can manifest as jaw pain, osteomyelitis, osteitis, bone erosion, tooth/periodontal infection, toothache, gingival ulceration/erosion). Other s/e include but not limited to Hypertension, Headache and peripheral edema.   Always let your dentist lnow about the medication if plan for major dental procedure.    Indication: Prolia  (denosumab) is a prescription medicine used to treat osteoporosis in patients who:   Are at high risk for fracture, meaning patients who have had a fracture related to osteoporosis, or who have multiple risk factors for fracture   Cannot use another osteoporosis medicine or other osteoporosis medicines did not work well   The timeline for early/late injections would be 4 weeks early and any time after the 6 month arcelia. If a patient receives their injection late, then the subsequent injection would be 6 months from the date that they actually received the injection      Discussed indications, risks and benefits of all medications prescribed, and answered questions to patient's satisfaction.  The longitudinal plan of care for the diagnosis(es)/condition(s) as documented were addressed during this visit. Due to the added complexity in care, I will continue to support Yunior in the subsequent management and with ongoing continuity of care.  All questions were answered.  The patient indicates understanding of the above issues and agrees with the plan set forth.      Follow-up:  As noted in AVS    Crystal Matt MD  Endocrinology  [facility]  CC:  Alberto Carlos Jr.    All questions were answered.  The patients mother indicates understanding of the above issues and agrees with the plan set forth.      Addendum to above note and clinic visit:    Labs reviewed.    See result note/telephone encounter.      The following steps were completed to comply with the REMS program for Prolia:  Reviewed the serious risks of Prolia  and the symptoms of each risk.  Advised patient to seek prompt medical attention if they have signs or symptoms of any of the serious risks.  Patient will be provided a copy of the Medication Guide and Patient Brochure prior to first injection.    Crystal Matt MD

## 2024-11-21 ENCOUNTER — PATIENT OUTREACH (OUTPATIENT)
Dept: CARE COORDINATION | Facility: CLINIC | Age: 59
End: 2024-11-21
Payer: COMMERCIAL

## 2024-11-21 LAB
CALCIUM SERPL-MCNC: 10 MG/DL (ref 8.8–10.4)
CREAT SERPL-MCNC: 0.83 MG/DL (ref 0.67–1.17)
EGFRCR SERPLBLD CKD-EPI 2021: >90 ML/MIN/1.73M2
VIT D+METAB SERPL-MCNC: 33 NG/ML (ref 20–50)

## 2024-12-05 ENCOUNTER — ALLIED HEALTH/NURSE VISIT (OUTPATIENT)
Dept: NURSING | Facility: CLINIC | Age: 59
End: 2024-12-05
Payer: COMMERCIAL

## 2024-12-05 DIAGNOSIS — M81.0 OSTEOPOROSIS: Primary | ICD-10-CM

## 2024-12-05 NOTE — PROGRESS NOTES
Clinic Administered Medication Documentation      Prolia Documentation    Indication: Prolia  (denosumab) is a prescription medicine used to treat osteoporosis in patients who:   Are at high risk for fracture, meaning patients who have had a fracture related to osteoporosis, or who have multiple risk factors for fracture.  Cannot use another osteoporosis medicine or other osteoporosis medicines did not work well.  The timeline for early/late injections would be 4 weeks early and any time after the 6 month arcelia. If a patient receives their injection late, then the subsequent injection would be 6 months from the date that they actually received the injection.    When was the last injection?  24  Was the last injection at least 6 months ago? Yes  Has the prior authorization been completed?  Yes  Is there an active order (written within the past 365 days, with administrations remaining, not ) in the chart?  Yes   GFR Estimate   Date Value Ref Range Status   2024 >90 >60 mL/min/1.73m2 Final     Comment:     eGFR calculated using  CKD-EPI equation.   2020 >90 >60 mL/min/[1.73_m2] Final     Comment:     Non  GFR Calc  Starting 2018, serum creatinine based estimated GFR (eGFR) will be   calculated using the Chronic Kidney Disease Epidemiology Collaboration   (CKD-EPI) equation.       Has patient had a GFR within the last 12 months? Yes   Is GFR under 30, or patient has a diagnosis of CKD4 or CKD5? No   Patient denies gastric bypass or parathyroid surgery in past 6 months? Yes - patient denies.   Patient denies dental work in the past two months involving drilling into the bone, such as implants/extractions, oral surgery or a tooth extraction that has not healed yet?  Yes  Patient denies plans for an emergency tooth extraction within the next week? Yes    The following steps were completed to comply with the REMS program for Prolia:  Reviewed information in the Medication  Guide, including the serious risks of Prolia  and the symptoms of each risk.  Advised patient to seek prompt medical attention if they have signs or symptoms of any of the serious risks.  Provided each patient a copy of the Medication Guide and Patient Guide.    Prior to injection, verified patient identity using patient's name and date of birth. Medication was administered. Please see MAR and medication order for additional information. Patient instructed to remain in clinic for 15 minutes and report any adverse reaction to staff immediately.    Vial/Syringe: Syringe  Was this medication supplied by the patient? No  Verified that the patient has administrations remaining in their prescription.

## 2025-02-21 NOTE — TELEPHONE ENCOUNTER
Form faxed and sent to scanning.  Ping Garay      
Form reconciled.  Form signed and placed in TC basket.    Alberto Carlos MD    
MED REC DONE     Discrepancies:      Calcium with D           Epic: take one tablet daily           Form:  Take one tab twice daily per mother and endocrinology       Vitamin D   Epic: take 2 daily    Form: take 1 daily    Meds on Epic but NOT  on Form:       None      Meds on Form but NOT on Epic :     Miralax     Tylenol      Dicyclomine 10mg cap     Omeprazole 40mg       Routing to PCP for further review/recommendations/orders    EUSEBIO MimsN, RN  [facility]    
Reason for Call:  Form, our goal is to have forms completed with 72 hours, however, some forms may require a visit or additional information.    Type of letter, form or note:  Home Health Certification    Who is the form from?: Home care    Where did the form come from: form was faxed in    What clinic location was the form placed at?: [place]    Where the form was placed: Given to MA/STELLA Bernal    What number is listed as a contact on the form?:        Additional comments:     Call taken on 10/1/2020 at 3:15 PM by Ping Garay        
Resulted

## 2025-07-10 NOTE — TELEPHONE ENCOUNTER
Home Care nurse Leona needs verbal orders for continued 1 time a week ( for 9 weeks) home care visits.    Please okay or include also the need for several PRN visits in orders in case she needs to go back more than once in a week.     Please call Leona at [phone] with verbal orders.  Can leave a message if she is not available.       Luica Pablo   .         IMPROVE-DD Application Not Available

## (undated) DEVICE — BAG CLEAR TRASH 1.3M 39X33" P4040C

## (undated) DEVICE — HOOD FLYTE W/PEELAWAY 408-800-100

## (undated) DEVICE — GLOVE PROTEXIS POWDER FREE 7.5 ORTHOPEDIC 2D73ET75

## (undated) DEVICE — SU VICRYL+ 0 CT 36" DYED VCP358H

## (undated) DEVICE — GLOVE PROTEXIS POWDER FREE SMT 8.0  2D72PT80X

## (undated) DEVICE — PREP CHLORAPREP 26ML TINTED ORANGE  260815

## (undated) DEVICE — SUCTION MANIFOLD NEPTUNE 2 SYS 4 PORT 0702-020-000

## (undated) DEVICE — SOL NACL 0.9% IRRIG 3000ML BAG 2B7477

## (undated) DEVICE — BLADE SAW SAGITTAL STRK 25X90X1.27MM HD SYS 6 6125-127-090

## (undated) DEVICE — DRAPE STERI TOWEL LG 1010

## (undated) DEVICE — SUTURE MONOCRYL+ 2-0 CT-1 36" UNDYED MCP945H

## (undated) DEVICE — LINEN FULL SHEET 5511

## (undated) DEVICE — SPONGE RAY-TEC 4X8" 7318

## (undated) DEVICE — DRSG AQUACEL AG 3.5X9.75" HYDROFIBER 412011

## (undated) DEVICE — LINEN HALF SHEET 5512

## (undated) DEVICE — GLOVE PROTEXIS POWDER FREE 7.0 ORTHOPEDIC 2D73ET70

## (undated) DEVICE — CATH TRAY FOLEY COUDE SURESTEP 16FR W/DRN BAG LATEX A304416A

## (undated) DEVICE — SUTURE VICRYL+ 0 CT-1 18" DYED VIO VCP740D

## (undated) DEVICE — GOWN IMPERVIOUS BREATHABLE SMART XLG 89045

## (undated) DEVICE — SET HANDPIECE INTERPULSE W/COAXIAL FAN SPRAY TIP 0210118000

## (undated) DEVICE — SU ETHIBOND 5 V-37 4X30" MB66G

## (undated) DEVICE — Device

## (undated) DEVICE — PACK TOTAL HIP RIDGES LATEX PO15HIFSG

## (undated) DEVICE — LINEN ORTHO ACL PACK 5447

## (undated) DEVICE — CLEANSER WOUND IRRISEPT 0.05% CHG IRRISEPT-403

## (undated) DEVICE — LINEN DRAPE 54X72" 5467

## (undated) DEVICE — GLOVE PROTEXIS BLUE W/NEU-THERA 8.0  2D73EB80

## (undated) DEVICE — SOL WATER IRRIG 1000ML BOTTLE 2F7114

## (undated) DEVICE — DRSG GAUZE 4X4" 8044

## (undated) DEVICE — GLOVE PROTEXIS BLUE W/NEU-THERA 7.0  2D73EB70

## (undated) DEVICE — IMM PILLOW ABDUCT HIP MED M60-025-M

## (undated) RX ORDER — FENTANYL CITRATE 50 UG/ML
INJECTION, SOLUTION INTRAMUSCULAR; INTRAVENOUS
Status: DISPENSED
Start: 2018-03-14

## (undated) RX ORDER — ONDANSETRON 2 MG/ML
INJECTION INTRAMUSCULAR; INTRAVENOUS
Status: DISPENSED
Start: 2018-03-14

## (undated) RX ORDER — HYDROMORPHONE HYDROCHLORIDE 1 MG/ML
INJECTION, SOLUTION INTRAMUSCULAR; INTRAVENOUS; SUBCUTANEOUS
Status: DISPENSED
Start: 2018-03-14

## (undated) RX ORDER — GLYCOPYRROLATE 0.2 MG/ML
INJECTION INTRAMUSCULAR; INTRAVENOUS
Status: DISPENSED
Start: 2018-03-14

## (undated) RX ORDER — PANTOPRAZOLE SODIUM 40 MG/1
TABLET, DELAYED RELEASE ORAL
Status: DISPENSED
Start: 2018-03-14

## (undated) RX ORDER — PROPOFOL 10 MG/ML
INJECTION, EMULSION INTRAVENOUS
Status: DISPENSED
Start: 2018-03-14

## (undated) RX ORDER — ACETAMINOPHEN 500 MG
TABLET ORAL
Status: DISPENSED
Start: 2018-03-14

## (undated) RX ORDER — DEXAMETHASONE SODIUM PHOSPHATE 4 MG/ML
INJECTION, SOLUTION INTRA-ARTICULAR; INTRALESIONAL; INTRAMUSCULAR; INTRAVENOUS; SOFT TISSUE
Status: DISPENSED
Start: 2018-03-14

## (undated) RX ORDER — NEOSTIGMINE METHYLSULFATE 1 MG/ML
VIAL (ML) INJECTION
Status: DISPENSED
Start: 2018-03-14

## (undated) RX ORDER — CEFAZOLIN SODIUM 2 G/100ML
INJECTION, SOLUTION INTRAVENOUS
Status: DISPENSED
Start: 2018-03-14

## (undated) RX ORDER — LIDOCAINE HYDROCHLORIDE 10 MG/ML
INJECTION, SOLUTION EPIDURAL; INFILTRATION; INTRACAUDAL; PERINEURAL
Status: DISPENSED
Start: 2018-03-14